# Patient Record
Sex: FEMALE | Race: BLACK OR AFRICAN AMERICAN | NOT HISPANIC OR LATINO
[De-identification: names, ages, dates, MRNs, and addresses within clinical notes are randomized per-mention and may not be internally consistent; named-entity substitution may affect disease eponyms.]

---

## 2019-05-31 ENCOUNTER — RESULT REVIEW (OUTPATIENT)
Age: 54
End: 2019-05-31

## 2019-05-31 ENCOUNTER — APPOINTMENT (OUTPATIENT)
Dept: OBGYN | Facility: CLINIC | Age: 54
End: 2019-05-31
Payer: COMMERCIAL

## 2019-05-31 ENCOUNTER — INPATIENT (INPATIENT)
Facility: HOSPITAL | Age: 54
LOS: 3 days | Discharge: HOME | DRG: 872 | End: 2019-06-04
Attending: INTERNAL MEDICINE | Admitting: STUDENT IN AN ORGANIZED HEALTH CARE EDUCATION/TRAINING PROGRAM
Payer: COMMERCIAL

## 2019-05-31 VITALS
WEIGHT: 156.09 LBS | HEIGHT: 69 IN | OXYGEN SATURATION: 99 % | HEART RATE: 98 BPM | RESPIRATION RATE: 18 BRPM | DIASTOLIC BLOOD PRESSURE: 78 MMHG | SYSTOLIC BLOOD PRESSURE: 133 MMHG | TEMPERATURE: 102 F

## 2019-05-31 LAB
ALBUMIN SERPL ELPH-MCNC: 4.4 G/DL — SIGNIFICANT CHANGE UP (ref 3.3–5)
ALP SERPL-CCNC: 98 U/L — SIGNIFICANT CHANGE UP (ref 40–120)
ALT FLD-CCNC: 54 U/L — HIGH (ref 10–45)
ANION GAP SERPL CALC-SCNC: 14 MMOL/L — SIGNIFICANT CHANGE UP (ref 5–17)
APPEARANCE UR: ABNORMAL
AST SERPL-CCNC: 44 U/L — HIGH (ref 10–40)
BACTERIA # UR AUTO: ABNORMAL
BASOPHILS # BLD AUTO: 0 K/UL — SIGNIFICANT CHANGE UP (ref 0–0.2)
BASOPHILS NFR BLD AUTO: 0.2 % — SIGNIFICANT CHANGE UP (ref 0–2)
BILIRUB SERPL-MCNC: 0.9 MG/DL — SIGNIFICANT CHANGE UP (ref 0.2–1.2)
BILIRUB UR-MCNC: NEGATIVE — SIGNIFICANT CHANGE UP
BUN SERPL-MCNC: 9 MG/DL — SIGNIFICANT CHANGE UP (ref 7–23)
CALCIUM SERPL-MCNC: 9.7 MG/DL — SIGNIFICANT CHANGE UP (ref 8.4–10.5)
CHLORIDE SERPL-SCNC: 94 MMOL/L — LOW (ref 96–108)
CO2 SERPL-SCNC: 23 MMOL/L — SIGNIFICANT CHANGE UP (ref 22–31)
COLOR SPEC: YELLOW — SIGNIFICANT CHANGE UP
CREAT SERPL-MCNC: 0.67 MG/DL — SIGNIFICANT CHANGE UP (ref 0.5–1.3)
DIFF PNL FLD: ABNORMAL
EOSINOPHIL # BLD AUTO: 0 K/UL — SIGNIFICANT CHANGE UP (ref 0–0.5)
EOSINOPHIL NFR BLD AUTO: 0.2 % — SIGNIFICANT CHANGE UP (ref 0–6)
EPI CELLS # UR: 2 /HPF — SIGNIFICANT CHANGE UP
GLUCOSE SERPL-MCNC: 282 MG/DL — HIGH (ref 70–99)
GLUCOSE UR QL: ABNORMAL
HCT VFR BLD CALC: 44.9 % — SIGNIFICANT CHANGE UP (ref 34.5–45)
HGB BLD-MCNC: 15.4 G/DL — SIGNIFICANT CHANGE UP (ref 11.5–15.5)
HYALINE CASTS # UR AUTO: 1 /LPF — SIGNIFICANT CHANGE UP (ref 0–2)
KETONES UR-MCNC: ABNORMAL
LEUKOCYTE ESTERASE UR-ACNC: ABNORMAL
LYMPHOCYTES # BLD AUTO: 1.2 K/UL — SIGNIFICANT CHANGE UP (ref 1–3.3)
LYMPHOCYTES # BLD AUTO: 14.4 % — SIGNIFICANT CHANGE UP (ref 13–44)
MCHC RBC-ENTMCNC: 26.7 PG — LOW (ref 27–34)
MCHC RBC-ENTMCNC: 34.2 GM/DL — SIGNIFICANT CHANGE UP (ref 32–36)
MCV RBC AUTO: 78 FL — LOW (ref 80–100)
MONOCYTES # BLD AUTO: 0.6 K/UL — SIGNIFICANT CHANGE UP (ref 0–0.9)
MONOCYTES NFR BLD AUTO: 7.1 % — SIGNIFICANT CHANGE UP (ref 2–14)
NEUTROPHILS # BLD AUTO: 6.3 K/UL — SIGNIFICANT CHANGE UP (ref 1.8–7.4)
NEUTROPHILS NFR BLD AUTO: 78 % — HIGH (ref 43–77)
NITRITE UR-MCNC: POSITIVE
PH UR: 6 — SIGNIFICANT CHANGE UP (ref 5–8)
PLATELET # BLD AUTO: 112 K/UL — LOW (ref 150–400)
POTASSIUM SERPL-MCNC: 4 MMOL/L — SIGNIFICANT CHANGE UP (ref 3.5–5.3)
POTASSIUM SERPL-SCNC: 4 MMOL/L — SIGNIFICANT CHANGE UP (ref 3.5–5.3)
PROT SERPL-MCNC: 7.8 G/DL — SIGNIFICANT CHANGE UP (ref 6–8.3)
PROT UR-MCNC: ABNORMAL
RBC # BLD: 5.76 M/UL — HIGH (ref 3.8–5.2)
RBC # FLD: 12.1 % — SIGNIFICANT CHANGE UP (ref 10.3–14.5)
RBC CASTS # UR COMP ASSIST: 231 /HPF — HIGH (ref 0–4)
SODIUM SERPL-SCNC: 131 MMOL/L — LOW (ref 135–145)
SP GR SPEC: 1.03 — HIGH (ref 1.01–1.02)
UROBILINOGEN FLD QL: NEGATIVE — SIGNIFICANT CHANGE UP
WBC # BLD: 8 K/UL — SIGNIFICANT CHANGE UP (ref 3.8–10.5)
WBC # FLD AUTO: 8 K/UL — SIGNIFICANT CHANGE UP (ref 3.8–10.5)
WBC UR QL: 374 /HPF — HIGH (ref 0–5)

## 2019-05-31 PROCEDURE — 76856 US EXAM PELVIC COMPLETE: CPT | Mod: 26,59

## 2019-05-31 PROCEDURE — 93975 VASCULAR STUDY: CPT | Mod: 26

## 2019-05-31 PROCEDURE — 99386 PREV VISIT NEW AGE 40-64: CPT

## 2019-05-31 PROCEDURE — 74177 CT ABD & PELVIS W/CONTRAST: CPT | Mod: 26

## 2019-05-31 PROCEDURE — 71046 X-RAY EXAM CHEST 2 VIEWS: CPT | Mod: 26

## 2019-05-31 PROCEDURE — 99285 EMERGENCY DEPT VISIT HI MDM: CPT

## 2019-05-31 RX ORDER — SODIUM CHLORIDE 9 MG/ML
2000 INJECTION INTRAMUSCULAR; INTRAVENOUS; SUBCUTANEOUS ONCE
Refills: 0 | Status: COMPLETED | OUTPATIENT
Start: 2019-05-31 | End: 2019-05-31

## 2019-05-31 RX ORDER — CEFTRIAXONE 500 MG/1
1 INJECTION, POWDER, FOR SOLUTION INTRAMUSCULAR; INTRAVENOUS ONCE
Refills: 0 | Status: COMPLETED | OUTPATIENT
Start: 2019-05-31 | End: 2019-05-31

## 2019-05-31 RX ORDER — ACETAMINOPHEN 500 MG
975 TABLET ORAL ONCE
Refills: 0 | Status: COMPLETED | OUTPATIENT
Start: 2019-05-31 | End: 2019-05-31

## 2019-05-31 RX ADMIN — SODIUM CHLORIDE 2000 MILLILITER(S): 9 INJECTION INTRAMUSCULAR; INTRAVENOUS; SUBCUTANEOUS at 19:00

## 2019-05-31 RX ADMIN — CEFTRIAXONE 100 GRAM(S): 500 INJECTION, POWDER, FOR SOLUTION INTRAMUSCULAR; INTRAVENOUS at 16:38

## 2019-05-31 RX ADMIN — SODIUM CHLORIDE 2000 MILLILITER(S): 9 INJECTION INTRAMUSCULAR; INTRAVENOUS; SUBCUTANEOUS at 16:38

## 2019-05-31 RX ADMIN — Medication 975 MILLIGRAM(S): at 16:37

## 2019-05-31 RX ADMIN — CEFTRIAXONE 1 GRAM(S): 500 INJECTION, POWDER, FOR SOLUTION INTRAMUSCULAR; INTRAVENOUS at 17:15

## 2019-05-31 RX ADMIN — Medication 975 MILLIGRAM(S): at 15:57

## 2019-05-31 NOTE — ED PROVIDER NOTE - CARE PLAN
Principal Discharge DX:	UTI (urinary tract infection)  Secondary Diagnosis:	Endometrial thickening on ultrasound

## 2019-05-31 NOTE — ED PROVIDER NOTE - PROGRESS NOTE DETAILS
Attending MD Gambino: Patient re-evaluated, reports persistent pain, unimproved, reports increasing loose stools

## 2019-05-31 NOTE — ED PROVIDER NOTE - CLINICAL SUMMARY MEDICAL DECISION MAKING FREE TEXT BOX
54 year-old female with history of frequent UTIs presents to the Emergency Department for dysuria with left flank pain; likely pyelonephritis.  Eval. with blood work, antibiotics and TVUS to rule-out CHLOE given discharge/vaginal bleeding.

## 2019-05-31 NOTE — ED PROVIDER NOTE - ATTENDING CONTRIBUTION TO CARE
Attending MD Gambino: I personally have seen and examined this patient.  Resident note reviewed and agree on plan of care and except where noted.  See below for details.     Seen in [place], unaccompanied  Reports PMD retired, did not get new one  Gyn Dr. Chávez      54F with PMH/PSH including C section presents to the ED with fever, L flank pain and dysuria.  Reports 2-3 weeks ago, developed dysuria, hematuria.  Reports started using Monostat at that time thinking it would improve symptoms.  Reports used it only once.  Reports did nothing to symptoms.  Reports L sided back pain started about a week ago.  Reports pain is intermittent, worse with walking, improved with rest.  Reports today went to see Gyn Dr. Chávez when she was sent in for an infection.  Reports temperature at Gyn was 104.  Reports was not given anything for temperature, was sent to the ED.  Reports last UTI was about 10 years ago.  Reports did not feel like this.  Denies abdominal pain, vomiting, reports about 5-6 watery stools today, nonbloody.  Reports had bloody vaginal discharge last night and today.  Reports today Gyn did pelvic exam and did a swab.  LMP 8 yrs ago.  Denies chest pain, shortness of breath, palpitations.  Reports sexually active, with one partner , no history of STIs.  On exam, NAD, head NCAT, PERRL, FROM at neck, no tenderness to midline palpation, no stepoffs along length of spine, lungs CTAB with good inspiratory effort, +S1S2, no m/r/g, abdomen soft with +BS, NT, ND, +L CVAT, moving all extremities with 5/5 strength bilateral upper and lower extremities, good and equal  strength bilaterally, no calf tenderness, swelling, erythema or warmth, skin feels warm to touch; A/P: 54F     TO BE COMPLETED Attending MD Gambino: I personally have seen and examined this patient.  Resident note reviewed and agree on plan of care and except where noted.  See below for details.     Seen in [place], unaccompanied  Reports PMD retired, did not get new one  Gyn Dr. Chávez      54F with PMH/PSH including C section presents to the ED with fever, L flank pain and dysuria.  Reports 2-3 weeks ago, developed dysuria, hematuria.  Reports started using Monostat at that time thinking it would improve symptoms.  Reports used it only once.  Reports did nothing to symptoms.  Reports L sided back pain started about a week ago.  Reports pain is intermittent, worse with walking, improved with rest.  Reports today went to see Gyn Dr. Chávez when she was sent in for an infection.  Reports temperature at Gyn was 104.  Reports was not given anything for temperature, was sent to the ED.  Reports last UTI was about 10 years ago.  Reports did not feel like this.  Denies abdominal pain, vomiting, reports about 5-6 watery stools today, nonbloody.  Reports had bloody vaginal discharge last night and today.  Reports today Gyn did pelvic exam and did a swab.  LMP 8 yrs ago.  Denies chest pain, shortness of breath, palpitations.  Reports sexually active, with one partner , no history of STIs.  On exam, NAD, head NCAT, PERRL, FROM at neck, no tenderness to midline palpation, no stepoffs along length of spine, lungs CTAB with good inspiratory effort, +S1S2, no m/r/g, abdomen soft with +BS, NT, ND, +L CVAT, moving all extremities with 5/5 strength bilateral upper and lower extremities, good and equal  strength bilaterally, no calf tenderness, swelling, erythema or warmth, skin feels warm to touch; A/P: 54F with L flank pain and dysuria, suspect pyelo, will obtain labs, UA, UrCx, also with vaginal discharge/bloody s/p menopause, given this will send for US, post menopausal bleeding always concern for malignancy, +/- CT after labs, will give IVFs, will likely need IV abx, ?CDU

## 2019-05-31 NOTE — ED PROVIDER NOTE - PHYSICAL EXAMINATION
*Gen: NAD, AAO*3  *HEENT: NC/AT, MMM, airway patent, trachea midline  *CV: RRR, S1/S2 present, no murmurs/rubs/gallops  *Resp: no respiratory distress, LCTAB, no wheezing/rales/rhonchi  *Abd: non-distended, soft N/Tx4, no guarding or rigidity, + CVA TTP (left)  *G/U - Pelvic: vagina and cervix without lesions, scant white discharge in vaginal vault, uterus and adnexa/parametria nontender without masses, no cervical motion tenderness; Chaperone: Rozina RN  *Neuro: no focal neuro deficits, moving all limbs appropriately  *Extremities: no gross deformity  *Skin: no rashes, no wounds   ~ Doyle Leos M.D.

## 2019-05-31 NOTE — ED ADULT NURSE NOTE - OBJECTIVE STATEMENT
55 y/o female c/o fever, Left flank pain and dysuria.  Pt reports she developed dysuria, hematuria 2-3 weeks ago and  Left sided back pain about a week ago.  Reports pain is intermittent, worse with walking, improves with rest.   Pt went to see Gyn, had temp of 104, and was sent in for infection.  Reports she was not given anything for temperature by MD.  Reports about 5-6 watery roz bloody stools today.   Denies chest pain, shortness of breath, abd pain, vomiting, palpitations.  Pt moving all extremities.  No acute respiratory distress noted. 53 y/o female c/o fever, Left flank pain and dysuria.  Pt reports she developed dysuria, hematuria 2-3 weeks ago and  Left sided back pain about a week ago.  Reports pain is intermittent, worse with walking, improves with rest.   Pt went to see Gyn, had temp of 104, and was sent in for infection.  Reports she was not given anything for temperature by MD.  Reports about 5-6 watery roz bloody stools today. Reports she also had bloody vaginal discharge last night and today.  Denies chest pain, shortness of breath, abd pain, vomiting, palpitations.  Pt moving all extremities.  No acute respiratory distress noted.

## 2019-05-31 NOTE — ED ADULT NURSE REASSESSMENT NOTE - NS ED NURSE REASSESS COMMENT FT1
Pt denies any pain or discomfort at this time. Awaiting US read and dispo, comfort and safety measures maintained.

## 2019-05-31 NOTE — ED PROVIDER NOTE - OBJECTIVE STATEMENT
54 year-old female with history of 54 year-old female with history of frequent UTIs presents to the Emergency Department for dysuria with left flank pain; fever ongoing for the past 2 weeks.  + vaginal bleeding/discharge has been ongoing during this time.  No fevers, chills, nausea, vomiting, chest pain, shortness of breath.  Seen by ON/GYN today and sent to meseret. sammy in the ER.

## 2019-06-01 DIAGNOSIS — N39.0 URINARY TRACT INFECTION, SITE NOT SPECIFIED: ICD-10-CM

## 2019-06-01 DIAGNOSIS — Z98.891 HISTORY OF UTERINE SCAR FROM PREVIOUS SURGERY: Chronic | ICD-10-CM

## 2019-06-01 DIAGNOSIS — Z29.9 ENCOUNTER FOR PROPHYLACTIC MEASURES, UNSPECIFIED: ICD-10-CM

## 2019-06-01 DIAGNOSIS — K76.0 FATTY (CHANGE OF) LIVER, NOT ELSEWHERE CLASSIFIED: ICD-10-CM

## 2019-06-01 DIAGNOSIS — R73.9 HYPERGLYCEMIA, UNSPECIFIED: ICD-10-CM

## 2019-06-01 DIAGNOSIS — R05 COUGH: ICD-10-CM

## 2019-06-01 DIAGNOSIS — R93.89 ABNORMAL FINDINGS ON DIAGNOSTIC IMAGING OF OTHER SPECIFIED BODY STRUCTURES: ICD-10-CM

## 2019-06-01 LAB
ANION GAP SERPL CALC-SCNC: 11 MMOL/L — SIGNIFICANT CHANGE UP (ref 5–17)
BUN SERPL-MCNC: 9 MG/DL — SIGNIFICANT CHANGE UP (ref 7–23)
CALCIUM SERPL-MCNC: 8.9 MG/DL — SIGNIFICANT CHANGE UP (ref 8.4–10.5)
CHLORIDE SERPL-SCNC: 102 MMOL/L — SIGNIFICANT CHANGE UP (ref 96–108)
CHOLEST SERPL-MCNC: 160 MG/DL — SIGNIFICANT CHANGE UP (ref 10–199)
CO2 SERPL-SCNC: 24 MMOL/L — SIGNIFICANT CHANGE UP (ref 22–31)
CREAT SERPL-MCNC: 0.49 MG/DL — LOW (ref 0.5–1.3)
GLUCOSE BLDC GLUCOMTR-MCNC: 240 MG/DL — HIGH (ref 70–99)
GLUCOSE BLDC GLUCOMTR-MCNC: 299 MG/DL — HIGH (ref 70–99)
GLUCOSE BLDC GLUCOMTR-MCNC: 315 MG/DL — HIGH (ref 70–99)
GLUCOSE SERPL-MCNC: 263 MG/DL — HIGH (ref 70–99)
HCT VFR BLD CALC: 41.2 % — SIGNIFICANT CHANGE UP (ref 34.5–45)
HDLC SERPL-MCNC: 38 MG/DL — LOW
HGB BLD-MCNC: 13.9 G/DL — SIGNIFICANT CHANGE UP (ref 11.5–15.5)
LIPID PNL WITH DIRECT LDL SERPL: 101 MG/DL — HIGH
MCHC RBC-ENTMCNC: 26.5 PG — LOW (ref 27–34)
MCHC RBC-ENTMCNC: 33.8 GM/DL — SIGNIFICANT CHANGE UP (ref 32–36)
MCV RBC AUTO: 78.5 FL — LOW (ref 80–100)
PLATELET # BLD AUTO: 101 K/UL — LOW (ref 150–400)
POTASSIUM SERPL-MCNC: 3.7 MMOL/L — SIGNIFICANT CHANGE UP (ref 3.5–5.3)
POTASSIUM SERPL-SCNC: 3.7 MMOL/L — SIGNIFICANT CHANGE UP (ref 3.5–5.3)
RBC # BLD: 5.24 M/UL — HIGH (ref 3.8–5.2)
RBC # FLD: 12.2 % — SIGNIFICANT CHANGE UP (ref 10.3–14.5)
SODIUM SERPL-SCNC: 137 MMOL/L — SIGNIFICANT CHANGE UP (ref 135–145)
TOTAL CHOLESTEROL/HDL RATIO MEASUREMENT: 4.2 RATIO — SIGNIFICANT CHANGE UP (ref 3.3–7.1)
TRIGL SERPL-MCNC: 106 MG/DL — SIGNIFICANT CHANGE UP (ref 10–149)
WBC # BLD: 5.4 K/UL — SIGNIFICANT CHANGE UP (ref 3.8–10.5)
WBC # FLD AUTO: 5.4 K/UL — SIGNIFICANT CHANGE UP (ref 3.8–10.5)

## 2019-06-01 PROCEDURE — 99223 1ST HOSP IP/OBS HIGH 75: CPT

## 2019-06-01 RX ORDER — CEFTRIAXONE 500 MG/1
1 INJECTION, POWDER, FOR SOLUTION INTRAMUSCULAR; INTRAVENOUS EVERY 24 HOURS
Refills: 0 | Status: DISCONTINUED | OUTPATIENT
Start: 2019-06-01 | End: 2019-06-01

## 2019-06-01 RX ORDER — CEFTRIAXONE 500 MG/1
1 INJECTION, POWDER, FOR SOLUTION INTRAMUSCULAR; INTRAVENOUS EVERY 24 HOURS
Refills: 0 | Status: DISCONTINUED | OUTPATIENT
Start: 2019-06-01 | End: 2019-06-02

## 2019-06-01 RX ORDER — GLUCAGON INJECTION, SOLUTION 0.5 MG/.1ML
1 INJECTION, SOLUTION SUBCUTANEOUS ONCE
Refills: 0 | Status: DISCONTINUED | OUTPATIENT
Start: 2019-06-01 | End: 2019-06-04

## 2019-06-01 RX ORDER — INSULIN LISPRO 100/ML
VIAL (ML) SUBCUTANEOUS AT BEDTIME
Refills: 0 | Status: DISCONTINUED | OUTPATIENT
Start: 2019-06-01 | End: 2019-06-04

## 2019-06-01 RX ORDER — DEXTROSE 50 % IN WATER 50 %
25 SYRINGE (ML) INTRAVENOUS ONCE
Refills: 0 | Status: DISCONTINUED | OUTPATIENT
Start: 2019-06-01 | End: 2019-06-04

## 2019-06-01 RX ORDER — DEXTROSE 50 % IN WATER 50 %
12.5 SYRINGE (ML) INTRAVENOUS ONCE
Refills: 0 | Status: DISCONTINUED | OUTPATIENT
Start: 2019-06-01 | End: 2019-06-04

## 2019-06-01 RX ORDER — SODIUM CHLORIDE 9 MG/ML
1000 INJECTION, SOLUTION INTRAVENOUS
Refills: 0 | Status: DISCONTINUED | OUTPATIENT
Start: 2019-06-01 | End: 2019-06-04

## 2019-06-01 RX ORDER — INSULIN LISPRO 100/ML
VIAL (ML) SUBCUTANEOUS
Refills: 0 | Status: DISCONTINUED | OUTPATIENT
Start: 2019-06-01 | End: 2019-06-04

## 2019-06-01 RX ORDER — DEXTROSE 50 % IN WATER 50 %
15 SYRINGE (ML) INTRAVENOUS ONCE
Refills: 0 | Status: DISCONTINUED | OUTPATIENT
Start: 2019-06-01 | End: 2019-06-04

## 2019-06-01 RX ADMIN — Medication 1: at 21:55

## 2019-06-01 RX ADMIN — Medication 8: at 13:11

## 2019-06-01 RX ADMIN — Medication 4: at 18:24

## 2019-06-01 RX ADMIN — CEFTRIAXONE 100 GRAM(S): 500 INJECTION, POWDER, FOR SOLUTION INTRAMUSCULAR; INTRAVENOUS at 18:41

## 2019-06-01 NOTE — H&P ADULT - ASSESSMENT
This patient is a 54yoF with no significant PMH who presents to the ED with complaint of dysuria and vaginal bleeding, found to have UTI with suspected early pyelonephritis, and endometrial thickening, which will require further evaluation to rule out malignancy. Patient also incidentally noted to have hyperglycemia and hepatic steatosis.

## 2019-06-01 NOTE — H&P ADULT - PROBLEM SELECTOR PLAN 6
VTE ppx: venodyne boots for now, check repeat Hgb in AM given concern for bleeding  Activity: ambulate with assistance  Diet: regular VTE ppx: IMPROVE score of 1 based on decreased mobility. Encourage ambulation  Activity: ambulate with assistance  Diet: regular

## 2019-06-01 NOTE — H&P ADULT - NSHPREVIEWOFSYSTEMS_GEN_ALL_CORE
REVIEW OF SYSTEMS  CONSTITUTIONAL: + fever, + chills, + fatigue, +diaphoresis  EYES: No eye pain, no vision changes  ENMT:  No difficulty hearing, no throat pain  RESPIRATORY: + cough, no wheezing, + sputum production; No shortness of breath  CARDIOVASCULAR: No chest pain, no palpitations, no UNDERWOOD, no leg swelling  GASTROINTESTINAL: No abdominal pain, no nausea, no vomiting, + diarrhea  GENITOURINARY: + dysuria, + hematuria, + suprapubic pain  NEUROLOGICAL: No headaches, no loss of strength, no numbness  SKIN: No itching, no rashes, no lesions   MUSCULOSKELETAL: + L flank pain,  No joint pain, no joint swelling;   HEME/LYMPH: No easy bruising, bleeding

## 2019-06-01 NOTE — H&P ADULT - NSHPPHYSICALEXAM_GEN_ALL_CORE
Vital Signs Last 24 Hrs  T(C): 37.6 (01 Jun 2019 02:36), Max: 39.1 (31 May 2019 12:49)  T(F): 99.6 (01 Jun 2019 02:36), Max: 102.4 (31 May 2019 12:49)  HR: 82 (01 Jun 2019 02:36) (79 - 98)  BP: 129/73 (01 Jun 2019 02:36) (124/68 - 133/78)  BP(mean): --  RR: 16 (01 Jun 2019 02:36) (16 - 18)  SpO2: 97% (01 Jun 2019 02:36) (97% - 99%)    PHYSICAL EXAM:  GENERAL: NAD, well-groomed, well-developed  HEAD:  Atraumatic, Normocephalic  EYES: EOMI, PERRLA, conjunctiva and sclera clear  ENMT: No oropharyngeal exudates, erythema or lesions,  Moist mucous membranes, good dentition  NECK: Supple, no cervical lymphadenopathy  NERVOUS SYSTEM:  Alert & Oriented X3, CN II-XII intact, 5/5 BUE and BLE motor strength, full sensation to light touch   CHEST/LUNG: Clear to auscultation bilaterally; No rales, no  rhonchi, no wheezing  HEART: Regular rate and rhythm; No murmurs, rubs, or gallops  ABDOMEN: Soft, Nontender, Nondistended  : + Left CVAT, +suprapubic tenderness to palpation  EXTREMITIES:  2+ Peripheral Pulses, No clubbing, cyanosis, or edema  LYMPH: No cervical lymphadenopathy noted  SKIN: No rashes or lesions

## 2019-06-01 NOTE — H&P ADULT - PROBLEM SELECTOR PLAN 2
TVUS revealed fibroid uterus, masslike endometrial thickening with internal vascularity. Given patient's age and that she is post-menopause, she will likely require endometrial biopsy to assess for malignancy  - Consult GYN in AM (patient sees Dr. Anyi Chávez) and follow up results of pap smear

## 2019-06-01 NOTE — H&P ADULT - NSHPLABSRESULTS_GEN_ALL_CORE
Labs, imaging and EKG personally reviewed by me.     LABS:                        15.4   8.0   )-----------( 112      ( 31 May 2019 15:50 )             44.9     Hgb Trend: 15.4<--      131<L>  |  94<L>  |  9   ----------------------------<  282<H>  4.0   |  23  |  0.67    Ca    9.7      31 May 2019 15:50    TPro  7.8  /  Alb  4.4  /  TBili  0.9  /  DBili  x   /  AST  44<H>  /  ALT  54<H>  /  AlkPhos  98      Creatinine Trend: 0.67<--    Urinalysis Basic - ( 31 May 2019 15:50 )    Color: Yellow / Appearance: Slightly Turbid / S.035 / pH: x  Gluc: x / Ketone: Moderate  / Bili: Negative / Urobili: Negative   Blood: x / Protein: 30 mg/dL / Nitrite: Positive   Leuk Esterase: Large / RBC: 231 /hpf /  /HPF   Sq Epi: x / Non Sq Epi: 2 /hpf / Bacteria: Moderate    < from: CT Abdomen and Pelvis w/ IV Cont (19 @ 20:15) >    FINDINGS:    LOWER CHEST: Within normal limits.    LIVER: Diffuse hepatic steatosis.  BILE DUCTS: Normal caliber.  GALLBLADDER: Within normal limits.  SPLEEN: Within normal limits.  PANCREAS: Within normal limits.  ADRENALS: Within normal limits.  KIDNEYS/URETERS: Punctate nonobstructing calculus in the left kidney. No hydroureteronephrosis.    BLADDER: Mild wall thickening.  REPRODUCTIVE ORGANS: Enlarged myomatous uterus.    BOWEL: No bowel obstruction. Appendix is normal.  PERITONEUM: No ascites.  VESSELS:  Within normal limits.  RETROPERITONEUM: No lymphadenopathy.    ABDOMINAL WALL: Within normal limits.  BONES: Within normal limits.    IMPRESSION:     Punctate nonobstructing calculus in the left kidney.    Mild thickening of the wall of the urinary bladder. Correlation with urinalysis is suggested to assess for possible infection..    < from: US Doppler Pelvis (19 @ 21:09) >    FINDINGS:    Uterus: 12.8 x 7.4 x 10.2 cm. Heterogeneously enlarged with multiple ill-defined myometrial masses which likely reflect fibroids. Calcified uterine fibroid in the left posterior uterine body.    Endometrium: Masslike endometrial thickening measuring up to 2.5 cm with internal vascularity.    Right ovary: 2.0 x 1.3 x 1.6 cm. Within normal limits. Normal arterial and venous waveforms.    Left ovary: 2.0 x 1.7 x 1.6 cm. Within normal limits. Normal arterial and venous waveforms.    Fluid: None.    Urinary bladder: Mildly distended with wall thickening and internal layering debris.    IMPRESSION:    -Fibroid uterus. Masslike endometrial thickening measuring up to 2.5 cm with internal vascularity. Endometrial carcinoma must be excluded in the setting of postmenopausal vaginal bleeding. Submucosal fibroid may also be considered in the differential diagnosis given the presence of multiple additional uterine fibroids.    Gynecologic follow-up recommended. Consider hysteroscopy and endometrial biopsy as warranted. Contrast-enhanced pelvic MRI would also provide additional characterization.    -Mildly distended urinary bladder with wall thickening and internal layering debris. Correlate with urinalysis.    < end of copied text >

## 2019-06-01 NOTE — H&P ADULT - HISTORY OF PRESENT ILLNESS
This patient is a 54yoF with no significant PMH who presents to the ED with complaint of dysuria and vaginal bleeding. The patient started to develop dysuria with hematuria about 2-3 weeks prior to presentation, with increased urinary frequency (voiding every 30 minutes) and malodorous urine. The patient tried to treat herself with 1 application of OTC Monistat. Her dysuria progressed, and she developed severe pain at the left flank which was worse with movement and improved with rest. Her left flank pain radiates to the groin. She also noted new onset of diarrhea, having 5 loose watery bowel movements daily. She denies nausea, vomiting, but endorsed fever, chills, and diaphoresis.  Pt also complains of intermittent vaginal bleeding and spotting for the last 2-3 weeks, with vaginal discharge. Her last menstrual period was at age 46 or 47. She is sexually active with one partner, her .   Patient had visited her gynecologist on day of ED visit. She was found to be febrile to 104F. Patient had pap smear performed and was directed to ED.     Of note, the patient endorses having persistent cough for the last 3 weeks, productive of clear or yellowish nonbloody phlegm. She denies epistaxis, pharyngitis. Her granddaughter, who was living with her for two weeks, had cold symptoms recently. Denies recent travel.     In the ED:  T102.4F, HR 98, /78, RR 18, SpO2 99%RA

## 2019-06-01 NOTE — H&P ADULT - PROBLEM SELECTOR PLAN 4
Patient was found to have hepatic steatosis.  Check lipid panel and A1C.   Will require follow up with PMD to monitor hepatic steatosis.

## 2019-06-02 LAB
-  AMPICILLIN/SULBACTAM: SIGNIFICANT CHANGE UP
-  CEFAZOLIN: SIGNIFICANT CHANGE UP
-  GENTAMICIN: SIGNIFICANT CHANGE UP
-  OXACILLIN: SIGNIFICANT CHANGE UP
-  PENICILLIN: SIGNIFICANT CHANGE UP
-  RIFAMPIN: SIGNIFICANT CHANGE UP
-  TETRACYCLINE: SIGNIFICANT CHANGE UP
-  TRIMETHOPRIM/SULFAMETHOXAZOLE: SIGNIFICANT CHANGE UP
-  VANCOMYCIN: SIGNIFICANT CHANGE UP
CULTURE RESULTS: SIGNIFICANT CHANGE UP
GLUCOSE BLDC GLUCOMTR-MCNC: 185 MG/DL — HIGH (ref 70–99)
GLUCOSE BLDC GLUCOMTR-MCNC: 228 MG/DL — HIGH (ref 70–99)
GLUCOSE BLDC GLUCOMTR-MCNC: 256 MG/DL — HIGH (ref 70–99)
GLUCOSE BLDC GLUCOMTR-MCNC: 278 MG/DL — HIGH (ref 70–99)
HBA1C BLD-MCNC: 12.1 % — HIGH (ref 4–5.6)
HBA1C BLD-MCNC: 12.3 % — HIGH (ref 4–5.6)
HCT VFR BLD CALC: 39.3 % — SIGNIFICANT CHANGE UP (ref 34.5–45)
HCV AB S/CO SERPL IA: 0.13 S/CO — SIGNIFICANT CHANGE UP (ref 0–0.99)
HCV AB SERPL-IMP: SIGNIFICANT CHANGE UP
HGB BLD-MCNC: 12.8 G/DL — SIGNIFICANT CHANGE UP (ref 11.5–15.5)
MCHC RBC-ENTMCNC: 25.4 PG — LOW (ref 27–34)
MCHC RBC-ENTMCNC: 32.6 GM/DL — SIGNIFICANT CHANGE UP (ref 32–36)
MCV RBC AUTO: 78.1 FL — LOW (ref 80–100)
METHOD TYPE: SIGNIFICANT CHANGE UP
ORGANISM # SPEC MICROSCOPIC CNT: SIGNIFICANT CHANGE UP
ORGANISM # SPEC MICROSCOPIC CNT: SIGNIFICANT CHANGE UP
PLATELET # BLD AUTO: 101 K/UL — LOW (ref 150–400)
RBC # BLD: 5.03 M/UL — SIGNIFICANT CHANGE UP (ref 3.8–5.2)
RBC # FLD: 12.6 % — SIGNIFICANT CHANGE UP (ref 10.3–14.5)
SPECIMEN SOURCE: SIGNIFICANT CHANGE UP
WBC # BLD: 5.06 K/UL — SIGNIFICANT CHANGE UP (ref 3.8–10.5)
WBC # FLD AUTO: 5.06 K/UL — SIGNIFICANT CHANGE UP (ref 3.8–10.5)

## 2019-06-02 RX ORDER — VANCOMYCIN HCL 1 G
1000 VIAL (EA) INTRAVENOUS EVERY 12 HOURS
Refills: 0 | Status: DISCONTINUED | OUTPATIENT
Start: 2019-06-02 | End: 2019-06-03

## 2019-06-02 RX ADMIN — Medication 250 MILLIGRAM(S): at 17:55

## 2019-06-02 RX ADMIN — Medication 1: at 22:20

## 2019-06-02 RX ADMIN — Medication 4: at 08:23

## 2019-06-02 RX ADMIN — Medication 6: at 12:16

## 2019-06-02 RX ADMIN — Medication 2: at 17:27

## 2019-06-03 ENCOUNTER — TRANSCRIPTION ENCOUNTER (OUTPATIENT)
Age: 54
End: 2019-06-03

## 2019-06-03 DIAGNOSIS — E11.9 TYPE 2 DIABETES MELLITUS WITHOUT COMPLICATIONS: ICD-10-CM

## 2019-06-03 LAB
GLUCOSE BLDC GLUCOMTR-MCNC: 171 MG/DL — HIGH (ref 70–99)
GLUCOSE BLDC GLUCOMTR-MCNC: 233 MG/DL — HIGH (ref 70–99)
GLUCOSE BLDC GLUCOMTR-MCNC: 261 MG/DL — HIGH (ref 70–99)
GLUCOSE BLDC GLUCOMTR-MCNC: 262 MG/DL — HIGH (ref 70–99)
HCT VFR BLD CALC: 38.5 % — SIGNIFICANT CHANGE UP (ref 34.5–45)
HGB BLD-MCNC: 12.4 G/DL — SIGNIFICANT CHANGE UP (ref 11.5–15.5)
MCHC RBC-ENTMCNC: 25.1 PG — LOW (ref 27–34)
MCHC RBC-ENTMCNC: 32.2 GM/DL — SIGNIFICANT CHANGE UP (ref 32–36)
MCV RBC AUTO: 77.8 FL — LOW (ref 80–100)
PLATELET # BLD AUTO: 101 K/UL — LOW (ref 150–400)
RBC # BLD: 4.95 M/UL — SIGNIFICANT CHANGE UP (ref 3.8–5.2)
RBC # FLD: 12.4 % — SIGNIFICANT CHANGE UP (ref 10.3–14.5)
WBC # BLD: 4.33 K/UL — SIGNIFICANT CHANGE UP (ref 3.8–10.5)
WBC # FLD AUTO: 4.33 K/UL — SIGNIFICANT CHANGE UP (ref 3.8–10.5)

## 2019-06-03 RX ORDER — INSULIN LISPRO 100/ML
6 VIAL (ML) SUBCUTANEOUS
Refills: 0 | Status: DISCONTINUED | OUTPATIENT
Start: 2019-06-03 | End: 2019-06-04

## 2019-06-03 RX ORDER — INSULIN GLARGINE 100 [IU]/ML
10 INJECTION, SOLUTION SUBCUTANEOUS AT BEDTIME
Refills: 0 | Status: DISCONTINUED | OUTPATIENT
Start: 2019-06-03 | End: 2019-06-04

## 2019-06-03 RX ORDER — CEPHALEXIN 500 MG
500 CAPSULE ORAL
Refills: 0 | Status: DISCONTINUED | OUTPATIENT
Start: 2019-06-03 | End: 2019-06-04

## 2019-06-03 RX ADMIN — Medication 6 UNIT(S): at 17:18

## 2019-06-03 RX ADMIN — Medication 4: at 12:04

## 2019-06-03 RX ADMIN — Medication 500 MILLIGRAM(S): at 17:40

## 2019-06-03 RX ADMIN — Medication 6 UNIT(S): at 12:04

## 2019-06-03 RX ADMIN — Medication 1: at 22:03

## 2019-06-03 RX ADMIN — Medication 2: at 17:17

## 2019-06-03 RX ADMIN — Medication 6: at 08:17

## 2019-06-03 RX ADMIN — Medication 500 MILLIGRAM(S): at 22:03

## 2019-06-03 RX ADMIN — INSULIN GLARGINE 10 UNIT(S): 100 INJECTION, SOLUTION SUBCUTANEOUS at 22:04

## 2019-06-03 RX ADMIN — Medication 500 MILLIGRAM(S): at 12:38

## 2019-06-03 RX ADMIN — Medication 250 MILLIGRAM(S): at 05:36

## 2019-06-03 NOTE — DISCHARGE NOTE NURSING/CASE MANAGEMENT/SOCIAL WORK - NSDCPEWEB_GEN_ALL_CORE
[facility] for Tobacco Control website --- [email or web address] --- [email or web address]

## 2019-06-03 NOTE — CONSULT NOTE ADULT - ASSESSMENT
Assessment  DMT2: 54y Female with newly diagnosed DM T2 with hyperglycemia, A1C 12.1% , blood sugars running high, no hypoglycemic episode,  eating meals,  non compliant with low carb diet.  UTI: on medications, afebrile stable, monitored.    Nahid Shipley MD  Cell: [phone]  Office: [phone]
This patient is a 54yoF with no significant PMH who presents to the ED with complaint of dysuria and vaginal bleeding. The patient started to develop dysuria with hematuria about 2-3 weeks prior to presentation, with increased urinary frequency (voiding every 30 minutes) and malodorous urine. The patient tried to treat herself with 1 application of OTC Monistat. Her dysuria progressed, and she developed severe pain at the left flank which was worse with movement and improved with rest. Her left flank pain radiates to the groin. She also noted new onset of diarrhea, having 5 loose watery bowel movements daily. She denies nausea, vomiting, but endorsed fever, chills, and diaphoresis.  Pt also complains of intermittent vaginal bleeding and spotting for the last 2-3 weeks, with vaginal discharge. Her last menstrual period was at age 46 or 47. She is sexually active with one partner, her .   Patient had visited her gynecologist on day of ED visit. She was found to be febrile to 104F. Patient had pap smear performed and was directed to ED.     Of note, the patient endorses having persistent cough for the last 3 weeks, productive of clear or yellowish nonbloody phlegm. She denies epistaxis, pharyngitis. Her granddaughter, who was living with her for two weeks, had cold symptoms recently. Denies recent travel.     In the ED:  T102.4F, HR 98, /78, RR 18, SpO2 99%GISELA (01 Jun 2019 05:20)        Recommend:    UTI/pyelonephritis:    - Pt with dysuria/freq/urgency and flank pain.  UA (+).  Agree with rocephin.  f/u ucx and bcx.    - monitor for fever, leukocytosis.    - Adjust abx as culture data becomes available.    - GYN following for endometrial biopsy - pt with endometrial thickening and vaginal bleeding.  f/u path.      Will follow,    Lois Reidi  [phone]

## 2019-06-03 NOTE — CHART NOTE - NSCHARTNOTEFT_GEN_A_CORE
Nutrition note     Nutrition verbal consult received per NP for education before discharge as pt diagnosed with DM.     Charts, medications, and labs reviewed - (06/02) HbA1c 12.3%. Pt with good PO intake. Denies acute GI distress, last BM yesterday (06/02). Pt states following an "almost Vegetarian" diet at home.  Provided education on T2DM Vegetarian nutrition therapy. Provided education on foods containing carbohydrates, foods containing proteins, and portion sizes. Stressed the importance of a balanced meal to maintain blood glucose. Encouraged vegetables consumption. Described HbA1c and stressed importance of its normal levels. Encouraged Pt to monitor blood glucose at home at different moments of the day everyday. Discussed how to manage hypoglycemia. Recommended water consumption with avoidance of soda and juice. Recommended limited salt and saturated fat intake. Discussed how to adjust diet recall to recommendations. Pt amenable but in denial of DM, states "I refuse to believe I have DM despite the evidence" with limited interest in education provided and constantly mentioning reasons not to follow recommendations - discussed with NP. Provided handout with education provided and pt made aware RD remains available.     RD remains available.   Lynne Henderson, MS, RDN, #[phone]

## 2019-06-03 NOTE — CONSULT NOTE ADULT - SUBJECTIVE AND OBJECTIVE BOX
HPI:  This patient is a 54yoF with no significant PMH who presents to the ED with complaint of dysuria and vaginal bleeding. The patient started to develop dysuria with hematuria about 2-3 weeks prior to presentation, with increased urinary frequency (voiding every 30 minutes) and malodorous urine. The patient tried to treat herself with 1 application of OTC Monistat. Her dysuria progressed, and she developed severe pain at the left flank which was worse with movement and improved with rest. Her left flank pain radiates to the groin. She also noted new onset of diarrhea, having 5 loose watery bowel movements daily. She denies nausea, vomiting, but endorsed fever, chills, and diaphoresis.  Pt also complains of intermittent vaginal bleeding and spotting for the last 2-3 weeks, with vaginal discharge. Her last menstrual period was at age 46 or 47. She is sexually active with one partner, her .   Patient had visited her gynecologist on day of ED visit. She was found to be febrile to 104F. Patient had pap smear performed and was directed to ED.     Of note, the patient endorses having persistent cough for the last 3 weeks, productive of clear or yellowish nonbloody phlegm. She denies epistaxis, pharyngitis. Her granddaughter, who was living with her for two weeks, had cold symptoms recently. Denies recent travel.     In the ED:  T102.4F, HR 98, /78, RR 18, SpO2 99%RA (2019 05:20)  Patient has no history of diabetes, has family history of diabetes no polyuria polydipsia. Patient follows up with PCP for health management.    PAST MEDICAL & SURGICAL HISTORY:  No pertinent past medical history  History of       FAMILY HISTORY:  FH: type 2 diabetes mellitus: mother      Social History:    Outpatient Medications:    MEDICATIONS  (STANDING):  dextrose 5%. 1000 milliLiter(s) (50 mL/Hr) IV Continuous <Continuous>  dextrose 50% Injectable 12.5 Gram(s) IV Push once  dextrose 50% Injectable 25 Gram(s) IV Push once  dextrose 50% Injectable 25 Gram(s) IV Push once  insulin glargine Injectable (LANTUS) 10 Unit(s) SubCutaneous at bedtime  insulin lispro (HumaLOG) corrective regimen sliding scale   SubCutaneous three times a day before meals  insulin lispro (HumaLOG) corrective regimen sliding scale   SubCutaneous at bedtime  insulin lispro Injectable (HumaLOG) 6 Unit(s) SubCutaneous three times a day before meals  vancomycin  IVPB 1000 milliGRAM(s) IV Intermittent every 12 hours    MEDICATIONS  (PRN):  dextrose 40% Gel 15 Gram(s) Oral once PRN Blood Glucose LESS THAN 70 milliGRAM(s)/deciliter  glucagon  Injectable 1 milliGRAM(s) IntraMuscular once PRN Glucose LESS THAN 70 milligrams/deciliter  guaiFENesin    Syrup 200 milliGRAM(s) Oral every 6 hours PRN Cough      Allergies    Sulf-10 (Rash; Short breath)    Intolerances      Review of Systems:  Constitutional: No fever, no chills  Eyes: No blurry vision  Neuro: No tremors  HEENT: No pain, no neck swelling  Cardiovascular: No chest pain, no palpitations  Respiratory: Has SOB, no cough  GI: No nausea, vomiting, abdominal pain  : No dysuria  Skin: no rash  MSK: Has leg swelling.  Psych: no depression  Endocrine: no polyuria, polydipsia    ALL OTHER SYSTEMS REVIEWED AND NEGATIVE    UNABLE TO OBTAIN    PHYSICAL EXAM:  VITALS: T(C): 36.9 (19 @ 05:12)  T(F): 98.5 (19 @ 05:12), Max: 98.5 (19 @ 05:12)  HR: 67 (19 @ 05:12) (66 - 74)  BP: 122/78 (19 @ 05:12) (121/74 - 135/75)  RR:  (17 - 17)  SpO2:  (97% - 99%)  Wt(kg): --  GENERAL: NAD, well-groomed, well-developed  EYES: No proptosis, no lid lag  HEENT:  Atraumatic, Normocephalic  THYROID: Normal size, no palpable nodules  RESPIRATORY: Clear to auscultation bilaterally; No rales, rhonchi, wheezing  CARDIOVASCULAR: Si S2, No murmurs;  GI: Soft, non distended, normal bowel sounds  SKIN: Dry, intact, No rashes or lesions  MUSCULOSKELETAL: Has BL lower extremity edema.  NEURO:  no tremor, sensation decreased in feet BL,    POCT Blood Glucose.: 278 mg/dL (19 @ 21:55)  POCT Blood Glucose.: 185 mg/dL (19 @ 17:11)  POCT Blood Glucose.: 256 mg/dL (19 @ 12:05)  POCT Blood Glucose.: 228 mg/dL (19 @ 07:49)  POCT Blood Glucose.: 299 mg/dL (19 @ 21:53)  POCT Blood Glucose.: 240 mg/dL (19 @ 17:46)  POCT Blood Glucose.: 315 mg/dL (19 @ 12:51)                            12.8   5.06  )-----------( 101      ( 2019 10:07 )             39.3           137  |  102  |  9   ----------------------------<  263<H>  3.7   |  24  |  0.49<L>    EGFR if : 126  EGFR if non : 109    Ca    8.9          TPro  7.8  /  Alb  4.4  /  TBili  0.9  /  DBili  x   /  AST  44<H>  /  ALT  54<H>  /  AlkPhos  98  05-31      Thyroid Function Tests:      Hemoglobin A1C, Whole Blood: 12.3 % <H> [4.0 - 5.6] (19 @ 10:07)  Hemoglobin A1C, Whole Blood: 12.1 % <H> [4.0 - 5.6] (19 @ 21:52)       Chol 160 <H> HDL 38<L> Trig 106    Radiology:
This patient is a 54yoF with no significant PMH who presents to the ED with complaint of dysuria and vaginal bleeding. The patient started to develop dysuria with hematuria about 2-3 weeks prior to presentation, with increased urinary frequency (voiding every 30 minutes) and malodorous urine. The patient tried to treat herself with 1 application of OTC Monistat. Her dysuria progressed, and she developed severe pain at the left flank which was worse with movement and improved with rest. Her left flank pain radiates to the groin. She also noted new onset of diarrhea, having 5 loose watery bowel movements daily. She denies nausea, vomiting, but endorsed fever, chills, and diaphoresis. Pt also complains of intermittent vaginal bleeding and spotting for the last 2-3 weeks, with vaginal discharge. Her last menstrual period was at age 46 or 47. She is sexually active with one partner, her . Patient had visited Dr. Chávez on day of ED visit. She was found to be febrile to 104F. Patient had pap smear performed and was directed to ED.     Vital Signs Last 24 Hrs  T(C): 37.5 (01 Jun 2019 07:29), Max: 39.1 (31 May 2019 12:49)  T(F): 99.5 (01 Jun 2019 07:29), Max: 102.4 (31 May 2019 12:49)  HR: 78 (01 Jun 2019 07:29) (77 - 98)  BP: 116/65 (01 Jun 2019 07:29) (116/65 - 133/78)  BP(mean): --  RR: 16 (01 Jun 2019 07:29) (16 - 18)  SpO2: 98% (01 Jun 2019 07:29) (97% - 99%)                          13.9   5.4   )-----------( 101      ( 01 Jun 2019 09:25 )             41.2     Gen: NAD  Abd: soft, nt, nd, no rebound or guarding  Perineum: scant dark brown discharge      TVUS   	FINDINGS:    	Uterus: 12.8 x 7.4 x 10.2 cm. Heterogeneously enlarged with multiple ill-defined myometrial masses which likely reflect fibroids. Calcified uterine fibroid in the left posterior uterine body.    	Endometrium: Masslike endometrial thickening measuring up to 2.5 cm with internal vascularity.    	Right ovary: 2.0 x 1.3 x 1.6 cm. Within normal limits. Normal arterial and venous waveforms.    	Left ovary: 2.0 x 1.7 x 1.6 cm. Within normal limits. Normal arterial and venous waveforms.    	Fluid: None.    	Urinary bladder: Mildly distended with wall thickening and internal layering debris.    	IMPRESSION:    	-Fibroid uterus. Masslike endometrial thickening measuring up to 2.5 cm with internal vascularity. Endometrial carcinoma must be excluded in the setting of postmenopausal vaginal bleeding. Submucosal fibroid may also be considered in the differential diagnosis given the presence of multiple additional uterine fibroids.    	Gynecologic follow-up recommended. Consider hysteroscopy and endometrial biopsy as warranted. Contrast-enhanced pelvic MRI would also provide additional characterization.    	-Mildly distended urinary bladder with wall thickening and internal layering debris. Correlate with urinalysis.      Plan:  - Appreciate excellent care from primary team for treatment of suspected pyelonephritis  - Patient actively sees Dr. Chávez regularly in the office, she will be worked up for endometrial thickening and PMB as an outpatient, no need for emergent workup   - Pap smear to be followed up as an outpatient  - Thank you for your consult    GOMEZ Maldonado Fellow
Patient is a 54y old  Female who presents with a chief complaint of dysuria and vaginal bleeding (2019 11:17)      HPI:    This patient is a 54yoF with no significant PMH who presents to the ED with complaint of dysuria and vaginal bleeding. The patient started to develop dysuria with hematuria about 2-3 weeks prior to presentation, with increased urinary frequency (voiding every 30 minutes) and malodorous urine. The patient tried to treat herself with 1 application of OTC Monistat. Her dysuria progressed, and she developed severe pain at the left flank which was worse with movement and improved with rest. Her left flank pain radiates to the groin. She also noted new onset of diarrhea, having 5 loose watery bowel movements daily. She denies nausea, vomiting, but endorsed fever, chills, and diaphoresis.  Pt also complains of intermittent vaginal bleeding and spotting for the last 2-3 weeks, with vaginal discharge. Her last menstrual period was at age 46 or 47. She is sexually active with one partner, her .   Patient had visited her gynecologist on day of ED visit. She was found to be febrile to 104F. Patient had pap smear performed and was directed to ED.     Of note, the patient endorses having persistent cough for the last 3 weeks, productive of clear or yellowish nonbloody phlegm. She denies epistaxis, pharyngitis. Her granddaughter, who was living with her for two weeks, had cold symptoms recently. Denies recent travel.     In the ED:  T102.4F, HR 98, /78, RR 18, SpO2 99%RA (2019 05:20)      REVIEW OF SYSTEMS:    CONSTITUTIONAL: No fever, weight loss, or fatigue  EYES: No eye pain, visual disturbances, or discharge  ENMT:  No sore throat  NECK: No pain or stiffness  RESPIRATORY: No cough, wheezing, chills or hemoptysis; No shortness of breath  CARDIOVASCULAR: No chest pain, palpitations, dizziness, or leg swelling  GASTROINTESTINAL: No abdominal or epigastric pain. No nausea, vomiting, or hematemesis; No diarrhea or constipation. No melena or hematochezia.  GENITOURINARY: No dysuria, frequency, hematuria, or incontinence  NEUROLOGICAL: No headaches, memory loss, loss of strength, numbness, or tremors  SKIN: No itching, burning, rashes, or lesions   LYMPH NODES: No enlarged glands  MUSCULOSKELETAL: No joint pain or swelling; No muscle, back, or extremity pain      PAST MEDICAL & SURGICAL HISTORY:  No pertinent past medical history  History of       Allergies    Sulf-10 (Rash; Short breath)    Intolerances        FAMILY HISTORY:  FH: type 2 diabetes mellitus: mother      SOCIAL HISTORY:        MEDICATIONS  (STANDING):  cefTRIAXone   IVPB 1 Gram(s) IV Intermittent every 24 hours  dextrose 5%. 1000 milliLiter(s) (50 mL/Hr) IV Continuous <Continuous>  dextrose 50% Injectable 12.5 Gram(s) IV Push once  dextrose 50% Injectable 25 Gram(s) IV Push once  dextrose 50% Injectable 25 Gram(s) IV Push once  insulin lispro (HumaLOG) corrective regimen sliding scale   SubCutaneous three times a day before meals  insulin lispro (HumaLOG) corrective regimen sliding scale   SubCutaneous at bedtime    MEDICATIONS  (PRN):  dextrose 40% Gel 15 Gram(s) Oral once PRN Blood Glucose LESS THAN 70 milliGRAM(s)/deciliter  glucagon  Injectable 1 milliGRAM(s) IntraMuscular once PRN Glucose LESS THAN 70 milligrams/deciliter  guaiFENesin    Syrup 200 milliGRAM(s) Oral every 6 hours PRN Cough      Vital Signs Last 24 Hrs  T(C): 37.5 (2019 07:29), Max: 39.1 (31 May 2019 12:49)  T(F): 99.5 (2019 07:29), Max: 102.4 (31 May 2019 12:49)  HR: 78 (2019 07:29) (77 - 98)  BP: 116/65 (2019 07:29) (116/65 - 133/78)  BP(mean): --  RR: 16 (2019 07:29) (16 - 18)  SpO2: 98% (2019 07:29) (97% - 99%)    PHYSICAL EXAM:    GENERAL: NAD, well-groomed  HEAD:  Atraumatic, Normocephalic  EYES: EOMI, PERRLA, conjunctiva and sclera clear  ENMT: No tonsillar erythema, exudates, or enlargement; Moist mucous membranes  NECK: Supple, No JVD  CHEST/LUNG: Clear to percussion bilaterally; No rales, rhonchi, wheezing, or rubs  HEART: Regular rate and rhythm; No murmurs, rubs, or gallops  ABDOMEN: Soft, Nontender, Nondistended; Bowel sounds present  EXTREMITIES:  2+ Peripheral Pulses, No clubbing, cyanosis, or edema  LYMPH: No lymphadenopathy noted  SKIN: No rashes or lesions    LABS:  CBC Full  -  ( 2019 09:25 )  WBC Count : 5.4 K/uL  RBC Count : 5.24 M/uL  Hemoglobin : 13.9 g/dL  Hematocrit : 41.2 %  Platelet Count - Automated : 101 K/uL  Mean Cell Volume : 78.5 fl  Mean Cell Hemoglobin : 26.5 pg  Mean Cell Hemoglobin Concentration : 33.8 gm/dL  Auto Neutrophil # : x  Auto Lymphocyte # : x  Auto Monocyte # : x  Auto Eosinophil # : x  Auto Basophil # : x  Auto Neutrophil % : x  Auto Lymphocyte % : x  Auto Monocyte % : x  Auto Eosinophil % : x  Auto Basophil % : x          137  |  102  |  9   ----------------------------<  263<H>  3.7   |  24  |  0.49<L>    Ca    8.9      2019 09:27    TPro  7.8  /  Alb  4.4  /  TBili  0.9  /  DBili  x   /  AST  44<H>  /  ALT  54<H>  /  AlkPhos  98        LIVER FUNCTIONS - ( 31 May 2019 15:50 )  Alb: 4.4 g/dL / Pro: 7.8 g/dL / ALK PHOS: 98 U/L / ALT: 54 U/L / AST: 44 U/L / GGT: x                               MICROBIOLOGY:        Urinalysis Basic - ( 31 May 2019 15:50 )    Color: Yellow / Appearance: Slightly Turbid / S.035 / pH: x  Gluc: x / Ketone: Moderate  / Bili: Negative / Urobili: Negative   Blood: x / Protein: 30 mg/dL / Nitrite: Positive   Leuk Esterase: Large / RBC: 231 /hpf /  /HPF   Sq Epi: x / Non Sq Epi: 2 /hpf / Bacteria: Moderate                RADIOLOGY:    < from: US Doppler Pelvis (19 @ 21:09) >    FINDINGS:    Uterus: 12.8 x 7.4 x 10.2 cm. Heterogeneously enlarged with multiple   ill-defined myometrial masses which likely reflect fibroids. Calcified   uterine fibroid in the left posterior uterine body.    Endometrium: Masslike endometrial thickening measuring up to 2.5 cm with   internal vascularity.    Right ovary: 2.0 x 1.3 x 1.6 cm. Within normal limits. Normal arterial   and venous waveforms.    Left ovary: 2.0 x 1.7 x 1.6 cm. Within normal limits. Normal arterial and   venous waveforms.    Fluid: None.    Urinary bladder: Mildly distended with wall thickening and internal   layering debris.    IMPRESSION:    -Fibroid uterus. Masslike endometrial thickening measuring up to 2.5 cm   with internal vascularity. Endometrial carcinoma must be excluded in the   setting of postmenopausal vaginal bleeding. Submucosal fibroid may also   be considered in the differential diagnosis given the presence of   multiple additional uterine fibroids.    Gynecologic follow-up recommended. Consider hysteroscopy and endometrial   biopsy as warranted. Contrast-enhanced pelvic MRI would also provide   additional characterization.    -Mildly distended urinary bladder with wall thickening and internal   layering debris. Correlate with urinalysis.    < end of copied text >          < from: CT Abdomen and Pelvis w/ IV Cont (19 @ 20:15) >  EXAM:  CT ABDOMEN AND PELVIS IC                            PROCEDURE DATE:  2019            INTERPRETATION:  CLINICAL INFORMATION: Dysuria and left flank pain 2 to 3   weeks. History of UTI and cough.    COMPARISON: None.    PROCEDURE:   CT of the Abdomen and Pelvis was performed with intravenous contrast.   Intravenous contrast: 90 ml Omnipaque 350. 10 ml discarded.  Oral contrast: None.  Sagittal and coronal reformats were performed.    FINDINGS:    LOWER CHEST: Within normal limits.    LIVER: Diffuse hepatic steatosis.  BILE DUCTS: Normal caliber.  GALLBLADDER: Within normal limits.  SPLEEN: Within normal limits.  PANCREAS: Within normal limits.  ADRENALS: Within normal limits.  KIDNEYS/URETERS: Punctate nonobstructing calculus in the left kidney. No   hydroureteronephrosis.    BLADDER: Mild wall thickening.  REPRODUCTIVE ORGANS: Enlarged myomatous uterus.    BOWEL: No bowel obstruction. Appendix is normal.  PERITONEUM: No ascites.  VESSELS:  Within normal limits.  RETROPERITONEUM: No lymphadenopathy.    ABDOMINAL WALL: Within normal limits.  BONES: Within normal limits.    IMPRESSION:     Punctate nonobstructing calculus in the left kidney.    Mild thickening of the wall of the urinary bladder. Correlation with   urinalysis is suggested to assess for possible infection..    < end of copied text >              < from: Xray Chest 2 Views PA/Lat (19 @ 18:18) >  FINDINGS:    The heart sizeis normal. The cardiomediastinal silhouette is   unremarkable.    The lungs are clear. No pleural effusions or pneumothorax.    IMPRESSION:     Clear lungs.    < end of copied text >

## 2019-06-03 NOTE — DISCHARGE NOTE NURSING/CASE MANAGEMENT/SOCIAL WORK - NSDCDPATPORTLINK_GEN_ALL_CORE
You can access the [facility] Patient Portal, offered by [facility], by registering with the following website: [email or web address]

## 2019-06-04 ENCOUNTER — TRANSCRIPTION ENCOUNTER (OUTPATIENT)
Age: 54
End: 2019-06-04

## 2019-06-04 VITALS
TEMPERATURE: 98 F | SYSTOLIC BLOOD PRESSURE: 126 MMHG | HEART RATE: 78 BPM | DIASTOLIC BLOOD PRESSURE: 78 MMHG | OXYGEN SATURATION: 97 % | RESPIRATION RATE: 17 BRPM

## 2019-06-04 LAB
ALBUMIN SERPL ELPH-MCNC: 3.8 G/DL — SIGNIFICANT CHANGE UP (ref 3.3–5)
ALP SERPL-CCNC: 108 U/L — SIGNIFICANT CHANGE UP (ref 40–120)
ALT FLD-CCNC: 41 U/L — SIGNIFICANT CHANGE UP (ref 10–45)
ANION GAP SERPL CALC-SCNC: 11 MMOL/L — SIGNIFICANT CHANGE UP (ref 5–17)
AST SERPL-CCNC: 32 U/L — SIGNIFICANT CHANGE UP (ref 10–40)
BASOPHILS # BLD AUTO: 0 K/UL — SIGNIFICANT CHANGE UP (ref 0–0.2)
BASOPHILS NFR BLD AUTO: 0.4 % — SIGNIFICANT CHANGE UP (ref 0–2)
BILIRUB SERPL-MCNC: 0.6 MG/DL — SIGNIFICANT CHANGE UP (ref 0.2–1.2)
BUN SERPL-MCNC: 9 MG/DL — SIGNIFICANT CHANGE UP (ref 7–23)
CALCIUM SERPL-MCNC: 9.3 MG/DL — SIGNIFICANT CHANGE UP (ref 8.4–10.5)
CHLORIDE SERPL-SCNC: 99 MMOL/L — SIGNIFICANT CHANGE UP (ref 96–108)
CO2 SERPL-SCNC: 25 MMOL/L — SIGNIFICANT CHANGE UP (ref 22–31)
CREAT SERPL-MCNC: 0.45 MG/DL — LOW (ref 0.5–1.3)
EOSINOPHIL # BLD AUTO: 0.2 K/UL — SIGNIFICANT CHANGE UP (ref 0–0.5)
EOSINOPHIL NFR BLD AUTO: 3.2 % — SIGNIFICANT CHANGE UP (ref 0–6)
GLUCOSE BLDC GLUCOMTR-MCNC: 137 MG/DL — HIGH (ref 70–99)
GLUCOSE BLDC GLUCOMTR-MCNC: 155 MG/DL — HIGH (ref 70–99)
GLUCOSE BLDC GLUCOMTR-MCNC: 231 MG/DL — HIGH (ref 70–99)
GLUCOSE SERPL-MCNC: 263 MG/DL — HIGH (ref 70–99)
HCT VFR BLD CALC: 39.6 % — SIGNIFICANT CHANGE UP (ref 34.5–45)
HGB BLD-MCNC: 12.8 G/DL — SIGNIFICANT CHANGE UP (ref 11.5–15.5)
LYMPHOCYTES # BLD AUTO: 2.3 K/UL — SIGNIFICANT CHANGE UP (ref 1–3.3)
LYMPHOCYTES # BLD AUTO: 47.7 % — HIGH (ref 13–44)
MCHC RBC-ENTMCNC: 25.3 PG — LOW (ref 27–34)
MCHC RBC-ENTMCNC: 32.4 GM/DL — SIGNIFICANT CHANGE UP (ref 32–36)
MCV RBC AUTO: 78.1 FL — LOW (ref 80–100)
MONOCYTES # BLD AUTO: 0.4 K/UL — SIGNIFICANT CHANGE UP (ref 0–0.9)
MONOCYTES NFR BLD AUTO: 7.6 % — SIGNIFICANT CHANGE UP (ref 2–14)
NEUTROPHILS # BLD AUTO: 2 K/UL — SIGNIFICANT CHANGE UP (ref 1.8–7.4)
NEUTROPHILS NFR BLD AUTO: 40.9 % — LOW (ref 43–77)
PLATELET # BLD AUTO: 123 K/UL — LOW (ref 150–400)
POTASSIUM SERPL-MCNC: 3.8 MMOL/L — SIGNIFICANT CHANGE UP (ref 3.5–5.3)
POTASSIUM SERPL-SCNC: 3.8 MMOL/L — SIGNIFICANT CHANGE UP (ref 3.5–5.3)
PROT SERPL-MCNC: 6.7 G/DL — SIGNIFICANT CHANGE UP (ref 6–8.3)
RBC # BLD: 5.07 M/UL — SIGNIFICANT CHANGE UP (ref 3.8–5.2)
RBC # FLD: 12 % — SIGNIFICANT CHANGE UP (ref 10.3–14.5)
SODIUM SERPL-SCNC: 135 MMOL/L — SIGNIFICANT CHANGE UP (ref 135–145)
WBC # BLD: 4.9 K/UL — SIGNIFICANT CHANGE UP (ref 3.8–10.5)
WBC # FLD AUTO: 4.9 K/UL — SIGNIFICANT CHANGE UP (ref 3.8–10.5)

## 2019-06-04 PROCEDURE — 71046 X-RAY EXAM CHEST 2 VIEWS: CPT

## 2019-06-04 PROCEDURE — 82962 GLUCOSE BLOOD TEST: CPT

## 2019-06-04 PROCEDURE — 80053 COMPREHEN METABOLIC PANEL: CPT

## 2019-06-04 PROCEDURE — 87040 BLOOD CULTURE FOR BACTERIA: CPT

## 2019-06-04 PROCEDURE — 99285 EMERGENCY DEPT VISIT HI MDM: CPT | Mod: 25

## 2019-06-04 PROCEDURE — 83036 HEMOGLOBIN GLYCOSYLATED A1C: CPT

## 2019-06-04 PROCEDURE — 86803 HEPATITIS C AB TEST: CPT

## 2019-06-04 PROCEDURE — 74177 CT ABD & PELVIS W/CONTRAST: CPT

## 2019-06-04 PROCEDURE — 76856 US EXAM PELVIC COMPLETE: CPT

## 2019-06-04 PROCEDURE — 85027 COMPLETE CBC AUTOMATED: CPT

## 2019-06-04 PROCEDURE — 80061 LIPID PANEL: CPT

## 2019-06-04 PROCEDURE — 96361 HYDRATE IV INFUSION ADD-ON: CPT

## 2019-06-04 PROCEDURE — 87086 URINE CULTURE/COLONY COUNT: CPT

## 2019-06-04 PROCEDURE — 93975 VASCULAR STUDY: CPT

## 2019-06-04 PROCEDURE — 80048 BASIC METABOLIC PNL TOTAL CA: CPT

## 2019-06-04 PROCEDURE — 87186 SC STD MICRODIL/AGAR DIL: CPT

## 2019-06-04 PROCEDURE — 81001 URINALYSIS AUTO W/SCOPE: CPT

## 2019-06-04 PROCEDURE — 96365 THER/PROPH/DIAG IV INF INIT: CPT | Mod: XU

## 2019-06-04 RX ORDER — CEPHALEXIN 500 MG
1 CAPSULE ORAL
Qty: 48 | Refills: 0
Start: 2019-06-04 | End: 2019-06-15

## 2019-06-04 RX ORDER — SITAGLIPTIN AND METFORMIN HYDROCHLORIDE 500; 50 MG/1; MG/1
1 TABLET, FILM COATED ORAL
Qty: 60 | Refills: 0
Start: 2019-06-04 | End: 2019-07-03

## 2019-06-04 RX ORDER — METFORMIN HYDROCHLORIDE 850 MG/1
1 TABLET ORAL
Qty: 60 | Refills: 0
Start: 2019-06-04 | End: 2019-07-03

## 2019-06-04 RX ORDER — ENOXAPARIN SODIUM 100 MG/ML
18 INJECTION SUBCUTANEOUS
Qty: 1 | Refills: 0
Start: 2019-06-04 | End: 2019-07-03

## 2019-06-04 RX ORDER — ISOPROPYL ALCOHOL, BENZOCAINE .7; .06 ML/ML; ML/ML
1 SWAB TOPICAL
Qty: 100 | Refills: 1
Start: 2019-06-04 | End: 2019-07-23

## 2019-06-04 RX ADMIN — Medication 500 MILLIGRAM(S): at 05:35

## 2019-06-04 RX ADMIN — Medication 500 MILLIGRAM(S): at 12:42

## 2019-06-04 RX ADMIN — Medication 2: at 17:45

## 2019-06-04 RX ADMIN — Medication 6 UNIT(S): at 12:42

## 2019-06-04 RX ADMIN — Medication 6 UNIT(S): at 08:40

## 2019-06-04 RX ADMIN — Medication 6 UNIT(S): at 17:45

## 2019-06-04 RX ADMIN — Medication 500 MILLIGRAM(S): at 17:46

## 2019-06-04 RX ADMIN — Medication 4: at 08:40

## 2019-06-04 NOTE — DISCHARGE NOTE PROVIDER - CARE PROVIDER_API CALL
Anyi Chávez)  Obstetrics and Gynecology  [address] [place] [address], [address]  [place] [address]  Phone: [phone]  Fax: [phone]  Follow Up Time:     Nahid Shipley)  EndocrinologyMetabDiabetes; Internal Medicine  [address] [place], [place] [address]  Phone: [phone]  Fax: [phone]  Follow Up Time: Anyi Chávez)  Obstetrics and Gynecology  [address] [place] [address], [address]  [place] [address]  Phone: [phone]  Fax: [phone]  Follow Up Time: 1 week    Nahid Shipley)  EndocrinologyMetabDiabetes; Internal Medicine  [address] [place], [place] [address]  Phone: [phone]  Fax: [phone]  Follow Up Time: 2 weeks

## 2019-06-04 NOTE — PROGRESS NOTE ADULT - PROVIDER SPECIALTY LIST ADULT
Infectious Disease
Internal Medicine
Endocrinology

## 2019-06-04 NOTE — PROGRESS NOTE ADULT - PROBLEM SELECTOR PLAN 1
Will continue current insulin regimen for now. Will continue monitoring FS, log, will Follow up.  Suggest to DC home on lantus 13u qhs, Metformin 1000mg PO BID, FU 4 weeks, diabetic teaching don, discussed with patient and primary team.  Patient counseled for compliance with consistent low carb diet.

## 2019-06-04 NOTE — PROGRESS NOTE ADULT - SUBJECTIVE AND OBJECTIVE BOX
HPI:  This patient is a 54yoF with no significant PMH who presents to the ED with complaint of dysuria and vaginal bleeding. The patient started to develop dysuria with hematuria about 2-3 weeks prior to presentation, with increased urinary frequency (voiding every 30 minutes) and malodorous urine. The patient tried to treat herself with 1 application of OTC Monistat. Her dysuria progressed, and she developed severe pain at the left flank which was worse with movement and improved with rest. Her left flank pain radiates to the groin. She also noted new onset of diarrhea, having 5 loose watery bowel movements daily. She denies nausea, vomiting, but endorsed fever, chills, and diaphoresis.  Pt also complains of intermittent vaginal bleeding and spotting for the last 2-3 weeks, with vaginal discharge. Her last menstrual period was at age 46 or 47. She is sexually active with one partner, her .   Patient had visited her gynecologist on day of ED visit. She was found to be febrile to 104F. Patient had pap smear performed and was directed to ED.     Of note, the patient endorses having persistent cough for the last 3 weeks, productive of clear or yellowish nonbloody phlegm. She denies epistaxis, pharyngitis. Her granddaughter, who was living with her for two weeks, had cold symptoms recently. Denies recent travel.     In the ED:  T102.4F, HR 98, /78, RR 18, SpO2 99%RA (2019 05:20)  PAST MEDICAL & SURGICAL HISTORY:  No pertinent past medical history  History of       FAMILY HISTORY:  FH: type 2 diabetes mellitus: mother      Social History:    Outpatient Medications:    MEDICATIONS  (STANDING):  cephalexin 500 milliGRAM(s) Oral four times a day  dextrose 5%. 1000 milliLiter(s) (50 mL/Hr) IV Continuous <Continuous>  dextrose 50% Injectable 12.5 Gram(s) IV Push once  dextrose 50% Injectable 25 Gram(s) IV Push once  dextrose 50% Injectable 25 Gram(s) IV Push once  insulin glargine Injectable (LANTUS) 10 Unit(s) SubCutaneous at bedtime  insulin lispro (HumaLOG) corrective regimen sliding scale   SubCutaneous three times a day before meals  insulin lispro (HumaLOG) corrective regimen sliding scale   SubCutaneous at bedtime  insulin lispro Injectable (HumaLOG) 6 Unit(s) SubCutaneous three times a day before meals    MEDICATIONS  (PRN):  dextrose 40% Gel 15 Gram(s) Oral once PRN Blood Glucose LESS THAN 70 milliGRAM(s)/deciliter  glucagon  Injectable 1 milliGRAM(s) IntraMuscular once PRN Glucose LESS THAN 70 milligrams/deciliter  guaiFENesin    Syrup 200 milliGRAM(s) Oral every 6 hours PRN Cough      Allergies    Sulf-10 (Rash; Short breath)    Intolerances      Review of Systems:  Constitutional: No fever, no chills  Eyes: No blurry vision  Neuro: No tremors  HEENT: No pain, no neck swelling  Cardiovascular: No chest pain, no palpitations  Respiratory: Has SOB, no cough  GI: No nausea, vomiting, abdominal pain  : No dysuria  Skin: no rash  MSK: Has leg swelling.  Psych: no depression  Endocrine: no polyuria, polydipsia    ALL OTHER SYSTEMS REVIEWED AND NEGATIVE    UNABLE TO OBTAIN    PHYSICAL EXAM:  VITALS: T(C): 36.7 (19 @ 13:32)  T(F): 98.1 (19 @ 13:32), Max: 98.4 (19 @ 21:15)  HR: 78 (19 @ 13:32) (61 - 78)  BP: 126/78 (19 @ 13:32) (114/72 - 129/73)  RR:  (17 - 18)  SpO2:  (96% - 97%)  Wt(kg): --  GENERAL: NAD, well-groomed, well-developed  EYES: No proptosis, no lid lag  HEENT:  Atraumatic, Normocephalic  THYROID: Normal size, no palpable nodules  RESPIRATORY: Clear to auscultation bilaterally; No rales, rhonchi, wheezing  CARDIOVASCULAR: Si S2, No murmurs;  GI: Soft, non distended, normal bowel sounds  SKIN: Dry, intact, No rashes or lesions  MUSCULOSKELETAL: Has BL lower extremity edema.  NEURO:  no tremor, sensation decreased in feet BL,    POCT Blood Glucose.: 137 mg/dL (19 @ 11:46)  POCT Blood Glucose.: 231 mg/dL (19 @ 08:16)  POCT Blood Glucose.: 262 mg/dL (19 @ 21:39)  POCT Blood Glucose.: 171 mg/dL (19 @ 17:02)  POCT Blood Glucose.: 233 mg/dL (19 @ 11:47)  POCT Blood Glucose.: 261 mg/dL (19 @ 07:59)  POCT Blood Glucose.: 278 mg/dL (19 @ 21:55)  POCT Blood Glucose.: 185 mg/dL (19 @ 17:11)  POCT Blood Glucose.: 256 mg/dL (19 @ 12:05)  POCT Blood Glucose.: 228 mg/dL (19 @ 07:49)  POCT Blood Glucose.: 299 mg/dL (19 @ 21:53)  POCT Blood Glucose.: 240 mg/dL (19 @ 17:46)                            12.8   4.9   )-----------( 123      ( 2019 06:39 )             39.6       06-    135  |  99  |  9   ----------------------------<  263<H>  3.8   |  25  |  0.45<L>    EGFR if : 130  EGFR if non : 112    Ca    9.3      -    TPro  6.7  /  Alb  3.8  /  TBili  0.6  /  DBili  x   /  AST  32  /  ALT  41  /  AlkPhos  108  06-04      Thyroid Function Tests:      Hemoglobin A1C, Whole Blood: 12.3 % <H> [4.0 - 5.6] (19 @ 10:07)  Hemoglobin A1C, Whole Blood: 12.1 % <H> [4.0 - 5.6] (19 @ 21:52)      06- Chol 160 <H> HDL 38<L> Trig 106    Radiology:
INTERVAL HPI/OVERNIGHT EVENTS: I feel fine .   Vital Signs Last 24 Hrs  T(C): 36.6 (03 Jun 2019 12:05), Max: 36.9 (03 Jun 2019 05:12)  T(F): 97.8 (03 Jun 2019 12:05), Max: 98.5 (03 Jun 2019 05:12)  HR: 73 (03 Jun 2019 12:05) (67 - 74)  BP: 133/77 (03 Jun 2019 12:05) (122/78 - 135/75)  BP(mean): --  RR: 17 (03 Jun 2019 12:05) (17 - 17)  SpO2: 97% (03 Jun 2019 12:05) (97% - 99%)  I&O's Summary    02 Jun 2019 07:01  -  03 Jun 2019 07:00  --------------------------------------------------------  IN: 1210 mL / OUT: 0 mL / NET: 1210 mL      MEDICATIONS  (STANDING):  cephalexin 500 milliGRAM(s) Oral four times a day  dextrose 5%. 1000 milliLiter(s) (50 mL/Hr) IV Continuous <Continuous>  dextrose 50% Injectable 12.5 Gram(s) IV Push once  dextrose 50% Injectable 25 Gram(s) IV Push once  dextrose 50% Injectable 25 Gram(s) IV Push once  insulin glargine Injectable (LANTUS) 10 Unit(s) SubCutaneous at bedtime  insulin lispro (HumaLOG) corrective regimen sliding scale   SubCutaneous three times a day before meals  insulin lispro (HumaLOG) corrective regimen sliding scale   SubCutaneous at bedtime  insulin lispro Injectable (HumaLOG) 6 Unit(s) SubCutaneous three times a day before meals    MEDICATIONS  (PRN):  dextrose 40% Gel 15 Gram(s) Oral once PRN Blood Glucose LESS THAN 70 milliGRAM(s)/deciliter  glucagon  Injectable 1 milliGRAM(s) IntraMuscular once PRN Glucose LESS THAN 70 milligrams/deciliter  guaiFENesin    Syrup 200 milliGRAM(s) Oral every 6 hours PRN Cough    LABS:                        12.4   4.33  )-----------( 101      ( 03 Jun 2019 09:05 )             38.5               CAPILLARY BLOOD GLUCOSE      POCT Blood Glucose.: 233 mg/dL (03 Jun 2019 11:47)  POCT Blood Glucose.: 261 mg/dL (03 Jun 2019 07:59)  POCT Blood Glucose.: 278 mg/dL (02 Jun 2019 21:55)  POCT Blood Glucose.: 185 mg/dL (02 Jun 2019 17:11)          REVIEW OF SYSTEMS:  CONSTITUTIONAL: No fever, weight loss, or fatigue  EYES: No eye pain, visual disturbances, or discharge  ENMT:  No difficulty hearing, tinnitus, vertigo; No sinus or throat pain  NECK: No pain or stiffness  RESPIRATORY: No cough, wheezing, chills or hemoptysis; No shortness of breath  CARDIOVASCULAR: No chest pain, palpitations, dizziness, or leg swelling  GASTROINTESTINAL: No abdominal or epigastric pain. No nausea, vomiting, or hematemesis; No diarrhea or constipation. No melena or hematochezia.  GENITOURINARY: No dysuria, frequency, hematuria, or incontinence  NEUROLOGICAL: No headaches, memory loss, loss of strength, numbness, or tremors    Consultant(s) Notes Reviewed:  [x ] YES  [ ] NO    PHYSICAL EXAM:  GENERAL: NAD, well-groomed, well-developed,not in any distress ,  HEAD:  Atraumatic, Normocephalic  EYES: EOMI, PERRLA, conjunctiva and sclera clear  ENMT: No tonsillar erythema, exudates, or enlargement; Moist mucous membranes, Good dentition, No lesions  NECK: Supple, No JVD, Normal thyroid  NERVOUS SYSTEM:  Alert & Oriented X3, No focal deficit   CHEST/LUNG: Good air entry bilateral with no  rales, rhonchi, wheezing, or rubs  HEART: Regular rate and rhythm; No murmurs, rubs, or gallops  ABDOMEN: Soft, Nontender, Nondistended; Bowel sounds present  EXTREMITIES:  2+ Peripheral Pulses, No clubbing, cyanosis, or edema    Care Discussed with Consultants/Other Providers [ x] YES  [ ] NO
INTERVAL HPI/OVERNIGHT EVENTS: I feel fine .  Vital Signs Last 24 Hrs  T(C): 36.7 (04 Jun 2019 13:32), Max: 36.9 (03 Jun 2019 21:15)  T(F): 98.1 (04 Jun 2019 13:32), Max: 98.4 (03 Jun 2019 21:15)  HR: 78 (04 Jun 2019 13:32) (61 - 78)  BP: 126/78 (04 Jun 2019 13:32) (114/72 - 129/73)  BP(mean): --  RR: 17 (04 Jun 2019 13:32) (17 - 18)  SpO2: 97% (04 Jun 2019 13:32) (96% - 97%)  I&O's Summary    04 Jun 2019 07:01  -  04 Jun 2019 14:38  --------------------------------------------------------  IN: 720 mL / OUT: 0 mL / NET: 720 mL      MEDICATIONS  (STANDING):  cephalexin 500 milliGRAM(s) Oral four times a day  dextrose 5%. 1000 milliLiter(s) (50 mL/Hr) IV Continuous <Continuous>  dextrose 50% Injectable 12.5 Gram(s) IV Push once  dextrose 50% Injectable 25 Gram(s) IV Push once  dextrose 50% Injectable 25 Gram(s) IV Push once  insulin glargine Injectable (LANTUS) 10 Unit(s) SubCutaneous at bedtime  insulin lispro (HumaLOG) corrective regimen sliding scale   SubCutaneous three times a day before meals  insulin lispro (HumaLOG) corrective regimen sliding scale   SubCutaneous at bedtime  insulin lispro Injectable (HumaLOG) 6 Unit(s) SubCutaneous three times a day before meals    MEDICATIONS  (PRN):  dextrose 40% Gel 15 Gram(s) Oral once PRN Blood Glucose LESS THAN 70 milliGRAM(s)/deciliter  glucagon  Injectable 1 milliGRAM(s) IntraMuscular once PRN Glucose LESS THAN 70 milligrams/deciliter  guaiFENesin    Syrup 200 milliGRAM(s) Oral every 6 hours PRN Cough    LABS:                        12.8   4.9   )-----------( 123      ( 04 Jun 2019 06:39 )             39.6     06-04    135  |  99  |  9   ----------------------------<  263<H>  3.8   |  25  |  0.45<L>    Ca    9.3      04 Jun 2019 06:37    TPro  6.7  /  Alb  3.8  /  TBili  0.6  /  DBili  x   /  AST  32  /  ALT  41  /  AlkPhos  108  06-04        CAPILLARY BLOOD GLUCOSE      POCT Blood Glucose.: 137 mg/dL (04 Jun 2019 11:46)  POCT Blood Glucose.: 231 mg/dL (04 Jun 2019 08:16)  POCT Blood Glucose.: 262 mg/dL (03 Jun 2019 21:39)  POCT Blood Glucose.: 171 mg/dL (03 Jun 2019 17:02)          REVIEW OF SYSTEMS:  CONSTITUTIONAL: No fever, weight loss, or fatigue  EYES: No eye pain, visual disturbances, or discharge  ENMT:  No difficulty hearing, tinnitus, vertigo; No sinus or throat pain  NECK: No pain or stiffness  RESPIRATORY: No cough, wheezing, chills or hemoptysis; No shortness of breath  CARDIOVASCULAR: No chest pain, palpitations, dizziness, or leg swelling  GASTROINTESTINAL: No abdominal or epigastric pain. No nausea, vomiting, or hematemesis; No diarrhea or constipation. No melena or hematochezia.  GENITOURINARY: No dysuria, frequency, hematuria, or incontinence  NEUROLOGICAL: No headaches, memory loss, loss of strength, numbness, or tremors  SKIN: No itching, burning, rashes, or lesions   LYMPH NODES: No enlarged glands  ENDOCRINE: No heat or cold intolerance; No hair loss  MUSCULOSKELETAL: No joint pain or swelling; No muscle, back, or extremity pain  PSYCHIATRIC: No depression, anxiety, mood swings, or difficulty sleeping  HEME/LYMPH: No easy bruising, or bleeding gums  ALLERY AND IMMUNOLOGIC: No hives or eczema    RADIOLOGY & ADDITIONAL TESTS:    Consultant(s) Notes Reviewed:  [x ] YES  [ ] NO    PHYSICAL EXAM:  GENERAL: NAD, well-groomed, well-developed,not in any distress ,  HEAD:  Atraumatic, Normocephalic  EYES: EOMI, PERRLA, conjunctiva and sclera clear  ENMT: No tonsillar erythema, exudates, or enlargement; Moist mucous membranes, Good dentition, No lesions  NECK: Supple, No JVD, Normal thyroid  NERVOUS SYSTEM:  Alert & Oriented X3, No focal deficit   CHEST/LUNG: Good air entry bilateral with no  rales, rhonchi, wheezing, or rubs  HEART: Regular rate and rhythm; No murmurs, rubs, or gallops  ABDOMEN: Soft, Nontender, Nondistended; Bowel sounds present  EXTREMITIES:  2+ Peripheral Pulses, No clubbing, cyanosis, or edema  SKIN: No rashes or lesions    Care Discussed with Consultants/Other Providers [ x] YES  [ ] NO
INTERVAL HPI/OVERNIGHT EVENTS: I feel fine.   Vital Signs Last 24 Hrs  T(C): 36.8 (02 Jun 2019 20:44), Max: 37.1 (01 Jun 2019 21:45)  T(F): 98.2 (02 Jun 2019 20:44), Max: 98.7 (01 Jun 2019 21:45)  HR: 74 (02 Jun 2019 20:44) (66 - 90)  BP: 135/75 (02 Jun 2019 20:44) (121/74 - 704/65)  BP(mean): --  RR: 17 (02 Jun 2019 20:44) (17 - 18)  SpO2: 97% (02 Jun 2019 20:44) (94% - 97%)  I&O's Summary    01 Jun 2019 07:01  -  02 Jun 2019 07:00  --------------------------------------------------------  IN: 290 mL / OUT: 400 mL / NET: -110 mL    02 Jun 2019 07:01  -  02 Jun 2019 21:41  --------------------------------------------------------  IN: 720 mL / OUT: 0 mL / NET: 720 mL      MEDICATIONS  (STANDING):  dextrose 5%. 1000 milliLiter(s) (50 mL/Hr) IV Continuous <Continuous>  dextrose 50% Injectable 12.5 Gram(s) IV Push once  dextrose 50% Injectable 25 Gram(s) IV Push once  dextrose 50% Injectable 25 Gram(s) IV Push once  insulin lispro (HumaLOG) corrective regimen sliding scale   SubCutaneous three times a day before meals  insulin lispro (HumaLOG) corrective regimen sliding scale   SubCutaneous at bedtime  vancomycin  IVPB 1000 milliGRAM(s) IV Intermittent every 12 hours    MEDICATIONS  (PRN):  dextrose 40% Gel 15 Gram(s) Oral once PRN Blood Glucose LESS THAN 70 milliGRAM(s)/deciliter  glucagon  Injectable 1 milliGRAM(s) IntraMuscular once PRN Glucose LESS THAN 70 milligrams/deciliter  guaiFENesin    Syrup 200 milliGRAM(s) Oral every 6 hours PRN Cough    LABS:                        12.8   5.06  )-----------( 101      ( 02 Jun 2019 10:07 )             39.3     06-01    137  |  102  |  9   ----------------------------<  263<H>  3.7   |  24  |  0.49<L>    Ca    8.9      01 Jun 2019 09:27          CAPILLARY BLOOD GLUCOSE      POCT Blood Glucose.: 185 mg/dL (02 Jun 2019 17:11)  POCT Blood Glucose.: 256 mg/dL (02 Jun 2019 12:05)  POCT Blood Glucose.: 228 mg/dL (02 Jun 2019 07:49)  POCT Blood Glucose.: 299 mg/dL (01 Jun 2019 21:53)          REVIEW OF SYSTEMS:  CONSTITUTIONAL: No fever, weight loss, or fatigue  EYES: No eye pain, visual disturbances, or discharge  ENMT:  No difficulty hearing, tinnitus, vertigo; No sinus or throat pain  NECK: No pain or stiffness  RESPIRATORY: No cough, wheezing, chills or hemoptysis; No shortness of breath  CARDIOVASCULAR: No chest pain, palpitations, dizziness, or leg swelling  GASTROINTESTINAL: No abdominal or epigastric pain. No nausea, vomiting, or hematemesis; No diarrhea or constipation. No melena or hematochezia.  GENITOURINARY: No dysuria, frequency, hematuria, or incontinence  NEUROLOGICAL: No headaches, memory loss, loss of strength, numbness, or tremors:    Consultant(s) Notes Reviewed:  [x ] YES  [ ] NO    PHYSICAL EXAM:  GENERAL: NAD, well-groomed, well-developed, not in any distress ,  HEAD:  Atraumatic, Normocephalic  EYES: EOMI, PERRLA, conjunctiva and sclera clear  ENMT: No tonsillar erythema, exudates, or enlargement; Moist mucous membranes, Good dentition, No lesions  NECK: Supple, No JVD, Normal thyroid  NERVOUS SYSTEM:  Alert & Oriented X3, No focal deficit   CHEST/LUNG: Good air entry bilateral with no  rales, rhonchi, wheezing, or rubs  HEART: Regular rate and rhythm; No murmurs, rubs, or gallops  ABDOMEN: Soft, Nontender, Nondistended; Bowel sounds present  EXTREMITIES:  2+ Peripheral Pulses, No clubbing, cyanosis, or edema    Care Discussed with Consultants/Other Providers [ x] YES  [ ] NO
Infectious Diseases progress note:    Subjective:  NAD, ready to go home.  Afebrile, no acute o/n events.  Feeling much better    ROS:  CONSTITUTIONAL:  No fever, chills, rigors  CARDIOVASCULAR:  No chest pain or palpitations  RESPIRATORY:   No SOB, cough, dyspnea on exertion.  No wheezing  GASTROINTESTINAL:  No abd pain, N/V, diarrhea/constipation  EXTREMITIES:  No swelling or joint pain  GENITOURINARY:  No burning on urination, increased frequency or urgency.  No flank pain  NEUROLOGIC:  No HA, visual disturbances  SKIN: No rashes    Allergies    Sulf-10 (Rash; Short breath)    Intolerances        ANTIBIOTICS/RELEVANT:  antimicrobials  cephalexin 500 milliGRAM(s) Oral four times a day    immunologic:    OTHER:  dextrose 40% Gel 15 Gram(s) Oral once PRN  dextrose 5%. 1000 milliLiter(s) IV Continuous <Continuous>  dextrose 50% Injectable 12.5 Gram(s) IV Push once  dextrose 50% Injectable 25 Gram(s) IV Push once  dextrose 50% Injectable 25 Gram(s) IV Push once  glucagon  Injectable 1 milliGRAM(s) IntraMuscular once PRN  guaiFENesin    Syrup 200 milliGRAM(s) Oral every 6 hours PRN  insulin glargine Injectable (LANTUS) 10 Unit(s) SubCutaneous at bedtime  insulin lispro (HumaLOG) corrective regimen sliding scale   SubCutaneous three times a day before meals  insulin lispro (HumaLOG) corrective regimen sliding scale   SubCutaneous at bedtime  insulin lispro Injectable (HumaLOG) 6 Unit(s) SubCutaneous three times a day before meals      Objective:  Vital Signs Last 24 Hrs  T(C): 36.7 (04 Jun 2019 13:32), Max: 36.7 (04 Jun 2019 04:41)  T(F): 98.1 (04 Jun 2019 13:32), Max: 98.1 (04 Jun 2019 04:41)  HR: 78 (04 Jun 2019 13:32) (61 - 78)  BP: 126/78 (04 Jun 2019 13:32) (126/78 - 129/73)  BP(mean): --  RR: 17 (04 Jun 2019 13:32) (17 - 17)  SpO2: 97% (04 Jun 2019 13:32) (97% - 97%)    PHYSICAL EXAM:  Constitutional:NAD  Eyes:ALMAZ, EOMI  Ear/Nose/Throat: no thrush, mucositis.  Moist mucous membranes	  Neck:no JVD, no lymphadenopathy, supple  Respiratory: CTA iesha  Cardiovascular: S1S2 RRR, no murmurs  Gastrointestinal:soft, nontender,  nondistended (+) BS  Extremities:no e/e/c  Skin:  no rashes, open wounds or ulcerations        LABS:                        12.8   4.9   )-----------( 123      ( 04 Jun 2019 06:39 )             39.6     06-04    135  |  99  |  9   ----------------------------<  263<H>  3.8   |  25  |  0.45<L>    Ca    9.3      04 Jun 2019 06:37    TPro  6.7  /  Alb  3.8  /  TBili  0.6  /  DBili  x   /  AST  32  /  ALT  41  /  AlkPhos  108  06-04            MICROBIOLOGY:    Culture - Urine (05.31.19 @ 22:41)    -  Ampicillin/Sulbactam: S <=8/4    -  Cefazolin: S <=4    -  Gentamicin: S <=4 Should not be used as monotherapy    -  Oxacillin: S <=0.25    -  Penicillin: R 2    -  RIF- Rifampin: S <=1 Should not be used as monotherapy    -  Tetra/Doxy: S <=4    -  Trimethoprim/Sulfamethoxazole: S <=0.5/9.5    -  Vancomycin: S 1    Specimen Source: .Urine    Culture Results:   >100,000 CFU/ml Staphylococcus aureus    Organism Identification: Staphylococcus aureus    Organism: Staphylococcus aureus    Method Type: BJORN    Culture - Blood (05.31.19 @ 22:14)    Specimen Source: .Blood    Culture Results:   No growth to date.    Culture - Blood (05.31.19 @ 22:14)    Specimen Source: .Blood    Culture Results:   No growth to date.          RADIOLOGY & ADDITIONAL STUDIES:
Infectious Diseases progress note:    Subjective: NAD, afebrile.  c/o pain at IV site in RUE.      ROS:  CONSTITUTIONAL:  No fever, chills, rigors  CARDIOVASCULAR:  No chest pain or palpitations  RESPIRATORY:   No SOB, cough, dyspnea on exertion.  No wheezing  GASTROINTESTINAL:  No abd pain, N/V, diarrhea/constipation  EXTREMITIES:  No swelling or joint pain  GENITOURINARY:  No burning on urination, increased frequency or urgency.  No flank pain  NEUROLOGIC:  No HA, visual disturbances  SKIN: No rashes    Allergies    Sulf-10 (Rash; Short breath)    Intolerances        ANTIBIOTICS/RELEVANT:  antimicrobials  cephalexin 500 milliGRAM(s) Oral four times a day    immunologic:    OTHER:  dextrose 40% Gel 15 Gram(s) Oral once PRN  dextrose 5%. 1000 milliLiter(s) IV Continuous <Continuous>  dextrose 50% Injectable 12.5 Gram(s) IV Push once  dextrose 50% Injectable 25 Gram(s) IV Push once  dextrose 50% Injectable 25 Gram(s) IV Push once  glucagon  Injectable 1 milliGRAM(s) IntraMuscular once PRN  guaiFENesin    Syrup 200 milliGRAM(s) Oral every 6 hours PRN  insulin glargine Injectable (LANTUS) 10 Unit(s) SubCutaneous at bedtime  insulin lispro (HumaLOG) corrective regimen sliding scale   SubCutaneous three times a day before meals  insulin lispro (HumaLOG) corrective regimen sliding scale   SubCutaneous at bedtime  insulin lispro Injectable (HumaLOG) 6 Unit(s) SubCutaneous three times a day before meals      Objective:  Vital Signs Last 24 Hrs  T(C): 36.6 (03 Jun 2019 12:05), Max: 36.9 (03 Jun 2019 05:12)  T(F): 97.8 (03 Jun 2019 12:05), Max: 98.5 (03 Jun 2019 05:12)  HR: 73 (03 Jun 2019 12:05) (67 - 74)  BP: 133/77 (03 Jun 2019 12:05) (122/78 - 135/75)  BP(mean): --  RR: 17 (03 Jun 2019 12:05) (17 - 17)  SpO2: 97% (03 Jun 2019 12:05) (97% - 99%)    PHYSICAL EXAM:  Constitutional:NAD  Eyes:ALMAZ, EOMI  Ear/Nose/Throat: no thrush, mucositis.  Moist mucous membranes	  Neck:no JVD, no lymphadenopathy, supple  Respiratory: CTA iesha  Cardiovascular: S1S2 RRR, no murmurs  Gastrointestinal:soft, nontender,  nondistended (+) BS  Extremities:no e/e/c  Skin:  no rashes, open wounds or ulcerations        LABS:                        12.4   4.33  )-----------( 101      ( 03 Jun 2019 09:05 )             38.5                     MICROBIOLOGY:    Culture - Urine (05.31.19 @ 22:41)    -  Ampicillin/Sulbactam: S <=8/4    -  Cefazolin: S <=4    -  Gentamicin: S <=4 Should not be used as monotherapy    -  Oxacillin: S <=0.25    -  Penicillin: R 2    -  RIF- Rifampin: S <=1 Should not be used as monotherapy    -  Tetra/Doxy: S <=4    -  Trimethoprim/Sulfamethoxazole: S <=0.5/9.5    -  Vancomycin: S 1    Specimen Source: .Urine    Culture Results:   >100,000 CFU/ml Staphylococcus aureus    Organism Identification: Staphylococcus aureus    Organism: Staphylococcus aureus    Method Type: BJORN    Culture - Blood (05.31.19 @ 22:14)    Specimen Source: .Blood    Culture Results:   No growth to date.      Culture - Blood (05.31.19 @ 22:14)    Specimen Source: .Blood    Culture Results:   No growth to date.          RADIOLOGY & ADDITIONAL STUDIES:    < from: US Doppler Pelvis (05.31.19 @ 21:09) >    IMPRESSION:    -Fibroid uterus. Masslike endometrial thickening measuring up to 2.5 cm   with internal vascularity. Endometrial carcinoma must be excluded in the   setting of postmenopausal vaginal bleeding. Submucosal fibroid may also   be considered in the differential diagnosis given the presence of   multiple additional uterine fibroids.    Gynecologic follow-up recommended. Consider hysteroscopy and endometrial   biopsy as warranted. Contrast-enhanced pelvic MRI would also provide   additional characterization.    -Mildly distended urinary bladder with wall thickening and internal   layering debris. Correlate with urinalysis.    < end of copied text >
Infectious Diseases progress note:    Subjective: Urinary symptoms improving.  Dysuria resolving.  No new fevers, no leukocytosis.   L flank pain is better.  Ucx (+) GPC    ROS:  CONSTITUTIONAL:  No fever, chills, rigors  CARDIOVASCULAR:  No chest pain or palpitations  RESPIRATORY:   No SOB, cough, dyspnea on exertion.  No wheezing  GASTROINTESTINAL:  No abd pain, N/V, diarrhea/constipation  EXTREMITIES:  No swelling or joint pain  GENITOURINARY:  No burning on urination, increased frequency or urgency.  No flank pain  NEUROLOGIC:  No HA, visual disturbances  SKIN: No rashes    Allergies    Sulf-10 (Rash; Short breath)    Intolerances        ANTIBIOTICS/RELEVANT:  antimicrobials  cefTRIAXone   IVPB 1 Gram(s) IV Intermittent every 24 hours  vancomycin  IVPB 1000 milliGRAM(s) IV Intermittent every 12 hours    immunologic:    OTHER:  dextrose 40% Gel 15 Gram(s) Oral once PRN  dextrose 5%. 1000 milliLiter(s) IV Continuous <Continuous>  dextrose 50% Injectable 12.5 Gram(s) IV Push once  dextrose 50% Injectable 25 Gram(s) IV Push once  dextrose 50% Injectable 25 Gram(s) IV Push once  glucagon  Injectable 1 milliGRAM(s) IntraMuscular once PRN  guaiFENesin    Syrup 200 milliGRAM(s) Oral every 6 hours PRN  insulin lispro (HumaLOG) corrective regimen sliding scale   SubCutaneous three times a day before meals  insulin lispro (HumaLOG) corrective regimen sliding scale   SubCutaneous at bedtime      Objective:  Vital Signs Last 24 Hrs  T(C): 36.8 (02 Jun 2019 12:07), Max: 37.2 (01 Jun 2019 17:21)  T(F): 98.3 (02 Jun 2019 12:07), Max: 98.9 (01 Jun 2019 17:21)  HR: 66 (02 Jun 2019 12:07) (66 - 90)  BP: 121/74 (02 Jun 2019 12:07) (121/74 - 704/65)  BP(mean): --  RR: 17 (02 Jun 2019 12:07) (17 - 18)  SpO2: 97% (02 Jun 2019 12:07) (94% - 99%)    PHYSICAL EXAM:  Constitutional:NAD  Eyes:ALMAZ, EOMI  Ear/Nose/Throat: no thrush, mucositis.  Moist mucous membranes	  Neck:no JVD, no lymphadenopathy, supple  Respiratory: CTA iesha  Cardiovascular: S1S2 RRR, no murmurs  Gastrointestinal:soft, nontender,  nondistended (+) BS  Extremities:no e/e/c  Skin:  no rashes, open wounds or ulcerations        LABS:                        12.8   5.06  )-----------( 101      ( 02 Jun 2019 10:07 )             39.3     06-01    137  |  102  |  9   ----------------------------<  263<H>  3.7   |  24  |  0.49<L>    Ca    8.9      01 Jun 2019 09:27                  MICROBIOLOGY:    Culture - Urine (05.31.19 @ 22:41)    Specimen Source: .Urine    Culture Results:   >100,000 CFU/ml Gram Positive Cocci in Clusters    Culture - Blood (05.31.19 @ 22:14)    Specimen Source: .Blood    Culture Results:   No growth to date.    Culture - Blood (05.31.19 @ 22:14)    Specimen Source: .Blood    Culture Results:   No growth to date.          RADIOLOGY & ADDITIONAL STUDIES:    < from: US Doppler Pelvis (05.31.19 @ 21:09) >  FINDINGS:    Uterus: 12.8 x 7.4 x 10.2 cm. Heterogeneously enlarged with multiple   ill-defined myometrial masses which likely reflect fibroids. Calcified   uterine fibroid in the left posterior uterine body.    Endometrium: Masslike endometrial thickening measuring up to 2.5 cm with   internal vascularity.    Right ovary: 2.0 x 1.3 x 1.6 cm. Within normal limits. Normal arterial   and venous waveforms.    Left ovary: 2.0 x 1.7 x 1.6 cm. Within normal limits. Normal arterial and   venous waveforms.    Fluid: None.    Urinary bladder: Mildly distended with wall thickening and internal   layering debris.    IMPRESSION:    -Fibroid uterus. Masslike endometrial thickening measuring up to 2.5 cm   with internal vascularity. Endometrial carcinoma must be excluded in the   setting of postmenopausal vaginal bleeding. Submucosal fibroid may also   be considered in the differential diagnosis given the presence of   multiple additional uterine fibroids.    Gynecologic follow-up recommended. Consider hysteroscopy and endometrial   biopsy as warranted. Contrast-enhanced pelvic MRI would also provide   additional characterization.    -Mildly distended urinary bladder with wall thickening and internal   layering debris. Correlate with urinalysis.    < end of copied text >
Seen and examined . New dx DM . ID consulted. Gyn helping.

## 2019-06-04 NOTE — DISCHARGE NOTE PROVIDER - NSDCCPCAREPLAN_GEN_ALL_CORE_FT
PRINCIPAL DISCHARGE DIAGNOSIS  Diagnosis: UTI (urinary tract infection)  Assessment and Plan of Treatment: HOME CARE INSTRUCTIONS  f you were prescribed antibiotics, take them exactly as your caregiver instructs you. Finish the medication even if you feel better after you have only taken some of the medication.  Drink enough water and fluids to keep your urine clear or pale yellow.  Avoid caffeine, tea, and carbonated beverages. They tend to irritate your bladder.  Empty your bladder often. Avoid holding urine for long periods of time.  After a bowel movement, women should cleanse from front to back. Use each tissue only once.  SEEK MEDICAL CARE IF:  You have back pain.  You develop a fever.  Your symptoms do not begin to resolve within 3 days.  SEEK IMMEDIATE MEDICAL CARE IF:  You have severe back pain or lower abdominal pain.  You develop chills.  You have nausea or vomiting.  You have continued burning or discomfort with urination.      SECONDARY DISCHARGE DIAGNOSES  Diagnosis: Type 2 diabetes mellitus  Assessment and Plan of Treatment: HgA1C this admission - 12.3  Make sure you get your HgA1c checked every three months.  If you take oral diabetes medications, check your blood glucose two times a day.  If you take insulin, check your blood glucose before meals and at bedtime.  It's important not to skip any meals.  Keep a log of your blood glucose results and always take it with you to your doctor appointments.  Keep a list of your current medications including injectables and over the counter medications and bring this medication list with you to all your doctor appointments.  If you have not seen your ophthalmologist this year call for appointment.  Check your feet daily for redness, sores, or openings. Do not self treat. If no improvement in two days call your primary care physician for an appointment.  Low blood sugar (hypoglycemia) is a blood sugar below 70mg/dl. Check your blood sugar if you feel signs/symptoms of hypoglycemia. If your blood sugar is below 70 take 15 grams of carbohydrates (ex 4 oz of apple juice, 3-4 glucose tablets, or 4-6 oz of regular soda) wait 15 minutes and repeat blood sugar to make sure it comes up above 70.  If your blood sugar is above 70 and you are due for a meal, have a meal.  If you are not due for a meal have a snack.  This snack helps keeps your blood sugar at a safe range.    Diagnosis: Endometrial thickening on ultrasound  Assessment and Plan of Treatment: Outpatient follow up with GYN Dr. Anyi Chávez

## 2019-06-04 NOTE — DISCHARGE NOTE PROVIDER - HOSPITAL COURSE
54yoF with no significant PMH who presents to the ED with complaint of dysuria and vaginal bleeding, found to have UTI with suspected early pyelonephritis, and endometrial thickening, which will require further evaluation to rule out malignancy. Patient also incidentally noted to have hyperglycemia and hepatic steatosis.          Problem/Plan - 1:    ·  Problem: Acute Pyelonephritis with UTI (urinary tract infection).  Plan: Sec to Staph Aureus so Abxs changed to IV Vancomycin. Changed  PO today per ID .     Noted  blood cultures and urine cultures.          Problem/Plan - 2:    ·  Problem: Endometrial thickening on ultrasound.  Plan: GYN consult noted .    - Advised outpt follow up with   Dr. Anyi Chávez .         Problem/Plan - 3:    ·  Problem: Newly DX DM with Hyperglycemia .  Plan: SSI and Endo consulted. outpt f/u 2 weeks. on lantus and janumet          Problem/Plan - 4:    ·  Problem: Hepatic steatosis.  Plan: Patient was found to have hepatic steatosis.    Outp follow up.          Problem/Plan - 5:    ·  Problem: Thrombocytopenia   Plan: stable so needs oupt follow up.

## 2019-06-04 NOTE — DISCHARGE NOTE PROVIDER - PROVIDER TOKENS
PROVIDER:[TOKEN:[[id number]]],PROVIDER:[TOKEN:[[id number]]] PROVIDER:[TOKEN:[[id number]],FOLLOWUP:[1 week]],PROVIDER:[TOKEN:[[id number]],FOLLOWUP:[2 weeks]]

## 2019-06-04 NOTE — PROGRESS NOTE ADULT - ASSESSMENT
Assessment  DMT2: 54y Female with newly diagnosed DM T2 with hyperglycemia, A1C 12.1% , started on basal bolus insulin blood sugars improving, no hypoglycemic episode,  eating meals,  non compliant with low carb diet.  UTI: on medications, afebrile stable, monitored.    Nahid Shipley MD  Cell: [phone]  Office: [phone]
54yoF with no significant PMH who presents to the ED with complaint of dysuria and vaginal bleeding, found to have UTI with suspected early pyelonephritis, and endometrial thickening, which will require further evaluation to rule out malignancy. Patient also incidentally noted to have hyperglycemia and hepatic steatosis.      Problem/Plan - 1:  ·  Problem: Acute Pyelonephritis with UTI (urinary tract infection).  Plan: Sec to Staph Aureus so Abxs changed to IV Vancomycin. Changed  PO today per ID .   Noted  blood cultures and urine cultures.      Problem/Plan - 2:  ·  Problem: Endometrial thickening on ultrasound.  Plan: GYN consult noted .  - Advised outpt follow up with   Dr. Anyi Chávez .     Problem/Plan - 3:  ·  Problem: Newly DX DM with Hyperglycemia .  Plan: SSI and Endo consulted.      Problem/Plan - 4:  ·  Problem: Hepatic steatosis.  Plan: Patient was found to have hepatic steatosis.  Outp follow up.      Problem/Plan - 5:  ·  Problem: Thrombocytopenia   Plan: stable so needs oupt follow up.      Problem/Plan - 6:  Problem: Prophylactic measure. Plan: VTE ppx: IMPROVE score of 1 based on decreased mobility. Encourage ambulation  Activity: ambulate with assistance      Disposition : DC planning home .
54yoF with no significant PMH who presents to the ED with complaint of dysuria and vaginal bleeding, found to have UTI with suspected early pyelonephritis, and endometrial thickening, which will require further evaluation to rule out malignancy. Patient also incidentally noted to have hyperglycemia and hepatic steatosis.      Problem/Plan - 1:  ·  Problem: Acute Pyelonephritis with UTI (urinary tract infection).  Plan: Sec to Staph Aureus so Abxs changed to IV Vancomycin. Changed  PO today per ID .   Noted  blood cultures and urine cultures.      Problem/Plan - 2:  ·  Problem: Endometrial thickening on ultrasound.  Plan: GYN consult noted .  - Advised outpt follow up with   Dr. Anyi Chávez .     Problem/Plan - 3:  ·  Problem: Newly DX DM with Hyperglycemia .  Plan: SSI and Endo helping . Management per him.      Problem/Plan - 4:  ·  Problem: Hepatic steatosis.  Plan: Patient was found to have hepatic steatosis.  Outp follow up.      Problem/Plan - 5:  ·  Problem: Thrombocytopenia   Plan: stable so needs oupt follow up.      Problem/Plan - 6:  Problem: Prophylactic measure. Plan: VTE ppx: IMPROVE score of 1 based on decreased mobility. Encourage ambulation  Activity: ambulate with assistance      Disposition : DC planning home today .
54yoF with no significant PMH who presents to the ED with complaint of dysuria and vaginal bleeding, found to have UTI with suspected early pyelonephritis, and endometrial thickening, which will require further evaluation to rule out malignancy. Patient also incidentally noted to have hyperglycemia and hepatic steatosis.      Problem/Plan - 1:  ·  Problem: UTI (urinary tract infection).  Plan: Sec to Staph Aureus so Abxs changed to IV Vancomycin. Will change to PO per ID . Continue ceftriaxone for UTI.  Noted  blood cultures and urine cultures.      Problem/Plan - 2:  ·  Problem: Endometrial thickening on ultrasound.  Plan: GYN consult noted .  - Advised outpt follow up with   Dr. Anyi Chávez .     Problem/Plan - 3:  ·  Problem: Newly DX DM with Hyperglycemia .  Plan:SSI and Endo consulted.      Problem/Plan - 4:  ·  Problem: Hepatic steatosis.  Plan: Patient was found to have hepatic steatosis.  Outp follow up.      Problem/Plan - 5:  ·  Problem: Thrombocytopenia   Plan: stable so needs oupt follow up.      Problem/Plan - 6:  Problem: Prophylactic measure. Plan: VTE ppx: IMPROVE score of 1 based on decreased mobility. Encourage ambulation  Activity: ambulate with assistance  Diet: regular.  Disposition : DC planning home in 24 to 48hrs.
This patient is a 54yoF with no significant PMH who presents to the ED with complaint of dysuria and vaginal bleeding. The patient started to develop dysuria with hematuria about 2-3 weeks prior to presentation, with increased urinary frequency (voiding every 30 minutes) and malodorous urine. The patient tried to treat herself with 1 application of OTC Monistat. Her dysuria progressed, and she developed severe pain at the left flank which was worse with movement and improved with rest. Her left flank pain radiates to the groin. She also noted new onset of diarrhea, having 5 loose watery bowel movements daily. She denies nausea, vomiting, but endorsed fever, chills, and diaphoresis.  Pt also complains of intermittent vaginal bleeding and spotting for the last 2-3 weeks, with vaginal discharge. Her last menstrual period was at age 46 or 47. She is sexually active with one partner, her .   Patient had visited her gynecologist on day of ED visit. She was found to be febrile to 104F. Patient had pap smear performed and was directed to ED.     Of note, the patient endorses having persistent cough for the last 3 weeks, productive of clear or yellowish nonbloody phlegm. She denies epistaxis, pharyngitis. Her granddaughter, who was living with her for two weeks, had cold symptoms recently. Denies recent travel.     In the ED:  T102.4F, HR 98, /78, RR 18, SpO2 99%GISELA (01 Jun 2019 05:20)        Recommend:    UTI/pyelonephritis:    - Pt with dysuria/freq/urgency and flank pain:  symptoms improving.  UA (+).  Ucx (+) MSSA and bcx NGTD.  Change to PO keflex 500mg q6hrs x total 2 week course for pyelonephritis    - monitor for fever, leukocytosis.    - GYN following for endometrial biopsy - pt with endometrial thickening and vaginal bleeding.  f/u path.    - Pt with pain at RUE pIV site.  d/w RN - will remove line.  Cont to monitor.       Will follow,    Lois Combs  [phone]
This patient is a 54yoF with no significant PMH who presents to the ED with complaint of dysuria and vaginal bleeding. The patient started to develop dysuria with hematuria about 2-3 weeks prior to presentation, with increased urinary frequency (voiding every 30 minutes) and malodorous urine. The patient tried to treat herself with 1 application of OTC Monistat. Her dysuria progressed, and she developed severe pain at the left flank which was worse with movement and improved with rest. Her left flank pain radiates to the groin. She also noted new onset of diarrhea, having 5 loose watery bowel movements daily. She denies nausea, vomiting, but endorsed fever, chills, and diaphoresis.  Pt also complains of intermittent vaginal bleeding and spotting for the last 2-3 weeks, with vaginal discharge. Her last menstrual period was at age 46 or 47. She is sexually active with one partner, her .   Patient had visited her gynecologist on day of ED visit. She was found to be febrile to 104F. Patient had pap smear performed and was directed to ED.     Of note, the patient endorses having persistent cough for the last 3 weeks, productive of clear or yellowish nonbloody phlegm. She denies epistaxis, pharyngitis. Her granddaughter, who was living with her for two weeks, had cold symptoms recently. Denies recent travel.     In the ED:  T102.4F, HR 98, /78, RR 18, SpO2 99%RA (01 Jun 2019 05:20)        Recommend:    UTI/pyelonephritis:    - Pt with dysuria/freq/urgency and flank pain:  symptoms improving.  UA (+).  Ucx (+) GPC clusters and bcx NGTD.  Change from rocephin to vancomycin.  f/u ID and sensis,  change to PO once culture data available.     - monitor for fever, leukocytosis.    - GYN following for endometrial biopsy - pt with endometrial thickening and vaginal bleeding.  f/u path.      Will follow,    Lois Combs  [phone]

## 2019-06-04 NOTE — PROGRESS NOTE ADULT - REASON FOR ADMISSION
dysuria and vaginal bleeding

## 2019-06-05 LAB
CULTURE RESULTS: SIGNIFICANT CHANGE UP
CULTURE RESULTS: SIGNIFICANT CHANGE UP
SPECIMEN SOURCE: SIGNIFICANT CHANGE UP
SPECIMEN SOURCE: SIGNIFICANT CHANGE UP

## 2019-09-04 ENCOUNTER — RESULT REVIEW (OUTPATIENT)
Age: 54
End: 2019-09-04

## 2019-09-04 ENCOUNTER — APPOINTMENT (OUTPATIENT)
Dept: OBGYN | Facility: CLINIC | Age: 54
End: 2019-09-04
Payer: COMMERCIAL

## 2019-09-04 PROCEDURE — 81025 URINE PREGNANCY TEST: CPT

## 2019-09-04 PROCEDURE — 58100 BIOPSY OF UTERUS LINING: CPT

## 2020-07-20 DIAGNOSIS — I10 ESSENTIAL (PRIMARY) HYPERTENSION: ICD-10-CM

## 2021-05-17 ENCOUNTER — APPOINTMENT (OUTPATIENT)
Dept: GYNECOLOGIC ONCOLOGY | Facility: CLINIC | Age: 56
End: 2021-05-17
Payer: COMMERCIAL

## 2021-05-17 VITALS
SYSTOLIC BLOOD PRESSURE: 154 MMHG | DIASTOLIC BLOOD PRESSURE: 75 MMHG | HEIGHT: 69 IN | HEART RATE: 83 BPM | WEIGHT: 151 LBS | BODY MASS INDEX: 22.36 KG/M2

## 2021-05-17 DIAGNOSIS — Z87.898 PERSONAL HISTORY OF OTHER SPECIFIED CONDITIONS: ICD-10-CM

## 2021-05-17 DIAGNOSIS — C54.1 MALIGNANT NEOPLASM OF ENDOMETRIUM: ICD-10-CM

## 2021-05-17 PROCEDURE — 99072 ADDL SUPL MATRL&STAF TM PHE: CPT

## 2021-05-17 PROCEDURE — 99204 OFFICE O/P NEW MOD 45 MIN: CPT

## 2021-05-17 NOTE — REVIEW OF SYSTEMS
[Negative] : Musculoskeletal [Abn Vag Bleeding] : abnormal vaginal bleeding [SOB on Exertion] : shortness of breath during exertion [de-identified] : Prediabetes [FreeTextEntry4] : Postmenopausal bleeding resumed 2/2021, recent UTI s/p antibiotics [de-identified] : Early satiety, occasional bloating [de-identified] : HTN - no meds

## 2021-05-17 NOTE — DISCUSSION/SUMMARY
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 Fung and her daughter.\par \par Given symptoms, there is concern for progression of disease.  I am recommending CT C/A/P to evaluate for interval change.  I discussed that treatment for carcinosarcoma includes both surgical resection and chemotherapy.  Pending results of CT, surgery may not be feasible if there is extensive burden of disease.  She is interested in pursuing treatment now.  I will follow up with patient and her daughter after CT results are available.  Surgical resection if feasible would warrant open approach.  Pathology slides from recent endometrial biopsy will be obtained for review.  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

## 2021-05-17 NOTE — HISTORY OF PRESENT ILLNESS
[FreeTextEntry1] : Referring GYN: Dr. Janae Sparrow\par \par Ms. Fung is a 55 yo  postmenopausal (LMP ) who presents for initial consultation at the request of Dr. Chad Sparrow for the management of endometrial carcinosarcoma. She was hospitalized at [facility] -19 with dysuria and PMB. She was f/w acute pyelonephritis started on ABx and endometrial thickening on US. She was advised to follow up with Dr. Anyi Chávez. Pap smear at the time revealed AGC favor neoplastic.  EMBx performed 19 revealed fragments of serous neoplasm as below and was referred to gyn oncology. She reports a delay in treatment as she felt well, PMB resolved and wanted to seek a second opinion.  Review of records reveals chart note from 3/2020 where patient declined gyn oncology consultation or any treatment.  PMB resumed 2021 and she was seen by Dr. Chad Sparrow for second opinion. EMBx was performed revealing carcinosarcoma and she was referred here for further management.  CT A/P in 2019 revealed no evidence of metastatic disease.\par \par She reports a 10lb weight loss over the past year, early satiety, occasional abdominal bloating, decreased appetite and new UNDERWOOD when climbing stairs. No cough. Reports recent UTI s/p antibiotics. She reports normal bowel and urinary function. She also reports worsening PMB.\par \par PMHx: HTN- no meds, prediabetes\par PSHx: C/S x 2\par Fam Hx: N/A\par \par HCM:\par Mammogram: \par Colonoscopy: never

## 2021-05-17 NOTE — PAST MEDICAL HISTORY
[Postmenopausal] : The patient is postmenopausal [Definite ___ (Date)] : the last menstrual period was [unfilled] [Total Preg ___] : G[unfilled] [Live Births ___] : P[unfilled]

## 2021-05-17 NOTE — PHYSICAL EXAM
[Absent] : CVAT: absent [Normal] : Recto-Vaginal Exam: Normal [Restricted in physically strenuous activity but ambulatory and able to carry out work of a light or sedentary nature] : Status 1- Restricted in physically strenuous activity but ambulatory and able to carry out work of a light or sedentary nature, e.g., light house work, office work [Abnormal] : Uterus: Abnormal [de-identified] : mildly distended [de-identified] : 16 week size uterus

## 2021-05-18 ENCOUNTER — RESULT REVIEW (OUTPATIENT)
Age: 56
End: 2021-05-18

## 2021-05-20 ENCOUNTER — APPOINTMENT (OUTPATIENT)
Dept: CT IMAGING | Facility: IMAGING CENTER | Age: 56
End: 2021-05-20
Payer: COMMERCIAL

## 2021-05-20 ENCOUNTER — OUTPATIENT (OUTPATIENT)
Dept: OUTPATIENT SERVICES | Facility: HOSPITAL | Age: 56
LOS: 1 days | End: 2021-05-20
Payer: COMMERCIAL

## 2021-05-20 DIAGNOSIS — Z98.891 HISTORY OF UTERINE SCAR FROM PREVIOUS SURGERY: Chronic | ICD-10-CM

## 2021-05-20 DIAGNOSIS — C54.1 MALIGNANT NEOPLASM OF ENDOMETRIUM: ICD-10-CM

## 2021-05-20 PROCEDURE — 74177 CT ABD & PELVIS W/CONTRAST: CPT

## 2021-05-20 PROCEDURE — 74177 CT ABD & PELVIS W/CONTRAST: CPT | Mod: 26

## 2021-05-20 PROCEDURE — 71260 CT THORAX DX C+: CPT | Mod: 26

## 2021-05-20 PROCEDURE — 71260 CT THORAX DX C+: CPT

## 2021-05-20 PROCEDURE — 82565 ASSAY OF CREATININE: CPT

## 2021-05-21 ENCOUNTER — OUTPATIENT (OUTPATIENT)
Dept: OUTPATIENT SERVICES | Facility: HOSPITAL | Age: 56
LOS: 1 days | End: 2021-05-21
Payer: COMMERCIAL

## 2021-05-21 DIAGNOSIS — N95.0 POSTMENOPAUSAL BLEEDING: ICD-10-CM

## 2021-05-21 DIAGNOSIS — Z98.891 HISTORY OF UTERINE SCAR FROM PREVIOUS SURGERY: Chronic | ICD-10-CM

## 2021-05-24 ENCOUNTER — RESULT REVIEW (OUTPATIENT)
Age: 56
End: 2021-05-24

## 2021-05-24 ENCOUNTER — OUTPATIENT (OUTPATIENT)
Dept: OUTPATIENT SERVICES | Facility: HOSPITAL | Age: 56
LOS: 1 days | Discharge: HOME | End: 2021-05-24

## 2021-05-24 ENCOUNTER — NON-APPOINTMENT (OUTPATIENT)
Age: 56
End: 2021-05-24

## 2021-05-24 DIAGNOSIS — Z98.891 HISTORY OF UTERINE SCAR FROM PREVIOUS SURGERY: Chronic | ICD-10-CM

## 2021-05-24 DIAGNOSIS — C54.1 MALIGNANT NEOPLASM OF ENDOMETRIUM: ICD-10-CM

## 2021-05-24 PROCEDURE — 88321 CONSLTJ&REPRT SLD PREP ELSWR: CPT

## 2021-05-25 ENCOUNTER — RESULT REVIEW (OUTPATIENT)
Age: 56
End: 2021-05-25

## 2021-05-25 ENCOUNTER — APPOINTMENT (OUTPATIENT)
Dept: HEMATOLOGY ONCOLOGY | Facility: CLINIC | Age: 56
End: 2021-05-25
Payer: COMMERCIAL

## 2021-05-25 ENCOUNTER — LABORATORY RESULT (OUTPATIENT)
Age: 56
End: 2021-05-25

## 2021-05-25 VITALS
WEIGHT: 156.97 LBS | BODY MASS INDEX: 23.25 KG/M2 | HEIGHT: 68.9 IN | DIASTOLIC BLOOD PRESSURE: 75 MMHG | SYSTOLIC BLOOD PRESSURE: 134 MMHG | HEART RATE: 110 BPM | OXYGEN SATURATION: 99 % | TEMPERATURE: 97.9 F | RESPIRATION RATE: 16 BRPM

## 2021-05-25 DIAGNOSIS — C80.1 MALIGNANT (PRIMARY) NEOPLASM, UNSPECIFIED: ICD-10-CM

## 2021-05-25 LAB
BASOPHILS # BLD AUTO: 0.04 K/UL — SIGNIFICANT CHANGE UP (ref 0–0.2)
BASOPHILS NFR BLD AUTO: 0.3 % — SIGNIFICANT CHANGE UP (ref 0–2)
EOSINOPHIL # BLD AUTO: 0.21 K/UL — SIGNIFICANT CHANGE UP (ref 0–0.5)
EOSINOPHIL NFR BLD AUTO: 1.4 % — SIGNIFICANT CHANGE UP (ref 0–6)
HCT VFR BLD CALC: 28 % — LOW (ref 34.5–45)
HGB BLD-MCNC: 8.2 G/DL — LOW (ref 11.5–15.5)
IMM GRANULOCYTES NFR BLD AUTO: 0.9 % — SIGNIFICANT CHANGE UP (ref 0–1.5)
LYMPHOCYTES # BLD AUTO: 1.34 K/UL — SIGNIFICANT CHANGE UP (ref 1–3.3)
LYMPHOCYTES # BLD AUTO: 8.8 % — LOW (ref 13–44)
MCHC RBC-ENTMCNC: 20.6 PG — LOW (ref 27–34)
MCHC RBC-ENTMCNC: 29.3 G/DL — LOW (ref 32–36)
MCV RBC AUTO: 70.4 FL — LOW (ref 80–100)
MONOCYTES # BLD AUTO: 1.45 K/UL — HIGH (ref 0–0.9)
MONOCYTES NFR BLD AUTO: 9.5 % — SIGNIFICANT CHANGE UP (ref 2–14)
NEUTROPHILS # BLD AUTO: 12.02 K/UL — HIGH (ref 1.8–7.4)
NEUTROPHILS NFR BLD AUTO: 79.1 % — HIGH (ref 43–77)
NRBC # BLD: 0 /100 WBCS — SIGNIFICANT CHANGE UP (ref 0–0)
PLATELET # BLD AUTO: 650 K/UL — HIGH (ref 150–400)
RBC # BLD: 3.98 M/UL — SIGNIFICANT CHANGE UP (ref 3.8–5.2)
RBC # FLD: 15.6 % — HIGH (ref 10.3–14.5)
SURGICAL PATHOLOGY STUDY: SIGNIFICANT CHANGE UP
WBC # BLD: 15.2 K/UL — HIGH (ref 3.8–10.5)
WBC # FLD AUTO: 15.2 K/UL — HIGH (ref 3.8–10.5)

## 2021-05-25 PROCEDURE — 99072 ADDL SUPL MATRL&STAF TM PHE: CPT

## 2021-05-25 PROCEDURE — 99205 OFFICE O/P NEW HI 60 MIN: CPT

## 2021-05-25 RX ORDER — TRAMADOL HYDROCHLORIDE 50 MG/1
50 TABLET, COATED ORAL
Qty: 120 | Refills: 0 | Status: ACTIVE | COMMUNITY
Start: 2021-05-25 | End: 1900-01-01

## 2021-05-26 PROBLEM — C80.1 CARCINOSARCOMA: Status: ACTIVE | Noted: 2021-05-17

## 2021-05-26 LAB
ALBUMIN SERPL ELPH-MCNC: 3.1 G/DL
ALP BLD-CCNC: 157 U/L
ALT SERPL-CCNC: 9 U/L
ANION GAP SERPL CALC-SCNC: 13 MMOL/L
APTT BLD: 29.2 SEC
AST SERPL-CCNC: 24 U/L
BILIRUB SERPL-MCNC: 0.4 MG/DL
BUN SERPL-MCNC: 8 MG/DL
CALCIUM SERPL-MCNC: 9.3 MG/DL
CANCER AG125 SERPL-ACNC: 639 U/ML
CEA SERPL-MCNC: 1.3 NG/ML
CHLORIDE SERPL-SCNC: 95 MMOL/L
CO2 SERPL-SCNC: 22 MMOL/L
CREAT SERPL-MCNC: 0.4 MG/DL
GLUCOSE SERPL-MCNC: 290 MG/DL
HAV IGM SER QL: NONREACTIVE
HBV CORE IGM SER QL: NONREACTIVE
HBV SURFACE AG SER QL: NONREACTIVE
HCV AB SER QL: NONREACTIVE
HCV S/CO RATIO: 0.17 S/CO
INR PPP: 1.39 RATIO
MAGNESIUM SERPL-MCNC: 2.2 MG/DL
POTASSIUM SERPL-SCNC: 5.7 MMOL/L
PROT SERPL-MCNC: 6.3 G/DL
PT BLD: 16.2 SEC
SODIUM SERPL-SCNC: 130 MMOL/L

## 2021-05-26 RX ORDER — DEXAMETHASONE 4 MG/1
4 TABLET ORAL
Qty: 10 | Refills: 0 | Status: ACTIVE | COMMUNITY
Start: 2021-05-26 | End: 1900-01-01

## 2021-05-26 RX ORDER — PROCHLORPERAZINE MALEATE 10 MG/1
10 TABLET ORAL EVERY 6 HOURS
Qty: 30 | Refills: 2 | Status: ACTIVE | COMMUNITY
Start: 2021-05-26 | End: 1900-01-01

## 2021-05-26 NOTE — HISTORY OF PRESENT ILLNESS
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 Fung is a 55 yo  postmenopausal (LMP ) who presents for initial consultation at the request of Dr. Chad Sparrow for the management of endometrial carcinosarcoma. She was hospitalized at [facility] -19 with dysuria and PMB. She was f/w acute pyelonephritis started on ABx and endometrial thickening on US. She was advised to follow up with Dr. Anyi Chávez. Pap smear at the time revealed AGC favor neoplastic. EMBx performed 19 revealed fragments of serous neoplasm as below and was referred to gyn oncology. She reports a delay in treatment as she felt well, PMB resolved and wanted to seek a second opinion. Review of records reveals chart note from 3/2020 where patient declined gyn oncology consultation or any treatment. PMB resumed 2021 and she was seen by Dr. Chad Sparrow for second opinion. EMBx was performed revealing carcinosarcoma and she was referred here for further management. CT A/P in 2019 revealed no evidence of metastatic disease.\par \par She reports a 10lb weight loss over the past year, early satiety, occasional abdominal bloating, decreased appetite and new UNDERWOOD when climbing stairs. No cough. Reports recent UTI s/p antibiotics. She reports normal bowel and urinary function. She also reports worsening PMB.\par She had scans as part of the work up by Dr. Veronica.\par 21 CT C/A/P: Extensive omental caking and peritoneal carcinomatosis. Retroperitoneal and pelvic adenopathy. Serosal implants. Bone metastases. Heterogeneously enhancing endometrial mass, as described on pelvic ultrasound of May 31, 2019, compatible with known malignancy.\par Patient not a surgical candidate and referred to med onc for systemic chemotherapy.\par  [de-identified] : Patient has abdominal pain and back pain . She takes Tylenol with some relief.\par She is , has four children. Denies any drinking or smoking. She is a retired teacher.\par No OC or HRT.\par Menarche: 13\par Menopause: 51\par First pregnancy: 19\par Mammogram: 4 yrs ago\par Colonoscopy: never.

## 2021-05-26 NOTE — PHYSICAL EXAM
[Ambulatory and capable of all self care but unable to carry out any work activities] : Status 2- Ambulatory and capable of all self care but unable to carry out any work activities. Up and about more than 50% of waking hours [Normal] : affect appropriate [de-identified] : distended, NT, BS present.

## 2021-05-26 NOTE — CONSULT LETTER
[Dear  ___] : Dear  [unfilled], [Consult Letter:] : I had the pleasure of evaluating your patient, [unfilled]. [Consult Closing:] : Thank you very much for allowing me to participate in the care of this patient.  If you have any questions, please do not hesitate to contact me. [Sincerely,] : Sincerely, [DrAntonia  ___] : Dr. BACK [DrAntonia ___] : Dr. BACK

## 2021-05-26 NOTE — REASON FOR VISIT
[Initial Consultation] : an initial consultation [Other: _____] : [unfilled] [FreeTextEntry2] : metastatic uterine MMT

## 2021-06-01 ENCOUNTER — INPATIENT (INPATIENT)
Facility: HOSPITAL | Age: 56
LOS: 16 days | Discharge: HOME HEALTH | End: 2021-06-18
Attending: INTERNAL MEDICINE | Admitting: INTERNAL MEDICINE
Payer: COMMERCIAL

## 2021-06-01 VITALS
TEMPERATURE: 97 F | SYSTOLIC BLOOD PRESSURE: 139 MMHG | OXYGEN SATURATION: 100 % | HEIGHT: 69 IN | HEART RATE: 111 BPM | RESPIRATION RATE: 18 BRPM | DIASTOLIC BLOOD PRESSURE: 72 MMHG

## 2021-06-01 DIAGNOSIS — Z98.891 HISTORY OF UTERINE SCAR FROM PREVIOUS SURGERY: Chronic | ICD-10-CM

## 2021-06-01 DIAGNOSIS — R18.8 OTHER ASCITES: ICD-10-CM

## 2021-06-01 DIAGNOSIS — E11.9 TYPE 2 DIABETES MELLITUS WITHOUT COMPLICATIONS: ICD-10-CM

## 2021-06-01 DIAGNOSIS — C54.1 MALIGNANT NEOPLASM OF ENDOMETRIUM: ICD-10-CM

## 2021-06-01 DIAGNOSIS — C56.9 MALIGNANT NEOPLASM OF UNSPECIFIED OVARY: ICD-10-CM

## 2021-06-01 DIAGNOSIS — Z29.9 ENCOUNTER FOR PROPHYLACTIC MEASURES, UNSPECIFIED: ICD-10-CM

## 2021-06-01 DIAGNOSIS — D64.9 ANEMIA, UNSPECIFIED: ICD-10-CM

## 2021-06-01 PROBLEM — Z87.42 PERSONAL HISTORY OF OTHER DISEASES OF THE FEMALE GENITAL TRACT: Chronic | Status: ACTIVE | Noted: 2021-05-27

## 2021-06-01 PROBLEM — Z86.69 PERSONAL HISTORY OF OTHER DISEASES OF THE NERVOUS SYSTEM AND SENSE ORGANS: Chronic | Status: ACTIVE | Noted: 2021-05-27

## 2021-06-01 LAB
ALBUMIN SERPL ELPH-MCNC: 2.7 G/DL — LOW (ref 3.3–5)
ALP SERPL-CCNC: 192 U/L — HIGH (ref 40–120)
ALT FLD-CCNC: 33 U/L — SIGNIFICANT CHANGE UP (ref 4–33)
ANION GAP SERPL CALC-SCNC: 15 MMOL/L — HIGH (ref 7–14)
APTT BLD: 25.5 SEC — LOW (ref 27–36.3)
AST SERPL-CCNC: 62 U/L — HIGH (ref 4–32)
BILIRUB SERPL-MCNC: 0.6 MG/DL — SIGNIFICANT CHANGE UP (ref 0.2–1.2)
BLOOD GAS VENOUS COMPREHENSIVE RESULT: SIGNIFICANT CHANGE UP
BUN SERPL-MCNC: 9 MG/DL — SIGNIFICANT CHANGE UP (ref 7–23)
CALCIUM SERPL-MCNC: 9.3 MG/DL — SIGNIFICANT CHANGE UP (ref 8.4–10.5)
CHLORIDE SERPL-SCNC: 94 MMOL/L — LOW (ref 98–107)
CO2 SERPL-SCNC: 20 MMOL/L — LOW (ref 22–31)
CREAT SERPL-MCNC: 0.35 MG/DL — LOW (ref 0.5–1.3)
FERRITIN SERPL-MCNC: 656 NG/ML — HIGH (ref 15–150)
GLUCOSE BLDC GLUCOMTR-MCNC: 229 MG/DL — HIGH (ref 70–99)
GLUCOSE BLDC GLUCOMTR-MCNC: 262 MG/DL — HIGH (ref 70–99)
GLUCOSE SERPL-MCNC: 338 MG/DL — HIGH (ref 70–99)
HCT VFR BLD CALC: 27.6 % — LOW (ref 34.5–45)
HGB BLD-MCNC: 7.7 G/DL — LOW (ref 11.5–15.5)
INR BLD: 1.51 RATIO — HIGH (ref 0.88–1.16)
IRON SATN MFR SERPL: 17 UG/DL — LOW (ref 30–160)
IRON SATN MFR SERPL: 8 % — LOW (ref 14–50)
MCHC RBC-ENTMCNC: 19.6 PG — LOW (ref 27–34)
MCHC RBC-ENTMCNC: 27.9 GM/DL — LOW (ref 32–36)
MCV RBC AUTO: 70.4 FL — LOW (ref 80–100)
NRBC # BLD: 0 /100 WBCS — SIGNIFICANT CHANGE UP
NRBC # FLD: 0 K/UL — SIGNIFICANT CHANGE UP
PLATELET # BLD AUTO: 569 K/UL — HIGH (ref 150–400)
POTASSIUM SERPL-MCNC: 4.6 MMOL/L — SIGNIFICANT CHANGE UP (ref 3.5–5.3)
POTASSIUM SERPL-SCNC: 4.6 MMOL/L — SIGNIFICANT CHANGE UP (ref 3.5–5.3)
PROT SERPL-MCNC: 6.9 G/DL — SIGNIFICANT CHANGE UP (ref 6–8.3)
PROTHROM AB SERPL-ACNC: 16.9 SEC — HIGH (ref 10.6–13.6)
RBC # BLD: 3.92 M/UL — SIGNIFICANT CHANGE UP (ref 3.8–5.2)
RBC # FLD: 16.7 % — HIGH (ref 10.3–14.5)
SARS-COV-2 RNA SPEC QL NAA+PROBE: SIGNIFICANT CHANGE UP
SODIUM SERPL-SCNC: 129 MMOL/L — LOW (ref 135–145)
TIBC SERPL-MCNC: 204 UG/DL — LOW (ref 220–430)
UIBC SERPL-MCNC: 187 UG/DL — SIGNIFICANT CHANGE UP (ref 110–370)
WBC # BLD: 16.42 K/UL — HIGH (ref 3.8–10.5)
WBC # FLD AUTO: 16.42 K/UL — HIGH (ref 3.8–10.5)

## 2021-06-01 PROCEDURE — 93010 ELECTROCARDIOGRAM REPORT: CPT

## 2021-06-01 PROCEDURE — 99223 1ST HOSP IP/OBS HIGH 75: CPT

## 2021-06-01 PROCEDURE — 71045 X-RAY EXAM CHEST 1 VIEW: CPT | Mod: 26

## 2021-06-01 PROCEDURE — 99285 EMERGENCY DEPT VISIT HI MDM: CPT | Mod: 25

## 2021-06-01 PROCEDURE — 74177 CT ABD & PELVIS W/CONTRAST: CPT | Mod: 26

## 2021-06-01 RX ORDER — POLYETHYLENE GLYCOL 3350 17 G/17G
17 POWDER, FOR SOLUTION ORAL DAILY
Refills: 0 | Status: DISCONTINUED | OUTPATIENT
Start: 2021-06-01 | End: 2021-06-08

## 2021-06-01 RX ORDER — SODIUM CHLORIDE 9 MG/ML
1000 INJECTION, SOLUTION INTRAVENOUS
Refills: 0 | Status: DISCONTINUED | OUTPATIENT
Start: 2021-06-01 | End: 2021-06-18

## 2021-06-01 RX ORDER — MORPHINE SULFATE 50 MG/1
4 CAPSULE, EXTENDED RELEASE ORAL ONCE
Refills: 0 | Status: DISCONTINUED | OUTPATIENT
Start: 2021-06-01 | End: 2021-06-01

## 2021-06-01 RX ORDER — MORPHINE SULFATE 50 MG/1
2 CAPSULE, EXTENDED RELEASE ORAL EVERY 4 HOURS
Refills: 0 | Status: DISCONTINUED | OUTPATIENT
Start: 2021-06-01 | End: 2021-06-03

## 2021-06-01 RX ORDER — DEXTROSE 50 % IN WATER 50 %
25 SYRINGE (ML) INTRAVENOUS ONCE
Refills: 0 | Status: DISCONTINUED | OUTPATIENT
Start: 2021-06-01 | End: 2021-06-18

## 2021-06-01 RX ORDER — INSULIN LISPRO 100/ML
VIAL (ML) SUBCUTANEOUS
Refills: 0 | Status: DISCONTINUED | OUTPATIENT
Start: 2021-06-01 | End: 2021-06-02

## 2021-06-01 RX ORDER — DEXTROSE 50 % IN WATER 50 %
15 SYRINGE (ML) INTRAVENOUS ONCE
Refills: 0 | Status: DISCONTINUED | OUTPATIENT
Start: 2021-06-01 | End: 2021-06-18

## 2021-06-01 RX ORDER — SODIUM CHLORIDE 9 MG/ML
1000 INJECTION INTRAMUSCULAR; INTRAVENOUS; SUBCUTANEOUS ONCE
Refills: 0 | Status: COMPLETED | OUTPATIENT
Start: 2021-06-01 | End: 2021-06-01

## 2021-06-01 RX ORDER — ENOXAPARIN SODIUM 100 MG/ML
40 INJECTION SUBCUTANEOUS DAILY
Refills: 0 | Status: DISCONTINUED | OUTPATIENT
Start: 2021-06-01 | End: 2021-06-03

## 2021-06-01 RX ORDER — ACETAMINOPHEN 500 MG
650 TABLET ORAL EVERY 6 HOURS
Refills: 0 | Status: DISCONTINUED | OUTPATIENT
Start: 2021-06-01 | End: 2021-06-18

## 2021-06-01 RX ORDER — INSULIN LISPRO 100/ML
6 VIAL (ML) SUBCUTANEOUS
Refills: 0 | Status: DISCONTINUED | OUTPATIENT
Start: 2021-06-01 | End: 2021-06-02

## 2021-06-01 RX ORDER — INSULIN GLARGINE 100 [IU]/ML
18 INJECTION, SOLUTION SUBCUTANEOUS AT BEDTIME
Refills: 0 | Status: DISCONTINUED | OUTPATIENT
Start: 2021-06-01 | End: 2021-06-02

## 2021-06-01 RX ORDER — DEXTROSE 50 % IN WATER 50 %
12.5 SYRINGE (ML) INTRAVENOUS ONCE
Refills: 0 | Status: DISCONTINUED | OUTPATIENT
Start: 2021-06-01 | End: 2021-06-18

## 2021-06-01 RX ORDER — GLUCAGON INJECTION, SOLUTION 0.5 MG/.1ML
1 INJECTION, SOLUTION SUBCUTANEOUS ONCE
Refills: 0 | Status: DISCONTINUED | OUTPATIENT
Start: 2021-06-01 | End: 2021-06-18

## 2021-06-01 RX ORDER — MORPHINE SULFATE 50 MG/1
2 CAPSULE, EXTENDED RELEASE ORAL ONCE
Refills: 0 | Status: DISCONTINUED | OUTPATIENT
Start: 2021-06-01 | End: 2021-06-01

## 2021-06-01 RX ADMIN — MORPHINE SULFATE 2 MILLIGRAM(S): 50 CAPSULE, EXTENDED RELEASE ORAL at 19:19

## 2021-06-01 RX ADMIN — MORPHINE SULFATE 4 MILLIGRAM(S): 50 CAPSULE, EXTENDED RELEASE ORAL at 13:13

## 2021-06-01 RX ADMIN — MORPHINE SULFATE 2 MILLIGRAM(S): 50 CAPSULE, EXTENDED RELEASE ORAL at 14:45

## 2021-06-01 RX ADMIN — Medication 6: at 17:43

## 2021-06-01 RX ADMIN — MORPHINE SULFATE 2 MILLIGRAM(S): 50 CAPSULE, EXTENDED RELEASE ORAL at 23:07

## 2021-06-01 RX ADMIN — MORPHINE SULFATE 2 MILLIGRAM(S): 50 CAPSULE, EXTENDED RELEASE ORAL at 15:15

## 2021-06-01 RX ADMIN — MORPHINE SULFATE 2 MILLIGRAM(S): 50 CAPSULE, EXTENDED RELEASE ORAL at 18:52

## 2021-06-01 RX ADMIN — SODIUM CHLORIDE 1000 MILLILITER(S): 9 INJECTION INTRAMUSCULAR; INTRAVENOUS; SUBCUTANEOUS at 12:42

## 2021-06-01 RX ADMIN — MORPHINE SULFATE 2 MILLIGRAM(S): 50 CAPSULE, EXTENDED RELEASE ORAL at 22:52

## 2021-06-01 RX ADMIN — Medication 6 UNIT(S): at 17:43

## 2021-06-01 RX ADMIN — INSULIN GLARGINE 18 UNIT(S): 100 INJECTION, SOLUTION SUBCUTANEOUS at 22:58

## 2021-06-01 RX ADMIN — MORPHINE SULFATE 4 MILLIGRAM(S): 50 CAPSULE, EXTENDED RELEASE ORAL at 12:43

## 2021-06-01 NOTE — ED PROVIDER NOTE - ATTENDING CONTRIBUTION TO CARE
I performed a face to face evaluation of this patient and performed a full history and physical examination on the patient.  I agree with the resident's history, physical examination, and plan of the patient. I performed a face to face evaluation of this patient and performed a full history and physical examination on the patient.  I agree with the resident's history, physical examination, and plan of the patient.  Pt with metastatic presumed ovarian cancer with sob, increased abdominal girth, diagnosed recently, in pain, sick in appearance, belly enlarged, firm, lungs equal but decreased at bases. For cxr, labs, CT and admission for further care and treatment- has not started treatment yet as recent diagnosis.

## 2021-06-01 NOTE — H&P ADULT - HISTORY OF PRESENT ILLNESS
55yo AA Female pmhx of DM II, migraines and newly diagnosed stage 4 endometrial cancer presents to the ED with increasing abdominal pain/distention and shortness of breath for 2 weeks. Pt is scheduled to start Chemotherapy on Friday, 6/4. Patient was taking Tramadol for the pain but reported minimal relief so she stopped taking it. Pt denies the use of other medications to alleviate the symptoms. She states that pain is increased with movement and manipulation. Pt denies N/V/D, fevers, chills, malaise, night sweats, chest pain or palpitations. Patient reports increasing shortness of breathe that correlates with her abdominal distention. She denies having significant SOB in the past and denies any history of CV or Pulmonary disease. She states that the SOB was not brought on by physical activity or trauma.   57yo AA Female pmhx of DM II (currently on DM diet, denies taking her insulin), migraines and newly diagnosed stage 4 endometrial cancer presents to the ED with increasing abdominal pain/distention and shortness of breath for 2 weeks. Pt is scheduled to start Chemotherapy on Friday, 6/4. Patient was taking Tramadol for the pain but reported minimal relief so she stopped taking it. Pt denies the use of other medications to alleviate the symptoms. She states that pain is increased with movement and manipulation, received morphine in ED with improvement.  Pt denies N/V/D, fevers, chills, malaise, night sweats, chest pain or palpitations. Patient reports increasing shortness of breathe naa with exertion that she correlates with her abdominal distention. She denies having significant SOB in the past. She states that the SOB was not brought on by physical activity or trauma. Last BM this AM.

## 2021-06-01 NOTE — H&P ADULT - REASON FOR ADMISSION
Endometrial Cancer, Ascites, Abdominal Distention, SOB Recent Endometrial Cancer, Abdominal Distention, SOB

## 2021-06-01 NOTE — ED PROVIDER NOTE - OBJECTIVE STATEMENT
pt with stage 4 ovarian cancer with increased pain/abdominal girth, and difficulty breathing secondary to belly.  No chest pain, fever, cough, is passing gas.  Difficulty eating secondary to fullness of belly.  Pt was recently diagnosed with stage 4 ovarian cancer last week and has not started treatment

## 2021-06-01 NOTE — ED PROVIDER NOTE - CONSTITUTIONAL APPEARANCE HYGIENE, MLM
Discussed results with mom - labs obtained here show a normal creatinine, clear urine and negative immune work-up.   Plan to f/u in 1 year   ILL APPEARING

## 2021-06-01 NOTE — H&P ADULT - RS GEN PE MLT RESP DETAILS PC
Diminished breath sounds at the base of L Lung/normal/airway patent/good air movement/respirations non-labored/no chest wall tenderness/no intercostal retractions/no rales/no rhonchi/no subcutaneous emphysema/no wheezes

## 2021-06-01 NOTE — H&P ADULT - NEGATIVE GASTROINTESTINAL SYMPTOMS
no nausea/no vomiting/no diarrhea/no constipation/no change in bowel habits/no flatulence/no melena/no hematochezia/no steatorrhea/no jaundice/no hiccoughs

## 2021-06-01 NOTE — H&P ADULT - PROBLEM SELECTOR PLAN 1
- Admit to medicine  - f/u with Dr. Hoang  - Consult palliative care  - Consult Interventional Radiology, Abdominal US for possible paracentesis  - Trend sodium  - Pain control w/ Morphine 2mg PRN Q4 - Admit to medicine, Pain control w/ Morphine 2mg IV Q4 prn  - Consult Interventional Radiology for possible paracentesis  - Oncology c/s called by MD

## 2021-06-01 NOTE — H&P ADULT - NEGATIVE GENERAL GENITOURINARY SYMPTOMS
no hematuria/no renal colic/no flank pain L/no flank pain R/no urine discoloration/no gas in urine/no bladder infections/no incontinence/no dysuria/no urinary hesitancy/normal urinary frequency/no nocturia/normal libido

## 2021-06-01 NOTE — H&P ADULT - NSHPOUTPATIENTPROVIDERS_GEN_ALL_CORE
PCP - Dr. Nunez (retired)  OBGYN - Dr. Hoang PCP - Dr. Bejarano (retired 4 years ago)  OBGYN - Evan Cabrales

## 2021-06-01 NOTE — ED ADULT TRIAGE NOTE - CHIEF COMPLAINT QUOTE
pt c/o abd distension and SOB since today.  pt is scheduled to have "drain" put in her abd today.  pt has PMH: uterine ca.

## 2021-06-01 NOTE — H&P ADULT - NSHPSOCIALHISTORY_GEN_ALL_CORE
Pt lives in a private home in [place] with her , son, daughter and 2 grandchildren. She stats she feels safe and comfortable at home. She is in a monogamous relationship with her . She is a retired city . She reports being previously very active and eating a healthy diet. She denies the use of tobacco, drugs or alcohol. Pt lives in a private home in [place] with her , son, daughter and 2 grandchildren. She states she feels safe and comfortable at home. She is in a monogamous relationship with her . She is a retired city . She reports being previously very active and eating a healthy diet. She denies the use of tobacco, drugs or alcohol.

## 2021-06-01 NOTE — H&P ADULT - NSICDXPASTMEDICALHX_GEN_ALL_CORE_FT
PAST MEDICAL HISTORY:  DM type 2 (diabetes mellitus, type 2)     History of irregular menstrual cycles     History of migraine headaches     No pertinent past medical history     Ovarian cancer Stage 4     PAST MEDICAL HISTORY:  DM type 2 (diabetes mellitus, type 2)     History of irregular menstrual cycles     History of migraine headaches     Ovarian cancer Stage 4

## 2021-06-01 NOTE — ED PROVIDER NOTE - CARE PLAN
Principal Discharge DX:	Ovarian cancer   Principal Discharge DX:	Ovarian cancer  Secondary Diagnosis:	Ascites  Secondary Diagnosis:	Abdominal distention

## 2021-06-01 NOTE — H&P ADULT - PROBLEM SELECTOR PLAN 2
- Admit to medicine  - f/u with Dr. Hoang for Ca. treatment plan  - Consult palliative care  - Consult Interventional Radiology, Abdominal US for possible paracentesis  - B/l LE Doppler to r/o DVT  - DVT pphx  - Pain control w/ Morphine 2mg PRN Q4 - f/u with Oncology for CA. treatment plan  - Consult palliative care

## 2021-06-01 NOTE — H&P ADULT - ATTENDING COMMENTS
Patient seen and examined, case d/w ACP.    56F with h/o DM-2, stage IV uterine CA with mets to bone, peritoneal carcinomatosis, malignant ascites, p/w 2 week h/o abdominal pain, worsening abdominal distention, associated with dyspnea, denies fever/chill/chest pain. No prior paracentesis. Pt is f/u Dr. Tania Hoang at [facility], no surgery or chemo, suppose to start palliative RT this Friday. In ED, pt was found to have large ascites.  EKG: sinus tach, no ischemic change  CT abd/pelvis Heterogeneous abnormal thickened endometrium, consistent with known endometrial carcinoma. Bilateral adnexal masses are also noted. Large volume ascites. RP adenopathy.  Lab hgb 7.7, glucose 338, albumin 2.7, lactate 2, CXR:LLL atelectasis, low lung volume  SH: lives at home with /children, denies smoking/etoh  PE: cachectic, lung decreased breath sound lung base, heart regular, tachycardic, abdomen distended, +ascites, ttp, extrem: 1-2+ pitting edema; neuro: a/ox3, nonfocal    Assessment/plan:  # uterine cancer with malignant ascites, peritoneal carcinomatosis, adenopathy, bone mets:   -consult IR for therapeutic paracentesis  -consult house oncology  -consult palliative care, pt is full code  -pain control iv morphine 2mg q4h prn, bowel regimen  -leg doppler to r/o DVT  -DVT ppx    # Anemia: likely d/t malignancy, chronic disease  trend CBC, transfuse prn for hgb<7  check iron panel, b12/folate, ESR  # hypervolemic and hyperglycemia-induced hyponatremia: should improve after large volume paracentesis, trend Na, correct glucose  # Uncontrolled DM-2: check A1c, basal bolus insulin, endocrine consult in AM

## 2021-06-01 NOTE — H&P ADULT - PROBLEM SELECTOR PLAN 4
- Endocrine Consult  - Check A1c  - Correct glucose levels  - Restart home dosing of Lantus  - f/u glucose testing  - US of b/l LE to r/o DVT - Check A1c, cover with ISS and restart home dosing of Lantus  - F/u Endocrine c/s

## 2021-06-01 NOTE — H&P ADULT - NEGATIVE MALE-SPECIFIC SYMPTOMS
GI Discharge Instructions BRONCHOSCOPY        1/24/2020           INSTRUCTIONS AFTER YOUR BRONCHOSCOPY    During your exam, the physician:   Took a biopsy of your lungs, and/or other specimens.  Dr. TAYLOR Han, [phone] will call you regarding results and/or follow up      No restrictions on any aspirin or anti-inflammatory products.    A light diet is recommended for today unless otherwise directed by your physician.    Your physician advises you to refrain from the following for 24 hours.  · Do not drive   · Do not return to work  · Do not consume alcohol   · Do not operate any machinery    AVOID strenuous or heavy activity for the next 24 hours.    You may experience a slight sore throat, coughing , hoarseness, a very small amount of bleeding, occasional chest discomfort or a feeling of sleepiness.    Please notify your physician or come to the emergency room if you experience any of the following:  · Sudden or severe chest pain  · Coughing up a large amount of bright red blood  · Shortness of breath  · Fever and/or chills      > If you have any questions or concerns, contact Dr. TAYLOR Han, [phone]    You may experience some localized pain, tenderness or light bruising at the IV medication site.  However, if the area becomes reddened and swollen, contact your physician within 24 hours.    ADDITIONAL INFORMATION: none   not applicable

## 2021-06-01 NOTE — H&P ADULT - NSICDXFAMILYHX_GEN_ALL_CORE_FT
FAMILY HISTORY:  Mother  Still living? Unknown  FH: type 2 diabetes mellitus, Age at diagnosis: Age Unknown

## 2021-06-01 NOTE — ED ADULT NURSE NOTE - OBJECTIVE STATEMENT
p/t is a 56y old female received awake and responsive, c/o of increased abd size and mild sob for few days, p/t sent by PMD for possible ascites drainage, abd distended with bs present, labs drawn and sent as per MD order

## 2021-06-01 NOTE — ED PROVIDER NOTE - CLINICAL SUMMARY MEDICAL DECISION MAKING FREE TEXT BOX
Pt with metastatic presumed ovarian cancer with sob, increased abdominal girth, diagnosed recently, in pain, sick in appearance, belly enlarged, firm, lungs equal but decreased at bases. For cxr, labs, CT and admission for further care and treatment- has not started treatment yet as recent diagnosis.

## 2021-06-01 NOTE — H&P ADULT - ASSESSMENT
55yo Female w/ newly diagnosed Stage IV Endometrial Cancer and poorly controlled DM II presents to the ED with SOB, Abdominal pain/distention x2 weeks. Patient is to be started on Chemotherapy on 6/04. Palliative care consulted to discuss the possibilities and goals of treatment.     EKG: Sinus Tachy @ 112, No ischemic changes    CXR: Left Lower Lung platelike atelectasis    CT Abdomen: Large volume ascites, increased since 5/20/2021. Peritoneal carcinomatosis is again noted.  Heterogeneous abnormal thickened endometrium, consistent with known endometrial carcinoma. Bilateral adnexal masses are also noted.  Retroperitoneal and pelvic lymphadenopathy.  Sclerotic osseous metastatic disease.    WBC: 16.4  H&H: 7.7/27.6  MCV: 70    Glu: 342 55yo Female w/ newly diagnosed Stage IV Endometrial Cancer and poorly controlled DM II presents to the ED with SOB, Abdominal pain/distention x2 weeks. Patient is to be started on Chemotherapy on 6/04. Palliative care consulted to discuss the possibilities and goals of treatment.     EKG: Sinus Tachy @ 112, No ischemic changes    CXR: Left Lower Lung platelike atelectasis    CT Abdomen: Large volume ascites, increased since 5/20/2021. Peritoneal carcinomatosis is again noted. Heterogeneous abnormal thickened endometrium, consistent with known endometrial carcinoma. Bilateral adnexal masses are also noted. Retroperitoneal and pelvic lymphadenopathy. Sclerotic osseous metastatic disease.

## 2021-06-01 NOTE — ED ADULT NURSE NOTE - ALCOHOL PRE SCREEN (AUDIT - C)
Subjective   Courtney Saleh is a 21 y.o. female.     HPI Comments: Patient is here for follow up on her Bipolar depression. She states she has weaned herself off of her Zoloft, because she felt like she could control her depression without medication. She states she was going through a temporary period of depression, after her miscarriage, and needed medication, but now she is able to deal with it, without medication.     Patient is also here for follow up on her migraines. She states the Topamax works well for her, but she thinks she needs to go back to 50 mg daily, instead of 25 mg. She states 50 mg was too strong at first, but now she is starting to have occasional headaches again, so feels she needs to go back up to 50 mg.     Patient states her GERD is well controlled with the Prevacid.    Patient also wants to discuss her obesity. She states she has been eating better and walking at least 5 days a week, and still can not lose weight. She states she would like to have her cholesterol rechecked as well, and see if it has improved. She states she would also like her thyroid rechecked, since she can not lose weight and her T4 was abnormal last visit.           The following portions of the patient's history were reviewed and updated as appropriate: allergies, current medications, past family history, past medical history, past social history, past surgical history and problem list.    Review of Systems   Constitutional: Negative for activity change, appetite change, chills, diaphoresis, fatigue and fever.        Unable to lose weight   Eyes: Negative.    Respiratory: Negative.    Cardiovascular: Negative.    Gastrointestinal: Negative.    Genitourinary: Negative.    Musculoskeletal: Negative.    Skin: Negative.    Neurological: Positive for headaches. Negative for dizziness, syncope, weakness and numbness.   Hematological: Negative for adenopathy.   Psychiatric/Behavioral: Negative for confusion and suicidal ideas.  Statement Selected The patient is not nervous/anxious.        Objective   Physical Exam   Constitutional: She is oriented to person, place, and time. She appears well-developed and well-nourished. No distress.   HENT:   Head: Normocephalic.   Right Ear: External ear normal.   Left Ear: External ear normal.   Nose: Nose normal.   Eyes: Conjunctivae are normal.   Neck: Normal range of motion. Neck supple.   Cardiovascular: Normal rate, regular rhythm and normal heart sounds.    Pulmonary/Chest: Effort normal and breath sounds normal. No respiratory distress. She has no wheezes. She has no rales.   Abdominal: Soft. She exhibits no distension. There is no hepatosplenomegaly or splenomegaly. There is no tenderness.   Lymphadenopathy:        Right cervical: No superficial cervical, no deep cervical and no posterior cervical adenopathy present.       Left cervical: No superficial cervical, no deep cervical and no posterior cervical adenopathy present.   Neurological: She is alert and oriented to person, place, and time. Coordination and gait normal.   Skin: Skin is warm and dry. No rash noted.   Psychiatric: She has a normal mood and affect. Her behavior is normal. Judgment and thought content normal.   Nursing note and vitals reviewed.      Assessment/Plan   Courtney was seen today for follow-up.    Diagnoses and all orders for this visit:    Bipolar 1 disorder, depressed  -     Comprehensive Metabolic Panel; Future    Hx of migraines  -     topiramate (TOPAMAX) 25 MG tablet; Take 1 tablet by mouth 2 (Two) Times a Day for 30 days.  -     Comprehensive Metabolic Panel; Future    Obesity, Class III, BMI 40-49.9 (morbid obesity)  -     phentermine 37.5 MG capsule; Take 1 capsule by mouth Every Morning for 30 days.  -     TSH; Future  -     T4, Free; Future  -     T3, Free; Future  -     Comprehensive Metabolic Panel; Future    Abnormal serum thyroxine (T4) level  -     TSH; Future  -     T4, Free; Future  -     T3, Free; Future    Mixed  hyperlipidemia  -     Lipid Panel; Future  -     Comprehensive Metabolic Panel; Future    Gastroesophageal reflux disease without esophagitis  -     lansoprazole (PREVACID) 15 MG capsule; Take 1 capsule by mouth 2 (Two) Times a Day.      Patients Bipolar disorder is controlled without medication at this time.     Topamax was increased to 50 mg, for better control of her migraines.     Adipex started for assistance with weight loss. Nutrition and activity goals reviewed including: mainly water to drink, limit white flour/processed sugar, high protein, high fiber carbs, good breakfast, working toward 150 mins cardio per week, weight training 2x/week.    Screening labs ordered and patient to RTC Monday fasting. I will contact patient regarding test results and provide instructions regarding any necessary changes in plan of care.    Prevacid continued, since it is working well.    Patient was encouraged to keep me informed of any acute changes, lack of improvement, or any new concerning symptoms. Patient voiced understanding of all instructions and denied further questions.

## 2021-06-01 NOTE — ED PROVIDER NOTE - PMH
DM type 2 (diabetes mellitus, type 2)    History of irregular menstrual cycles    History of migraine headaches    No pertinent past medical history    Ovarian cancer

## 2021-06-02 ENCOUNTER — RESULT REVIEW (OUTPATIENT)
Age: 56
End: 2021-06-02

## 2021-06-02 DIAGNOSIS — C56.9 MALIGNANT NEOPLASM OF UNSPECIFIED OVARY: ICD-10-CM

## 2021-06-02 DIAGNOSIS — I10 ESSENTIAL (PRIMARY) HYPERTENSION: ICD-10-CM

## 2021-06-02 DIAGNOSIS — E11.65 TYPE 2 DIABETES MELLITUS WITH HYPERGLYCEMIA: ICD-10-CM

## 2021-06-02 DIAGNOSIS — E78.5 HYPERLIPIDEMIA, UNSPECIFIED: ICD-10-CM

## 2021-06-02 DIAGNOSIS — Z51.5 ENCOUNTER FOR PALLIATIVE CARE: ICD-10-CM

## 2021-06-02 DIAGNOSIS — R52 PAIN, UNSPECIFIED: ICD-10-CM

## 2021-06-02 DIAGNOSIS — R18.0 MALIGNANT ASCITES: ICD-10-CM

## 2021-06-02 LAB
A1C WITH ESTIMATED AVERAGE GLUCOSE RESULT: 9.6 % — HIGH (ref 4–5.6)
ALBUMIN FLD-MCNC: 2.6 G/DL — SIGNIFICANT CHANGE UP
ALBUMIN SERPL ELPH-MCNC: 2.8 G/DL — LOW (ref 3.3–5)
ALP SERPL-CCNC: 194 U/L — HIGH (ref 40–120)
ALT FLD-CCNC: 25 U/L — SIGNIFICANT CHANGE UP (ref 4–33)
ANION GAP SERPL CALC-SCNC: 15 MMOL/L — HIGH (ref 7–14)
AST SERPL-CCNC: 35 U/L — HIGH (ref 4–32)
B PERT IGG+IGM PNL SER: ABNORMAL
BASOPHILS # BLD AUTO: 0.05 K/UL — SIGNIFICANT CHANGE UP (ref 0–0.2)
BASOPHILS NFR BLD AUTO: 0.3 % — SIGNIFICANT CHANGE UP (ref 0–2)
BILIRUB SERPL-MCNC: 0.6 MG/DL — SIGNIFICANT CHANGE UP (ref 0.2–1.2)
BLD GP AB SCN SERPL QL: NEGATIVE — SIGNIFICANT CHANGE UP
BUN SERPL-MCNC: 11 MG/DL — SIGNIFICANT CHANGE UP (ref 7–23)
CALCIUM SERPL-MCNC: 9.2 MG/DL — SIGNIFICANT CHANGE UP (ref 8.4–10.5)
CHLORIDE SERPL-SCNC: 95 MMOL/L — LOW (ref 98–107)
CO2 SERPL-SCNC: 20 MMOL/L — LOW (ref 22–31)
COLOR FLD: ABNORMAL
COVID-19 SPIKE DOMAIN AB INTERP: POSITIVE
COVID-19 SPIKE DOMAIN ANTIBODY RESULT: >250 U/ML — HIGH
CREAT SERPL-MCNC: 0.43 MG/DL — LOW (ref 0.5–1.3)
EOSINOPHIL # BLD AUTO: 0.02 K/UL — SIGNIFICANT CHANGE UP (ref 0–0.5)
EOSINOPHIL NFR BLD AUTO: 0.1 % — SIGNIFICANT CHANGE UP (ref 0–6)
ERYTHROCYTE [SEDIMENTATION RATE] IN BLOOD: 109 MM/HR — HIGH (ref 4–25)
ESTIMATED AVERAGE GLUCOSE: 229 MG/DL — HIGH (ref 68–114)
FERRITIN SERPL-MCNC: 646 NG/ML — HIGH (ref 15–150)
FLUID INTAKE SUBSTANCE CLASS: SIGNIFICANT CHANGE UP
FLUID SEGMENTED GRANULOCYTES: 48 % — SIGNIFICANT CHANGE UP
FOLATE SERPL-MCNC: 10.6 NG/ML — SIGNIFICANT CHANGE UP (ref 3.1–17.5)
GLUCOSE BLDC GLUCOMTR-MCNC: 147 MG/DL — HIGH (ref 70–99)
GLUCOSE BLDC GLUCOMTR-MCNC: 215 MG/DL — HIGH (ref 70–99)
GLUCOSE BLDC GLUCOMTR-MCNC: 242 MG/DL — HIGH (ref 70–99)
GLUCOSE BLDC GLUCOMTR-MCNC: 250 MG/DL — HIGH (ref 70–99)
GLUCOSE FLD-MCNC: 109 MG/DL — SIGNIFICANT CHANGE UP
GLUCOSE SERPL-MCNC: 231 MG/DL — HIGH (ref 70–99)
GRAM STN FLD: SIGNIFICANT CHANGE UP
HCT VFR BLD CALC: 26.4 % — LOW (ref 34.5–45)
HCT VFR BLD CALC: 27.2 % — LOW (ref 34.5–45)
HGB BLD-MCNC: 7.3 G/DL — LOW (ref 11.5–15.5)
HGB BLD-MCNC: 7.8 G/DL — LOW (ref 11.5–15.5)
IANC: 13.48 K/UL — HIGH (ref 1.5–8.5)
IMM GRANULOCYTES NFR BLD AUTO: 1.2 % — SIGNIFICANT CHANGE UP (ref 0–1.5)
IRON SATN MFR SERPL: 14 UG/DL — LOW (ref 30–160)
IRON SATN MFR SERPL: 7 % — LOW (ref 14–50)
LDH SERPL L TO P-CCNC: 2694 U/L — SIGNIFICANT CHANGE UP
LYMPHOCYTES # BLD AUTO: 0.95 K/UL — LOW (ref 1–3.3)
LYMPHOCYTES # BLD AUTO: 5.8 % — LOW (ref 13–44)
LYMPHOCYTES # FLD: 2 % — SIGNIFICANT CHANGE UP
MAGNESIUM SERPL-MCNC: 2.2 MG/DL — SIGNIFICANT CHANGE UP (ref 1.6–2.6)
MCHC RBC-ENTMCNC: 19.5 PG — LOW (ref 27–34)
MCHC RBC-ENTMCNC: 19.6 PG — LOW (ref 27–34)
MCHC RBC-ENTMCNC: 27.7 GM/DL — LOW (ref 32–36)
MCHC RBC-ENTMCNC: 28.7 GM/DL — LOW (ref 32–36)
MCV RBC AUTO: 68.5 FL — LOW (ref 80–100)
MCV RBC AUTO: 70.6 FL — LOW (ref 80–100)
MONOCYTES # BLD AUTO: 1.55 K/UL — HIGH (ref 0–0.9)
MONOCYTES NFR BLD AUTO: 9.5 % — SIGNIFICANT CHANGE UP (ref 2–14)
MONOS+MACROS # FLD: 26 % — SIGNIFICANT CHANGE UP
NEUTROPHILS # BLD AUTO: 13.48 K/UL — HIGH (ref 1.8–7.4)
NEUTROPHILS NFR BLD AUTO: 83.1 % — HIGH (ref 43–77)
NRBC # BLD: 0 /100 WBCS — SIGNIFICANT CHANGE UP
NRBC # BLD: 0 /100 WBCS — SIGNIFICANT CHANGE UP
NRBC # FLD: 0 K/UL — SIGNIFICANT CHANGE UP
NRBC # FLD: 0 K/UL — SIGNIFICANT CHANGE UP
OTHER CELLS FLD MANUAL: 24 % — SIGNIFICANT CHANGE UP
PHOSPHATE SERPL-MCNC: 3.3 MG/DL — SIGNIFICANT CHANGE UP (ref 2.5–4.5)
PLATELET # BLD AUTO: 534 K/UL — HIGH (ref 150–400)
PLATELET # BLD AUTO: 537 K/UL — HIGH (ref 150–400)
POTASSIUM SERPL-MCNC: 4.8 MMOL/L — SIGNIFICANT CHANGE UP (ref 3.5–5.3)
POTASSIUM SERPL-SCNC: 4.8 MMOL/L — SIGNIFICANT CHANGE UP (ref 3.5–5.3)
PROT FLD-MCNC: 5.2 G/DL — SIGNIFICANT CHANGE UP
PROT SERPL-MCNC: 6.7 G/DL — SIGNIFICANT CHANGE UP (ref 6–8.3)
RBC # BLD: 3.74 M/UL — LOW (ref 3.8–5.2)
RBC # BLD: 3.97 M/UL — SIGNIFICANT CHANGE UP (ref 3.8–5.2)
RBC # BLD: 3.97 M/UL — SIGNIFICANT CHANGE UP (ref 3.8–5.2)
RBC # FLD: 16.5 % — HIGH (ref 10.3–14.5)
RBC # FLD: 16.6 % — HIGH (ref 10.3–14.5)
RCV VOL RI: HIGH CELLS/UL (ref 0–5)
RETICS #: 100.6 K/UL — SIGNIFICANT CHANGE UP (ref 25–125)
RETICS/RBC NFR: 2.5 % — SIGNIFICANT CHANGE UP (ref 0.5–2.5)
RH IG SCN BLD-IMP: POSITIVE — SIGNIFICANT CHANGE UP
SARS-COV-2 IGG+IGM SERPL QL IA: >250 U/ML — HIGH
SARS-COV-2 IGG+IGM SERPL QL IA: POSITIVE
SODIUM SERPL-SCNC: 130 MMOL/L — LOW (ref 135–145)
SPECIMEN SOURCE: SIGNIFICANT CHANGE UP
TIBC SERPL-MCNC: 201 UG/DL — LOW (ref 220–430)
TOTAL NUCLEATED CELL COUNT, BODY FLUID: 1917 CELLS/UL — HIGH (ref 0–5)
TUBE TYPE: SIGNIFICANT CHANGE UP
UIBC SERPL-MCNC: 187 UG/DL — SIGNIFICANT CHANGE UP (ref 110–370)
VIT B12 SERPL-MCNC: 664 PG/ML — SIGNIFICANT CHANGE UP (ref 200–900)
WBC # BLD: 16.08 K/UL — HIGH (ref 3.8–10.5)
WBC # BLD: 16.24 K/UL — HIGH (ref 3.8–10.5)
WBC # FLD AUTO: 16.08 K/UL — HIGH (ref 3.8–10.5)
WBC # FLD AUTO: 16.24 K/UL — HIGH (ref 3.8–10.5)

## 2021-06-02 PROCEDURE — 88305 TISSUE EXAM BY PATHOLOGIST: CPT | Mod: 26

## 2021-06-02 PROCEDURE — 88112 CYTOPATH CELL ENHANCE TECH: CPT | Mod: 26

## 2021-06-02 PROCEDURE — 99223 1ST HOSP IP/OBS HIGH 75: CPT | Mod: GC

## 2021-06-02 PROCEDURE — 99233 SBSQ HOSP IP/OBS HIGH 50: CPT | Mod: 25

## 2021-06-02 PROCEDURE — 88108 CYTOPATH CONCENTRATE TECH: CPT | Mod: 26

## 2021-06-02 PROCEDURE — 99255 IP/OBS CONSLTJ NEW/EST HI 80: CPT

## 2021-06-02 PROCEDURE — 99223 1ST HOSP IP/OBS HIGH 75: CPT

## 2021-06-02 PROCEDURE — 49083 ABD PARACENTESIS W/IMAGING: CPT | Mod: GC

## 2021-06-02 RX ORDER — IRON SUCROSE 20 MG/ML
200 INJECTION, SOLUTION INTRAVENOUS EVERY 24 HOURS
Refills: 0 | Status: COMPLETED | OUTPATIENT
Start: 2021-06-02 | End: 2021-06-04

## 2021-06-02 RX ORDER — INSULIN LISPRO 100/ML
8 VIAL (ML) SUBCUTANEOUS
Refills: 0 | Status: DISCONTINUED | OUTPATIENT
Start: 2021-06-02 | End: 2021-06-02

## 2021-06-02 RX ORDER — INSULIN LISPRO 100/ML
VIAL (ML) SUBCUTANEOUS
Refills: 0 | Status: DISCONTINUED | OUTPATIENT
Start: 2021-06-02 | End: 2021-06-18

## 2021-06-02 RX ORDER — INSULIN GLARGINE 100 [IU]/ML
24 INJECTION, SOLUTION SUBCUTANEOUS AT BEDTIME
Refills: 0 | Status: DISCONTINUED | OUTPATIENT
Start: 2021-06-02 | End: 2021-06-04

## 2021-06-02 RX ORDER — SENNA PLUS 8.6 MG/1
2 TABLET ORAL AT BEDTIME
Refills: 0 | Status: DISCONTINUED | OUTPATIENT
Start: 2021-06-02 | End: 2021-06-18

## 2021-06-02 RX ORDER — INSULIN LISPRO 100/ML
VIAL (ML) SUBCUTANEOUS AT BEDTIME
Refills: 0 | Status: DISCONTINUED | OUTPATIENT
Start: 2021-06-02 | End: 2021-06-18

## 2021-06-02 RX ORDER — OXYCODONE HYDROCHLORIDE 5 MG/1
5 TABLET ORAL EVERY 4 HOURS
Refills: 0 | Status: DISCONTINUED | OUTPATIENT
Start: 2021-06-02 | End: 2021-06-03

## 2021-06-02 RX ORDER — CEFTRIAXONE 500 MG/1
2000 INJECTION, POWDER, FOR SOLUTION INTRAMUSCULAR; INTRAVENOUS EVERY 24 HOURS
Refills: 0 | Status: COMPLETED | OUTPATIENT
Start: 2021-06-02 | End: 2021-06-08

## 2021-06-02 RX ORDER — LIDOCAINE HCL 20 MG/ML
10 VIAL (ML) INJECTION ONCE
Refills: 0 | Status: COMPLETED | OUTPATIENT
Start: 2021-06-02 | End: 2021-06-02

## 2021-06-02 RX ORDER — INSULIN LISPRO 100/ML
10 VIAL (ML) SUBCUTANEOUS
Refills: 0 | Status: DISCONTINUED | OUTPATIENT
Start: 2021-06-02 | End: 2021-06-04

## 2021-06-02 RX ADMIN — Medication 2: at 12:49

## 2021-06-02 RX ADMIN — MORPHINE SULFATE 2 MILLIGRAM(S): 50 CAPSULE, EXTENDED RELEASE ORAL at 11:41

## 2021-06-02 RX ADMIN — INSULIN GLARGINE 24 UNIT(S): 100 INJECTION, SOLUTION SUBCUTANEOUS at 22:13

## 2021-06-02 RX ADMIN — Medication 2: at 17:32

## 2021-06-02 RX ADMIN — MORPHINE SULFATE 2 MILLIGRAM(S): 50 CAPSULE, EXTENDED RELEASE ORAL at 12:41

## 2021-06-02 RX ADMIN — ENOXAPARIN SODIUM 40 MILLIGRAM(S): 100 INJECTION SUBCUTANEOUS at 13:55

## 2021-06-02 RX ADMIN — MORPHINE SULFATE 2 MILLIGRAM(S): 50 CAPSULE, EXTENDED RELEASE ORAL at 21:46

## 2021-06-02 RX ADMIN — MORPHINE SULFATE 2 MILLIGRAM(S): 50 CAPSULE, EXTENDED RELEASE ORAL at 22:01

## 2021-06-02 RX ADMIN — MORPHINE SULFATE 2 MILLIGRAM(S): 50 CAPSULE, EXTENDED RELEASE ORAL at 07:05

## 2021-06-02 RX ADMIN — MORPHINE SULFATE 2 MILLIGRAM(S): 50 CAPSULE, EXTENDED RELEASE ORAL at 02:53

## 2021-06-02 RX ADMIN — POLYETHYLENE GLYCOL 3350 17 GRAM(S): 17 POWDER, FOR SOLUTION ORAL at 13:55

## 2021-06-02 RX ADMIN — MORPHINE SULFATE 2 MILLIGRAM(S): 50 CAPSULE, EXTENDED RELEASE ORAL at 16:37

## 2021-06-02 RX ADMIN — Medication 8 UNIT(S): at 12:49

## 2021-06-02 RX ADMIN — Medication 650 MILLIGRAM(S): at 19:52

## 2021-06-02 RX ADMIN — IRON SUCROSE 110 MILLIGRAM(S): 20 INJECTION, SOLUTION INTRAVENOUS at 14:30

## 2021-06-02 RX ADMIN — Medication 10 UNIT(S): at 17:33

## 2021-06-02 RX ADMIN — MORPHINE SULFATE 2 MILLIGRAM(S): 50 CAPSULE, EXTENDED RELEASE ORAL at 07:20

## 2021-06-02 RX ADMIN — Medication 4: at 08:47

## 2021-06-02 RX ADMIN — CEFTRIAXONE 100 MILLIGRAM(S): 500 INJECTION, POWDER, FOR SOLUTION INTRAMUSCULAR; INTRAVENOUS at 16:37

## 2021-06-02 RX ADMIN — Medication 650 MILLIGRAM(S): at 20:22

## 2021-06-02 RX ADMIN — Medication 10 MILLILITER(S): at 11:47

## 2021-06-02 RX ADMIN — MORPHINE SULFATE 2 MILLIGRAM(S): 50 CAPSULE, EXTENDED RELEASE ORAL at 16:57

## 2021-06-02 RX ADMIN — Medication 6 UNIT(S): at 08:47

## 2021-06-02 RX ADMIN — MORPHINE SULFATE 2 MILLIGRAM(S): 50 CAPSULE, EXTENDED RELEASE ORAL at 03:12

## 2021-06-02 NOTE — CONSULT NOTE ADULT - ASSESSMENT
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     -Therapeutic paracentesis, 4200ml removed, feels better  -transfuse 1 u prbc  -pt with iron deficiency, venofer 200mg x 3 days  -Pal care consult for symptom management  -If patient stays in house, will consider inpatient chemotherapy   -Supportive care, pain control, Nutrition, PT, DVT ppx  -Outpatient oncology f/u    Will follow. Please do not hesitate to call back with questions.     Aziza Hanna MD  Medical Oncology Attending  C: [phone]

## 2021-06-02 NOTE — CONSULT NOTE ADULT - ASSESSMENT
56 year old woman with PMH uncontrolled DM2, A1c 9.6%, migraines and newly diagnosed stage 4 endometrial cancer presents to the ED with increasing abdominal pain/distention and shortness of breath for 2 weeks. Consult called for management of uncontrolled DM2. BG goal 100-180 mg/dL.

## 2021-06-02 NOTE — CONSULT NOTE ADULT - PROBLEM SELECTOR RECOMMENDATION 3
goal is to start chemo on friday as outpatient  if pt unable to go home, will assess for inpatient chemo   pt made aware
- check lipid panel in AM  - would benefit from statin such as Lipitor 20 mg qhs as long as LDL > 70 and there is no contraindication and as long as consistent with her goals of care

## 2021-06-02 NOTE — PROGRESS NOTE ADULT - PROBLEM SELECTOR PLAN 1
s/p diagnostic and therapeutic (4L fluid removal) 6/2  - SAAG 0.2 suggesting mlaignant ascites although cell count w/ PMNs 844 (adjusted for 19K RBCs)  - given neutrocytic ascites will empriically tx with IV CTX 2g daily x 5 days (6/2 - ), f.u GS, cx and cyropath  Pain control w/ IV Morphine 2mg IV Q4 prn for severe and PO oxy IR 5 mg q4h prn for mod pain, appreciate pall reccs  - Oncology consulted

## 2021-06-02 NOTE — CHART NOTE - NSCHARTNOTEFT_GEN_A_CORE
Per discussion with Dr. Hanna and Dr. Mooney, IV venofer 200mg QD ordered while inpatient. Dr. Hanna also recommended 1U PRBC today, ACP explained benefits and risks at length and obtained consent from patient and T&S x2 ordered with repeat CBC. Will continue to monitor pt closely.

## 2021-06-02 NOTE — PROGRESS NOTE ADULT - ASSESSMENT
55yo Female w/ newly diagnosed Stage IV Endometrial Cancer and poorly controlled DM II presents to the ED with SOB, Abdominal pain/distention x2 weeks. CT A/P showed large volume ascites s/p diagnostic and therapeutic paracentesis (4L removed) c/b neutrocytic ascites on IV CTX. Patient is to be started on Chemotherapy on 6/04, consider inpatient vs outpatient. Palliative care consulted to discuss the possibilities and goals of treatment.

## 2021-06-02 NOTE — CONSULT NOTE ADULT - ASSESSMENT
55yo AA Female pmhx of DM II (currently on DM diet, denies taking her insulin), migraines and newly diagnosed stage 4 endometrial cancer presents to the ED with increasing abdominal pain/distention and shortness of breath for 2 weeks.  +ascites.  Asked to see for pain management

## 2021-06-02 NOTE — CONSULT NOTE ADULT - SUBJECTIVE AND OBJECTIVE BOX
HPI:  Patient is a 56 year old woman with PMH uncontrolled DM2, A1c 9.6%, migraines and newly diagnosed stage 4 endometrial cancer presents to the ED with increasing abdominal pain/distention and shortness of breath for 2 weeks. Consult called for management of uncontrolled DM2. She was diagnosed with DM2 at least a few years ago. Medication list includes Metformin and Lantus, however on further questioning, she states that she does not take any medications for DM at home, was only receiving insulin therapy while in the hospital. States that she has been controlling her DM with diet and exercise. Does not check her BG at home. No known nephropathy. BG on presentation to the ED was in the 300's. Did not eat breakfast today as she is awaiting bedside paracentesis. Not on steroids here.     PAST MEDICAL & SURGICAL HISTORY:  History of migraine headaches    DM type 2 (diabetes mellitus, type 2)    History of irregular menstrual cycles    Ovarian cancer  Stage 4    History of   ,       FAMILY HISTORY:  DM2 in mother     Social History:  no cigarette use  no alcohol use      Outpatient Medications:   not taking medications for DM at home    MEDICATIONS  (STANDING):  dextrose 40% Gel 15 Gram(s) Oral once  dextrose 5%. 1000 milliLiter(s) (50 mL/Hr) IV Continuous <Continuous>  dextrose 5%. 1000 milliLiter(s) (100 mL/Hr) IV Continuous <Continuous>  dextrose 50% Injectable 25 Gram(s) IV Push once  dextrose 50% Injectable 12.5 Gram(s) IV Push once  dextrose 50% Injectable 25 Gram(s) IV Push once  enoxaparin Injectable 40 milliGRAM(s) SubCutaneous daily  glucagon  Injectable 1 milliGRAM(s) IntraMuscular once  insulin glargine Injectable (LANTUS) 18 Unit(s) SubCutaneous at bedtime  insulin lispro (ADMELOG) corrective regimen sliding scale   SubCutaneous three times a day before meals  insulin lispro Injectable (ADMELOG) 6 Unit(s) SubCutaneous three times a day before meals  lidocaine 1% (Preservative-free) Injectable 10 milliLiter(s) Local Injection once  polyethylene glycol 3350 17 Gram(s) Oral daily    MEDICATIONS  (PRN):  acetaminophen   Tablet .. 650 milliGRAM(s) Oral every 6 hours PRN Mild Pain (1 - 3), Moderate Pain (4 - 6)  morphine  - Injectable 2 milliGRAM(s) IV Push every 4 hours PRN Severe Pain (7 - 10)      Allergies  Sulf-10 (Rash; Short breath)    Review of Systems:  Constitutional: No fever  Eyes: No blurry vision  Neuro: No tremors  HEENT: No pain  Cardiovascular: No chest pain, palpitations  Respiratory: No SOB, no cough  GI: No nausea, vomiting, abdominal pain; + abdominal distension  : No dysuria  Skin: no rash  Endocrine: no polyuria, polydipsia    ALL OTHER SYSTEMS REVIEWED AND NEGATIVE    PHYSICAL EXAM:  VITALS: T(C): 36.9 (21 @ 06:28)  T(F): 98.4 (21 @ 06:28), Max: 98.4 (21 @ 06:28)  HR: 107 (21 @ 06:28) (76 - 113)  BP: 124/66 (21 @ 06:28) (122/74 - 140/75)  RR:  (16 - 18)  SpO2:  (99% - 100%)  Wt(kg): --  GENERAL: NAD, well-developed  EYES: No proptosis, anicteric  HEENT:  Atraumatic, Normocephalic  THYROID: Normal size, no palpable nodules  RESPIRATORY: Clear to auscultation bilaterally; No rales, rhonchi, wheezing  CARDIOVASCULAR: Regular rate and rhythm; No murmurs; + lower extremity edema   GI: Soft, nontender, + abdominal distension, normal bowel sounds  SKIN: Dry, intact, No rashes or lesions  PSYCH: Alert and oriented x 3, reactive affect, euthymic mood       POCT Blood Glucose.: 242 mg/dL (21 @ 08:26) A 6, A 4  POCT Blood Glucose.: 229 mg/dL (21 @ 22:41) L 18  POCT Blood Glucose.: 262 mg/dL (21 @ 17:37) A 6, A 6  POCT Blood Glucose.: 342 mg/dL (21 @ 11:13)                          7.8    16.24 )-----------( 534      ( 2021 06:33 )             27.2       06-02    130<L>  |  95<L>  |  11  ----------------------------<  231<H>  4.8   |  20<L>  |  0.43<L>    EGFR if : 130  EGFR if non : 112    Ca    9.2        Mg     2.2       Phos  3.3         TPro  6.7  /  Alb  2.8<L>  /  TBili  0.6  /  DBili  x   /  AST  35<H>  /  ALT  25  /  AlkPhos  194<H>      HbA1c 9.6%             HPI:  Patient is a 56 year old woman with PMH uncontrolled DM2, A1c 9.6%, migraines and newly diagnosed stage 4 endometrial cancer presents to the ED with increasing abdominal pain/distention and shortness of breath for 2 weeks. Consult called for management of uncontrolled DM2. She was diagnosed with DM2 at least a few years ago. Medication list includes Metformin and Lantus, however on further questioning, she states that she does not take any medications for DM at home, was only receiving insulin therapy while in the hospital. States that she has been controlling her DM with diet and exercise. Does not check her BG at home. No known nephropathy. BG on presentation to the ED was in the 300's. Did not eat breakfast today as she is awaiting bedside paracentesis. Not on steroids here.     Does not want to take oral agents at home for DM. Would rather start with just basal insulin at home. States that she knows how to check her BG. Has been drinking a lot of prune juice recently.     PAST MEDICAL & SURGICAL HISTORY:  History of migraine headaches    DM type 2 (diabetes mellitus, type 2)    History of irregular menstrual cycles    Ovarian cancer  Stage 4    History of   ,       FAMILY HISTORY:  DM2 in mother     Social History:  no cigarette use  no alcohol use      Outpatient Medications:   not taking medications for DM at home    MEDICATIONS  (STANDING):  dextrose 40% Gel 15 Gram(s) Oral once  dextrose 5%. 1000 milliLiter(s) (50 mL/Hr) IV Continuous <Continuous>  dextrose 5%. 1000 milliLiter(s) (100 mL/Hr) IV Continuous <Continuous>  dextrose 50% Injectable 25 Gram(s) IV Push once  dextrose 50% Injectable 12.5 Gram(s) IV Push once  dextrose 50% Injectable 25 Gram(s) IV Push once  enoxaparin Injectable 40 milliGRAM(s) SubCutaneous daily  glucagon  Injectable 1 milliGRAM(s) IntraMuscular once  insulin glargine Injectable (LANTUS) 18 Unit(s) SubCutaneous at bedtime  insulin lispro (ADMELOG) corrective regimen sliding scale   SubCutaneous three times a day before meals  insulin lispro Injectable (ADMELOG) 6 Unit(s) SubCutaneous three times a day before meals  lidocaine 1% (Preservative-free) Injectable 10 milliLiter(s) Local Injection once  polyethylene glycol 3350 17 Gram(s) Oral daily    MEDICATIONS  (PRN):  acetaminophen   Tablet .. 650 milliGRAM(s) Oral every 6 hours PRN Mild Pain (1 - 3), Moderate Pain (4 - 6)  morphine  - Injectable 2 milliGRAM(s) IV Push every 4 hours PRN Severe Pain (7 - 10)      Allergies  Sulf-10 (Rash; Short breath)    Review of Systems:  Constitutional: No fever  Eyes: No blurry vision  Neuro: No tremors  HEENT: No pain  Cardiovascular: No chest pain, palpitations  Respiratory: No SOB, no cough  GI: No nausea, vomiting, abdominal pain; + abdominal distension  : No dysuria  Skin: no rash  Endocrine: no polyuria, polydipsia    ALL OTHER SYSTEMS REVIEWED AND NEGATIVE    PHYSICAL EXAM:  VITALS: T(C): 36.9 (21 @ 06:28)  T(F): 98.4 (21 @ 06:28), Max: 98.4 (21 @ 06:28)  HR: 107 (21 @ 06:28) (76 - 113)  BP: 124/66 (21 @ 06:28) (122/74 - 140/75)  RR:  (16 - 18)  SpO2:  (99% - 100%)  Wt(kg): --  GENERAL: NAD, well-developed  EYES: No proptosis, anicteric  HEENT:  Atraumatic, Normocephalic  THYROID: Normal size, no palpable nodules  RESPIRATORY: Clear to auscultation bilaterally; No rales, rhonchi, wheezing  CARDIOVASCULAR: Regular rate and rhythm; No murmurs; + lower extremity edema   GI: Soft, nontender, + abdominal distension, normal bowel sounds  SKIN: Dry, intact, No rashes or lesions  PSYCH: Alert and oriented x 3, reactive affect, euthymic mood       POCT Blood Glucose.: 242 mg/dL (21 @ 08:26) A 6, A 4  POCT Blood Glucose.: 229 mg/dL (21 @ 22:41) L 18  POCT Blood Glucose.: 262 mg/dL (21 @ 17:37) A 6, A 6  POCT Blood Glucose.: 342 mg/dL (21 @ 11:13)                          7.8    16.24 )-----------( 534      ( 2021 06:33 )             27.2       06    130<L>  |  95<L>  |  11  ----------------------------<  231<H>  4.8   |  20<L>  |  0.43<L>    EGFR if : 130  EGFR if non : 112    Ca    9.2        Mg     2.2       Phos  3.3         TPro  6.7  /  Alb  2.8<L>  /  TBili  0.6  /  DBili  x   /  AST  35<H>  /  ALT  25  /  AlkPhos  194<H>      HbA1c 9.6%

## 2021-06-02 NOTE — CONSULT NOTE ADULT - SUBJECTIVE AND OBJECTIVE BOX
HPI:  55yo AA Female pmhx of DM II (currently on DM diet, denies taking her insulin), migraines and newly diagnosed stage 4 endometrial cancer presents to the ED with increasing abdominal pain/distention and shortness of breath for 2 weeks. Pt is scheduled to start Chemotherapy on Friday, . Patient was taking Tramadol for the pain but reported minimal relief so she stopped taking it. Pt denies the use of other medications to alleviate the symptoms. She states that pain is increased with movement and manipulation, received morphine in ED with improvement.  Pt denies N/V/D, fevers, chills, malaise, night sweats, chest pain or palpitations. Patient reports increasing shortness of breathe naa with exertion that she correlates with her abdominal distention. She denies having significant SOB in the past. She states that the SOB was not brought on by physical activity or trauma. Last BM this AM.    (2021 16:27)      Pt had paracentesis- 4L removed.  Pt feels pain improved but still some epigastric.  Morphine relieves pain.  Was not taking any opioids prior to admission.  Pt lives with .  Has 3 children- 2 over 30 ys and 18 you son.      Last BM 2 days ago. Denies nausea, vomitting.      PERTINENT PM/SXH:   No pertinent past medical history    History of migraine headaches    DM type 2 (diabetes mellitus, type 2)    History of irregular menstrual cycles    Ovarian cancer      No significant past surgical history    History of       FAMILY HISTORY:  FH: type 2 diabetes mellitus (Mother)  mother      ITEMS NOT CHECKED ARE NOT PRESENT    SOCIAL HISTORY:   Significant other/partner:  [x ]  Children:  [ x]  Anabaptism/Spirituality:  Substance hx:  [ ]   Tobacco hx:  [ ]   Alcohol hx: [ ]   Home Opioid hx:  [ ] I-Stop Reference No:  Living Situation: [x ]Home  [ ]Long term care  [ ]Rehab [ ]Other    ADVANCE DIRECTIVES:    DNR  MOLST  [ ]  Living Will  [ ]   DECISION MAKER(s):  [ x] Health Care Proxy(s)  [ ] Surrogate(s)  [ ] Guardian           Name(s): Phone Number(s):  Marline- daughter- hcp placed in chart and copy provided to ptbrittney secondary    BASELINE (I)ADL(s) (prior to admission):  [place]: [x ]Total  [ ] Moderate [ ]Dependent    Allergies    Sulf-10 (Rash; Short breath)    Intolerances    MEDICATIONS  (STANDING):  dextrose 40% Gel 15 Gram(s) Oral once  dextrose 5%. 1000 milliLiter(s) (50 mL/Hr) IV Continuous <Continuous>  dextrose 5%. 1000 milliLiter(s) (100 mL/Hr) IV Continuous <Continuous>  dextrose 50% Injectable 25 Gram(s) IV Push once  dextrose 50% Injectable 12.5 Gram(s) IV Push once  dextrose 50% Injectable 25 Gram(s) IV Push once  enoxaparin Injectable 40 milliGRAM(s) SubCutaneous daily  glucagon  Injectable 1 milliGRAM(s) IntraMuscular once  insulin glargine Injectable (LANTUS) 24 Unit(s) SubCutaneous at bedtime  insulin lispro (ADMELOG) corrective regimen sliding scale   SubCutaneous three times a day before meals  insulin lispro (ADMELOG) corrective regimen sliding scale   SubCutaneous at bedtime  insulin lispro Injectable (ADMELOG) 10 Unit(s) SubCutaneous three times a day before meals  iron sucrose IVPB 200 milliGRAM(s) IV Intermittent every 24 hours  polyethylene glycol 3350 17 Gram(s) Oral daily    MEDICATIONS  (PRN):  acetaminophen   Tablet .. 650 milliGRAM(s) Oral every 6 hours PRN Mild Pain (1 - 3), Moderate Pain (4 - 6)  morphine  - Injectable 2 milliGRAM(s) IV Push every 4 hours PRN Severe Pain (7 - 10)  oxyCODONE    IR 5 milliGRAM(s) Oral every 4 hours PRN Moderate Pain (4 - 6)    PRESENT SYMPTOMS: [ ]Unable to obtain due to poor mentation   Source if other than patient:  [ ]Family   [ ]Team     Pain (Impact on QOL):  yes  Location -      mid epigastric    Minimal acceptable level (0-10 scale):  3/10                    Aggrevating factors - increasing fluid in abdomen  Quality - dull  Radiation - to back  Severity (0-10 scale) - 5/10   Timing - comes and goes    PAIN AD Score:     [email or web address] (press ctrl +  left click to view)    Dyspnea:                           [ ]Mild [ ]Moderate [ ]Severe  Anxiety:                             [ ]Mild [ ]Moderate [ ]Severe  Fatigue:                             [ ]Mild x[ ]Moderate [ ]Severe  Nausea:                             [ ]Mild [ ]Moderate [ ]Severe  Loss of appetite:              [ ]Mild [ x]Moderate [ ]Severe  Constipation:                    [ ]Mild [ ]Moderate [ ]Severe    Other Symptoms:  [x ]All other review of systems negative     Karnofsky Performance Score/Palliative Performance Status Version 2: 80        %  PHYSICAL EXAM:  Vital Signs Last 24 Hrs  T(C): 36.9 (2021 13:37), Max: 36.9 (2021 06:28)  T(F): 98.5 (2021 13:37), Max: 98.5 (2021 13:37)  HR: 111 (2021 13:37) (76 - 113)  BP: 117/62 (2021 13:37) (117/62 - 140/75)  BP(mean): --  RR: 18 (2021 13:37) (16 - 18)  SpO2: 97% (2021 13:37) (97% - 100%) I&O's Summary    2021 07:01  -  2021 14:02  --------------------------------------------------------  IN: 240 mL / OUT: 0 mL / NET: 240 mL    GENERAL:  [x ]Alert  [x ]Oriented x3   [ ]Lethargic  [ ]Cachexia  [ ]Unarousable  [ ]Verbal  [ ]Non-Verbal  Behavioral:   [ ] Anxiety  [ ] Delirium [ ] Agitation [ ] Other  HEENT:  [ ]Normal   [ x]Dry mouth   [ ]ET Tube/Trach  [ ]Oral lesions  PULMONARY:   [x ]Clear [ ]Tachypnea  [ ]Audible excessive secretions   [ ]Rhonchi        [ ]Right [ ]Left [ ]Bilateral  [ ]Crackles        [ ]Right [ ]Left [ ]Bilateral  [ ]Wheezing     [ ]Right [ ]Left [ ]Bilateral  CARDIOVASCULAR:    [ ]Regular [ ]Irregular [ x]Tachy  [ ]Glenn [ ]Murmur [ ]Other  GASTROINTESTINAL:  [ x]Soft  [x ]Distended   [x ]+BS  [ ]Non tender [x ]Tender  [ ]PEG [ ]OGT/ NGT  Last BM:   GENITOURINARY:  [ x]Normal [ ] Incontinent   [ ]Oliguria/Anuria   [ ][place]  MUSCULOSKELETAL:   [ ]Normal   [ x]Weakness  [ ]Bed/Wheelchair bound [ ]Edema  NEUROLOGIC:   [ x]No focal deficits  [ ] Cognitive impairment  [ ] Dysphagia [ ]Dysarthria [ ] Paresis [ ]Other   SKIN:   [ x]Normal   [ ]Pressure ulcer(s)  [ ]Rash    CRITICAL CARE:  [ ] Shock Present  [ ]Septic [ ]Cardiogenic [ ]Neurologic [ ]Hypovolemic  [ ]  Vasopressors [ ]  Inotropes   [ ] Respiratory failure present  [ ] Acute  [ ] Chronic [ ] Hypoxic  [ ] Hypercarbic [ ] Other  [ ] Other organ failure     LABS:                        7.3    16.08 )-----------( 537      ( 2021 13:28 )             26.4   06-02    130<L>  |  95<L>  |  11  ----------------------------<  231<H>  4.8   |  20<L>  |  0.43<L>    Ca    9.2      2021 06:33  Phos  3.3     06-  Mg     2.2     06-02    TPro  6.7  /  Alb  2.8<L>  /  TBili  0.6  /  DBili  x   /  AST  35<H>  /  ALT  25  /  AlkPhos  194<H>  06-02  PT/INR - ( 2021 12:08 )   PT: 16.9 sec;   INR: 1.51 ratio         PTT - ( 2021 12:08 )  PTT:25.5 sec      RADIOLOGY & ADDITIONAL STUDIES:    PROTEIN CALORIE MALNUTRITION:   [ ] PPSV2 < or = to 30% [ ] significant weight loss  [ ] poor nutritional intake [ ] catabolic state [ ] anasarca     Albumin, Serum: 2.8 g/dL (21 @ 06:33)  Artificial Nutrition [ ]     REFERRALS:   [ ]Chaplaincy  [ ] Hospice  [ ]Child Life  [ ]Social Work  [ ]Case management [ ]Holistic Therapy   Goals of Care Discussion Document:

## 2021-06-02 NOTE — CONSULT NOTE ADULT - PROBLEM SELECTOR RECOMMENDATION 9
continue iv morphine  try to transition to oral- started oxy ir 5mg po q 4 hours  will assess for need for long acting now that ascites was drained  add bowel regimen- senna, miralax

## 2021-06-02 NOTE — CONSULT NOTE ADULT - PROBLEM SELECTOR RECOMMENDATION 2
s/p 4L drainage  fluid sent to rule out infection  watch for reaccumulation- may required pigtail cath in future
- goal BP is < 130/80, BP intermittently above goal  - continue to monitor

## 2021-06-02 NOTE — CONSULT NOTE ADULT - PROBLEM SELECTOR RECOMMENDATION 9
- HbA1c 9.6%, not on treatment at home  - increase Lantus to 24 units qhs  - increase Admelog to 8 units before meals  - low correction scale qac and qhs   - consistent carb diet  - check FS qac and qhs  - RD consult  - will follow  - for discharge: will likely recommend discharging on basal insulin + oral agent such as Metformin. Final recs TBD. - HbA1c 9.6%, not on treatment at home  - increase Lantus to 24 units qhs  - increase Admelog to 8 units before meals  - low correction scale qac and qhs   - consistent carb diet  - check FS qac and qhs  - RD consult  - will follow  - for discharge: ideally would discharging on basal insulin + oral agent such as Metformin, however patient declines treatment with orals and would likely to start with once daily basal insulin for now. Thus, will plan on discharging on Lantus (or whichever basal insulin is covered), dose TBD. Send script for Lantus 24 units qhs to assess coverage. - HbA1c 9.6%, not on treatment at home  - increase Lantus to 24 units qhs  - increase Admelog to 10 units before meals  - low correction scale qac and qhs   - consistent carb diet  - check FS qac and qhs  - RD consult  - will follow  - for discharge: ideally would discharging on basal insulin + oral agent such as Metformin, however patient declines treatment with orals and would likely to start with once daily basal insulin for now. Thus, will plan on discharging on Lantus (or whichever basal insulin is covered), dose TBD. Send script for Lantus 24 units qhs to assess coverage. - HbA1c 9.6%, not on treatment at home  - increase Lantus to 24 units qhs  - increase Admelog to 10 units before meals  - low correction scale qac and qhs   - consistent carb diet  - check FS qac and qhs  - RD consult  - will follow  - for discharge: ideally would discharge on basal insulin + oral agent such as Metformin, however patient declines treatment with orals and would like to start with once daily basal insulin for now. Thus, will plan on discharging on Lantus (or whichever basal insulin is covered), dose TBD. Send script for Lantus 24 units qhs to assess coverage.

## 2021-06-02 NOTE — CHART NOTE - NSCHARTNOTEFT_GEN_A_CORE
procedure team consulted for paracentesis, will discuss with oncology re: if diagnostic is needed or just therapeutic tap.

## 2021-06-02 NOTE — PROGRESS NOTE ADULT - SUBJECTIVE AND OBJECTIVE BOX
Patient is a 56y old  Female who presents with a chief complaint of Recent Endometrial Cancer, Abdominal Distention, SOB (02 Jun 2021 14:02)      SUBJECTIVE / OVERNIGHT EVENTS: pt admitted overnight. Endorses diffuse abdominal distention w/ dyspnea on exertion. Denies abd pain, although received IV morphine shortly prior to interview. She denies N/V. Has flatus, no BM in 3 three days.    MEDICATIONS  (STANDING):  cefTRIAXone   IVPB 2000 milliGRAM(s) IV Intermittent every 24 hours  dextrose 40% Gel 15 Gram(s) Oral once  dextrose 5%. 1000 milliLiter(s) (50 mL/Hr) IV Continuous <Continuous>  dextrose 5%. 1000 milliLiter(s) (100 mL/Hr) IV Continuous <Continuous>  dextrose 50% Injectable 25 Gram(s) IV Push once  dextrose 50% Injectable 12.5 Gram(s) IV Push once  dextrose 50% Injectable 25 Gram(s) IV Push once  enoxaparin Injectable 40 milliGRAM(s) SubCutaneous daily  glucagon  Injectable 1 milliGRAM(s) IntraMuscular once  insulin glargine Injectable (LANTUS) 24 Unit(s) SubCutaneous at bedtime  insulin lispro (ADMELOG) corrective regimen sliding scale   SubCutaneous three times a day before meals  insulin lispro (ADMELOG) corrective regimen sliding scale   SubCutaneous at bedtime  insulin lispro Injectable (ADMELOG) 10 Unit(s) SubCutaneous three times a day before meals  iron sucrose IVPB 200 milliGRAM(s) IV Intermittent every 24 hours  polyethylene glycol 3350 17 Gram(s) Oral daily  senna 2 Tablet(s) Oral at bedtime    MEDICATIONS  (PRN):  acetaminophen   Tablet .. 650 milliGRAM(s) Oral every 6 hours PRN Mild Pain (1 - 3), Moderate Pain (4 - 6)  morphine  - Injectable 2 milliGRAM(s) IV Push every 4 hours PRN Severe Pain (7 - 10)  oxyCODONE    IR 5 milliGRAM(s) Oral every 4 hours PRN Moderate Pain (4 - 6)        CAPILLARY BLOOD GLUCOSE      POCT Blood Glucose.: 250 mg/dL (02 Jun 2021 12:25)  POCT Blood Glucose.: 242 mg/dL (02 Jun 2021 08:26)  POCT Blood Glucose.: 229 mg/dL (01 Jun 2021 22:41)  POCT Blood Glucose.: 262 mg/dL (01 Jun 2021 17:37)    I&O's Summary    02 Jun 2021 07:01  -  02 Jun 2021 14:52  --------------------------------------------------------  IN: 240 mL / OUT: 0 mL / NET: 240 mL    Vital Signs Last 24 Hrs  T(C): 36.9 (02 Jun 2021 13:37), Max: 36.9 (02 Jun 2021 06:28)  T(F): 98.5 (02 Jun 2021 13:37), Max: 98.5 (02 Jun 2021 13:37)  HR: 111 (02 Jun 2021 13:37) (76 - 113)  BP: 117/62 (02 Jun 2021 13:37) (117/62 - 140/75)  BP(mean): --  RR: 18 (02 Jun 2021 13:37) (16 - 18)  SpO2: 97% (02 Jun 2021 13:37) (97% - 100%)    PHYSICAL EXAM:  GENERAL; cachectic w/ bitemporal wasting  HEAD:  Atraumatic, Normocephalic  CHEST/LUNG: Clear to auscultation bilaterally; No wheeze  HEART: Regular rate and rhythm; No murmurs, rubs, or gallops  ABDOMEN: +severely distended, Nontender, Nondistended; hypoactive BS  EXTREMITIES:  2+ Peripheral Pulses, No clubbing, cyanosis, or edema  PSYCH: AAOx3  NEUROLOGY: non-focal  SKIN: No rashes or lesions    LABS:                        7.3    16.08 )-----------( 537      ( 02 Jun 2021 13:28 )             26.4     06-02    130<L>  |  95<L>  |  11  ----------------------------<  231<H>  4.8   |  20<L>  |  0.43<L>    Ca    9.2      02 Jun 2021 06:33  Phos  3.3     06-02  Mg     2.2     06-02    TPro  6.7  /  Alb  2.8<L>  /  TBili  0.6  /  DBili  x   /  AST  35<H>  /  ALT  25  /  AlkPhos  194<H>  06-02    PT/INR - ( 01 Jun 2021 12:08 )   PT: 16.9 sec;   INR: 1.51 ratio         PTT - ( 01 Jun 2021 12:08 )  PTT:25.5 sec          RADIOLOGY & ADDITIONAL TESTS:    Imaging Personally Reviewed:    Consultant(s) Notes Reviewed:      Care Discussed with Consultants/Other Providers: Dr. Mccord (Palliative Care)

## 2021-06-02 NOTE — PROGRESS NOTE ADULT - PROBLEM SELECTOR PLAN 3
- Trend CBC/H&H  - will transufse 1u PRBC in anticipation for chemotx in future  - iron panel c/w DOUGLAS, administer IV venofer 200 mg [id number]

## 2021-06-02 NOTE — CONSULT NOTE ADULT - SUBJECTIVE AND OBJECTIVE BOX
Patient is a 56y old  Female who presents with a chief complaint of Recent Endometrial Cancer, Abdominal Distention, SOB (2021 16:27)      HPI:  55yo AA Female pmhx of DM II (currently on DM diet, denies taking her insulin), migraines and newly diagnosed stage 4 endometrial cancer presents to the ED with increasing abdominal pain/distention and shortness of breath for 2 weeks. Pt is scheduled to start Chemotherapy on Friday, . Patient was taking Tramadol for the pain but reported minimal relief so she stopped taking it. Pt denies the use of other medications to alleviate the symptoms. She states that pain is increased with movement and manipulation, received morphine in ED with improvement.  Pt denies N/V/D, fevers, chills, malaise, night sweats, chest pain or palpitations. Patient reports increasing shortness of breathe naa with exertion that she correlates with her abdominal distention. She denies having significant SOB in the past. She states that the SOB was not brought on by physical activity or trauma. Last BM this AM.    (2021 16:27)       Oncologic History:      ROS: as above     PAST MEDICAL & SURGICAL HISTORY:  History of migraine headaches    DM type 2 (diabetes mellitus, type 2)    History of irregular menstrual cycles    Ovarian cancer  Stage 4    History of   2003        SOCIAL HISTORY:    FAMILY HISTORY:  FH: type 2 diabetes mellitus (Mother)  mother        MEDICATIONS  (STANDING):  dextrose 40% Gel 15 Gram(s) Oral once  dextrose 5%. 1000 milliLiter(s) (50 mL/Hr) IV Continuous <Continuous>  dextrose 5%. 1000 milliLiter(s) (100 mL/Hr) IV Continuous <Continuous>  dextrose 50% Injectable 25 Gram(s) IV Push once  dextrose 50% Injectable 12.5 Gram(s) IV Push once  dextrose 50% Injectable 25 Gram(s) IV Push once  enoxaparin Injectable 40 milliGRAM(s) SubCutaneous daily  glucagon  Injectable 1 milliGRAM(s) IntraMuscular once  insulin glargine Injectable (LANTUS) 18 Unit(s) SubCutaneous at bedtime  insulin lispro (ADMELOG) corrective regimen sliding scale   SubCutaneous three times a day before meals  insulin lispro Injectable (ADMELOG) 6 Unit(s) SubCutaneous three times a day before meals  lidocaine 1% (Preservative-free) Injectable 10 milliLiter(s) Local Injection once  polyethylene glycol 3350 17 Gram(s) Oral daily    MEDICATIONS  (PRN):  acetaminophen   Tablet .. 650 milliGRAM(s) Oral every 6 hours PRN Mild Pain (1 - 3), Moderate Pain (4 - 6)  morphine  - Injectable 2 milliGRAM(s) IV Push every 4 hours PRN Severe Pain (7 - 10)      Allergies    Sulf-10 (Rash; Short breath)    Intolerances        Vital Signs Last 24 Hrs  T(C): 36.9 (2021 06:28), Max: 36.9 (2021 06:28)  T(F): 98.4 (2021 06:28), Max: 98.4 (2021 06:28)  HR: 107 (2021 06:28) (76 - 113)  BP: 124/66 (2021 06:28) (122/74 - 140/75)  BP(mean): --  RR: 17 (2021 06:28) (16 - 18)  SpO2: 99% (2021 06:28) (99% - 100%)    PHYSICAL EXAM  General: adult in NAD  HEENT: clear oropharynx, anicteric sclera, pink conjunctiva  Neck: supple  CV: normal S1/S2 with no murmur rubs or gallops  Lungs: positive air movement b/l ant lungs, clear to auscultation, no wheezes, no rales  Abdomen: soft non-tender non-distended, no hepatosplenomegaly  Ext: no clubbing cyanosis or edema  Skin: no rashes and no petechiae  Neuro: alert and oriented X 3, none focal    LABS:                          7.8    16.24 )-----------( 534      ( 2021 06:33 )             27.2         Mean Cell Volume : 68.5 fL  Mean Cell Hemoglobin : 19.6 pg  Mean Cell Hemoglobin Concentration : 28.7 gm/dL  Auto Neutrophil # : 13.48 K/uL  Auto Lymphocyte # : 0.95 K/uL  Auto Monocyte # : 1.55 K/uL  Auto Eosinophil # : 0.02 K/uL  Auto Basophil # : 0.05 K/uL  Auto Neutrophil % : 83.1 %  Auto Lymphocyte % : 5.8 %  Auto Monocyte % : 9.5 %  Auto Eosinophil % : 0.1 %  Auto Basophil % : 0.3 %      Serial CBC's   @ 06:33  Hct-27.2 / Hgb-7.8 / Plat-534 / RBC-3.97 / WBC-16.24  Serial CBC's   @ 12:08  Hct-27.6 / Hgb-7.7 / Plat-569 / RBC-3.92 / WBC-16.42      06-02    130<L>  |  95<L>  |  11  ----------------------------<  231<H>  4.8   |  20<L>  |  0.43<L>    Ca    9.2      2021 06:33  Phos  3.3       Mg     2.2         TPro  6.7  /  Alb  2.8<L>  /  TBili  0.6  /  DBili  x   /  AST  35<H>  /  ALT  25  /  AlkPhos  194<H>        PT/INR - ( 2021 12:08 )   PT: 16.9 sec;   INR: 1.51 ratio         PTT - ( 2021 12:08 )  PTT:25.5 sec    Vitamin B12, Serum: 664 pg/mL ( @ 06:33)  Folate, Serum: 10.6 ng/mL ( @ 06:33)  Iron - Total Binding Capacity.: 201 ug/dL ( @ 06:33)  Ferritin, Serum: 646 ng/mL ( @ 06:33)  Reticulocyte Percent: 2.5 % ( @ 06:33)  Iron - Total Binding Capacity.: 204 ug/dL ( @ 12:16)  Ferritin, Serum: 656 ng/mL ( @ 12:16)              RADIOLOGY & ADDITIONAL STUDIES:     Patient is a 56y old  Female who presents with a chief complaint of Recent Endometrial Cancer, Abdominal Distention, SOB (2021 16:27)      HPI:  55yo AA Female pmhx of DM II (currently on DM diet, denies taking her insulin), migraines and newly diagnosed stage 4 endometrial cancer presents to the ED with increasing abdominal pain/distention and shortness of breath for 2 weeks. Pt is scheduled to start Chemotherapy on Friday, . Patient was taking Tramadol for the pain but reported minimal relief so she stopped taking it. Pt denies the use of other medications to alleviate the symptoms. She states that pain is increased with movement and manipulation, received morphine in ED with improvement.  Pt denies N/V/D, fevers, chills, malaise, night sweats, chest pain or palpitations. Patient reports increasing shortness of breathe naa with exertion that she correlates with her abdominal distention. She denies having significant SOB in the past. She states that the SOB was not brought on by physical activity or trauma. Last BM this AM.    (2021 16:27)         ROS: as above     PAST MEDICAL & SURGICAL HISTORY:  History of migraine headaches    DM type 2 (diabetes mellitus, type 2)    History of irregular menstrual cycles    Ovarian cancer  Stage 4    History of   2003        SOCIAL HISTORY:    FAMILY HISTORY:  FH: type 2 diabetes mellitus (Mother)  mother        MEDICATIONS  (STANDING):  dextrose 40% Gel 15 Gram(s) Oral once  dextrose 5%. 1000 milliLiter(s) (50 mL/Hr) IV Continuous <Continuous>  dextrose 5%. 1000 milliLiter(s) (100 mL/Hr) IV Continuous <Continuous>  dextrose 50% Injectable 25 Gram(s) IV Push once  dextrose 50% Injectable 12.5 Gram(s) IV Push once  dextrose 50% Injectable 25 Gram(s) IV Push once  enoxaparin Injectable 40 milliGRAM(s) SubCutaneous daily  glucagon  Injectable 1 milliGRAM(s) IntraMuscular once  insulin glargine Injectable (LANTUS) 18 Unit(s) SubCutaneous at bedtime  insulin lispro (ADMELOG) corrective regimen sliding scale   SubCutaneous three times a day before meals  insulin lispro Injectable (ADMELOG) 6 Unit(s) SubCutaneous three times a day before meals  lidocaine 1% (Preservative-free) Injectable 10 milliLiter(s) Local Injection once  polyethylene glycol 3350 17 Gram(s) Oral daily    MEDICATIONS  (PRN):  acetaminophen   Tablet .. 650 milliGRAM(s) Oral every 6 hours PRN Mild Pain (1 - 3), Moderate Pain (4 - 6)  morphine  - Injectable 2 milliGRAM(s) IV Push every 4 hours PRN Severe Pain (7 - 10)      Allergies    Sulf-10 (Rash; Short breath)    Intolerances        Vital Signs Last 24 Hrs  T(C): 36.9 (2021 06:28), Max: 36.9 (2021 06:28)  T(F): 98.4 (2021 06:28), Max: 98.4 (2021 06:28)  HR: 107 (:) (76 - 113)  BP: 124/66 (:28) (122/74 - 140/75)  BP(mean): --  RR: 17 (2021 06:28) (16 - 18)  SpO2: 99% (2021 06:28) (99% - 100%)      LABS:                          7.8    16.24 )-----------( 534      ( 2021 06:33 )             27.2         Mean Cell Volume : 68.5 fL  Mean Cell Hemoglobin : 19.6 pg  Mean Cell Hemoglobin Concentration : 28.7 gm/dL  Auto Neutrophil # : 13.48 K/uL  Auto Lymphocyte # : 0.95 K/uL  Auto Monocyte # : 1.55 K/uL  Auto Eosinophil # : 0.02 K/uL  Auto Basophil # : 0.05 K/uL  Auto Neutrophil % : 83.1 %  Auto Lymphocyte % : 5.8 %  Auto Monocyte % : 9.5 %  Auto Eosinophil % : 0.1 %  Auto Basophil % : 0.3 %      Serial CBC's  06-02 @ 06:33  Hct-27.2 / Hgb-7.8 / Plat-534 / RBC-3.97 / WBC-16.24  Serial CBC's  - @ 12:08  Hct-27.6 / Hgb-7.7 / Plat-569 / RBC-3.92 / WBC-16.42      06-02    130<L>  |  95<L>  |  11  ----------------------------<  231<H>  4.8   |  20<L>  |  0.43<L>    Ca    9.2      2021 06:33  Phos  3.3       Mg     2.2         TPro  6.7  /  Alb  2.8<L>  /  TBili  0.6  /  DBili  x   /  AST  35<H>  /  ALT  25  /  AlkPhos  194<H>        PT/INR - ( 2021 12:08 )   PT: 16.9 sec;   INR: 1.51 ratio         PTT - ( 2021 12:08 )  PTT:25.5 sec    Vitamin B12, Serum: 664 pg/mL ( @ 06:33)  Folate, Serum: 10.6 ng/mL ( @ 06:33)  Iron - Total Binding Capacity.: 201 ug/dL ( @ 06:33)  Ferritin, Serum: 646 ng/mL ( @ 06:33)  Reticulocyte Percent: 2.5 % ( @ 06:33)  Iron - Total Binding Capacity.: 204 ug/dL ( @ 12:16)  Ferritin, Serum: 656 ng/mL ( @ 12:16)          RADIOLOGY & ADDITIONAL STUDIES:    reviewed

## 2021-06-03 DIAGNOSIS — I82.412 ACUTE EMBOLISM AND THROMBOSIS OF LEFT FEMORAL VEIN: ICD-10-CM

## 2021-06-03 LAB
ALBUMIN SERPL ELPH-MCNC: 2.1 G/DL — LOW (ref 3.3–5)
ALP SERPL-CCNC: 184 U/L — HIGH (ref 40–120)
ALT FLD-CCNC: 32 U/L — SIGNIFICANT CHANGE UP (ref 4–33)
ANION GAP SERPL CALC-SCNC: 12 MMOL/L — SIGNIFICANT CHANGE UP (ref 7–14)
AST SERPL-CCNC: 81 U/L — HIGH (ref 4–32)
BASOPHILS # BLD AUTO: 0.04 K/UL — SIGNIFICANT CHANGE UP (ref 0–0.2)
BASOPHILS NFR BLD AUTO: 0.2 % — SIGNIFICANT CHANGE UP (ref 0–2)
BILIRUB SERPL-MCNC: 0.7 MG/DL — SIGNIFICANT CHANGE UP (ref 0.2–1.2)
BLD GP AB SCN SERPL QL: NEGATIVE — SIGNIFICANT CHANGE UP
BUN SERPL-MCNC: 7 MG/DL — SIGNIFICANT CHANGE UP (ref 7–23)
CALCIUM SERPL-MCNC: 8.3 MG/DL — LOW (ref 8.4–10.5)
CHLORIDE SERPL-SCNC: 97 MMOL/L — LOW (ref 98–107)
CHOLEST SERPL-MCNC: 115 MG/DL — SIGNIFICANT CHANGE UP
CO2 SERPL-SCNC: 20 MMOL/L — LOW (ref 22–31)
CREAT SERPL-MCNC: 0.37 MG/DL — LOW (ref 0.5–1.3)
EOSINOPHIL # BLD AUTO: 0.02 K/UL — SIGNIFICANT CHANGE UP (ref 0–0.5)
EOSINOPHIL NFR BLD AUTO: 0.1 % — SIGNIFICANT CHANGE UP (ref 0–6)
GLUCOSE BLDC GLUCOMTR-MCNC: 110 MG/DL — HIGH (ref 70–99)
GLUCOSE BLDC GLUCOMTR-MCNC: 144 MG/DL — HIGH (ref 70–99)
GLUCOSE BLDC GLUCOMTR-MCNC: 84 MG/DL — SIGNIFICANT CHANGE UP (ref 70–99)
GLUCOSE BLDC GLUCOMTR-MCNC: 89 MG/DL — SIGNIFICANT CHANGE UP (ref 70–99)
GLUCOSE SERPL-MCNC: 111 MG/DL — HIGH (ref 70–99)
HCT VFR BLD CALC: 31 % — LOW (ref 34.5–45)
HDLC SERPL-MCNC: 19 MG/DL — LOW
HGB BLD-MCNC: 9.1 G/DL — LOW (ref 11.5–15.5)
IANC: 14.11 K/UL — HIGH (ref 1.5–8.5)
IMM GRANULOCYTES NFR BLD AUTO: 1.1 % — SIGNIFICANT CHANGE UP (ref 0–1.5)
LIPID PNL WITH DIRECT LDL SERPL: 59 MG/DL — SIGNIFICANT CHANGE UP
LYMPHOCYTES # BLD AUTO: 0.97 K/UL — LOW (ref 1–3.3)
LYMPHOCYTES # BLD AUTO: 5.7 % — LOW (ref 13–44)
MAGNESIUM SERPL-MCNC: 2 MG/DL — SIGNIFICANT CHANGE UP (ref 1.6–2.6)
MCHC RBC-ENTMCNC: 20.8 PG — LOW (ref 27–34)
MCHC RBC-ENTMCNC: 29.4 GM/DL — LOW (ref 32–36)
MCV RBC AUTO: 70.8 FL — LOW (ref 80–100)
MONOCYTES # BLD AUTO: 1.62 K/UL — HIGH (ref 0–0.9)
MONOCYTES NFR BLD AUTO: 9.6 % — SIGNIFICANT CHANGE UP (ref 2–14)
NEUTROPHILS # BLD AUTO: 14.11 K/UL — HIGH (ref 1.8–7.4)
NEUTROPHILS NFR BLD AUTO: 83.3 % — HIGH (ref 43–77)
NON HDL CHOLESTEROL: 96 MG/DL — SIGNIFICANT CHANGE UP
NRBC # BLD: 0 /100 WBCS — SIGNIFICANT CHANGE UP
NRBC # FLD: 0.02 K/UL — HIGH
PHOSPHATE SERPL-MCNC: 2.7 MG/DL — SIGNIFICANT CHANGE UP (ref 2.5–4.5)
PLATELET # BLD AUTO: 459 K/UL — HIGH (ref 150–400)
POTASSIUM SERPL-MCNC: 4.9 MMOL/L — SIGNIFICANT CHANGE UP (ref 3.5–5.3)
POTASSIUM SERPL-SCNC: 4.9 MMOL/L — SIGNIFICANT CHANGE UP (ref 3.5–5.3)
PROT SERPL-MCNC: 5.9 G/DL — LOW (ref 6–8.3)
RBC # BLD: 4.38 M/UL — SIGNIFICANT CHANGE UP (ref 3.8–5.2)
RBC # FLD: 17.5 % — HIGH (ref 10.3–14.5)
RH IG SCN BLD-IMP: POSITIVE — SIGNIFICANT CHANGE UP
SODIUM SERPL-SCNC: 129 MMOL/L — LOW (ref 135–145)
TRIGL SERPL-MCNC: 184 MG/DL — HIGH
WBC # BLD: 16.94 K/UL — HIGH (ref 3.8–10.5)
WBC # FLD AUTO: 16.94 K/UL — HIGH (ref 3.8–10.5)

## 2021-06-03 PROCEDURE — 93970 EXTREMITY STUDY: CPT | Mod: 26

## 2021-06-03 PROCEDURE — 99233 SBSQ HOSP IP/OBS HIGH 50: CPT

## 2021-06-03 PROCEDURE — 99232 SBSQ HOSP IP/OBS MODERATE 35: CPT

## 2021-06-03 PROCEDURE — 99233 SBSQ HOSP IP/OBS HIGH 50: CPT | Mod: GC

## 2021-06-03 RX ORDER — MORPHINE SULFATE 50 MG/1
1 CAPSULE, EXTENDED RELEASE ORAL
Qty: 60 | Refills: 0
Start: 2021-06-03 | End: 2021-07-02

## 2021-06-03 RX ORDER — MORPHINE SULFATE 50 MG/1
15 CAPSULE, EXTENDED RELEASE ORAL
Refills: 0 | Status: DISCONTINUED | OUTPATIENT
Start: 2021-06-03 | End: 2021-06-10

## 2021-06-03 RX ORDER — ENOXAPARIN SODIUM 100 MG/ML
70 INJECTION SUBCUTANEOUS
Qty: 30 | Refills: 0
Start: 2021-06-03 | End: 2021-07-02

## 2021-06-03 RX ORDER — ENOXAPARIN SODIUM 100 MG/ML
70 INJECTION SUBCUTANEOUS
Refills: 0 | Status: DISCONTINUED | OUTPATIENT
Start: 2021-06-04 | End: 2021-06-10

## 2021-06-03 RX ORDER — OXYCODONE HYDROCHLORIDE 5 MG/1
5 TABLET ORAL EVERY 4 HOURS
Refills: 0 | Status: DISCONTINUED | OUTPATIENT
Start: 2021-06-03 | End: 2021-06-10

## 2021-06-03 RX ORDER — ENOXAPARIN SODIUM 100 MG/ML
24 INJECTION SUBCUTANEOUS
Qty: 5 | Refills: 0
Start: 2021-06-03 | End: 2021-07-02

## 2021-06-03 RX ORDER — MORPHINE SULFATE 50 MG/1
30 CAPSULE, EXTENDED RELEASE ORAL
Refills: 0 | Status: DISCONTINUED | OUTPATIENT
Start: 2021-06-03 | End: 2021-06-03

## 2021-06-03 RX ORDER — ENOXAPARIN SODIUM 100 MG/ML
30 INJECTION SUBCUTANEOUS ONCE
Refills: 0 | Status: COMPLETED | OUTPATIENT
Start: 2021-06-03 | End: 2021-06-03

## 2021-06-03 RX ORDER — MORPHINE SULFATE 50 MG/1
2 CAPSULE, EXTENDED RELEASE ORAL EVERY 4 HOURS
Refills: 0 | Status: DISCONTINUED | OUTPATIENT
Start: 2021-06-03 | End: 2021-06-08

## 2021-06-03 RX ADMIN — IRON SUCROSE 110 MILLIGRAM(S): 20 INJECTION, SOLUTION INTRAVENOUS at 14:09

## 2021-06-03 RX ADMIN — MORPHINE SULFATE 15 MILLIGRAM(S): 50 CAPSULE, EXTENDED RELEASE ORAL at 17:40

## 2021-06-03 RX ADMIN — MORPHINE SULFATE 2 MILLIGRAM(S): 50 CAPSULE, EXTENDED RELEASE ORAL at 03:21

## 2021-06-03 RX ADMIN — Medication 10 UNIT(S): at 12:25

## 2021-06-03 RX ADMIN — MORPHINE SULFATE 2 MILLIGRAM(S): 50 CAPSULE, EXTENDED RELEASE ORAL at 12:25

## 2021-06-03 RX ADMIN — SENNA PLUS 2 TABLET(S): 8.6 TABLET ORAL at 21:51

## 2021-06-03 RX ADMIN — ENOXAPARIN SODIUM 30 MILLIGRAM(S): 100 INJECTION SUBCUTANEOUS at 13:41

## 2021-06-03 RX ADMIN — Medication 10 UNIT(S): at 08:53

## 2021-06-03 RX ADMIN — MORPHINE SULFATE 2 MILLIGRAM(S): 50 CAPSULE, EXTENDED RELEASE ORAL at 13:00

## 2021-06-03 RX ADMIN — CEFTRIAXONE 100 MILLIGRAM(S): 500 INJECTION, POWDER, FOR SOLUTION INTRAMUSCULAR; INTRAVENOUS at 13:41

## 2021-06-03 RX ADMIN — MORPHINE SULFATE 2 MILLIGRAM(S): 50 CAPSULE, EXTENDED RELEASE ORAL at 03:06

## 2021-06-03 RX ADMIN — MORPHINE SULFATE 2 MILLIGRAM(S): 50 CAPSULE, EXTENDED RELEASE ORAL at 07:54

## 2021-06-03 RX ADMIN — ENOXAPARIN SODIUM 40 MILLIGRAM(S): 100 INJECTION SUBCUTANEOUS at 12:25

## 2021-06-03 RX ADMIN — MORPHINE SULFATE 15 MILLIGRAM(S): 50 CAPSULE, EXTENDED RELEASE ORAL at 18:43

## 2021-06-03 RX ADMIN — POLYETHYLENE GLYCOL 3350 17 GRAM(S): 17 POWDER, FOR SOLUTION ORAL at 12:26

## 2021-06-03 RX ADMIN — MORPHINE SULFATE 2 MILLIGRAM(S): 50 CAPSULE, EXTENDED RELEASE ORAL at 07:39

## 2021-06-03 NOTE — PROGRESS NOTE ADULT - PROBLEM SELECTOR PLAN 3
- was initiating chemotx on 6/4, givne ongoing infection will defer tx until completion of abx  - onc to determine inpatient vs outpt chemo early next week  - palliative following

## 2021-06-03 NOTE — PROGRESS NOTE ADULT - ASSESSMENT
55yo Female w/ newly diagnosed Stage IV Endometrial Cancer and poorly controlled DM II presents to the ED with SOB, Abdominal pain/distention x2 weeks. CT A/P showed large volume ascites s/p diagnostic and therapeutic paracentesis (4L removed) c/b neutrocytic ascites on IV CTX. Found to have age indeterminate non occlusive L. mid femoral and peroneal veins DVT on therapeutic lovenox. Patient is to be started on Chemotherapy on 6/04, consider inpatient vs outpatient after infection resolves. Palliative care consulted for pain management.

## 2021-06-03 NOTE — PROGRESS NOTE ADULT - SUBJECTIVE AND OBJECTIVE BOX
SUBJECTIVE AND OBJECTIVE:  pt with pain in abdomen.  took 6 doses of morphine 2mg in last 24 hours.    INTERVAL HPI/OVERNIGHT EVENTS:    DNR on chart:   Allergies    Sulf-10 (Rash; Short breath)    Intolerances    MEDICATIONS  (STANDING):  cefTRIAXone   IVPB 2000 milliGRAM(s) IV Intermittent every 24 hours  dextrose 40% Gel 15 Gram(s) Oral once  dextrose 5%. 1000 milliLiter(s) (50 mL/Hr) IV Continuous <Continuous>  dextrose 5%. 1000 milliLiter(s) (100 mL/Hr) IV Continuous <Continuous>  dextrose 50% Injectable 25 Gram(s) IV Push once  dextrose 50% Injectable 12.5 Gram(s) IV Push once  dextrose 50% Injectable 25 Gram(s) IV Push once  glucagon  Injectable 1 milliGRAM(s) IntraMuscular once  insulin glargine Injectable (LANTUS) 24 Unit(s) SubCutaneous at bedtime  insulin lispro (ADMELOG) corrective regimen sliding scale   SubCutaneous three times a day before meals  insulin lispro (ADMELOG) corrective regimen sliding scale   SubCutaneous at bedtime  insulin lispro Injectable (ADMELOG) 10 Unit(s) SubCutaneous three times a day before meals  iron sucrose IVPB 200 milliGRAM(s) IV Intermittent every 24 hours  morphine ER Tablet 15 milliGRAM(s) Oral two times a day  polyethylene glycol 3350 17 Gram(s) Oral daily  senna 2 Tablet(s) Oral at bedtime    MEDICATIONS  (PRN):  acetaminophen   Tablet .. 650 milliGRAM(s) Oral every 6 hours PRN Mild Pain (1 - 3), Moderate Pain (4 - 6)  morphine  - Injectable 2 milliGRAM(s) IV Push every 4 hours PRN Severe Pain (7 - 10)  oxyCODONE    IR 5 milliGRAM(s) Oral every 4 hours PRN Moderate Pain (4 - 6)      ITEMS UNCHECKED ARE NOT PRESENT    PRESENT SYMPTOMS: [ ]Unable to obtain due to poor mentation   Source if other than patient:  [ ]Family   [ ]Team     Pain (Impact on QOL):  yes  Location: mid epigastric  Minimal acceptable level (0-10 scale):  3/10         Aggravating factors:  none  Quality: dull  Radiation: none  Severity (0-10 scale):  7/10  Timing: comes and goes    Dyspnea:                           [ ]Mild [ ]Moderate [ ]Severe  Anxiety:                             [x ]Mild [ ]Moderate [ ]Severe  Fatigue:                             [ ]Mild [ x]Moderate [ ]Severe  Nausea:                             [ ]Mild [ ]Moderate [ ]Severe  Loss of appetite:              [ ]Mild [x ]Moderate [ ]Severe  Constipation:                    [ ]Mild [x ]Moderate [ ]Severe    PAIN AD Score:	  [email or web address].[email or web address]/[email or web address] (Ctrl + left click to view)    Other Symptoms:  [x ]All other review of systems negative     Karnofsky Performance Score/Palliative Performance Status Version 2:     70    %    http://palliative.info/resource_material/[email or web address].pdf  PHYSICAL EXAM:  Vital Signs Last 24 Hrs  T(C): 36.8 (03 Jun 2021 12:33), Max: 37.4 (02 Jun 2021 19:08)  T(F): 98.2 (03 Jun 2021 12:33), Max: 99.3 (02 Jun 2021 19:08)  HR: 108 (03 Jun 2021 12:33) (99 - 116)  BP: 128/72 (03 Jun 2021 12:33) (112/61 - 137/73)  BP(mean): --  RR: 18 (03 Jun 2021 12:33) (18 - 18)  SpO2: 99% (03 Jun 2021 12:33) (98% - 100%) I&O's Summary    02 Jun 2021 07:01  -  03 Jun 2021 07:00  --------------------------------------------------------  IN: 1009 mL / OUT: 200 mL / NET: 809 mL    03 Jun 2021 07:01  -  03 Jun 2021 16:29  --------------------------------------------------------  IN: 200 mL / OUT: 0 mL / NET: 200 mL     GENERAL:  [x ]Alert  [x ]Oriented x3   [ ]Lethargic  [ ]Cachexia  [ ]Unarousable  [ ]Verbal  [ ]Non-Verbal    Behavioral:   [ ] Anxiety  [ ] Delirium [ ] Agitation [ ] Other    HEENT:  [ x]Normal   [ ]Dry mouth   [ ]ET Tube/Trach  [ ]Oral lesions    PULMONARY:   [x ]Clear [ ]Tachypnea  [ ]Audible excessive secretions   [ ]Rhonchi        [ ]Right [ ]Left [ ]Bilateral  [ ]Crackles        [ ]Right [ ]Left [ ]Bilateral  [ ]Wheezing     [ ]Right [ ]Left [ ]Bilateral    CARDIOVASCULAR:    [ ]Regular [ ]Irregular [x ]Tachy  [ ]Glenn [ ]Murmur [ ]Other    GASTROINTESTINAL:  [x ]Soft  [ x]Distended   [ ]+BS  [ ]Non tender [x ]Tender  [ ]PEG [ ]OGT/ NGT   Last BM:    GENITOURINARY:  [ x]Normal [ ] Incontinent   [ ]Oliguria/Anuria   [ ]Angeles    MUSCULOSKELETAL:   [ x]Normal   [ ]Weakness  [ ]Bed/Wheelchair bound [ ]Edema    NEUROLOGIC:   [x ]No focal deficits  [ ] Cognitive impairment  [ ] Dysphagia [ ]Dysarthria [ ] Paresis [ ]Other     SKIN:   [x ]Normal   [ ]Pressure ulcer(s)  [ ]Rash    CRITICAL CARE:  [ ] Shock Present  [ ]Septic [ ]Cardiogenic [ ]Neurologic [ ]Hypovolemic  [ ]  Vasopressors [ ]  Inotropes   [ ] Respiratory failure present  [ ] Acute  [ ] Chronic [ ] Hypoxic  [ ] Hypercarbic [ ] Other  [ ] Other organ failure     LABS:                        9.1    16.94 )-----------( 459      ( 03 Jun 2021 07:58 )             31.0   06-03    129<L>  |  97<L>  |  7   ----------------------------<  111<H>  4.9   |  20<L>  |  0.37<L>    Ca    8.3<L>      03 Jun 2021 07:58  Phos  2.7     06-03  Mg     2.0     06-03    TPro  5.9<L>  /  Alb  2.1<L>  /  TBili  0.7  /  DBili  x   /  AST  81<H>  /  ALT  32  /  AlkPhos  184<H>  06-03        RADIOLOGY & ADDITIONAL STUDIES:    Protein Calorie Malnutrition Present: [ ] yes [ ] no  [ ] PPSV2 < or = 30%  [ ] significant weight loss [ ] poor nutritional intake [ ] anasarca [ ] catabolic state Albumin, Serum: 2.1 g/dL (06-03-21 @ 07:58)  Artificial Nutrition [ ]     REFERRALS:   [ ]Chaplaincy  [ ] Hospice  [ ]Child Life  [ ]Social Work  [ ]Case management [ ]Holistic Therapy   Goals of Care Document:

## 2021-06-03 NOTE — PROGRESS NOTE ADULT - SUBJECTIVE AND OBJECTIVE BOX
INTERVAL HPI/OVERNIGHT EVENTS:  Patient seen at bedside.  Patient complaining of soreness in the abdomen  Requesting pain meds  Denies fever or chills      VITAL SIGNS:  T(F): 98.2 (06-03-21 @ 12:33)  HR: 108 (06-03-21 @ 12:33)  BP: 128/72 (06-03-21 @ 12:33)  RR: 18 (06-03-21 @ 12:33)  SpO2: 99% (06-03-21 @ 12:33)  Wt(kg): --    PHYSICAL EXAM:    In accordance with current standard limiting patient contact, deferred physical exam  2/2 COVID pandemic  Please refer to physical exam of primary team.    MEDICATIONS  (STANDING):  cefTRIAXone   IVPB 2000 milliGRAM(s) IV Intermittent every 24 hours  dextrose 40% Gel 15 Gram(s) Oral once  dextrose 5%. 1000 milliLiter(s) (50 mL/Hr) IV Continuous <Continuous>  dextrose 5%. 1000 milliLiter(s) (100 mL/Hr) IV Continuous <Continuous>  dextrose 50% Injectable 25 Gram(s) IV Push once  dextrose 50% Injectable 12.5 Gram(s) IV Push once  dextrose 50% Injectable 25 Gram(s) IV Push once  glucagon  Injectable 1 milliGRAM(s) IntraMuscular once  insulin glargine Injectable (LANTUS) 24 Unit(s) SubCutaneous at bedtime  insulin lispro (ADMELOG) corrective regimen sliding scale   SubCutaneous three times a day before meals  insulin lispro (ADMELOG) corrective regimen sliding scale   SubCutaneous at bedtime  insulin lispro Injectable (ADMELOG) 10 Unit(s) SubCutaneous three times a day before meals  iron sucrose IVPB 200 milliGRAM(s) IV Intermittent every 24 hours  polyethylene glycol 3350 17 Gram(s) Oral daily  senna 2 Tablet(s) Oral at bedtime    MEDICATIONS  (PRN):  acetaminophen   Tablet .. 650 milliGRAM(s) Oral every 6 hours PRN Mild Pain (1 - 3), Moderate Pain (4 - 6)  morphine  - Injectable 2 milliGRAM(s) IV Push every 4 hours PRN Severe Pain (7 - 10)  oxyCODONE    IR 5 milliGRAM(s) Oral every 4 hours PRN Moderate Pain (4 - 6)      Allergies    Sulf-10 (Rash; Short breath)    Intolerances        LABS:                        9.1    16.94 )-----------( 459      ( 03 Jun 2021 07:58 )             31.0     06-03    129<L>  |  97<L>  |  7   ----------------------------<  111<H>  4.9   |  20<L>  |  0.37<L>    Ca    8.3<L>      03 Jun 2021 07:58  Phos  2.7     06-03  Mg     2.0     06-03    TPro  5.9<L>  /  Alb  2.1<L>  /  TBili  0.7  /  DBili  x   /  AST  81<H>  /  ALT  32  /  AlkPhos  184<H>  06-03          RADIOLOGY & ADDITIONAL TESTS:  Studies reviewed.

## 2021-06-03 NOTE — PROGRESS NOTE ADULT - ASSESSMENT
57yo AA Female pmhx of DM II (currently on DM diet, denies taking her insulin), migraines and newly diagnosed stage 4 endometrial cancer presents to the ED with increasing abdominal pain/distention and shortness of breath for 2 weeks.  +ascites.  Asked to see for pain management

## 2021-06-03 NOTE — PROGRESS NOTE ADULT - PROBLEM SELECTOR PLAN 3
- LDL 59  - okay to defer statin     Darryl Valladares MD   Pager # [phone]  On evenings and weekends, please call the office at [phone] or page endocrine fellow on call. Please note that this patient may be followed by different provider tomorrow. If no answer, contact the office.

## 2021-06-03 NOTE — CHART NOTE - NSCHARTNOTEFT_GEN_A_CORE
Notified by vascular lab [phone] pt has RLE nonocclusive DVT located at mid-femur. Per discussion with Dr. Mooney, will start Lovenox 1mg/kg BID and follow up onc recs re: AC. Will continue to monitor pt closely.

## 2021-06-03 NOTE — PROGRESS NOTE ADULT - PROBLEM SELECTOR PLAN 1
- HbA1c 9.6%, not on treatment at home  - BG now at goal  - c/w Lantus 24 units qhs and Admelog to 10 units before meals  - low correction scale qac and qhs   - consistent carb diet  - check FS qac and qhs  - RD consult  - will follow  - for discharge: ideally would discharge on basal insulin + oral agent such as Metformin, however patient declines treatment with orals and would like to start with once daily basal insulin for now. Thus, will plan on discharging on Lantus (or whichever basal insulin is covered), dose TBD. Send script for Lantus 24 units qhs to assess coverage.

## 2021-06-03 NOTE — PROGRESS NOTE ADULT - PROBLEM SELECTOR PLAN 2
Va duplex 6/3 showed age indeterminate non occlusive L. mid femoral and peroneal veins DVT  - given active malignancy start on therapeutic Lovenox BID  - monitor for bleeding

## 2021-06-03 NOTE — PROGRESS NOTE ADULT - SUBJECTIVE AND OBJECTIVE BOX
Patient is a 56y old  Female who presents with a chief complaint of Recent Endometrial Cancer, Abdominal Distention, SOB (03 Jun 2021 13:53)      SUBJECTIVE / OVERNIGHT EVENTS: No notable events overnight. pt reports abdominal soreness s/p paracentesis although denies N/V/diarrhea. Reports sob has improved.    MEDICATIONS  (STANDING):  cefTRIAXone   IVPB 2000 milliGRAM(s) IV Intermittent every 24 hours  dextrose 40% Gel 15 Gram(s) Oral once  dextrose 5%. 1000 milliLiter(s) (50 mL/Hr) IV Continuous <Continuous>  dextrose 5%. 1000 milliLiter(s) (100 mL/Hr) IV Continuous <Continuous>  dextrose 50% Injectable 25 Gram(s) IV Push once  dextrose 50% Injectable 12.5 Gram(s) IV Push once  dextrose 50% Injectable 25 Gram(s) IV Push once  glucagon  Injectable 1 milliGRAM(s) IntraMuscular once  insulin glargine Injectable (LANTUS) 24 Unit(s) SubCutaneous at bedtime  insulin lispro (ADMELOG) corrective regimen sliding scale   SubCutaneous three times a day before meals  insulin lispro (ADMELOG) corrective regimen sliding scale   SubCutaneous at bedtime  insulin lispro Injectable (ADMELOG) 10 Unit(s) SubCutaneous three times a day before meals  iron sucrose IVPB 200 milliGRAM(s) IV Intermittent every 24 hours  polyethylene glycol 3350 17 Gram(s) Oral daily  senna 2 Tablet(s) Oral at bedtime    MEDICATIONS  (PRN):  acetaminophen   Tablet .. 650 milliGRAM(s) Oral every 6 hours PRN Mild Pain (1 - 3), Moderate Pain (4 - 6)  morphine  - Injectable 2 milliGRAM(s) IV Push every 4 hours PRN Severe Pain (7 - 10)  oxyCODONE    IR 5 milliGRAM(s) Oral every 4 hours PRN Moderate Pain (4 - 6)        CAPILLARY BLOOD GLUCOSE      POCT Blood Glucose.: 144 mg/dL (03 Jun 2021 12:20)  POCT Blood Glucose.: 110 mg/dL (03 Jun 2021 08:23)  POCT Blood Glucose.: 147 mg/dL (02 Jun 2021 21:56)  POCT Blood Glucose.: 215 mg/dL (02 Jun 2021 17:10)    I&O's Summary    02 Jun 2021 07:01  -  03 Jun 2021 07:00  --------------------------------------------------------  IN: 1009 mL / OUT: 200 mL / NET: 809 mL    03 Jun 2021 07:01  -  03 Jun 2021 14:59  --------------------------------------------------------  IN: 200 mL / OUT: 0 mL / NET: 200 mL    Vital Signs Last 24 Hrs  T(C): 36.8 (03 Jun 2021 12:33), Max: 37.4 (02 Jun 2021 19:08)  T(F): 98.2 (03 Jun 2021 12:33), Max: 99.3 (02 Jun 2021 19:08)  HR: 108 (03 Jun 2021 12:33) (99 - 116)  BP: 128/72 (03 Jun 2021 12:33) (112/61 - 137/73)  BP(mean): --  RR: 18 (03 Jun 2021 12:33) (18 - 18)  SpO2: 99% (03 Jun 2021 12:33) (98% - 100%)      PHYSICAL EXAM:  GENERAL; cachectic w/ bitemporal wasting  HEAD:  Atraumatic, Normocephalic  CHEST/LUNG: Clear to auscultation bilaterally; No wheeze  HEART: Regular rate and rhythm; No murmurs, rubs, or gallops  ABDOMEN: +mildly distended, Nontender, soft, hypoactive BS  EXTREMITIES:  2+ Peripheral Pulses, No clubbing, cyanosis, or edema  PSYCH: AAOx3  NEUROLOGY: non-focal  SKIN: No rashes or lesions    LABS:                        9.1    16.94 )-----------( 459      ( 03 Jun 2021 07:58 )             31.0     06-03    129<L>  |  97<L>  |  7   ----------------------------<  111<H>  4.9   |  20<L>  |  0.37<L>    Ca    8.3<L>      03 Jun 2021 07:58  Phos  2.7     06-03  Mg     2.0     06-03    TPro  5.9<L>  /  Alb  2.1<L>  /  TBili  0.7  /  DBili  x   /  AST  81<H>  /  ALT  32  /  AlkPhos  184<H>  06-03              RADIOLOGY & ADDITIONAL TESTS:    Imaging Personally Reviewed:    Consultant(s) Notes Reviewed:      Care Discussed with Consultants/Other Providers: heme/onc Dr. Hanna

## 2021-06-03 NOTE — PROGRESS NOTE ADULT - PROBLEM SELECTOR PLAN 1
s/p diagnostic and therapeutic (4L fluid removal) 6/2  - SAAG 0.2 suggesting mlaignant ascites although cell count w/ PMNs 844 (adjusted for 19K RBCs)  - given neutrocytic ascites will empriically tx with IV CTX 2g daily x 5 days (6/2 - ), f.u GS, cx and cytopath  Pain control w/ IV Morphine 2mg IV Q4 prn for severe and PO oxy IR 5 mg q4h prn for mod pain, appreciate pall reccs

## 2021-06-03 NOTE — PROGRESS NOTE ADULT - SUBJECTIVE AND OBJECTIVE BOX
Chief Complaint: f/u DM2    History:  Patient seen at bedside this afternoon. Tolerating po, had breakfast this morning. States that she had a big lunch yesterday so she didn't eat dinner yesterday. No nausea, vomiting, does have some abdominal discomfort.     MEDICATIONS  (STANDING):  cefTRIAXone   IVPB 2000 milliGRAM(s) IV Intermittent every 24 hours  dextrose 40% Gel 15 Gram(s) Oral once  dextrose 5%. 1000 milliLiter(s) (50 mL/Hr) IV Continuous <Continuous>  dextrose 5%. 1000 milliLiter(s) (100 mL/Hr) IV Continuous <Continuous>  dextrose 50% Injectable 25 Gram(s) IV Push once  dextrose 50% Injectable 12.5 Gram(s) IV Push once  dextrose 50% Injectable 25 Gram(s) IV Push once  enoxaparin Injectable 30 milliGRAM(s) SubCutaneous once  glucagon  Injectable 1 milliGRAM(s) IntraMuscular once  insulin glargine Injectable (LANTUS) 24 Unit(s) SubCutaneous at bedtime  insulin lispro (ADMELOG) corrective regimen sliding scale   SubCutaneous three times a day before meals  insulin lispro (ADMELOG) corrective regimen sliding scale   SubCutaneous at bedtime  insulin lispro Injectable (ADMELOG) 10 Unit(s) SubCutaneous three times a day before meals  iron sucrose IVPB 200 milliGRAM(s) IV Intermittent every 24 hours  polyethylene glycol 3350 17 Gram(s) Oral daily  senna 2 Tablet(s) Oral at bedtime    MEDICATIONS  (PRN):  acetaminophen   Tablet .. 650 milliGRAM(s) Oral every 6 hours PRN Mild Pain (1 - 3), Moderate Pain (4 - 6)  morphine  - Injectable 2 milliGRAM(s) IV Push every 4 hours PRN Severe Pain (7 - 10)  oxyCODONE    IR 5 milliGRAM(s) Oral every 4 hours PRN Moderate Pain (4 - 6)      Allergies  Sulf-10 (Rash; Short breath)    Review of Systems:  ALL OTHER SYSTEMS REVIEWED AND NEGATIVE    PHYSICAL EXAM:  VITALS: T(C): 36.8 (06-03-21 @ 12:33)  T(F): 98.2 (06-03-21 @ 12:33), Max: 99.3 (06-02-21 @ 19:08)  HR: 108 (06-03-21 @ 12:33) (99 - 116)  BP: 128/72 (06-03-21 @ 12:33) (112/61 - 137/73)  RR:  (18 - 18)  SpO2:  (97% - 100%)  Wt(kg): --  GENERAL: NAD, well-developed  EYES: No proptosis, anicteric  HEENT:  Atraumatic, Normocephalic  RESPIRATORY: + air movement bilaterally, no respiratory distress   PSYCH: Alert and oriented x 3, reactive affect     POCT Blood Glucose.: 144 mg/dL (06-03-21 @ 12:20)  POCT Blood Glucose.: 110 mg/dL (06-03-21 @ 08:23)  POCT Blood Glucose.: 147 mg/dL (06-02-21 @ 21:56)  POCT Blood Glucose.: 215 mg/dL (06-02-21 @ 17:10)  POCT Blood Glucose.: 250 mg/dL (06-02-21 @ 12:25)  POCT Blood Glucose.: 242 mg/dL (06-02-21 @ 08:26)  POCT Blood Glucose.: 229 mg/dL (06-01-21 @ 22:41)  POCT Blood Glucose.: 262 mg/dL (06-01-21 @ 17:37)  POCT Blood Glucose.: 342 mg/dL (06-01-21 @ 11:13)      06-03    129<L>  |  97<L>  |  7   ----------------------------<  111<H>  4.9   |  20<L>  |  0.37<L>    EGFR if : 137  EGFR if non : 118    Ca    8.3<L>      06-03  Mg     2.0     06-03  Phos  2.7     06-03    TPro  5.9<L>  /  Alb  2.1<L>  /  TBili  0.7  /  DBili  x   /  AST  81<H>  /  ALT  32  /  AlkPhos  184<H>  06-03

## 2021-06-03 NOTE — PROGRESS NOTE ADULT - ASSESSMENT
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     -Therapeutic paracentesis, 4200ml removed, given neutrocytic ascites now on empiric tx for SBP with IV CTX 2g daily x 5 days (6/2 - ), Will f.u GS, cx and cyropath  -S/p 1 unit of PRBC, Hgb improved  -pt with iron deficiency, venofer 200mg x 3 days  -Pal care consult for symptom and pain management  -Will hold inpatient chemotherapy for now since patient is being treated for infection, will re eevaluate next week s/p completion of antibiotcs  -Patient to followup with Dr. Hoang ([facility]) upon discharge  -C/w Supportive care, pain control, Nutrition, PT, DVT ppx  -Oncology will continue to follow with you      Case d/w Dr. Jacques HORN  Oncology Physician Assistant  [facility] SHARMA/[facility]  Pager [phone]    If after 5pm or weekends please page On-call Oncology Fellow   56f with recently diagnosed metastatic uterine carcinosarcoma, c/b ascites, with plan to start chemotherapy 6/4, presented to the ED with abdominal discomfort.     -Therapeutic paracentesis, 4200ml removed, high PMN in ascites now on empiric tx for SBP with IV CTX 2g daily x 5 days (6/2 - ), Will f.u GS, cx and cytopath  -S/p 1 unit of PRBC, Hgb improved  -pt with iron deficiency, venofer 200mg x 3 days  -Pal care consult for symptom and pain management  -Will hold inpatient chemotherapy for now since patient is being treated for infection, will re eevaluate next week s/p completion of antibiotcs  -Patient to followup with Dr. Hoang ([facility]) upon discharge  -C/w Supportive care, pain control, Nutrition, PT, DVT ppx  -Oncology will continue to follow with you    Case d/w Dr. Jacques HORN  Oncology Physician Assistant  [facility] SHARMA/[facility]  Pager [phone]    If after 5pm or weekends please page On-call Oncology Fellow

## 2021-06-04 ENCOUNTER — APPOINTMENT (OUTPATIENT)
Dept: INFUSION THERAPY | Facility: HOSPITAL | Age: 56
End: 2021-06-04

## 2021-06-04 LAB
ALBUMIN SERPL ELPH-MCNC: 2.5 G/DL — LOW (ref 3.3–5)
ALP SERPL-CCNC: 198 U/L — HIGH (ref 40–120)
ALT FLD-CCNC: 22 U/L — SIGNIFICANT CHANGE UP (ref 4–33)
ANION GAP SERPL CALC-SCNC: 14 MMOL/L — SIGNIFICANT CHANGE UP (ref 7–14)
AST SERPL-CCNC: 43 U/L — HIGH (ref 4–32)
BASOPHILS # BLD AUTO: 0.03 K/UL — SIGNIFICANT CHANGE UP (ref 0–0.2)
BASOPHILS NFR BLD AUTO: 0.2 % — SIGNIFICANT CHANGE UP (ref 0–2)
BILIRUB SERPL-MCNC: 0.6 MG/DL — SIGNIFICANT CHANGE UP (ref 0.2–1.2)
BUN SERPL-MCNC: 7 MG/DL — SIGNIFICANT CHANGE UP (ref 7–23)
CALCIUM SERPL-MCNC: 8.7 MG/DL — SIGNIFICANT CHANGE UP (ref 8.4–10.5)
CHLORIDE SERPL-SCNC: 94 MMOL/L — LOW (ref 98–107)
CO2 SERPL-SCNC: 19 MMOL/L — LOW (ref 22–31)
COMMENT - FLUIDS: SIGNIFICANT CHANGE UP
COMMENT - FLUIDS: SIGNIFICANT CHANGE UP
CREAT SERPL-MCNC: 0.39 MG/DL — LOW (ref 0.5–1.3)
EOSINOPHIL # BLD AUTO: 0 K/UL — SIGNIFICANT CHANGE UP (ref 0–0.5)
EOSINOPHIL NFR BLD AUTO: 0 % — SIGNIFICANT CHANGE UP (ref 0–6)
GLUCOSE BLDC GLUCOMTR-MCNC: 127 MG/DL — HIGH (ref 70–99)
GLUCOSE BLDC GLUCOMTR-MCNC: 202 MG/DL — HIGH (ref 70–99)
GLUCOSE BLDC GLUCOMTR-MCNC: 80 MG/DL — SIGNIFICANT CHANGE UP (ref 70–99)
GLUCOSE BLDC GLUCOMTR-MCNC: 84 MG/DL — SIGNIFICANT CHANGE UP (ref 70–99)
GLUCOSE BLDC GLUCOMTR-MCNC: 85 MG/DL — SIGNIFICANT CHANGE UP (ref 70–99)
GLUCOSE BLDC GLUCOMTR-MCNC: 96 MG/DL — SIGNIFICANT CHANGE UP (ref 70–99)
GLUCOSE SERPL-MCNC: 165 MG/DL — HIGH (ref 70–99)
HCT VFR BLD CALC: 33.7 % — LOW (ref 34.5–45)
HGB BLD-MCNC: 9.5 G/DL — LOW (ref 11.5–15.5)
IANC: 15.29 K/UL — HIGH (ref 1.5–8.5)
IMM GRANULOCYTES NFR BLD AUTO: 1.6 % — HIGH (ref 0–1.5)
LYMPHOCYTES # BLD AUTO: 1.04 K/UL — SIGNIFICANT CHANGE UP (ref 1–3.3)
LYMPHOCYTES # BLD AUTO: 5.6 % — LOW (ref 13–44)
MAGNESIUM SERPL-MCNC: 2.3 MG/DL — SIGNIFICANT CHANGE UP (ref 1.6–2.6)
MCHC RBC-ENTMCNC: 20.5 PG — LOW (ref 27–34)
MCHC RBC-ENTMCNC: 28.2 GM/DL — LOW (ref 32–36)
MCV RBC AUTO: 72.6 FL — LOW (ref 80–100)
MONOCYTES # BLD AUTO: 2.05 K/UL — HIGH (ref 0–0.9)
MONOCYTES NFR BLD AUTO: 11 % — SIGNIFICANT CHANGE UP (ref 2–14)
NEUTROPHILS # BLD AUTO: 15.29 K/UL — HIGH (ref 1.8–7.4)
NEUTROPHILS NFR BLD AUTO: 81.6 % — HIGH (ref 43–77)
NRBC # BLD: 0 /100 WBCS — SIGNIFICANT CHANGE UP
NRBC # FLD: 0.02 K/UL — HIGH
OSMOLALITY UR: 663 MOSM/KG — SIGNIFICANT CHANGE UP (ref 50–1200)
PHOSPHATE SERPL-MCNC: 3.5 MG/DL — SIGNIFICANT CHANGE UP (ref 2.5–4.5)
PLATELET # BLD AUTO: 502 K/UL — HIGH (ref 150–400)
POTASSIUM SERPL-MCNC: 4.9 MMOL/L — SIGNIFICANT CHANGE UP (ref 3.5–5.3)
POTASSIUM SERPL-SCNC: 4.9 MMOL/L — SIGNIFICANT CHANGE UP (ref 3.5–5.3)
PROT SERPL-MCNC: 6.6 G/DL — SIGNIFICANT CHANGE UP (ref 6–8.3)
RBC # BLD: 4.64 M/UL — SIGNIFICANT CHANGE UP (ref 3.8–5.2)
RBC # FLD: 18.6 % — HIGH (ref 10.3–14.5)
SODIUM SERPL-SCNC: 127 MMOL/L — LOW (ref 135–145)
SODIUM UR-SCNC: <20 MMOL/L — SIGNIFICANT CHANGE UP
WBC # BLD: 18.7 K/UL — HIGH (ref 3.8–10.5)
WBC # FLD AUTO: 18.7 K/UL — HIGH (ref 3.8–10.5)

## 2021-06-04 PROCEDURE — 99232 SBSQ HOSP IP/OBS MODERATE 35: CPT

## 2021-06-04 PROCEDURE — 99233 SBSQ HOSP IP/OBS HIGH 50: CPT | Mod: GC

## 2021-06-04 RX ORDER — INSULIN GLARGINE 100 [IU]/ML
18 INJECTION, SOLUTION SUBCUTANEOUS ONCE
Refills: 0 | Status: COMPLETED | OUTPATIENT
Start: 2021-06-04 | End: 2021-06-04

## 2021-06-04 RX ORDER — INSULIN GLARGINE 100 [IU]/ML
18 INJECTION, SOLUTION SUBCUTANEOUS AT BEDTIME
Refills: 0 | Status: DISCONTINUED | OUTPATIENT
Start: 2021-06-04 | End: 2021-06-06

## 2021-06-04 RX ORDER — INSULIN LISPRO 100/ML
8 VIAL (ML) SUBCUTANEOUS
Refills: 0 | Status: DISCONTINUED | OUTPATIENT
Start: 2021-06-04 | End: 2021-06-06

## 2021-06-04 RX ADMIN — MORPHINE SULFATE 15 MILLIGRAM(S): 50 CAPSULE, EXTENDED RELEASE ORAL at 18:41

## 2021-06-04 RX ADMIN — MORPHINE SULFATE 2 MILLIGRAM(S): 50 CAPSULE, EXTENDED RELEASE ORAL at 17:28

## 2021-06-04 RX ADMIN — INSULIN GLARGINE 18 UNIT(S): 100 INJECTION, SOLUTION SUBCUTANEOUS at 00:12

## 2021-06-04 RX ADMIN — MORPHINE SULFATE 15 MILLIGRAM(S): 50 CAPSULE, EXTENDED RELEASE ORAL at 06:35

## 2021-06-04 RX ADMIN — Medication 8 UNIT(S): at 12:59

## 2021-06-04 RX ADMIN — Medication 2: at 08:53

## 2021-06-04 RX ADMIN — CEFTRIAXONE 100 MILLIGRAM(S): 500 INJECTION, POWDER, FOR SOLUTION INTRAMUSCULAR; INTRAVENOUS at 13:13

## 2021-06-04 RX ADMIN — MORPHINE SULFATE 15 MILLIGRAM(S): 50 CAPSULE, EXTENDED RELEASE ORAL at 06:33

## 2021-06-04 RX ADMIN — MORPHINE SULFATE 2 MILLIGRAM(S): 50 CAPSULE, EXTENDED RELEASE ORAL at 22:54

## 2021-06-04 RX ADMIN — MORPHINE SULFATE 2 MILLIGRAM(S): 50 CAPSULE, EXTENDED RELEASE ORAL at 23:09

## 2021-06-04 RX ADMIN — ENOXAPARIN SODIUM 70 MILLIGRAM(S): 100 INJECTION SUBCUTANEOUS at 18:00

## 2021-06-04 RX ADMIN — ENOXAPARIN SODIUM 70 MILLIGRAM(S): 100 INJECTION SUBCUTANEOUS at 06:54

## 2021-06-04 RX ADMIN — OXYCODONE HYDROCHLORIDE 5 MILLIGRAM(S): 5 TABLET ORAL at 20:49

## 2021-06-04 RX ADMIN — MORPHINE SULFATE 2 MILLIGRAM(S): 50 CAPSULE, EXTENDED RELEASE ORAL at 13:28

## 2021-06-04 RX ADMIN — IRON SUCROSE 110 MILLIGRAM(S): 20 INJECTION, SOLUTION INTRAVENOUS at 13:50

## 2021-06-04 RX ADMIN — Medication 10 UNIT(S): at 08:53

## 2021-06-04 RX ADMIN — MORPHINE SULFATE 2 MILLIGRAM(S): 50 CAPSULE, EXTENDED RELEASE ORAL at 05:20

## 2021-06-04 RX ADMIN — SENNA PLUS 2 TABLET(S): 8.6 TABLET ORAL at 21:39

## 2021-06-04 RX ADMIN — Medication 8 UNIT(S): at 18:02

## 2021-06-04 RX ADMIN — MORPHINE SULFATE 2 MILLIGRAM(S): 50 CAPSULE, EXTENDED RELEASE ORAL at 17:13

## 2021-06-04 RX ADMIN — OXYCODONE HYDROCHLORIDE 5 MILLIGRAM(S): 5 TABLET ORAL at 00:08

## 2021-06-04 RX ADMIN — MORPHINE SULFATE 2 MILLIGRAM(S): 50 CAPSULE, EXTENDED RELEASE ORAL at 09:28

## 2021-06-04 RX ADMIN — POLYETHYLENE GLYCOL 3350 17 GRAM(S): 17 POWDER, FOR SOLUTION ORAL at 11:44

## 2021-06-04 RX ADMIN — MORPHINE SULFATE 2 MILLIGRAM(S): 50 CAPSULE, EXTENDED RELEASE ORAL at 09:13

## 2021-06-04 RX ADMIN — OXYCODONE HYDROCHLORIDE 5 MILLIGRAM(S): 5 TABLET ORAL at 21:49

## 2021-06-04 RX ADMIN — OXYCODONE HYDROCHLORIDE 5 MILLIGRAM(S): 5 TABLET ORAL at 01:00

## 2021-06-04 RX ADMIN — MORPHINE SULFATE 2 MILLIGRAM(S): 50 CAPSULE, EXTENDED RELEASE ORAL at 13:13

## 2021-06-04 RX ADMIN — MORPHINE SULFATE 15 MILLIGRAM(S): 50 CAPSULE, EXTENDED RELEASE ORAL at 17:59

## 2021-06-04 RX ADMIN — MORPHINE SULFATE 2 MILLIGRAM(S): 50 CAPSULE, EXTENDED RELEASE ORAL at 05:08

## 2021-06-04 NOTE — PROGRESS NOTE ADULT - SUBJECTIVE AND OBJECTIVE BOX
Chief Complaint: f/u DM2    History:  Patient seen at bedside this morning. She states that she had a big breakfast yesterday morning, didn't really eat much of lunch, and then BG dropped to the 80's before dinner. Premeal Admelog for dinner was held, but she did have some potatoes for dinner. Received reduced dose of Lantus 18 units last night.     MEDICATIONS  (STANDING):  cefTRIAXone   IVPB 2000 milliGRAM(s) IV Intermittent every 24 hours  dextrose 40% Gel 15 Gram(s) Oral once  dextrose 5%. 1000 milliLiter(s) (50 mL/Hr) IV Continuous <Continuous>  dextrose 5%. 1000 milliLiter(s) (100 mL/Hr) IV Continuous <Continuous>  dextrose 50% Injectable 25 Gram(s) IV Push once  dextrose 50% Injectable 12.5 Gram(s) IV Push once  dextrose 50% Injectable 25 Gram(s) IV Push once  enoxaparin Injectable 70 milliGRAM(s) SubCutaneous two times a day  glucagon  Injectable 1 milliGRAM(s) IntraMuscular once  insulin glargine Injectable (LANTUS) 18 Unit(s) SubCutaneous at bedtime  insulin lispro (ADMELOG) corrective regimen sliding scale   SubCutaneous three times a day before meals  insulin lispro (ADMELOG) corrective regimen sliding scale   SubCutaneous at bedtime  insulin lispro Injectable (ADMELOG) 8 Unit(s) SubCutaneous three times a day before meals  iron sucrose IVPB 200 milliGRAM(s) IV Intermittent every 24 hours  morphine ER Tablet 15 milliGRAM(s) Oral two times a day  polyethylene glycol 3350 17 Gram(s) Oral daily  senna 2 Tablet(s) Oral at bedtime    MEDICATIONS  (PRN):  acetaminophen   Tablet .. 650 milliGRAM(s) Oral every 6 hours PRN Mild Pain (1 - 3), Moderate Pain (4 - 6)  bisacodyl Suppository 10 milliGRAM(s) Rectal daily PRN Constipation  morphine  - Injectable 2 milliGRAM(s) IV Push every 4 hours PRN Severe Pain (7 - 10)  oxyCODONE    IR 5 milliGRAM(s) Oral every 4 hours PRN Moderate Pain (4 - 6)      Allergies  Sulf-10 (Rash; Short breath)    Review of Systems:  ALL OTHER SYSTEMS REVIEWED AND NEGATIVE    PHYSICAL EXAM:  VITALS: T(C): 36.8 (06-04-21 @ 05:07)  T(F): 98.2 (06-04-21 @ 05:07), Max: 98.6 (06-03-21 @ 21:48)  HR: 107 (06-04-21 @ 05:07) (102 - 108)  BP: 116/62 (06-04-21 @ 05:07) (116/62 - 128/72)  RR:  (16 - 18)  SpO2:  (98% - 99%)  Wt(kg): --  GENERAL: NAD, well-developed  EYES: No proptosis, anicteric  HEENT:  Atraumatic, Normocephalic  RESPIRATORY: + air movement bilaterally, no respiratory distress  PSYCH: Alert and oriented x 3, reactive affect    POCT Blood Glucose.: 202 mg/dL (06-04-21 @ 08:49)  POCT Blood Glucose.: 127 mg/dL (06-04-21 @ 00:03)  POCT Blood Glucose.: 84 mg/dL (06-03-21 @ 22:57)  POCT Blood Glucose.: 89 mg/dL (06-03-21 @ 17:46)  POCT Blood Glucose.: 144 mg/dL (06-03-21 @ 12:20)  POCT Blood Glucose.: 110 mg/dL (06-03-21 @ 08:23)  POCT Blood Glucose.: 147 mg/dL (06-02-21 @ 21:56)  POCT Blood Glucose.: 215 mg/dL (06-02-21 @ 17:10)  POCT Blood Glucose.: 250 mg/dL (06-02-21 @ 12:25)  POCT Blood Glucose.: 242 mg/dL (06-02-21 @ 08:26)  POCT Blood Glucose.: 229 mg/dL (06-01-21 @ 22:41)  POCT Blood Glucose.: 262 mg/dL (06-01-21 @ 17:37)      06-04    127<L>  |  94<L>  |  7   ----------------------------<  165<H>  4.9   |  19<L>  |  0.39<L>    EGFR if : 134  EGFR if non : 116    Ca    8.7      06-04  Mg     2.3     06-04  Phos  3.5     06-04    TPro  6.6  /  Alb  2.5<L>  /  TBili  0.6  /  DBili  x   /  AST  43<H>  /  ALT  22  /  AlkPhos  198<H>  06-04

## 2021-06-04 NOTE — PROGRESS NOTE ADULT - PROBLEM SELECTOR PLAN 1
- HbA1c 9.6%, not on treatment at home  - given erratic po intake and occasional post-prandial BG < 100 mg/dL, will decrease Admelog to 8 units before meals  - reduce Lantus to 18 units (received this reduced dose yesterday night and serum BG this AM at goal)  - low correction scale qac and qhs   - consistent carb diet  - check FS qac and qhs  - RD consult  - will follow  - for discharge: ideally would discharge on basal insulin + oral agent such as Metformin, however patient declines treatment with orals and would like to start with once daily basal insulin for now. Thus, will plan on discharging on Lantus (or whichever basal insulin is covered), dose TBD. Send script for Lantus 18 units qhs to assess coverage.

## 2021-06-04 NOTE — DIETITIAN INITIAL EVALUATION ADULT. - ETIOLOGY
patient meets criteria for Moderate malnutrition in the context of acute on chronic illness (newly diagnosed stage 4 endometrial cancer

## 2021-06-04 NOTE — DIETITIAN INITIAL EVALUATION ADULT. - PERTINENT LABORATORY DATA
06-04 Na127 mmol/L<L> Glu 165 mg/dL<H> K+ 4.9 mmol/L Cr  0.39 mg/dL<L> BUN 7 mg/dL 06-04 Phos 3.5 mg/dL 06-04 Alb 2.5 g/dL<L> 06-03 Chol 115 mg/dL LDL --    HDL 19 mg/dL<L> Trig 184 mg/dL<H>    A1C with Estimated Average Glucose Result: 9.6: High Risk (prediabetic)    5.7 - 6.4 %  Diabetic, diagnostic           > 6.5 %  ADA diabetic treatment goal    < 7.0 %  HbA1C values may not accurately reflect mean blood glucose in patients  with Hb variants.  Suggest clinical correlation. % (06.02.21 @ 06:33)

## 2021-06-04 NOTE — PROGRESS NOTE ADULT - SUBJECTIVE AND OBJECTIVE BOX
INTERVAL HPI/OVERNIGHT EVENTS:  Patient seen at bedside.  Patient with much improved pain symptoms since her regimen  was changed last night  Slight symptoms of SOB when ambulating  No chest pain, fever or chills  No overnight events reported    VITAL SIGNS:  T(F): 98.2 (06-04-21 @ 05:07)  HR: 107 (06-04-21 @ 05:07)  BP: 116/62 (06-04-21 @ 05:07)  RR: 16 (06-04-21 @ 05:07)  SpO2: 98% (06-04-21 @ 05:07)  Wt(kg): --    PHYSICAL EXAM:    In accordance with current standard limiting patient contact, deferred physical exam  2/2 COVID pandemic  Please refer to physical exam of primary team.    MEDICATIONS  (STANDING):  cefTRIAXone   IVPB 2000 milliGRAM(s) IV Intermittent every 24 hours  dextrose 40% Gel 15 Gram(s) Oral once  dextrose 5%. 1000 milliLiter(s) (50 mL/Hr) IV Continuous <Continuous>  dextrose 5%. 1000 milliLiter(s) (100 mL/Hr) IV Continuous <Continuous>  dextrose 50% Injectable 25 Gram(s) IV Push once  dextrose 50% Injectable 12.5 Gram(s) IV Push once  dextrose 50% Injectable 25 Gram(s) IV Push once  enoxaparin Injectable 70 milliGRAM(s) SubCutaneous two times a day  glucagon  Injectable 1 milliGRAM(s) IntraMuscular once  insulin glargine Injectable (LANTUS) 18 Unit(s) SubCutaneous at bedtime  insulin lispro (ADMELOG) corrective regimen sliding scale   SubCutaneous three times a day before meals  insulin lispro (ADMELOG) corrective regimen sliding scale   SubCutaneous at bedtime  insulin lispro Injectable (ADMELOG) 8 Unit(s) SubCutaneous three times a day before meals  iron sucrose IVPB 200 milliGRAM(s) IV Intermittent every 24 hours  morphine ER Tablet 15 milliGRAM(s) Oral two times a day  polyethylene glycol 3350 17 Gram(s) Oral daily  senna 2 Tablet(s) Oral at bedtime    MEDICATIONS  (PRN):  acetaminophen   Tablet .. 650 milliGRAM(s) Oral every 6 hours PRN Mild Pain (1 - 3), Moderate Pain (4 - 6)  bisacodyl Suppository 10 milliGRAM(s) Rectal daily PRN Constipation  morphine  - Injectable 2 milliGRAM(s) IV Push every 4 hours PRN Severe Pain (7 - 10)  oxyCODONE    IR 5 milliGRAM(s) Oral every 4 hours PRN Moderate Pain (4 - 6)      Allergies    Sulf-10 (Rash; Short breath)    Intolerances        LABS:                        9.5    18.70 )-----------( 502      ( 04 Jun 2021 07:05 )             33.7     06-04    127<L>  |  94<L>  |  7   ----------------------------<  165<H>  4.9   |  19<L>  |  0.39<L>    Ca    8.7      04 Jun 2021 07:05  Phos  3.5     06-04  Mg     2.3     06-04    TPro  6.6  /  Alb  2.5<L>  /  TBili  0.6  /  DBili  x   /  AST  43<H>  /  ALT  22  /  AlkPhos  198<H>  06-04          RADIOLOGY & ADDITIONAL TESTS:  Studies reviewed.

## 2021-06-04 NOTE — PROGRESS NOTE ADULT - PROBLEM SELECTOR PLAN 3
- LDL 59  - okay to defer statin     Darryl Valladares MD   Pager # [phone]  On evenings and weekends, please call the office at [phone] or page endocrine fellow on call. Please note that this patient may be followed by different provider tomorrow. If no answer, contact the office.

## 2021-06-04 NOTE — DIETITIAN INITIAL EVALUATION ADULT. - PERTINENT MEDS FT
dextrose 40% Gel  dextrose 5%.  dextrose 5%.  dextrose 50% Injectable  dextrose 50% Injectable  dextrose 50% Injectable  glucagon  Injectable  insulin glargine Injectable (LANTUS)  insulin lispro (ADMELOG) corrective regimen sliding scale  insulin lispro (ADMELOG) corrective regimen sliding scale  insulin lispro Injectable (ADMELOG)  morphine ER Tablet  polyethylene glycol 3350  senna

## 2021-06-04 NOTE — DIETITIAN NUTRITION RISK NOTIFICATION - TREATMENT: THE FOLLOWING DIET HAS BEEN RECOMMENDED
Diet, Regular:   Consistent Carbohydrate {Evening Snack} (CSTCHOSN)  [employer] Vegetarian (Accepts Fish) (06-02-21 @ 12:50) [Active]

## 2021-06-04 NOTE — PROGRESS NOTE ADULT - ASSESSMENT
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     -Therapeutic paracentesis, 4200ml removed, high PMN in ascites now on empiric tx for SBP with IV CTX 2g daily x 5 days (6/2 - ), Will f.u GS, cx and cytopath  -S/p 1 unit of PRBC, Hgb improved  -pt with iron deficiency, venofer 200mg x 3 days  -Pal care consult for symptom and pain management, Pain improved  -Will hold inpatient chemotherapy for now since patient is being treated for infection, will re eealuate next week s/p completion of antibiotics  -Noted to have RLE nonocclusive DVT located at mid-femur on recent dopplers ( 6/4) . Patient started on therapeutic Lovenox which she is amendable to self-admininstering  -Patient to followup with Dr. Hoang ([facility]) upon discharge  -C/w Supportive care, pain control, Nutrition, PT, DVT ppx  -Oncology will continue to follow with you    Case d/w Dr. Marga HORN  Oncology Physician Assistant  Car[facility] SHARMA/[facility]  Pager [phone]    If after 5pm or weekends please page On-call Oncology Fellow

## 2021-06-04 NOTE — PROGRESS NOTE ADULT - ASSESSMENT
55yo Female w/ newly diagnosed Stage IV Endometrial Cancer and poorly controlled DM II presents to the ED with SOB, Abdominal pain/distention x2 weeks. CT A/P showed large volume ascites s/p diagnostic and therapeutic paracentesis (4L removed) c/b neutrocytic ascites on IV CTX. Found to have age indeterminate non occlusive L. mid femoral and peroneal veins DVT on therapeutic lovenox. Pt was scheduled for outpt chemotx 6/4, although tentative plan for inpt chemo 6/7. Palliative care following for pain management.

## 2021-06-04 NOTE — PROGRESS NOTE ADULT - SUBJECTIVE AND OBJECTIVE BOX
Patient is a 56y old  Female who presents with a chief complaint of Recent Endometrial Cancer, Abdominal Distention, SOB (04 Jun 2021 13:12)      SUBJECTIVE / OVERNIGHT EVENTS: No events overnight     MEDICATIONS  (STANDING):  cefTRIAXone   IVPB 2000 milliGRAM(s) IV Intermittent every 24 hours  dextrose 40% Gel 15 Gram(s) Oral once  dextrose 5%. 1000 milliLiter(s) (50 mL/Hr) IV Continuous <Continuous>  dextrose 5%. 1000 milliLiter(s) (100 mL/Hr) IV Continuous <Continuous>  dextrose 50% Injectable 25 Gram(s) IV Push once  dextrose 50% Injectable 12.5 Gram(s) IV Push once  dextrose 50% Injectable 25 Gram(s) IV Push once  enoxaparin Injectable 70 milliGRAM(s) SubCutaneous two times a day  glucagon  Injectable 1 milliGRAM(s) IntraMuscular once  insulin glargine Injectable (LANTUS) 18 Unit(s) SubCutaneous at bedtime  insulin lispro (ADMELOG) corrective regimen sliding scale   SubCutaneous three times a day before meals  insulin lispro (ADMELOG) corrective regimen sliding scale   SubCutaneous at bedtime  insulin lispro Injectable (ADMELOG) 8 Unit(s) SubCutaneous three times a day before meals  iron sucrose IVPB 200 milliGRAM(s) IV Intermittent every 24 hours  morphine ER Tablet 15 milliGRAM(s) Oral two times a day  polyethylene glycol 3350 17 Gram(s) Oral daily  senna 2 Tablet(s) Oral at bedtime    MEDICATIONS  (PRN):  acetaminophen   Tablet .. 650 milliGRAM(s) Oral every 6 hours PRN Mild Pain (1 - 3), Moderate Pain (4 - 6)  bisacodyl Suppository 10 milliGRAM(s) Rectal daily PRN Constipation  morphine  - Injectable 2 milliGRAM(s) IV Push every 4 hours PRN Severe Pain (7 - 10)  oxyCODONE    IR 5 milliGRAM(s) Oral every 4 hours PRN Moderate Pain (4 - 6)        CAPILLARY BLOOD GLUCOSE      POCT Blood Glucose.: 84 mg/dL (04 Jun 2021 12:38)  POCT Blood Glucose.: 202 mg/dL (04 Jun 2021 08:49)  POCT Blood Glucose.: 127 mg/dL (04 Jun 2021 00:03)  POCT Blood Glucose.: 84 mg/dL (03 Jun 2021 22:57)  POCT Blood Glucose.: 89 mg/dL (03 Jun 2021 17:46)    I&O's Summary    03 Jun 2021 07:01  -  04 Jun 2021 07:00  --------------------------------------------------------  IN: 200 mL / OUT: 100 mL / NET: 100 mL    04 Jun 2021 07:01  -  04 Jun 2021 13:30  --------------------------------------------------------  IN: 400 mL / OUT: 300 mL / NET: 100 mL        PHYSICAL EXAM:  GENERAL: NAD, well-developed  HEAD:  Atraumatic, Normocephalic  EYES: EOMI, PERRLA, conjunctiva and sclera clear  NECK: Supple, No JVD  CHEST/LUNG: Clear to auscultation bilaterally; No wheeze  HEART: Regular rate and rhythm; No murmurs, rubs, or gallops  ABDOMEN: Soft, Nontender, Nondistended; Bowel sounds present  EXTREMITIES:  2+ Peripheral Pulses, No clubbing, cyanosis, or edema  PSYCH: AAOx3  NEUROLOGY: non-focal  SKIN: No rashes or lesions    LABS:                        9.5    18.70 )-----------( 502      ( 04 Jun 2021 07:05 )             33.7     06-04    127<L>  |  94<L>  |  7   ----------------------------<  165<H>  4.9   |  19<L>  |  0.39<L>    Ca    8.7      04 Jun 2021 07:05  Phos  3.5     06-04  Mg     2.3     06-04    TPro  6.6  /  Alb  2.5<L>  /  TBili  0.6  /  DBili  x   /  AST  43<H>  /  ALT  22  /  AlkPhos  198<H>  06-04              RADIOLOGY & ADDITIONAL TESTS:    Imaging Personally Reviewed:    Consultant(s) Notes Reviewed:      Care Discussed with Consultants/Other Providers:     Patient is a 56y old  Female who presents with a chief complaint of Recent Endometrial Cancer, Abdominal Distention, SOB (04 Jun 2021 13:12)      SUBJECTIVE / OVERNIGHT EVENTS: No events overnight. pt reports abd pain and UNDERWOOD have improved. Currently ambulating without issues. Has no other complaint.    MEDICATIONS  (STANDING):  cefTRIAXone   IVPB 2000 milliGRAM(s) IV Intermittent every 24 hours  dextrose 40% Gel 15 Gram(s) Oral once  dextrose 5%. 1000 milliLiter(s) (50 mL/Hr) IV Continuous <Continuous>  dextrose 5%. 1000 milliLiter(s) (100 mL/Hr) IV Continuous <Continuous>  dextrose 50% Injectable 25 Gram(s) IV Push once  dextrose 50% Injectable 12.5 Gram(s) IV Push once  dextrose 50% Injectable 25 Gram(s) IV Push once  enoxaparin Injectable 70 milliGRAM(s) SubCutaneous two times a day  glucagon  Injectable 1 milliGRAM(s) IntraMuscular once  insulin glargine Injectable (LANTUS) 18 Unit(s) SubCutaneous at bedtime  insulin lispro (ADMELOG) corrective regimen sliding scale   SubCutaneous three times a day before meals  insulin lispro (ADMELOG) corrective regimen sliding scale   SubCutaneous at bedtime  insulin lispro Injectable (ADMELOG) 8 Unit(s) SubCutaneous three times a day before meals  iron sucrose IVPB 200 milliGRAM(s) IV Intermittent every 24 hours  morphine ER Tablet 15 milliGRAM(s) Oral two times a day  polyethylene glycol 3350 17 Gram(s) Oral daily  senna 2 Tablet(s) Oral at bedtime    MEDICATIONS  (PRN):  acetaminophen   Tablet .. 650 milliGRAM(s) Oral every 6 hours PRN Mild Pain (1 - 3), Moderate Pain (4 - 6)  bisacodyl Suppository 10 milliGRAM(s) Rectal daily PRN Constipation  morphine  - Injectable 2 milliGRAM(s) IV Push every 4 hours PRN Severe Pain (7 - 10)  oxyCODONE    IR 5 milliGRAM(s) Oral every 4 hours PRN Moderate Pain (4 - 6)        CAPILLARY BLOOD GLUCOSE      POCT Blood Glucose.: 84 mg/dL (04 Jun 2021 12:38)  POCT Blood Glucose.: 202 mg/dL (04 Jun 2021 08:49)  POCT Blood Glucose.: 127 mg/dL (04 Jun 2021 00:03)  POCT Blood Glucose.: 84 mg/dL (03 Jun 2021 22:57)  POCT Blood Glucose.: 89 mg/dL (03 Jun 2021 17:46)    I&O's Summary    03 Jun 2021 07:01  -  04 Jun 2021 07:00  --------------------------------------------------------  IN: 200 mL / OUT: 100 mL / NET: 100 mL    04 Jun 2021 07:01  -  04 Jun 2021 13:30  --------------------------------------------------------  IN: 400 mL / OUT: 300 mL / NET: 100 mL    Vital Signs Last 24 Hrs  T(C): 36.8 (04 Jun 2021 05:07), Max: 37 (03 Jun 2021 21:48)  T(F): 98.2 (04 Jun 2021 05:07), Max: 98.6 (03 Jun 2021 21:48)  HR: 107 (04 Jun 2021 05:07) (102 - 107)  BP: 116/62 (04 Jun 2021 05:07) (116/62 - 121/78)  BP(mean): --  RR: 16 (04 Jun 2021 05:07) (16 - 18)  SpO2: 98% (04 Jun 2021 05:07) (98% - 99%)    PHYSICAL EXAM:  GENERAL; cachectic w/ bitemporal wasting  HEAD:  Atraumatic, Normocephalic  CHEST/LUNG: Clear to auscultation bilaterally; No wheeze  HEART: Regular rate and rhythm; No murmurs, rubs, or gallops  ABDOMEN: +mildly distended, Nontender, soft, hypoactive BS  EXTREMITIES:  2+ Peripheral Pulses, No clubbing, cyanosis, or edema  PSYCH: AAOx3  NEUROLOGY: non-focal  SKIN: No rashes or lesions  LABS:                        9.5    18.70 )-----------( 502      ( 04 Jun 2021 07:05 )             33.7     06-04    127<L>  |  94<L>  |  7   ----------------------------<  165<H>  4.9   |  19<L>  |  0.39<L>    Ca    8.7      04 Jun 2021 07:05  Phos  3.5     06-04  Mg     2.3     06-04    TPro  6.6  /  Alb  2.5<L>  /  TBili  0.6  /  DBili  x   /  AST  43<H>  /  ALT  22  /  AlkPhos  198<H>  06-04              RADIOLOGY & ADDITIONAL TESTS:    Imaging Personally Reviewed:    Consultant(s) Notes Reviewed:      Care Discussed with Consultants/Other Providers:

## 2021-06-04 NOTE — PROGRESS NOTE ADULT - PROBLEM SELECTOR PLAN 1
continue ms contin 15mg bid with morphine iv 2mg prn  bowel regimen senna- miralax  add dulcolax supp if no bm

## 2021-06-04 NOTE — DIETITIAN INITIAL EVALUATION ADULT. - OTHER INFO
Patient appears to be very lethargic at time of visit. Limited information provided. Reports diminished appetite and PO intake about 50% of usual intake over 2 weeks. Noted with ascites s/p paracentesis 6/2 and 4L removed. Consuming >50%-80% in house and confirmed by nursing flowsheet. Patient denies any nausea/vomiting/diarrhea/constipation or difficulty chewing and swallowing. NKFA. Unable to report weight changes. Noted to have elevated HbA1c 9.6% 6/2/21 and consult for uncontrolled DM. However, nutrition education not appropriate at this time. Patient with poor participation in interview.

## 2021-06-04 NOTE — DIETITIAN INITIAL EVALUATION ADULT. - PROBLEM SELECTOR PLAN 1
- Admit to medicine, Pain control w/ Morphine 2mg IV Q4 prn  - Consult Interventional Radiology for possible paracentesis  - Oncology c/s called by MD

## 2021-06-04 NOTE — PROGRESS NOTE ADULT - SUBJECTIVE AND OBJECTIVE BOX
SUBJECTIVE AND OBJECTIVE:  pt doing much better on ms contin.    INTERVAL HPI/OVERNIGHT EVENTS:    DNR on chart:   Allergies    Sulf-10 (Rash; Short breath)    Intolerances    MEDICATIONS  (STANDING):  cefTRIAXone   IVPB 2000 milliGRAM(s) IV Intermittent every 24 hours  dextrose 40% Gel 15 Gram(s) Oral once  dextrose 5%. 1000 milliLiter(s) (50 mL/Hr) IV Continuous <Continuous>  dextrose 5%. 1000 milliLiter(s) (100 mL/Hr) IV Continuous <Continuous>  dextrose 50% Injectable 25 Gram(s) IV Push once  dextrose 50% Injectable 12.5 Gram(s) IV Push once  dextrose 50% Injectable 25 Gram(s) IV Push once  enoxaparin Injectable 70 milliGRAM(s) SubCutaneous two times a day  glucagon  Injectable 1 milliGRAM(s) IntraMuscular once  insulin glargine Injectable (LANTUS) 18 Unit(s) SubCutaneous at bedtime  insulin lispro (ADMELOG) corrective regimen sliding scale   SubCutaneous three times a day before meals  insulin lispro (ADMELOG) corrective regimen sliding scale   SubCutaneous at bedtime  insulin lispro Injectable (ADMELOG) 8 Unit(s) SubCutaneous three times a day before meals  iron sucrose IVPB 200 milliGRAM(s) IV Intermittent every 24 hours  morphine ER Tablet 15 milliGRAM(s) Oral two times a day  polyethylene glycol 3350 17 Gram(s) Oral daily  senna 2 Tablet(s) Oral at bedtime    MEDICATIONS  (PRN):  acetaminophen   Tablet .. 650 milliGRAM(s) Oral every 6 hours PRN Mild Pain (1 - 3), Moderate Pain (4 - 6)  bisacodyl Suppository 10 milliGRAM(s) Rectal daily PRN Constipation  morphine  - Injectable 2 milliGRAM(s) IV Push every 4 hours PRN Severe Pain (7 - 10)  oxyCODONE    IR 5 milliGRAM(s) Oral every 4 hours PRN Moderate Pain (4 - 6)      ITEMS UNCHECKED ARE NOT PRESENT    PRESENT SYMPTOMS: [ ]Unable to obtain due to poor mentation   Source if other than patient:  [ ]Family   [ ]Team     Pain (Impact on QOL):  yes  Location: abdomen  Minimal acceptable level (0-10 scale):          3/10  Aggravating factors:  none  Quality:  dull  Radiation:  none  Severity (0-10 scale):  4/10  Timing:  comes and goes    Dyspnea:                           [ ]Mild [ ]Moderate [ ]Severe  Anxiety:                             [ ]Mild [ ]Moderate [ ]Severe  Fatigue:                             [ ]Mild [x ]Moderate [ ]Severe  Nausea:                             [ ]Mild [ ]Moderate [ ]Severe  Loss of appetite:              [ ]Mild [ x]Moderate [ ]Severe  Constipation:                    [ ]Mild [ x]Moderate [ ]Severe    PAIN AD Score:	  [email or web address] (Ctrl + left click to view)    Other Symptoms:  [ x]All other review of systems negative     Karnofsky Performance Score/Palliative Performance Status Version 2:   80      %    http://palliative.info/resource_material/[email or web address].pdf  PHYSICAL EXAM:  Vital Signs Last 24 Hrs  T(C): 36.8 (04 Jun 2021 05:07), Max: 37 (03 Jun 2021 21:48)  T(F): 98.2 (04 Jun 2021 05:07), Max: 98.6 (03 Jun 2021 21:48)  HR: 107 (04 Jun 2021 05:07) (102 - 107)  BP: 116/62 (04 Jun 2021 05:07) (116/62 - 121/78)  BP(mean): --  RR: 16 (04 Jun 2021 05:07) (16 - 18)  SpO2: 98% (04 Jun 2021 05:07) (98% - 99%) I&O's Summary    03 Jun 2021 07:01  -  04 Jun 2021 07:00  --------------------------------------------------------  IN: 200 mL / OUT: 100 mL / NET: 100 mL    04 Jun 2021 07:01  -  04 Jun 2021 13:12  --------------------------------------------------------  IN: 400 mL / OUT: 300 mL / NET: 100 mL     GENERAL:  [x ]Alert  [ x]Oriented x3   [ ]Lethargic  [ ]Cachexia  [ ]Unarousable  [ ]Verbal  [ ]Non-Verbal    Behavioral:   [ ] Anxiety  [ ] Delirium [ ] Agitation [ ] Other    HEENT:  [ ]Normal   [ x]Dry mouth   [ ]ET Tube/Trach  [ ]Oral lesions    PULMONARY:   [x ]Clear [ ]Tachypnea  [ ]Audible excessive secretions   [ ]Rhonchi        [ ]Right [ ]Left [ ]Bilateral  [ ]Crackles        [ ]Right [ ]Left [ ]Bilateral  [ ]Wheezing     [ ]Right [ ]Left [ ]Bilateral    CARDIOVASCULAR:    [ ]Regular [ ]Irregular [x ]Tachy  [ ]Glenn [ ]Murmur [ ]Other    GASTROINTESTINAL:  [x ]Soft  [x ]Distended   [ ]+BS  [ ]Non tender [x ]Tender  [ ]PEG [ ]OGT/ NGT   Last BM:    GENITOURINARY:  [x ]Normal [ ] Incontinent   [ ]Oliguria/Anuria   [ ]Angeles    MUSCULOSKELETAL:   [ ]Normal   [ x]Weakness  [ ]Bed/Wheelchair bound [ ]Edema    NEUROLOGIC:   [ x]No focal deficits  [ ] Cognitive impairment  [ ] Dysphagia [ ]Dysarthria [ ] Paresis [ ]Other     SKIN:   [x ]Normal   [ ]Pressure ulcer(s)  [ ]Rash    CRITICAL CARE:  [ ] Shock Present  [ ]Septic [ ]Cardiogenic [ ]Neurologic [ ]Hypovolemic  [ ]  Vasopressors [ ]  Inotropes   [ ] Respiratory failure present  [ ] Acute  [ ] Chronic [ ] Hypoxic  [ ] Hypercarbic [ ] Other  [ ] Other organ failure     LABS:                        9.5    18.70 )-----------( 502      ( 04 Jun 2021 07:05 )             33.7   06-04    127<L>  |  94<L>  |  7   ----------------------------<  165<H>  4.9   |  19<L>  |  0.39<L>    Ca    8.7      04 Jun 2021 07:05  Phos  3.5     06-04  Mg     2.3     06-04    TPro  6.6  /  Alb  2.5<L>  /  TBili  0.6  /  DBili  x   /  AST  43<H>  /  ALT  22  /  AlkPhos  198<H>  06-04        RADIOLOGY & ADDITIONAL STUDIES:    Protein Calorie Malnutrition Present: [ ] yes [ ] no  [ ] PPSV2 < or = 30%  [ ] significant weight loss [ ] poor nutritional intake [ ] anasarca [ ] catabolic state Albumin, Serum: 2.5 g/dL (06-04-21 @ 07:05)  Artificial Nutrition [ ]     REFERRALS:   [ ]Chaplaincy  [ ] Hospice  [ ]Child Life  [ ]Social Work  [ ]Case management [ ]Holistic Therapy   Goals of Care Document:

## 2021-06-04 NOTE — PROGRESS NOTE ADULT - PROBLEM SELECTOR PLAN 3
- was initiating chemotx on 6/4, givne ongoing infection will defer tx until completion of abx  - tentative plan for inpt chemo 6/7  - palliative following

## 2021-06-05 LAB
ALBUMIN SERPL ELPH-MCNC: 2.5 G/DL — LOW (ref 3.3–5)
ALP SERPL-CCNC: 174 U/L — HIGH (ref 40–120)
ALT FLD-CCNC: 16 U/L — SIGNIFICANT CHANGE UP (ref 4–33)
ANION GAP SERPL CALC-SCNC: 15 MMOL/L — HIGH (ref 7–14)
AST SERPL-CCNC: 29 U/L — SIGNIFICANT CHANGE UP (ref 4–32)
BASOPHILS # BLD AUTO: 0.03 K/UL — SIGNIFICANT CHANGE UP (ref 0–0.2)
BASOPHILS NFR BLD AUTO: 0.2 % — SIGNIFICANT CHANGE UP (ref 0–2)
BILIRUB SERPL-MCNC: 0.5 MG/DL — SIGNIFICANT CHANGE UP (ref 0.2–1.2)
BUN SERPL-MCNC: 13 MG/DL — SIGNIFICANT CHANGE UP (ref 7–23)
CALCIUM SERPL-MCNC: 8.6 MG/DL — SIGNIFICANT CHANGE UP (ref 8.4–10.5)
CHLORIDE SERPL-SCNC: 93 MMOL/L — LOW (ref 98–107)
CO2 SERPL-SCNC: 18 MMOL/L — LOW (ref 22–31)
CREAT SERPL-MCNC: 0.45 MG/DL — LOW (ref 0.5–1.3)
EOSINOPHIL # BLD AUTO: 0 K/UL — SIGNIFICANT CHANGE UP (ref 0–0.5)
EOSINOPHIL NFR BLD AUTO: 0 % — SIGNIFICANT CHANGE UP (ref 0–6)
GLUCOSE BLDC GLUCOMTR-MCNC: 106 MG/DL — HIGH (ref 70–99)
GLUCOSE BLDC GLUCOMTR-MCNC: 115 MG/DL — HIGH (ref 70–99)
GLUCOSE BLDC GLUCOMTR-MCNC: 133 MG/DL — HIGH (ref 70–99)
GLUCOSE BLDC GLUCOMTR-MCNC: 159 MG/DL — HIGH (ref 70–99)
GLUCOSE SERPL-MCNC: 213 MG/DL — HIGH (ref 70–99)
HCT VFR BLD CALC: 32.2 % — LOW (ref 34.5–45)
HGB BLD-MCNC: 9.3 G/DL — LOW (ref 11.5–15.5)
IANC: 15.19 K/UL — HIGH (ref 1.5–8.5)
IMM GRANULOCYTES NFR BLD AUTO: 1.5 % — SIGNIFICANT CHANGE UP (ref 0–1.5)
LYMPHOCYTES # BLD AUTO: 1.06 K/UL — SIGNIFICANT CHANGE UP (ref 1–3.3)
LYMPHOCYTES # BLD AUTO: 5.8 % — LOW (ref 13–44)
MAGNESIUM SERPL-MCNC: 2.1 MG/DL — SIGNIFICANT CHANGE UP (ref 1.6–2.6)
MCHC RBC-ENTMCNC: 21 PG — LOW (ref 27–34)
MCHC RBC-ENTMCNC: 28.9 GM/DL — LOW (ref 32–36)
MCV RBC AUTO: 72.9 FL — LOW (ref 80–100)
MONOCYTES # BLD AUTO: 1.77 K/UL — HIGH (ref 0–0.9)
MONOCYTES NFR BLD AUTO: 9.7 % — SIGNIFICANT CHANGE UP (ref 2–14)
NEUTROPHILS # BLD AUTO: 15.19 K/UL — HIGH (ref 1.8–7.4)
NEUTROPHILS NFR BLD AUTO: 82.8 % — HIGH (ref 43–77)
NRBC # BLD: 0 /100 WBCS — SIGNIFICANT CHANGE UP
NRBC # FLD: 0 K/UL — SIGNIFICANT CHANGE UP
OSMOLALITY SERPL: 274 MOSM/KG — LOW (ref 275–295)
PHOSPHATE SERPL-MCNC: 3.3 MG/DL — SIGNIFICANT CHANGE UP (ref 2.5–4.5)
PLATELET # BLD AUTO: 470 K/UL — HIGH (ref 150–400)
POTASSIUM SERPL-MCNC: 4.9 MMOL/L — SIGNIFICANT CHANGE UP (ref 3.5–5.3)
POTASSIUM SERPL-SCNC: 4.9 MMOL/L — SIGNIFICANT CHANGE UP (ref 3.5–5.3)
PROT SERPL-MCNC: 6.7 G/DL — SIGNIFICANT CHANGE UP (ref 6–8.3)
RBC # BLD: 4.42 M/UL — SIGNIFICANT CHANGE UP (ref 3.8–5.2)
RBC # FLD: 18.7 % — HIGH (ref 10.3–14.5)
SODIUM SERPL-SCNC: 126 MMOL/L — LOW (ref 135–145)
WBC # BLD: 18.33 K/UL — HIGH (ref 3.8–10.5)
WBC # FLD AUTO: 18.33 K/UL — HIGH (ref 3.8–10.5)

## 2021-06-05 PROCEDURE — 99232 SBSQ HOSP IP/OBS MODERATE 35: CPT

## 2021-06-05 RX ORDER — INSULIN GLARGINE 100 [IU]/ML
9 INJECTION, SOLUTION SUBCUTANEOUS ONCE
Refills: 0 | Status: COMPLETED | OUTPATIENT
Start: 2021-06-05 | End: 2021-06-05

## 2021-06-05 RX ADMIN — OXYCODONE HYDROCHLORIDE 5 MILLIGRAM(S): 5 TABLET ORAL at 02:25

## 2021-06-05 RX ADMIN — ENOXAPARIN SODIUM 70 MILLIGRAM(S): 100 INJECTION SUBCUTANEOUS at 18:17

## 2021-06-05 RX ADMIN — MORPHINE SULFATE 2 MILLIGRAM(S): 50 CAPSULE, EXTENDED RELEASE ORAL at 21:59

## 2021-06-05 RX ADMIN — MORPHINE SULFATE 2 MILLIGRAM(S): 50 CAPSULE, EXTENDED RELEASE ORAL at 05:34

## 2021-06-05 RX ADMIN — Medication 8 UNIT(S): at 08:41

## 2021-06-05 RX ADMIN — MORPHINE SULFATE 2 MILLIGRAM(S): 50 CAPSULE, EXTENDED RELEASE ORAL at 12:20

## 2021-06-05 RX ADMIN — SENNA PLUS 2 TABLET(S): 8.6 TABLET ORAL at 21:59

## 2021-06-05 RX ADMIN — INSULIN GLARGINE 18 UNIT(S): 100 INJECTION, SOLUTION SUBCUTANEOUS at 22:47

## 2021-06-05 RX ADMIN — MORPHINE SULFATE 15 MILLIGRAM(S): 50 CAPSULE, EXTENDED RELEASE ORAL at 06:44

## 2021-06-05 RX ADMIN — MORPHINE SULFATE 2 MILLIGRAM(S): 50 CAPSULE, EXTENDED RELEASE ORAL at 22:14

## 2021-06-05 RX ADMIN — OXYCODONE HYDROCHLORIDE 5 MILLIGRAM(S): 5 TABLET ORAL at 07:50

## 2021-06-05 RX ADMIN — Medication 8 UNIT(S): at 18:16

## 2021-06-05 RX ADMIN — CEFTRIAXONE 100 MILLIGRAM(S): 500 INJECTION, POWDER, FOR SOLUTION INTRAMUSCULAR; INTRAVENOUS at 13:51

## 2021-06-05 RX ADMIN — MORPHINE SULFATE 15 MILLIGRAM(S): 50 CAPSULE, EXTENDED RELEASE ORAL at 18:22

## 2021-06-05 RX ADMIN — MORPHINE SULFATE 15 MILLIGRAM(S): 50 CAPSULE, EXTENDED RELEASE ORAL at 06:45

## 2021-06-05 RX ADMIN — ENOXAPARIN SODIUM 70 MILLIGRAM(S): 100 INJECTION SUBCUTANEOUS at 05:34

## 2021-06-05 RX ADMIN — OXYCODONE HYDROCHLORIDE 5 MILLIGRAM(S): 5 TABLET ORAL at 07:35

## 2021-06-05 RX ADMIN — MORPHINE SULFATE 15 MILLIGRAM(S): 50 CAPSULE, EXTENDED RELEASE ORAL at 18:28

## 2021-06-05 RX ADMIN — Medication 8 UNIT(S): at 12:52

## 2021-06-05 RX ADMIN — Medication 1: at 12:52

## 2021-06-05 RX ADMIN — MORPHINE SULFATE 2 MILLIGRAM(S): 50 CAPSULE, EXTENDED RELEASE ORAL at 12:12

## 2021-06-05 RX ADMIN — OXYCODONE HYDROCHLORIDE 5 MILLIGRAM(S): 5 TABLET ORAL at 03:25

## 2021-06-05 RX ADMIN — MORPHINE SULFATE 2 MILLIGRAM(S): 50 CAPSULE, EXTENDED RELEASE ORAL at 05:49

## 2021-06-05 RX ADMIN — INSULIN GLARGINE 9 UNIT(S): 100 INJECTION, SOLUTION SUBCUTANEOUS at 01:20

## 2021-06-05 RX ADMIN — Medication 3: at 08:41

## 2021-06-05 NOTE — PROGRESS NOTE ADULT - ASSESSMENT
57yo Female w/ newly diagnosed Stage IV Endometrial Cancer and poorly controlled DM II presents to the ED with SOB, Abdominal pain/distention x2 weeks. CT A/P showed large volume ascites s/p diagnostic and therapeutic paracentesis (4L removed) c/b neutrocytic ascites on IV CTX. Found to have age indeterminate non occlusive L. mid femoral and peroneal veins DVT on therapeutic lovenox. Pt was scheduled for outpt chemotx 6/4, although tentative plan for inpt chemo 6/7. Palliative care following for pain management.

## 2021-06-05 NOTE — PROGRESS NOTE ADULT - SUBJECTIVE AND OBJECTIVE BOX
Patient is a 56y old  Female who presents with a chief complaint of Recent Endometrial Cancer, Abdominal Distention, SOB (04 Jun 2021 15:48)    [facility]   Raymond Escalona DO   Pager: [phone]  Cell: [phone]    SUBJECTIVE / OVERNIGHT EVENTS: Sitting up in chair, eating lunch. Complains of mild abdominal pain, improving with medications. SOB improving.     MEDICATIONS  (STANDING):  cefTRIAXone   IVPB 2000 milliGRAM(s) IV Intermittent every 24 hours  dextrose 40% Gel 15 Gram(s) Oral once  dextrose 5%. 1000 milliLiter(s) (50 mL/Hr) IV Continuous <Continuous>  dextrose 5%. 1000 milliLiter(s) (100 mL/Hr) IV Continuous <Continuous>  dextrose 50% Injectable 25 Gram(s) IV Push once  dextrose 50% Injectable 12.5 Gram(s) IV Push once  dextrose 50% Injectable 25 Gram(s) IV Push once  enoxaparin Injectable 70 milliGRAM(s) SubCutaneous two times a day  glucagon  Injectable 1 milliGRAM(s) IntraMuscular once  insulin glargine Injectable (LANTUS) 18 Unit(s) SubCutaneous at bedtime  insulin lispro (ADMELOG) corrective regimen sliding scale   SubCutaneous three times a day before meals  insulin lispro (ADMELOG) corrective regimen sliding scale   SubCutaneous at bedtime  insulin lispro Injectable (ADMELOG) 8 Unit(s) SubCutaneous three times a day before meals  morphine ER Tablet 15 milliGRAM(s) Oral two times a day  polyethylene glycol 3350 17 Gram(s) Oral daily  senna 2 Tablet(s) Oral at bedtime    MEDICATIONS  (PRN):  acetaminophen   Tablet .. 650 milliGRAM(s) Oral every 6 hours PRN Mild Pain (1 - 3), Moderate Pain (4 - 6)  bisacodyl Suppository 10 milliGRAM(s) Rectal daily PRN Constipation  morphine  - Injectable 2 milliGRAM(s) IV Push every 4 hours PRN Severe Pain (7 - 10)  oxyCODONE    IR 5 milliGRAM(s) Oral every 4 hours PRN Moderate Pain (4 - 6)      Vital Signs Last 24 Hrs  T(C): 36.9 (05 Jun 2021 14:27), Max: 36.9 (05 Jun 2021 14:27)  T(F): 98.4 (05 Jun 2021 14:27), Max: 98.4 (05 Jun 2021 14:27)  HR: 103 (05 Jun 2021 12:21) (103 - 106)  BP: 126/82 (05 Jun 2021 12:21) (121/64 - 127/67)  BP(mean): --  RR: 16 (05 Jun 2021 12:21) (16 - 16)  SpO2: 100% (05 Jun 2021 12:21) (98% - 100%)  CAPILLARY BLOOD GLUCOSE      POCT Blood Glucose.: 133 mg/dL (05 Jun 2021 18:15)  POCT Blood Glucose.: 159 mg/dL (05 Jun 2021 12:16)  POCT Blood Glucose.: 115 mg/dL (05 Jun 2021 01:17)  POCT Blood Glucose.: 96 mg/dL (04 Jun 2021 23:25)  POCT Blood Glucose.: 85 mg/dL (04 Jun 2021 21:43)      PHYSICAL EXAM:  GENERAL: cachectic  HEAD:  Atraumatic, Normocephalic  CHEST/LUNG: Clear to auscultation bilaterally; No wheeze  HEART: Regular rate and rhythm; No murmurs, rubs, or gallops  ABDOMEN:  distended, Nontender, soft, hypoactive BS  EXTREMITIES:  2+ Peripheral Pulses, No clubbing, cyanosis, or edema  PSYCH: AAOx3  NEUROLOGY: non-focal  SKIN: No rashes or lesions    LABS:                        9.3    18.33 )-----------( 470      ( 05 Jun 2021 08:11 )             32.2     06-05    126<L>  |  93<L>  |  13  ----------------------------<  213<H>  4.9   |  18<L>  |  0.45<L>    Ca    8.6      05 Jun 2021 08:11  Phos  3.3     06-05  Mg     2.1     06-05    TPro  6.7  /  Alb  2.5<L>  /  TBili  0.5  /  DBili  x   /  AST  29  /  ALT  16  /  AlkPhos  174<H>  06-05              RADIOLOGY & ADDITIONAL TESTS:

## 2021-06-05 NOTE — PROGRESS NOTE ADULT - PROBLEM SELECTOR PLAN 3
- was initiating chemotx on 6/4, given ongoing infection will defer tx until completion of abx  - tentative plan for inpt chemo 6/7  - palliative following

## 2021-06-06 DIAGNOSIS — E87.1 HYPO-OSMOLALITY AND HYPONATREMIA: ICD-10-CM

## 2021-06-06 LAB
ALBUMIN SERPL ELPH-MCNC: 2.5 G/DL — LOW (ref 3.3–5)
ALP SERPL-CCNC: 173 U/L — HIGH (ref 40–120)
ALT FLD-CCNC: 13 U/L — SIGNIFICANT CHANGE UP (ref 4–33)
ANION GAP SERPL CALC-SCNC: 14 MMOL/L — SIGNIFICANT CHANGE UP (ref 7–14)
ANION GAP SERPL CALC-SCNC: 15 MMOL/L — HIGH (ref 7–14)
AST SERPL-CCNC: 30 U/L — SIGNIFICANT CHANGE UP (ref 4–32)
BASOPHILS # BLD AUTO: 0.04 K/UL — SIGNIFICANT CHANGE UP (ref 0–0.2)
BASOPHILS NFR BLD AUTO: 0.2 % — SIGNIFICANT CHANGE UP (ref 0–2)
BILIRUB SERPL-MCNC: 0.4 MG/DL — SIGNIFICANT CHANGE UP (ref 0.2–1.2)
BUN SERPL-MCNC: 12 MG/DL — SIGNIFICANT CHANGE UP (ref 7–23)
BUN SERPL-MCNC: 16 MG/DL — SIGNIFICANT CHANGE UP (ref 7–23)
CALCIUM SERPL-MCNC: 8.6 MG/DL — SIGNIFICANT CHANGE UP (ref 8.4–10.5)
CALCIUM SERPL-MCNC: 8.9 MG/DL — SIGNIFICANT CHANGE UP (ref 8.4–10.5)
CHLORIDE SERPL-SCNC: 91 MMOL/L — LOW (ref 98–107)
CHLORIDE SERPL-SCNC: 94 MMOL/L — LOW (ref 98–107)
CO2 SERPL-SCNC: 18 MMOL/L — LOW (ref 22–31)
CO2 SERPL-SCNC: 21 MMOL/L — LOW (ref 22–31)
CREAT SERPL-MCNC: 0.36 MG/DL — LOW (ref 0.5–1.3)
CREAT SERPL-MCNC: 0.45 MG/DL — LOW (ref 0.5–1.3)
EOSINOPHIL # BLD AUTO: 0 K/UL — SIGNIFICANT CHANGE UP (ref 0–0.5)
EOSINOPHIL NFR BLD AUTO: 0 % — SIGNIFICANT CHANGE UP (ref 0–6)
GLUCOSE BLDC GLUCOMTR-MCNC: 101 MG/DL — HIGH (ref 70–99)
GLUCOSE BLDC GLUCOMTR-MCNC: 133 MG/DL — HIGH (ref 70–99)
GLUCOSE BLDC GLUCOMTR-MCNC: 171 MG/DL — HIGH (ref 70–99)
GLUCOSE BLDC GLUCOMTR-MCNC: 211 MG/DL — HIGH (ref 70–99)
GLUCOSE SERPL-MCNC: 109 MG/DL — HIGH (ref 70–99)
GLUCOSE SERPL-MCNC: 225 MG/DL — HIGH (ref 70–99)
HCT VFR BLD CALC: 32.5 % — LOW (ref 34.5–45)
HGB BLD-MCNC: 9.3 G/DL — LOW (ref 11.5–15.5)
IANC: 15.18 K/UL — HIGH (ref 1.5–8.5)
IMM GRANULOCYTES NFR BLD AUTO: 1.4 % — SIGNIFICANT CHANGE UP (ref 0–1.5)
LYMPHOCYTES # BLD AUTO: 0.99 K/UL — LOW (ref 1–3.3)
LYMPHOCYTES # BLD AUTO: 5.5 % — LOW (ref 13–44)
MAGNESIUM SERPL-MCNC: 2.2 MG/DL — SIGNIFICANT CHANGE UP (ref 1.6–2.6)
MCHC RBC-ENTMCNC: 20.9 PG — LOW (ref 27–34)
MCHC RBC-ENTMCNC: 28.6 GM/DL — LOW (ref 32–36)
MCV RBC AUTO: 73.2 FL — LOW (ref 80–100)
MONOCYTES # BLD AUTO: 1.4 K/UL — HIGH (ref 0–0.9)
MONOCYTES NFR BLD AUTO: 7.8 % — SIGNIFICANT CHANGE UP (ref 2–14)
NEUTROPHILS # BLD AUTO: 15.18 K/UL — HIGH (ref 1.8–7.4)
NEUTROPHILS NFR BLD AUTO: 85.1 % — HIGH (ref 43–77)
NRBC # BLD: 0 /100 WBCS — SIGNIFICANT CHANGE UP
NRBC # FLD: 0 K/UL — SIGNIFICANT CHANGE UP
OSMOLALITY SERPL: 270 MOSM/KG — LOW (ref 275–295)
OSMOLALITY UR: 659 MOSM/KG — SIGNIFICANT CHANGE UP (ref 50–1200)
PHOSPHATE SERPL-MCNC: 2.7 MG/DL — SIGNIFICANT CHANGE UP (ref 2.5–4.5)
PLATELET # BLD AUTO: 436 K/UL — HIGH (ref 150–400)
POTASSIUM SERPL-MCNC: 4.7 MMOL/L — SIGNIFICANT CHANGE UP (ref 3.5–5.3)
POTASSIUM SERPL-MCNC: 5.1 MMOL/L — SIGNIFICANT CHANGE UP (ref 3.5–5.3)
POTASSIUM SERPL-SCNC: 4.7 MMOL/L — SIGNIFICANT CHANGE UP (ref 3.5–5.3)
POTASSIUM SERPL-SCNC: 5.1 MMOL/L — SIGNIFICANT CHANGE UP (ref 3.5–5.3)
PROT SERPL-MCNC: 6.6 G/DL — SIGNIFICANT CHANGE UP (ref 6–8.3)
RBC # BLD: 4.44 M/UL — SIGNIFICANT CHANGE UP (ref 3.8–5.2)
RBC # FLD: 19.1 % — HIGH (ref 10.3–14.5)
SODIUM SERPL-SCNC: 124 MMOL/L — LOW (ref 135–145)
SODIUM SERPL-SCNC: 129 MMOL/L — LOW (ref 135–145)
SODIUM UR-SCNC: <20 MMOL/L — SIGNIFICANT CHANGE UP
URATE SERPL-MCNC: 4.4 MG/DL — SIGNIFICANT CHANGE UP (ref 2.5–7)
WBC # BLD: 17.86 K/UL — HIGH (ref 3.8–10.5)
WBC # FLD AUTO: 17.86 K/UL — HIGH (ref 3.8–10.5)

## 2021-06-06 PROCEDURE — 99254 IP/OBS CNSLTJ NEW/EST MOD 60: CPT | Mod: GC

## 2021-06-06 PROCEDURE — 99232 SBSQ HOSP IP/OBS MODERATE 35: CPT

## 2021-06-06 RX ORDER — ETHACRYNIC ACID 25 MG/1
25 TABLET ORAL EVERY 12 HOURS
Refills: 0 | Status: DISCONTINUED | OUTPATIENT
Start: 2021-06-06 | End: 2021-06-08

## 2021-06-06 RX ORDER — INSULIN GLARGINE 100 [IU]/ML
22 INJECTION, SOLUTION SUBCUTANEOUS AT BEDTIME
Refills: 0 | Status: DISCONTINUED | OUTPATIENT
Start: 2021-06-06 | End: 2021-06-07

## 2021-06-06 RX ORDER — INSULIN LISPRO 100/ML
7 VIAL (ML) SUBCUTANEOUS
Refills: 0 | Status: DISCONTINUED | OUTPATIENT
Start: 2021-06-06 | End: 2021-06-07

## 2021-06-06 RX ORDER — ALBUMIN HUMAN 25 %
50 VIAL (ML) INTRAVENOUS EVERY 6 HOURS
Refills: 0 | Status: COMPLETED | OUTPATIENT
Start: 2021-06-06 | End: 2021-06-07

## 2021-06-06 RX ADMIN — MORPHINE SULFATE 15 MILLIGRAM(S): 50 CAPSULE, EXTENDED RELEASE ORAL at 19:26

## 2021-06-06 RX ADMIN — Medication 10 MILLIGRAM(S): at 22:24

## 2021-06-06 RX ADMIN — OXYCODONE HYDROCHLORIDE 5 MILLIGRAM(S): 5 TABLET ORAL at 12:50

## 2021-06-06 RX ADMIN — SENNA PLUS 2 TABLET(S): 8.6 TABLET ORAL at 22:24

## 2021-06-06 RX ADMIN — MORPHINE SULFATE 15 MILLIGRAM(S): 50 CAPSULE, EXTENDED RELEASE ORAL at 06:18

## 2021-06-06 RX ADMIN — MORPHINE SULFATE 15 MILLIGRAM(S): 50 CAPSULE, EXTENDED RELEASE ORAL at 18:50

## 2021-06-06 RX ADMIN — Medication 50 MILLILITER(S): at 16:05

## 2021-06-06 RX ADMIN — INSULIN GLARGINE 22 UNIT(S): 100 INJECTION, SOLUTION SUBCUTANEOUS at 22:24

## 2021-06-06 RX ADMIN — MORPHINE SULFATE 2 MILLIGRAM(S): 50 CAPSULE, EXTENDED RELEASE ORAL at 22:38

## 2021-06-06 RX ADMIN — Medication 1: at 12:45

## 2021-06-06 RX ADMIN — CEFTRIAXONE 100 MILLIGRAM(S): 500 INJECTION, POWDER, FOR SOLUTION INTRAMUSCULAR; INTRAVENOUS at 13:20

## 2021-06-06 RX ADMIN — Medication 7 UNIT(S): at 17:58

## 2021-06-06 RX ADMIN — MORPHINE SULFATE 2 MILLIGRAM(S): 50 CAPSULE, EXTENDED RELEASE ORAL at 04:45

## 2021-06-06 RX ADMIN — Medication 2: at 08:38

## 2021-06-06 RX ADMIN — MORPHINE SULFATE 2 MILLIGRAM(S): 50 CAPSULE, EXTENDED RELEASE ORAL at 22:24

## 2021-06-06 RX ADMIN — OXYCODONE HYDROCHLORIDE 5 MILLIGRAM(S): 5 TABLET ORAL at 13:50

## 2021-06-06 RX ADMIN — ENOXAPARIN SODIUM 70 MILLIGRAM(S): 100 INJECTION SUBCUTANEOUS at 06:19

## 2021-06-06 RX ADMIN — ENOXAPARIN SODIUM 70 MILLIGRAM(S): 100 INJECTION SUBCUTANEOUS at 17:58

## 2021-06-06 RX ADMIN — MORPHINE SULFATE 2 MILLIGRAM(S): 50 CAPSULE, EXTENDED RELEASE ORAL at 04:30

## 2021-06-06 RX ADMIN — Medication 7 UNIT(S): at 12:45

## 2021-06-06 RX ADMIN — Medication 8 UNIT(S): at 08:38

## 2021-06-06 NOTE — PROGRESS NOTE ADULT - SUBJECTIVE AND OBJECTIVE BOX
Patient is a 56y old  Female who presents with a chief complaint of Recent Endometrial Cancer, Abdominal Distention, SOB (06 Jun 2021 11:37)    [facility] Hospitalist - Department of Medicine   Raymond Escalona DO   Pager: [phone]  Cell: [phone]    SUBJECTIVE / OVERNIGHT EVENTS: When seen in the afternoon denies any abdominal pain. Eating lunch. Pain controlled. Denies any nausea, vomiting, confusion. Denies CP, palpitations and SOB.     MEDICATIONS  (STANDING):  albumin human 25% IVPB 50 milliLiter(s) IV Intermittent every 6 hours  bisacodyl Suppository 10 milliGRAM(s) Rectal daily  cefTRIAXone   IVPB 2000 milliGRAM(s) IV Intermittent every 24 hours  dextrose 40% Gel 15 Gram(s) Oral once  dextrose 5%. 1000 milliLiter(s) (50 mL/Hr) IV Continuous <Continuous>  dextrose 5%. 1000 milliLiter(s) (100 mL/Hr) IV Continuous <Continuous>  dextrose 50% Injectable 25 Gram(s) IV Push once  dextrose 50% Injectable 12.5 Gram(s) IV Push once  dextrose 50% Injectable 25 Gram(s) IV Push once  enoxaparin Injectable 70 milliGRAM(s) SubCutaneous two times a day  ethacrynic acid 25 milliGRAM(s) Oral every 12 hours  glucagon  Injectable 1 milliGRAM(s) IntraMuscular once  insulin glargine Injectable (LANTUS) 22 Unit(s) SubCutaneous at bedtime  insulin lispro (ADMELOG) corrective regimen sliding scale   SubCutaneous three times a day before meals  insulin lispro (ADMELOG) corrective regimen sliding scale   SubCutaneous at bedtime  insulin lispro Injectable (ADMELOG) 7 Unit(s) SubCutaneous three times a day before meals  morphine ER Tablet 15 milliGRAM(s) Oral two times a day  polyethylene glycol 3350 17 Gram(s) Oral daily  senna 2 Tablet(s) Oral at bedtime    MEDICATIONS  (PRN):  acetaminophen   Tablet .. 650 milliGRAM(s) Oral every 6 hours PRN Mild Pain (1 - 3), Moderate Pain (4 - 6)  morphine  - Injectable 2 milliGRAM(s) IV Push every 4 hours PRN Severe Pain (7 - 10)  oxyCODONE    IR 5 milliGRAM(s) Oral every 4 hours PRN Moderate Pain (4 - 6)      Vital Signs Last 24 Hrs  T(C): 36.7 (06 Jun 2021 22:01), Max: 36.7 (06 Jun 2021 04:26)  T(F): 98.1 (06 Jun 2021 22:01), Max: 98.1 (06 Jun 2021 22:01)  HR: 105 (06 Jun 2021 22:01) (101 - 108)  BP: 136/68 (06 Jun 2021 22:01) (126/70 - 136/68)  BP(mean): --  RR: 17 (06 Jun 2021 22:01) (17 - 18)  SpO2: 99% (06 Jun 2021 22:01) (99% - 99%)  CAPILLARY BLOOD GLUCOSE      POCT Blood Glucose.: 101 mg/dL (06 Jun 2021 22:22)  POCT Blood Glucose.: 133 mg/dL (06 Jun 2021 17:49)  POCT Blood Glucose.: 171 mg/dL (06 Jun 2021 11:55)  POCT Blood Glucose.: 211 mg/dL (06 Jun 2021 08:27)      PHYSICAL EXAM:  GENERAL: cachectic  HEAD:  Atraumatic, Normocephalic  CHEST/LUNG: Clear to auscultation bilaterally; No wheeze  HEART: Regular rate and rhythm; No murmurs, rubs, or gallops  ABDOMEN:  distended, Nontender, soft, hypoactive BS  EXTREMITIES:  2+ Peripheral Pulses, No clubbing, cyanosis, or edema  PSYCH: AAOx3  NEUROLOGY: non-focal  SKIN: No rashes or lesions    LABS:                        9.3    17.86 )-----------( 436      ( 06 Jun 2021 07:54 )             32.5     06-06    129<L>  |  94<L>  |  12  ----------------------------<  109<H>  4.7   |  21<L>  |  0.36<L>    Ca    8.6      06 Jun 2021 20:59  Phos  2.7     06-06  Mg     2.2     06-06    TPro  6.6  /  Alb  2.5<L>  /  TBili  0.4  /  DBili  x   /  AST  30  /  ALT  13  /  AlkPhos  173<H>  06-06              RADIOLOGY & ADDITIONAL TESTS:

## 2021-06-06 NOTE — CONSULT NOTE ADULT - SUBJECTIVE AND OBJECTIVE BOX
[facility] DIVISION OF KIDNEY DISEASES AND HYPERTENSION -- [phone]  -- INITIAL CONSULT NOTE  --------------------------------------------------------------------------------  HPI: 55yo Female w/ newly diagnosed Stage IV Endometrial Cancer and poorly controlled DM II presents to the ED with SOB, Abdominal pain/distention x2 weeks. CT A/P showed large volume ascites s/p diagnostic and therapeutic paracentesis (4L removed) c/b neutrocytic ascites on IV CTX. Found to have age indeterminate non occlusive L. mid femoral and peroneal veins DVT on therapeutic lovenox. Pt was scheduled for outpt chemotx , although tentative plan for inpt chemo . Palliative care following for pain management. Nephrology called for hyponatremia.             PAST HISTORY  --------------------------------------------------------------------------------  PAST MEDICAL & SURGICAL HISTORY:  History of migraine headaches    DM type 2 (diabetes mellitus, type 2)    History of irregular menstrual cycles    Ovarian cancer  Stage 4    History of   2003      FAMILY HISTORY:  FH: type 2 diabetes mellitus (Mother)  mother    Patient seen & examined. Denies chest pain, fever, chills, increased frequency, dysuria, hematuria, pus in urine, frothy urine, SOB, leg edema, loss of appetite, pruritis, N/V.  Gen: NAD  	HEENT: MMM  	Pulm: CTA B/L  	CV: S1S2  	Abd: Soft, +BS   	Ext: No LE edema B/L, no asterixis  	Neuro: Awake, alert  	Skin: Warm and dry  	Vascular access:      PAST SOCIAL HISTORY:    ALLERGIES & MEDICATIONS  --------------------------------------------------------------------------------  Allergies    Sulf-10 (Rash; Short breath)    Intolerances      Standing Inpatient Medications  cefTRIAXone   IVPB 2000 milliGRAM(s) IV Intermittent every 24 hours  dextrose 40% Gel 15 Gram(s) Oral once  dextrose 5%. 1000 milliLiter(s) IV Continuous <Continuous>  dextrose 5%. 1000 milliLiter(s) IV Continuous <Continuous>  dextrose 50% Injectable 25 Gram(s) IV Push once  dextrose 50% Injectable 12.5 Gram(s) IV Push once  dextrose 50% Injectable 25 Gram(s) IV Push once  enoxaparin Injectable 70 milliGRAM(s) SubCutaneous two times a day  glucagon  Injectable 1 milliGRAM(s) IntraMuscular once  insulin glargine Injectable (LANTUS) 18 Unit(s) SubCutaneous at bedtime  insulin lispro (ADMELOG) corrective regimen sliding scale   SubCutaneous three times a day before meals  insulin lispro (ADMELOG) corrective regimen sliding scale   SubCutaneous at bedtime  insulin lispro Injectable (ADMELOG) 8 Unit(s) SubCutaneous three times a day before meals  morphine ER Tablet 15 milliGRAM(s) Oral two times a day  polyethylene glycol 3350 17 Gram(s) Oral daily  senna 2 Tablet(s) Oral at bedtime    PRN Inpatient Medications  acetaminophen   Tablet .. 650 milliGRAM(s) Oral every 6 hours PRN  bisacodyl Suppository 10 milliGRAM(s) Rectal daily PRN  morphine  - Injectable 2 milliGRAM(s) IV Push every 4 hours PRN  oxyCODONE    IR 5 milliGRAM(s) Oral every 4 hours PRN      REVIEW OF SYSTEMS  --------------------------------------------------------------------------------  Gen: No fevers/chills  Skin: No rashes  Head/Eyes/Ears: Normal hearing,   Respiratory: No dyspnea, cough  CV: No chest pain  GI: No abdominal pain, diarrhea  : No dysuria, hematuria  MSK: No  edema  Heme: No easy bruising or bleeding  Psych: No significant depression    All other systems were reviewed and are negative, except as noted.    VITALS/PHYSICAL EXAM  --------------------------------------------------------------------------------  T(C): 36.7 (21 @ 04:26), Max: 36.9 (21 @ 14:27)  HR: 108 (21 @ 04:26) (103 - 108)  BP: 128/72 (21 @ 04:26) (118/75 - 128/72)  RR: 18 (21 @ 04:26) (16 - 18)  SpO2: 99% (21 @ 04:26) (98% - 100%)  Wt(kg): --        21 @ 07:01  -  21 @ 07:00  --------------------------------------------------------  IN: 537 mL / OUT: 500 mL / NET: 37 mL      Physical Exam:  	    LABS/STUDIES  --------------------------------------------------------------------------------              9.3    17.86 >-----------<  436      [21 @ 07:54]              32.5     124  |  91  |  16  ----------------------------<  225      [21 @ 07:54]  5.1   |  18  |  0.45        Ca     8.9     [21 @ 07:54]      Mg     2.2     [21 07:54]      Phos  2.7     [21 07:54]    TPro  6.6  /  Alb  2.5  /  TBili  0.4  /  DBili  x   /  AST  30  /  ALT  13  /  AlkPhos  173  [21 @ 07:54]        Serum Osmolality 274      [21 @ 08:11]    Creatinine Trend:  SCr 0.45 [:54]  SCr 0.45 [ 08:11]  SCr 0.39 [ 07:05]  SCr 0.37 [ 07:58]  SCr 0.43 [ 06:33]    Urinalysis - [19 @ 15:50]      Color Yellow / Appearance Slightly Turbid / SG 1.035 / pH 6.0      Gluc 1000 mg/dL / Ketone Moderate  / Bili Negative / Urobili Negative       Blood Large / Protein 30 mg/dL / Leuk Est Large / Nitrite Positive       /  / Hyaline 1 / Gran  / Sq Epi  / Non Sq Epi 2 / Bacteria Moderate    Urine Sodium <20      [21 @ 18:52]  Urine Osmolality 663      [21 @ 18:52]    Iron 14, TIBC 201, %sat 7      [21 @ 06:33]  Ferritin 646      [21 @ 06:33]  HbA1c 12.3      [19 @ 10:07]  Lipid: chol 115, , HDL 19, LDL --      [21 @ 07:58]    HCV 0.13, Nonreact      [19 @ 10:15]     [facility] DIVISION OF KIDNEY DISEASES AND HYPERTENSION -- [phone]  -- INITIAL CONSULT NOTE  --------------------------------------------------------------------------------  HPI: 57yo Female w/ newly diagnosed Stage IV Endometrial Cancer and poorly controlled DM II presents to the ED with SOB, Abdominal pain/distention x2 weeks. CT A/P showed large volume ascites s/p diagnostic and therapeutic paracentesis (4L removed) c/b neutrocytic ascites on IV CTX. Found to have age indeterminate non occlusive L. mid femoral and peroneal veins DVT on therapeutic lovenox. Pt was scheduled for outpt chemotx , although tentative plan for inpt chemo . Palliative care following for pain management. Nephrology called for hyponatremia.     Pt seen & examined. Does not have any abd pain currently. Eating lunch. States she has a good appetite. Does not know if she has a hx of hyponatremia. No SOB, N/V, diarrhea.         PAST HISTORY  --------------------------------------------------------------------------------  PAST MEDICAL & SURGICAL HISTORY:  History of migraine headaches    DM type 2 (diabetes mellitus, type 2)    History of irregular menstrual cycles    Ovarian cancer  Stage 4    History of   2003      FAMILY HISTORY:  FH: type 2 diabetes mellitus (Mother)  mother            PAST SOCIAL HISTORY:    ALLERGIES & MEDICATIONS  --------------------------------------------------------------------------------  Allergies    Sulf-10 (Rash; Short breath)    Intolerances      Standing Inpatient Medications  cefTRIAXone   IVPB 2000 milliGRAM(s) IV Intermittent every 24 hours  dextrose 40% Gel 15 Gram(s) Oral once  dextrose 5%. 1000 milliLiter(s) IV Continuous <Continuous>  dextrose 5%. 1000 milliLiter(s) IV Continuous <Continuous>  dextrose 50% Injectable 25 Gram(s) IV Push once  dextrose 50% Injectable 12.5 Gram(s) IV Push once  dextrose 50% Injectable 25 Gram(s) IV Push once  enoxaparin Injectable 70 milliGRAM(s) SubCutaneous two times a day  glucagon  Injectable 1 milliGRAM(s) IntraMuscular once  insulin glargine Injectable (LANTUS) 18 Unit(s) SubCutaneous at bedtime  insulin lispro (ADMELOG) corrective regimen sliding scale   SubCutaneous three times a day before meals  insulin lispro (ADMELOG) corrective regimen sliding scale   SubCutaneous at bedtime  insulin lispro Injectable (ADMELOG) 8 Unit(s) SubCutaneous three times a day before meals  morphine ER Tablet 15 milliGRAM(s) Oral two times a day  polyethylene glycol 3350 17 Gram(s) Oral daily  senna 2 Tablet(s) Oral at bedtime    PRN Inpatient Medications  acetaminophen   Tablet .. 650 milliGRAM(s) Oral every 6 hours PRN  bisacodyl Suppository 10 milliGRAM(s) Rectal daily PRN  morphine  - Injectable 2 milliGRAM(s) IV Push every 4 hours PRN  oxyCODONE    IR 5 milliGRAM(s) Oral every 4 hours PRN      REVIEW OF SYSTEMS  --------------------------------------------------------------------------------  Gen: No fevers/chills  Skin: No rashes  Head/Eyes/Ears: Normal hearing,   Respiratory: No dyspnea, cough  CV: No chest pain  GI: No abdominal pain, diarrhea  : No dysuria, hematuria  MSK: No  edema  Heme: No easy bruising or bleeding  Psych: No significant depression    All other systems were reviewed and are negative, except as noted.    VITALS/PHYSICAL EXAM  Gen: NAD, frail, cachectic  	HEENT: MMM  	Pulm: CTA B/L  	CV: S1S2  	Abd: Soft, +BS, +ascites   	Ext: ++ LE edema B/L, no asterixis  	Neuro: Awake, alert  	Skin: Warm and dry  	Vascular access:  --------------------------------------------------------------------------------  T(C): 36.7 (21 @ 04:26), Max: 36.9 (21 @ 14:27)  HR: 108 (21 @ 04:26) (103 - 108)  BP: 128/72 (21 @ 04:26) (118/75 - 128/72)  RR: 18 (21 @ 04:26) (16 - 18)  SpO2: 99% (21 @ 04:26) (98% - 100%)  Wt(kg): --        21 @ 07:01  -  21 @ 07:00  --------------------------------------------------------  IN: 537 mL / OUT: 500 mL / NET: 37 mL      Physical Exam:  	    LABS/STUDIES  --------------------------------------------------------------------------------              9.3    17.86 >-----------<  436      [21 @ 07:54]              32.5     124  |  91  |  16  ----------------------------<  225      [21 @ 07:54]  5.1   |  18  |  0.45        Ca     8.9     [21 @ 07:54]      Mg     2.2     [21 07:54]      Phos  2.7     [21 07:54]    TPro  6.6  /  Alb  2.5  /  TBili  0.4  /  DBili  x   /  AST  30  /  ALT  13  /  AlkPhos  173  [21 @ 07:54]        Serum Osmolality 274      [21 @ 08:11]    Creatinine Trend:  SCr 0.45 [ 07:54]  SCr 0.45 [ 08:11]  SCr 0.39 [ 07:05]  SCr 0.37 [ 07:58]  SCr 0.43 [ 06:33]    Urinalysis - [19 @ 15:50]      Color Yellow / Appearance Slightly Turbid / SG 1.035 / pH 6.0      Gluc 1000 mg/dL / Ketone Moderate  / Bili Negative / Urobili Negative       Blood Large / Protein 30 mg/dL / Leuk Est Large / Nitrite Positive       /  / Hyaline 1 / Gran  / Sq Epi  / Non Sq Epi 2 / Bacteria Moderate    Urine Sodium <20      [21 @ 18:52]  Urine Osmolality 663      [21 @ 18:52]    Iron 14, TIBC 201, %sat 7      [21 @ 06:33]  Ferritin 646      [21 @ 06:33]  HbA1c 12.3      [19 @ 10:07]  Lipid: chol 115, , HDL 19, LDL --      [21 @ 07:58]    HCV 0.13, Nonreact      [19 @ 10:15]

## 2021-06-06 NOTE — PROGRESS NOTE ADULT - PROBLEM SELECTOR PLAN 1
s/p diagnostic and therapeutic (4L fluid removal) 6/2  - SAAG 0.2 suggesting malignant ascites although cell count w/ PMNs 844 (adjusted for 19K RBCs)  - given neutrocytic ascites will empirically tx with IV CTX 2g daily x 5 days (6/2 - ), f.u GS, cx and cytopath  Pain control w/ IV Morphine 2mg IV Q4 prn for severe and PO oxy IR 5 mg q4h prn for mod pain, appreciate pall reccs

## 2021-06-06 NOTE — CHART NOTE - NSCHARTNOTEFT_GEN_A_CORE
Patient off the floor. Chart reviewed.  HbA1c 9.6% DM2, stage 4 endometrial Ca  Trend of hyperglycemia in AM and improves later in the day.  Increase Lantus to 22 units qhs  reduce Admelog to 7/7/7 units premeal  continue low correction scale premeal and low bedtime scale.  Discharge plan basal insulin only  Will follow    Hemal Giang MD  Division of Endocrinology  Pager: [phone]    If after 6PM or before 9AM, or on weekends/holidays, please call endocrine answering service for assistance ([phone]).  For nonurgent matters email [email or web address] for assistance.

## 2021-06-06 NOTE — PROGRESS NOTE ADULT - PROBLEM SELECTOR PLAN 2
Pt with worsening hyponatremia, appears to be hypervolemic given ascites but with intravascular depletion.   - f/u urine lytes, nephrology consulted   - per nephro will give ethacrynic acid + albumin

## 2021-06-06 NOTE — CONSULT NOTE ADULT - ASSESSMENT
57yo Female w/ newly diagnosed Stage IV Endometrial Cancer and poorly controlled DM II presents to the ED with SOB, Abdominal pain/distention x2 weeks. CT A/P showed large volume ascites s/p diagnostic and therapeutic paracentesis (4L removed) c/b neutrocytic ascites on IV CTX. Found to have age indeterminate non occlusive L. mid femoral and peroneal veins DVT on therapeutic lovenox. Pt was scheduled for outpt chemotx 6/4, although tentative plan for inpt chemo 6/7. Palliative care following for pain management. Nephrology called for hyponatremia.           Hypo-osmolar Hyponatremia-  Upon review of [facility] pt's serum Na was 130 on 6/2/21. Currently corrected Na is 126 (corrected for hyperglycemia)  Posm 274; Uosm 663  Please send Mark & serum uric acid  Pt received NS & receiving rocephin in dextrose since admission  Increase Solute intake  Goal correction is not more than 6meq in 24 hrs.     Note incomplete**        If you have any questions, please feel free to contact me  Lynn Shah  Nephrology Fellow  [phone]  (After 5pm or on weekends please page the on-call fellow)     55yo Female w/ newly diagnosed Stage IV Endometrial Cancer and poorly controlled DM II presents to the ED with SOB, Abdominal pain/distention x2 weeks. CT A/P showed large volume ascites s/p diagnostic and therapeutic paracentesis (4L removed) c/b neutrocytic ascites on IV CTX. Found to have age indeterminate non occlusive L. mid femoral and peroneal veins DVT on therapeutic lovenox. Pt was scheduled for outpt chemotx 6/4, although tentative plan for inpt chemo 6/7. Palliative care following for pain management. Nephrology called for hyponatremia.           Hypo-osmolar Hyponatremia-  Upon review of [facility] pt's serum Na was 130 on 6/2/21. Currently corrected Na is 126 (corrected for hyperglycemia)  Posm 274; Uosm 663; Mark < 20  Please send Mark & serum uric acid  Pt received NS & receiving rocephin in dextrose since admission  Increase Solute intake  Goal correction is not more than 6meq in 24 hrs.     Note incomplete**        If you have any questions, please feel free to contact agustin Shah  Nephrology Fellow  [phone]  (After 5pm or on weekends please page the on-call fellow)     55yo Female w/ newly diagnosed Stage IV Endometrial Cancer and poorly controlled DM II presents to the ED with SOB, Abdominal pain/distention x2 weeks. CT A/P showed large volume ascites s/p diagnostic and therapeutic paracentesis (4L removed) c/b neutrocytic ascites on IV CTX. Found to have age indeterminate non occlusive L. mid femoral and peroneal veins DVT on therapeutic lovenox. Pt was scheduled for outpt chemotx 6/4, although tentative plan for inpt chemo 6/7. Palliative care following for pain management. Nephrology called for hyponatremia.           Hypo-osmolar Hypervolemic Hyponatremia-  Upon review of [facility] pt's serum Na was 130 on 6/2/21. Currently corrected Na is 126 (corrected for hyperglycemia)  Posm 274; Uosm 663; Mark < 20 which goes with volume overload; pt is also third spacing  Hyponatremia is ADH mediated from intravascular volume depletion  Will do a trial of ethacrynic acid + albumin (pt is allergic to sulfa)  Monitor I & Os  F/U BMP  Please send serum uric acid  Pt received NS & receiving rocephin in dextrose since admission. Minimise IVF if possible  Increase Solute intake which will cause free water diuresis  Goal correction is not more than 6meq in 24 hrs.             If you have any questions, please feel free to contact me  Lynn Shah  Nephrology Fellow  [phone]  (After 5pm or on weekends please page the on-call fellow)     57yo Female w/ newly diagnosed Stage IV Endometrial Cancer and poorly controlled DM II presents to the ED with SOB, Abdominal pain/distention x2 weeks. CT A/P showed large volume ascites s/p diagnostic and therapeutic paracentesis (4L removed) c/b neutrocytic ascites on IV CTX. Found to have age indeterminate non occlusive L. mid femoral and peroneal veins DVT on therapeutic lovenox. Pt was scheduled for outpt chemotx 6/4, although tentative plan for inpt chemo 6/7. Palliative care following for pain management. Nephrology called for hyponatremia.           Hypo-osmolar Hypervolemic Hyponatremia-  Upon review of [facility] pt's serum Na was 130 on 6/2/21. Currently corrected Na is 126 (corrected for hyperglycemia)  Posm 274; Uosm 663; Mark < 20 which goes with volume depletein; pt is also third spacing  Hyponatremia is ADH mediated   Will do a trial of ethacrynic acid + albumin (pt is allergic to sulfa)  Monitor I & Os  F/U BMP  Please send serum uric acid  Pt received NS & receiving rocephin in dextrose since admission. Minimise IVF if possible  Increase Solute intake which will cause free water diuresis  Goal correction is not more than 6meq in 24 hrs.             If you have any questions, please feel free to contact me  Lynn Shah  Nephrology Fellow  [phone]  (After 5pm or on weekends please page the on-call fellow)

## 2021-06-07 DIAGNOSIS — K59.00 CONSTIPATION, UNSPECIFIED: ICD-10-CM

## 2021-06-07 LAB
ALBUMIN SERPL ELPH-MCNC: 2.6 G/DL — LOW (ref 3.3–5)
ALBUMIN SERPL ELPH-MCNC: 3.1 G/DL — LOW (ref 3.3–5)
ALP SERPL-CCNC: 148 U/L — HIGH (ref 40–120)
ALP SERPL-CCNC: 150 U/L — HIGH (ref 40–120)
ALT FLD-CCNC: 12 U/L — SIGNIFICANT CHANGE UP (ref 4–33)
ALT FLD-CCNC: 9 U/L — SIGNIFICANT CHANGE UP (ref 4–33)
ANION GAP SERPL CALC-SCNC: 14 MMOL/L — SIGNIFICANT CHANGE UP (ref 7–14)
ANION GAP SERPL CALC-SCNC: 14 MMOL/L — SIGNIFICANT CHANGE UP (ref 7–14)
APTT BLD: 27.8 SEC — SIGNIFICANT CHANGE UP (ref 27–36.3)
AST SERPL-CCNC: 29 U/L — SIGNIFICANT CHANGE UP (ref 4–32)
AST SERPL-CCNC: 29 U/L — SIGNIFICANT CHANGE UP (ref 4–32)
BASOPHILS # BLD AUTO: 0.03 K/UL — SIGNIFICANT CHANGE UP (ref 0–0.2)
BASOPHILS NFR BLD AUTO: 0.2 % — SIGNIFICANT CHANGE UP (ref 0–2)
BILIRUB SERPL-MCNC: 0.5 MG/DL — SIGNIFICANT CHANGE UP (ref 0.2–1.2)
BILIRUB SERPL-MCNC: 0.6 MG/DL — SIGNIFICANT CHANGE UP (ref 0.2–1.2)
BLD GP AB SCN SERPL QL: NEGATIVE — SIGNIFICANT CHANGE UP
BUN SERPL-MCNC: 10 MG/DL — SIGNIFICANT CHANGE UP (ref 7–23)
BUN SERPL-MCNC: 12 MG/DL — SIGNIFICANT CHANGE UP (ref 7–23)
CALCIUM SERPL-MCNC: 8.7 MG/DL — SIGNIFICANT CHANGE UP (ref 8.4–10.5)
CALCIUM SERPL-MCNC: 8.8 MG/DL — SIGNIFICANT CHANGE UP (ref 8.4–10.5)
CHLORIDE SERPL-SCNC: 92 MMOL/L — LOW (ref 98–107)
CHLORIDE SERPL-SCNC: 94 MMOL/L — LOW (ref 98–107)
CO2 SERPL-SCNC: 19 MMOL/L — LOW (ref 22–31)
CO2 SERPL-SCNC: 22 MMOL/L — SIGNIFICANT CHANGE UP (ref 22–31)
CREAT SERPL-MCNC: 0.39 MG/DL — LOW (ref 0.5–1.3)
CREAT SERPL-MCNC: 0.42 MG/DL — LOW (ref 0.5–1.3)
CULTURE RESULTS: SIGNIFICANT CHANGE UP
EOSINOPHIL # BLD AUTO: 0.01 K/UL — SIGNIFICANT CHANGE UP (ref 0–0.5)
EOSINOPHIL NFR BLD AUTO: 0.1 % — SIGNIFICANT CHANGE UP (ref 0–6)
GLUCOSE BLDC GLUCOMTR-MCNC: 121 MG/DL — HIGH (ref 70–99)
GLUCOSE BLDC GLUCOMTR-MCNC: 122 MG/DL — HIGH (ref 70–99)
GLUCOSE BLDC GLUCOMTR-MCNC: 146 MG/DL — HIGH (ref 70–99)
GLUCOSE BLDC GLUCOMTR-MCNC: 213 MG/DL — HIGH (ref 70–99)
GLUCOSE SERPL-MCNC: 119 MG/DL — HIGH (ref 70–99)
GLUCOSE SERPL-MCNC: 201 MG/DL — HIGH (ref 70–99)
HCT VFR BLD CALC: 29 % — LOW (ref 34.5–45)
HCT VFR BLD CALC: 29.6 % — LOW (ref 34.5–45)
HGB BLD-MCNC: 8.4 G/DL — LOW (ref 11.5–15.5)
HGB BLD-MCNC: 8.6 G/DL — LOW (ref 11.5–15.5)
IANC: 14.59 K/UL — HIGH (ref 1.5–8.5)
IMM GRANULOCYTES NFR BLD AUTO: 1 % — SIGNIFICANT CHANGE UP (ref 0–1.5)
INR BLD: 1.31 RATIO — HIGH (ref 0.88–1.16)
LYMPHOCYTES # BLD AUTO: 0.84 K/UL — LOW (ref 1–3.3)
LYMPHOCYTES # BLD AUTO: 4.9 % — LOW (ref 13–44)
MAGNESIUM SERPL-MCNC: 2 MG/DL — SIGNIFICANT CHANGE UP (ref 1.6–2.6)
MAGNESIUM SERPL-MCNC: 2.2 MG/DL — SIGNIFICANT CHANGE UP (ref 1.6–2.6)
MCHC RBC-ENTMCNC: 20.6 PG — LOW (ref 27–34)
MCHC RBC-ENTMCNC: 21.1 PG — LOW (ref 27–34)
MCHC RBC-ENTMCNC: 29 GM/DL — LOW (ref 32–36)
MCHC RBC-ENTMCNC: 29.1 GM/DL — LOW (ref 32–36)
MCV RBC AUTO: 71.3 FL — LOW (ref 80–100)
MCV RBC AUTO: 72.5 FL — LOW (ref 80–100)
MONOCYTES # BLD AUTO: 1.55 K/UL — HIGH (ref 0–0.9)
MONOCYTES NFR BLD AUTO: 9 % — SIGNIFICANT CHANGE UP (ref 2–14)
NEUTROPHILS # BLD AUTO: 14.59 K/UL — HIGH (ref 1.8–7.4)
NEUTROPHILS NFR BLD AUTO: 84.8 % — HIGH (ref 43–77)
NRBC # BLD: 0 /100 WBCS — SIGNIFICANT CHANGE UP
NRBC # BLD: 0 /100 WBCS — SIGNIFICANT CHANGE UP
NRBC # FLD: 0 K/UL — SIGNIFICANT CHANGE UP
NRBC # FLD: 0.02 K/UL — HIGH
PHOSPHATE SERPL-MCNC: 2.6 MG/DL — SIGNIFICANT CHANGE UP (ref 2.5–4.5)
PHOSPHATE SERPL-MCNC: 2.6 MG/DL — SIGNIFICANT CHANGE UP (ref 2.5–4.5)
PLATELET # BLD AUTO: 411 K/UL — HIGH (ref 150–400)
PLATELET # BLD AUTO: 414 K/UL — HIGH (ref 150–400)
POTASSIUM SERPL-MCNC: 4.7 MMOL/L — SIGNIFICANT CHANGE UP (ref 3.5–5.3)
POTASSIUM SERPL-MCNC: 4.9 MMOL/L — SIGNIFICANT CHANGE UP (ref 3.5–5.3)
POTASSIUM SERPL-SCNC: 4.7 MMOL/L — SIGNIFICANT CHANGE UP (ref 3.5–5.3)
POTASSIUM SERPL-SCNC: 4.9 MMOL/L — SIGNIFICANT CHANGE UP (ref 3.5–5.3)
PROT SERPL-MCNC: 6.4 G/DL — SIGNIFICANT CHANGE UP (ref 6–8.3)
PROT SERPL-MCNC: 6.7 G/DL — SIGNIFICANT CHANGE UP (ref 6–8.3)
PROTHROM AB SERPL-ACNC: 14.9 SEC — HIGH (ref 10.6–13.6)
RBC # BLD: 4.07 M/UL — SIGNIFICANT CHANGE UP (ref 3.8–5.2)
RBC # BLD: 4.08 M/UL — SIGNIFICANT CHANGE UP (ref 3.8–5.2)
RBC # FLD: 19.3 % — HIGH (ref 10.3–14.5)
RBC # FLD: 19.4 % — HIGH (ref 10.3–14.5)
RH IG SCN BLD-IMP: POSITIVE — SIGNIFICANT CHANGE UP
SODIUM SERPL-SCNC: 127 MMOL/L — LOW (ref 135–145)
SODIUM SERPL-SCNC: 128 MMOL/L — LOW (ref 135–145)
SPECIMEN SOURCE: SIGNIFICANT CHANGE UP
WBC # BLD: 17.2 K/UL — HIGH (ref 3.8–10.5)
WBC # BLD: 19.73 K/UL — HIGH (ref 3.8–10.5)
WBC # FLD AUTO: 17.2 K/UL — HIGH (ref 3.8–10.5)
WBC # FLD AUTO: 19.73 K/UL — HIGH (ref 3.8–10.5)

## 2021-06-07 PROCEDURE — 74018 RADEX ABDOMEN 1 VIEW: CPT | Mod: 26

## 2021-06-07 PROCEDURE — 99232 SBSQ HOSP IP/OBS MODERATE 35: CPT | Mod: GC

## 2021-06-07 PROCEDURE — 74177 CT ABD & PELVIS W/CONTRAST: CPT | Mod: 26

## 2021-06-07 PROCEDURE — 99232 SBSQ HOSP IP/OBS MODERATE 35: CPT

## 2021-06-07 PROCEDURE — 99233 SBSQ HOSP IP/OBS HIGH 50: CPT

## 2021-06-07 PROCEDURE — 99233 SBSQ HOSP IP/OBS HIGH 50: CPT | Mod: GC

## 2021-06-07 RX ORDER — MAGNESIUM HYDROXIDE 400 MG/1
30 TABLET, CHEWABLE ORAL DAILY
Refills: 0 | Status: DISCONTINUED | OUTPATIENT
Start: 2021-06-07 | End: 2021-06-08

## 2021-06-07 RX ORDER — AER TRAVELER 0.5 G/1
1 SOLUTION RECTAL; TOPICAL THREE TIMES A DAY
Refills: 0 | Status: DISCONTINUED | OUTPATIENT
Start: 2021-06-07 | End: 2021-06-18

## 2021-06-07 RX ORDER — UREA 15 G
15 POWDER IN PACKET (EA) ORAL DAILY
Refills: 0 | Status: DISCONTINUED | OUTPATIENT
Start: 2021-06-07 | End: 2021-06-10

## 2021-06-07 RX ORDER — INSULIN LISPRO 100/ML
7 VIAL (ML) SUBCUTANEOUS
Refills: 0 | Status: DISCONTINUED | OUTPATIENT
Start: 2021-06-07 | End: 2021-06-11

## 2021-06-07 RX ORDER — INSULIN LISPRO 100/ML
8 VIAL (ML) SUBCUTANEOUS
Refills: 0 | Status: DISCONTINUED | OUTPATIENT
Start: 2021-06-07 | End: 2021-06-07

## 2021-06-07 RX ORDER — INSULIN GLARGINE 100 [IU]/ML
18 INJECTION, SOLUTION SUBCUTANEOUS AT BEDTIME
Refills: 0 | Status: DISCONTINUED | OUTPATIENT
Start: 2021-06-07 | End: 2021-06-07

## 2021-06-07 RX ORDER — INSULIN GLARGINE 100 [IU]/ML
24 INJECTION, SOLUTION SUBCUTANEOUS AT BEDTIME
Refills: 0 | Status: DISCONTINUED | OUTPATIENT
Start: 2021-06-07 | End: 2021-06-09

## 2021-06-07 RX ORDER — ALBUMIN HUMAN 25 %
50 VIAL (ML) INTRAVENOUS EVERY 6 HOURS
Refills: 0 | Status: COMPLETED | OUTPATIENT
Start: 2021-06-07 | End: 2021-06-08

## 2021-06-07 RX ADMIN — Medication 50 MILLILITER(S): at 11:42

## 2021-06-07 RX ADMIN — Medication 15 GRAM(S): at 18:19

## 2021-06-07 RX ADMIN — OXYCODONE HYDROCHLORIDE 5 MILLIGRAM(S): 5 TABLET ORAL at 14:25

## 2021-06-07 RX ADMIN — Medication 50 MILLILITER(S): at 23:22

## 2021-06-07 RX ADMIN — ENOXAPARIN SODIUM 70 MILLIGRAM(S): 100 INJECTION SUBCUTANEOUS at 18:20

## 2021-06-07 RX ADMIN — Medication 8 UNIT(S): at 12:52

## 2021-06-07 RX ADMIN — MORPHINE SULFATE 15 MILLIGRAM(S): 50 CAPSULE, EXTENDED RELEASE ORAL at 06:30

## 2021-06-07 RX ADMIN — INSULIN GLARGINE 24 UNIT(S): 100 INJECTION, SOLUTION SUBCUTANEOUS at 23:22

## 2021-06-07 RX ADMIN — MORPHINE SULFATE 2 MILLIGRAM(S): 50 CAPSULE, EXTENDED RELEASE ORAL at 02:41

## 2021-06-07 RX ADMIN — MORPHINE SULFATE 2 MILLIGRAM(S): 50 CAPSULE, EXTENDED RELEASE ORAL at 02:56

## 2021-06-07 RX ADMIN — ETHACRYNIC ACID 25 MILLIGRAM(S): 25 TABLET ORAL at 06:30

## 2021-06-07 RX ADMIN — AER TRAVELER 1 APPLICATION(S): 0.5 SOLUTION RECTAL; TOPICAL at 06:21

## 2021-06-07 RX ADMIN — OXYCODONE HYDROCHLORIDE 5 MILLIGRAM(S): 5 TABLET ORAL at 15:20

## 2021-06-07 RX ADMIN — Medication 7 UNIT(S): at 08:47

## 2021-06-07 RX ADMIN — MORPHINE SULFATE 15 MILLIGRAM(S): 50 CAPSULE, EXTENDED RELEASE ORAL at 18:00

## 2021-06-07 RX ADMIN — Medication 7 UNIT(S): at 18:20

## 2021-06-07 RX ADMIN — OXYCODONE HYDROCHLORIDE 5 MILLIGRAM(S): 5 TABLET ORAL at 22:25

## 2021-06-07 RX ADMIN — ENOXAPARIN SODIUM 70 MILLIGRAM(S): 100 INJECTION SUBCUTANEOUS at 06:30

## 2021-06-07 RX ADMIN — MORPHINE SULFATE 15 MILLIGRAM(S): 50 CAPSULE, EXTENDED RELEASE ORAL at 18:20

## 2021-06-07 RX ADMIN — CEFTRIAXONE 100 MILLIGRAM(S): 500 INJECTION, POWDER, FOR SOLUTION INTRAMUSCULAR; INTRAVENOUS at 14:17

## 2021-06-07 RX ADMIN — Medication 50 MILLILITER(S): at 01:03

## 2021-06-07 RX ADMIN — Medication 50 MILLILITER(S): at 06:30

## 2021-06-07 RX ADMIN — Medication 50 MILLILITER(S): at 18:19

## 2021-06-07 RX ADMIN — OXYCODONE HYDROCHLORIDE 5 MILLIGRAM(S): 5 TABLET ORAL at 21:55

## 2021-06-07 RX ADMIN — Medication 2: at 08:47

## 2021-06-07 RX ADMIN — ETHACRYNIC ACID 25 MILLIGRAM(S): 25 TABLET ORAL at 18:20

## 2021-06-07 NOTE — PROGRESS NOTE ADULT - SUBJECTIVE AND OBJECTIVE BOX
Patient is a 56y old  Female who presents with a chief complaint of Recent Endometrial Cancer, Abdominal Distention, SOB (07 Jun 2021 14:30)      SUBJECTIVE / OVERNIGHT EVENTS: Pt seen and examined at 12:15pm, no overnight events, pt's sisters at bedside, pt reports no bm since 2nd, no nausea, vomiting or abdominal pain, says she is belching a lot, abdomen is distended, just had abdominal xray,  wants to have MOM. No other new issues reported.    MEDICATIONS  (STANDING):  albumin human 25% IVPB 50 milliLiter(s) IV Intermittent every 6 hours  bisacodyl Suppository 10 milliGRAM(s) Rectal daily  cefTRIAXone   IVPB 2000 milliGRAM(s) IV Intermittent every 24 hours  dextrose 40% Gel 15 Gram(s) Oral once  dextrose 5%. 1000 milliLiter(s) (50 mL/Hr) IV Continuous <Continuous>  dextrose 5%. 1000 milliLiter(s) (100 mL/Hr) IV Continuous <Continuous>  dextrose 50% Injectable 25 Gram(s) IV Push once  dextrose 50% Injectable 12.5 Gram(s) IV Push once  dextrose 50% Injectable 25 Gram(s) IV Push once  enoxaparin Injectable 70 milliGRAM(s) SubCutaneous two times a day  ethacrynic acid 25 milliGRAM(s) Oral every 12 hours  glucagon  Injectable 1 milliGRAM(s) IntraMuscular once  insulin glargine Injectable (LANTUS) 18 Unit(s) SubCutaneous at bedtime  insulin lispro (ADMELOG) corrective regimen sliding scale   SubCutaneous three times a day before meals  insulin lispro (ADMELOG) corrective regimen sliding scale   SubCutaneous at bedtime  insulin lispro Injectable (ADMELOG) 8 Unit(s) SubCutaneous three times a day before meals  morphine ER Tablet 15 milliGRAM(s) Oral two times a day  polyethylene glycol 3350 17 Gram(s) Oral daily  senna 2 Tablet(s) Oral at bedtime    MEDICATIONS  (PRN):  acetaminophen   Tablet .. 650 milliGRAM(s) Oral every 6 hours PRN Mild Pain (1 - 3), Moderate Pain (4 - 6)  magnesium hydroxide Suspension 30 milliLiter(s) Oral daily PRN Constipation  morphine  - Injectable 2 milliGRAM(s) IV Push every 4 hours PRN Severe Pain (7 - 10)  oxyCODONE    IR 5 milliGRAM(s) Oral every 4 hours PRN Moderate Pain (4 - 6)  witch hazel Pads 1 Application(s) Topical three times a day PRN discomfort      Vital Signs Last 24 Hrs  T(C): 36.4 (07 Jun 2021 06:27), Max: 36.7 (06 Jun 2021 22:01)  T(F): 97.6 (07 Jun 2021 06:27), Max: 98.1 (06 Jun 2021 22:01)  HR: 107 (07 Jun 2021 06:27) (101 - 107)  BP: 133/72 (07 Jun 2021 06:27) (126/70 - 136/68)  BP(mean): --  RR: 18 (07 Jun 2021 06:27) (17 - 18)  SpO2: 98% (07 Jun 2021 06:27) (98% - 99%)  CAPILLARY BLOOD GLUCOSE      POCT Blood Glucose.: 146 mg/dL (07 Jun 2021 12:39)  POCT Blood Glucose.: 213 mg/dL (07 Jun 2021 08:43)  POCT Blood Glucose.: 101 mg/dL (06 Jun 2021 22:22)  POCT Blood Glucose.: 133 mg/dL (06 Jun 2021 17:49)    I&O's Summary    06 Jun 2021 07:01  -  07 Jun 2021 07:00  --------------------------------------------------------  IN: 440 mL / OUT: 250 mL / NET: 190 mL    07 Jun 2021 07:01  -  07 Jun 2021 14:35  --------------------------------------------------------  IN: 454 mL / OUT: 550 mL / NET: -96 mL        PHYSICAL EXAM:  GENERAL: cachectic  CHEST/LUNG: Clear to auscultation bilaterally; No wheeze  HEART: mild tachycardia to 104/mt  ABDOMEN: soft, distended, Nontender, hypoactive BS  EXTREMITIES:  +LE edema L>R  PSYCH: Calm  NEUROLOGY: AAOx3  SKIN: No rashes or lesions    LABS:                        8.4    17.20 )-----------( 414      ( 07 Jun 2021 06:59 )             29.0     06-07    127<L>  |  94<L>  |  12  ----------------------------<  201<H>  4.9   |  19<L>  |  0.39<L>    Ca    8.8      07 Jun 2021 06:59  Phos  2.6     06-07  Mg     2.2     06-07    TPro  6.4  /  Alb  2.6<L>  /  TBili  0.6  /  DBili  x   /  AST  29  /  ALT  9   /  AlkPhos  150<H>  06-07              RADIOLOGY & ADDITIONAL TESTS:  < from: Xray Abdomen 1 View PORTABLE -Urgent (Xray Abdomen 1 View PORTABLE -Urgent .) (06.07.21 @ 12:29) >  XR ABDOMEN PORTABLE URGENT 1V        < end of copied text >  < from: Xray Abdomen 1 View PORTABLE -Urgent (Xray Abdomen 1 View PORTABLE -Urgent .) (06.07.21 @ 12:29) >  1. Gaseous distention of the transverse colon with questionable small bowel distention in the left upper quadrant versus more distal transverse colon  2. Air is seen in the rectum but the possibility of a distal colonic obstruction is not excluded considering the absence of any air in the descending colon.    Comment: If obstruction suspected, CT abdomen would be more sensitive.        < end of copied text >    Imaging Personally Reviewed:    Consultant(s) Notes Reviewed:      Care Discussed with Consultants/Other Providers:

## 2021-06-07 NOTE — PROGRESS NOTE ADULT - PROBLEM SELECTOR PLAN 3
Abdominal xray showed gaseous distention of the transverse colon with questionable small bowel distention in the left upper quadrant versus more distal transverse colon. Air is seen in the rectum but the possibility of a distal colonic obstruction is not excluded considering the absence of any air in the descending colon.  -will get urgent ct abd/pelvis  - plan discussed with ACP  - f/u ct results

## 2021-06-07 NOTE — PROGRESS NOTE ADULT - ASSESSMENT
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     -Therapeutic paracentesis, 4200ml removed, high PMN in ascites now on empiric tx for SBP with IV CTX 2g daily x 5 days (6/2 - ), Will f.u GS, cx and cytopath  -S/p 1 unit of PRBC, Hgb improved  -pt with iron deficiency, venofer 200mg x 3 days  -Pal care consult for symptom and pain management, Pain improved  -Will hold inpatient chemotherapy for now since patient is being treated for infection, will re eealuate later this week s/p completion of antibiotics Patient currently on Ceftriaxone   -Patient with constipation, AXR done 6/7 today : 1. Gaseous distention of the transverse colon with questionable small bowel distention in the left upper quadrant versus more distal transverse colon  2. Air is seen in the rectum but the possibility of a distal colonic obstruction is not excluded considering the absence of any air in the descending colon.  -Recommended for CT AP to r/o obstruction  -Noted to have RLE nonocclusive DVT located at mid-femur on recent dopplers ( 6/4) . Patient started on therapeutic Lovenox which she is amendable to self-admininstering  -Patient to followup with Dr. Hoang ([facility]) upon discharge  -C/w Supportive care, pain control, Nutrition, PT, DVT ppx  -Oncology will continue to follow with you    Case d/w Dr. Jacques HORN  Oncology Physician Assistant  Carina SHARMA/[facility]  Pager [phone]    If after 5pm or weekends please page On-call Oncology Fellow   56f with recently diagnosed metastatic uterine carcinosarcoma, c/b ascites, with plan to start chemotherapy 6/4, presented to the ED with abdominal discomfort.     -Therapeutic paracentesis, 4200ml removed, high PMN in ascites now on empiric tx for SBP with IV CTX 2g daily x 5 days (6/2 - ), Will f.u GS, cx and cytopath  -S/p 1 unit of PRBC, Hgb improved  -pt with iron deficiency, venofer 200mg x 3 days  -Pal care consult for symptom and pain management, Pain improved  -Will hold inpatient chemotherapy for now since patient is being treated for infection, will re eealuate later this week after completion of antibiotics. Patient currently on Ceftriaxone   -Patient with constipation, AXR done 6/7 today : 1. Gaseous distention of the transverse colon with questionable small bowel distention in the left upper quadrant versus more distal transverse colon  2. Air is seen in the rectum but the possibility of a distal colonic obstruction is not excluded considering the absence of any air in the descending colon.  -Recommended for CT AP to r/o obstruction  -Noted to have RLE nonocclusive DVT located at mid-femur on recent dopplers ( 6/4) . Patient started on therapeutic Lovenox which she is amendable to self-admininstering  -Patient to followup with Dr. Hoang ([facility]) upon discharge  -C/w Supportive care, pain control, Nutrition, PT, DVT ppx  -Oncology will continue to follow with you    Case d/w Dr. Jacques HORN  Oncology Physician Assistant  Carina SHARMA/[facility]  Pager [phone]    If after 5pm or weekends please page On-call Oncology Fellow

## 2021-06-07 NOTE — PROGRESS NOTE ADULT - PROBLEM SELECTOR PLAN 2
Please advise refill request. It was prescribed to patient when she was discharged from the hospital.    Pt with worsening hyponatremia, appears to be hypervolemic given ascites but with intravascular depletion.   -  nephrology consulted and follg  - per nephro will give ethacrynic acid + albumin  - cont to monitor Na, f/u renal recs

## 2021-06-07 NOTE — PROVIDER CONTACT NOTE (OTHER) - ASSESSMENT
Pt. c/o constipation since 6/2   On bowel regimen   Ducolax suppository given, pt. complains of discomfort at rectal area and small amount of blood noted on jacey pad

## 2021-06-07 NOTE — CHART NOTE - NSCHARTNOTEFT_GEN_A_CORE
Patient with distended abdomen on exam, +hypoactive and distant BS on auscultation. Patient reports last BM was 6/1 (day of admission). Suppository administered last night without resulting BM.   Abdominal xray ordered, cannot r/o distal obstruction. Urgent CT Abd/Pel with contrast ordered. Will follow for results.   Dr. Milner aware and agrees with above plan of care.

## 2021-06-07 NOTE — PROGRESS NOTE ADULT - PROBLEM SELECTOR PLAN 1
continue ms contin 15mg bid with morphine iv 2mg prn- would encourage oxy ir 5mg po q 4 hours prn  bowel regimen senna- miralax

## 2021-06-07 NOTE — CHART NOTE - NSCHARTNOTEFT_GEN_A_CORE
Called by radiologist, patients CT abdomen prelim results:   High-grade small bowel obstruction with transition point in the right lower quadrant. Bibasilar atelectasis.  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 Large ascites.  Dilated and fecalized ileum with transition point in the right lower quadrant (2:112), new since prior study. This finding is compatible with high-grade small bowel obstruction.  Heterogeneous uterus with central hypodense enlarged endometrium and bilateral complex adnexal masses are unchanged.  Retroperitoneal lymphadenopathy is grossly unchanged.  Diffuse metastatic osseous lesions.    Called surgery consult at [id number]. Will follow up recommendations. Called by radiologist, patients CT abdomen prelim results:   High-grade small bowel obstruction with transition point in the right lower quadrant. Bibasilar atelectasis.  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 Large ascites.  Dilated and fecalized ileum with transition point in the right lower quadrant (2:112), new since prior study. This finding is compatible with high-grade small bowel obstruction.  Heterogeneous uterus with central hypodense enlarged endometrium and bilateral complex adnexal masses are unchanged.  Retroperitoneal lymphadenopathy is grossly unchanged.  Diffuse metastatic osseous lesions.    Called surgery consult at [id number]. Will follow up recommendations.    D/w surgery who after further d/w radiology does not think it is a high grade SBO but patient has large stool burden. New read shows Dilated and fecalized ileum with transition point in the right lower quadrant (2:112), new since prior study, likely represent ileus or incompetent cecal valve. Per sx recommendations can give enemas and lactulose as needed for constipation.

## 2021-06-07 NOTE — PROGRESS NOTE ADULT - SUBJECTIVE AND OBJECTIVE BOX
Chief Complaint:     History:    MEDICATIONS  (STANDING):  albumin human 25% IVPB 50 milliLiter(s) IV Intermittent every 6 hours  bisacodyl Suppository 10 milliGRAM(s) Rectal daily  cefTRIAXone   IVPB 2000 milliGRAM(s) IV Intermittent every 24 hours  dextrose 40% Gel 15 Gram(s) Oral once  dextrose 5%. 1000 milliLiter(s) (50 mL/Hr) IV Continuous <Continuous>  dextrose 5%. 1000 milliLiter(s) (100 mL/Hr) IV Continuous <Continuous>  dextrose 50% Injectable 25 Gram(s) IV Push once  dextrose 50% Injectable 12.5 Gram(s) IV Push once  dextrose 50% Injectable 25 Gram(s) IV Push once  enoxaparin Injectable 70 milliGRAM(s) SubCutaneous two times a day  ethacrynic acid 25 milliGRAM(s) Oral every 12 hours  glucagon  Injectable 1 milliGRAM(s) IntraMuscular once  insulin glargine Injectable (LANTUS) 22 Unit(s) SubCutaneous at bedtime  insulin lispro (ADMELOG) corrective regimen sliding scale   SubCutaneous three times a day before meals  insulin lispro (ADMELOG) corrective regimen sliding scale   SubCutaneous at bedtime  insulin lispro Injectable (ADMELOG) 7 Unit(s) SubCutaneous three times a day before meals  morphine ER Tablet 15 milliGRAM(s) Oral two times a day  polyethylene glycol 3350 17 Gram(s) Oral daily  senna 2 Tablet(s) Oral at bedtime    MEDICATIONS  (PRN):  acetaminophen   Tablet .. 650 milliGRAM(s) Oral every 6 hours PRN Mild Pain (1 - 3), Moderate Pain (4 - 6)  morphine  - Injectable 2 milliGRAM(s) IV Push every 4 hours PRN Severe Pain (7 - 10)  oxyCODONE    IR 5 milliGRAM(s) Oral every 4 hours PRN Moderate Pain (4 - 6)  witch hazel Pads 1 Application(s) Topical three times a day PRN discomfort      Allergies    Sulf-10 (Rash; Short breath)    Intolerances      Review of Systems:  Constitutional: No fever  Eyes: No blurry vision  Neuro: No tremors  HEENT: No pain  Cardiovascular: No chest pain, palpitations  Respiratory: No SOB, no cough  GI: No nausea, vomiting, abdominal pain  : No dysuria  Skin: no rash  Psych: no depression  Endocrine: no polyuria, polydipsia  Hem/lymph: no swelling  Osteoporosis: no fractures    ALL OTHER SYSTEMS REVIEWED AND NEGATIVE    UNABLE TO OBTAIN    PHYSICAL EXAM:  VITALS: T(C): 36.4 (06-07-21 @ 06:27)  T(F): 97.6 (06-07-21 @ 06:27), Max: 98.1 (06-06-21 @ 22:01)  HR: 107 (06-07-21 @ 06:27) (101 - 107)  BP: 133/72 (06-07-21 @ 06:27) (126/70 - 136/68)  RR:  (17 - 18)  SpO2:  (98% - 99%)  Wt(kg): --  GENERAL: NAD, well-groomed, well-developed  EYES: No proptosis, no lid lag, anicteric  HEENT:  Atraumatic, Normocephalic, moist mucous membranes  THYROID: Normal size, no palpable nodules  RESPIRATORY: Clear to auscultation bilaterally; No rales, rhonchi, wheezing, or rubs  CARDIOVASCULAR: Regular rate and rhythm; No murmurs; no peripheral edema  GI: Soft, nontender, non distended, normal bowel sounds  SKIN: Dry, intact, No rashes or lesions  MUSCULOSKELETAL: Full range of motion, normal strength  NEURO: sensation intact, extraocular movements intact, no tremor, normal reflexes  PSYCH: Alert and oriented x 3, normal affect, normal mood  CUSHING'S SIGNS: no striae    POCT Blood Glucose.: 213 mg/dL (06-07-21 @ 08:43)  POCT Blood Glucose.: 101 mg/dL (06-06-21 @ 22:22)  POCT Blood Glucose.: 133 mg/dL (06-06-21 @ 17:49)  POCT Blood Glucose.: 171 mg/dL (06-06-21 @ 11:55)  POCT Blood Glucose.: 211 mg/dL (06-06-21 @ 08:27)  POCT Blood Glucose.: 106 mg/dL (06-05-21 @ 22:41)  POCT Blood Glucose.: 133 mg/dL (06-05-21 @ 18:15)  POCT Blood Glucose.: 159 mg/dL (06-05-21 @ 12:16)  POCT Blood Glucose.: 115 mg/dL (06-05-21 @ 01:17)  POCT Blood Glucose.: 96 mg/dL (06-04-21 @ 23:25)  POCT Blood Glucose.: 85 mg/dL (06-04-21 @ 21:43)  POCT Blood Glucose.: 80 mg/dL (06-04-21 @ 17:50)  POCT Blood Glucose.: 84 mg/dL (06-04-21 @ 12:38)      06-07    127<L>  |  94<L>  |  12  ----------------------------<  201<H>  4.9   |  19<L>  |  0.39<L>    EGFR if : 134  EGFR if non : 116    Ca    8.8      06-07  Mg     2.2     06-07  Phos  2.6     06-07    TPro  6.4  /  Alb  2.6<L>  /  TBili  0.6  /  DBili  x   /  AST  29  /  ALT  9   /  AlkPhos  150<H>  06-07          Thyroid Function Tests:                           Chief Complaint: T2DM    History: No acute events. No hypoglycemia. Patient is eating well.     MEDICATIONS  (STANDING):  albumin human 25% IVPB 50 milliLiter(s) IV Intermittent every 6 hours  bisacodyl Suppository 10 milliGRAM(s) Rectal daily  cefTRIAXone   IVPB 2000 milliGRAM(s) IV Intermittent every 24 hours  dextrose 40% Gel 15 Gram(s) Oral once  dextrose 5%. 1000 milliLiter(s) (50 mL/Hr) IV Continuous <Continuous>  dextrose 5%. 1000 milliLiter(s) (100 mL/Hr) IV Continuous <Continuous>  dextrose 50% Injectable 25 Gram(s) IV Push once  dextrose 50% Injectable 12.5 Gram(s) IV Push once  dextrose 50% Injectable 25 Gram(s) IV Push once  enoxaparin Injectable 70 milliGRAM(s) SubCutaneous two times a day  ethacrynic acid 25 milliGRAM(s) Oral every 12 hours  glucagon  Injectable 1 milliGRAM(s) IntraMuscular once  insulin glargine Injectable (LANTUS) 22 Unit(s) SubCutaneous at bedtime  insulin lispro (ADMELOG) corrective regimen sliding scale   SubCutaneous three times a day before meals  insulin lispro (ADMELOG) corrective regimen sliding scale   SubCutaneous at bedtime  insulin lispro Injectable (ADMELOG) 7 Unit(s) SubCutaneous three times a day before meals  morphine ER Tablet 15 milliGRAM(s) Oral two times a day  polyethylene glycol 3350 17 Gram(s) Oral daily  senna 2 Tablet(s) Oral at bedtime    MEDICATIONS  (PRN):  acetaminophen   Tablet .. 650 milliGRAM(s) Oral every 6 hours PRN Mild Pain (1 - 3), Moderate Pain (4 - 6)  morphine  - Injectable 2 milliGRAM(s) IV Push every 4 hours PRN Severe Pain (7 - 10)  oxyCODONE    IR 5 milliGRAM(s) Oral every 4 hours PRN Moderate Pain (4 - 6)  witch hazel Pads 1 Application(s) Topical three times a day PRN discomfort      Allergies    Sulf-10 (Rash; Short breath)    Intolerances      Review of Systems:  Constitutional: No fever  Eyes: No blurry vision  Neuro: No tremors  HEENT: No pain  Cardiovascular: No chest pain, palpitations  Respiratory: No SOB, no cough  GI: No nausea, vomiting, abdominal pain  : No dysuria  Skin: no rash  Psych: no depression  Endocrine: no polyuria, polydipsia      ALL OTHER SYSTEMS REVIEWED AND NEGATIVE        PHYSICAL EXAM:  VITALS: T(C): 36.4 (06-07-21 @ 06:27)  T(F): 97.6 (06-07-21 @ 06:27), Max: 98.1 (06-06-21 @ 22:01)  HR: 107 (06-07-21 @ 06:27) (101 - 107)  BP: 133/72 (06-07-21 @ 06:27) (126/70 - 136/68)  RR:  (17 - 18)  SpO2:  (98% - 99%)  Wt(kg): --  GENERAL: NAD, well-groomed, well-developed  EYES: No proptosis, no lid lag, anicteric  HEENT:  Atraumatic, Normocephalic, moist mucous membranes  RESPIRATORY: Clear to auscultation bilaterally  CARDIOVASCULAR: Regular rate and rhythm  PSYCH: Alert and oriented x 3, normal affect, normal mood      POCT Blood Glucose.: 213 mg/dL (06-07-21 @ 08:43)  POCT Blood Glucose.: 101 mg/dL (06-06-21 @ 22:22)  POCT Blood Glucose.: 133 mg/dL (06-06-21 @ 17:49)  POCT Blood Glucose.: 171 mg/dL (06-06-21 @ 11:55)  POCT Blood Glucose.: 211 mg/dL (06-06-21 @ 08:27)  POCT Blood Glucose.: 106 mg/dL (06-05-21 @ 22:41)  POCT Blood Glucose.: 133 mg/dL (06-05-21 @ 18:15)  POCT Blood Glucose.: 159 mg/dL (06-05-21 @ 12:16)  POCT Blood Glucose.: 115 mg/dL (06-05-21 @ 01:17)  POCT Blood Glucose.: 96 mg/dL (06-04-21 @ 23:25)  POCT Blood Glucose.: 85 mg/dL (06-04-21 @ 21:43)  POCT Blood Glucose.: 80 mg/dL (06-04-21 @ 17:50)  POCT Blood Glucose.: 84 mg/dL (06-04-21 @ 12:38)      06-07    127<L>  |  94<L>  |  12  ----------------------------<  201<H>  4.9   |  19<L>  |  0.39<L>    EGFR if : 134  EGFR if non : 116    Ca    8.8      06-07  Mg     2.2     06-07  Phos  2.6     06-07    TPro  6.4  /  Alb  2.6<L>  /  TBili  0.6  /  DBili  x   /  AST  29  /  ALT  9   /  AlkPhos  150<H>  06-07

## 2021-06-07 NOTE — PROGRESS NOTE ADULT - PROBLEM SELECTOR PLAN 1
- HbA1c 9.6%, not on treatment at home  - given erratic po intake and occasional post-prandial BG < 100 mg/dL, will decrease Admelog to 8 units before meals  - reduce Lantus to 18 units (received this reduced dose yesterday night and serum BG this AM at goal)  - low correction scale qac and qhs   - consistent carb diet  - check FS qac and qhs  - RD consult  - will follow  - for discharge: ideally would discharge on basal insulin + oral agent such as Metformin, however patient declines treatment with orals and would like to start with once daily basal insulin for now. Thus, will plan on discharging on Lantus (or whichever basal insulin is covered), dose TBD. Send script for Lantus 18 units qhs to assess coverage. - HbA1c 9.6%, not on treatment at home  - Increase Lantus to 24 units qhs  - Cont Admelog 7 units TIDAC  - low correction scale qac and qhs   - consistent carb diet  - check FS qac and qhs  - RD consult  - will follow  - for discharge: ideally would discharge on basal insulin + oral agent such as Metformin, however patient declines treatment with orals and would like to start with once daily basal insulin for now. Thus, will plan on discharging on Lantus (or whichever basal insulin is covered), dose TBD. Send script for Lantus 24 units qhs to assess coverage.

## 2021-06-07 NOTE — CHART NOTE - NSCHARTNOTEFT_GEN_A_CORE
Called for CT scan Prelim report performed for abdominal distention of findings of possible small bowel obstruction with dilated terminal ileum.   Discussion with radiology resident Dr. 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 No other small bowel dilatation present, no proximal small bowel dilation.     Patient states she has not had a bowel movement since 6/1. On significant amount of opioid medications for pain. Has been tolerating diet without issue, no nausea/emesis.   On exam, abdominal soft, minimally tender, not tympanic to percussion.     Clinical setting more consistent with constipation and large volume stool burden as seen on CT. Recommend aggressive bowel regimen to include enemas, lactulose.     Please do not hesitate to contact for any Surgical questions or concerns.

## 2021-06-07 NOTE — PROGRESS NOTE ADULT - SUBJECTIVE AND OBJECTIVE BOX
INTERVAL HPI/OVERNIGHT EVENTS:  Patient seen at bedside.  Patient with symptoms of anxiety s/p enema yesterday  Has some abdominal cramping but much improved from  yesterday  No other acute complaints       VITAL SIGNS:  T(F): 97.6 (06-07-21 @ 06:27)  HR: 107 (06-07-21 @ 06:27)  BP: 133/72 (06-07-21 @ 06:27)  RR: 18 (06-07-21 @ 06:27)  SpO2: 98% (06-07-21 @ 06:27)  Wt(kg): --    PHYSICAL EXAM:    In accordance with current standard limiting patient contact, deferred physical exam  2/2 COVID pandemic  Please refer to physical exam of primary team.    MEDICATIONS  (STANDING):  albumin human 25% IVPB 50 milliLiter(s) IV Intermittent every 6 hours  bisacodyl Suppository 10 milliGRAM(s) Rectal daily  cefTRIAXone   IVPB 2000 milliGRAM(s) IV Intermittent every 24 hours  dextrose 40% Gel 15 Gram(s) Oral once  dextrose 5%. 1000 milliLiter(s) (50 mL/Hr) IV Continuous <Continuous>  dextrose 5%. 1000 milliLiter(s) (100 mL/Hr) IV Continuous <Continuous>  dextrose 50% Injectable 25 Gram(s) IV Push once  dextrose 50% Injectable 12.5 Gram(s) IV Push once  dextrose 50% Injectable 25 Gram(s) IV Push once  enoxaparin Injectable 70 milliGRAM(s) SubCutaneous two times a day  ethacrynic acid 25 milliGRAM(s) Oral every 12 hours  glucagon  Injectable 1 milliGRAM(s) IntraMuscular once  insulin glargine Injectable (LANTUS) 18 Unit(s) SubCutaneous at bedtime  insulin lispro (ADMELOG) corrective regimen sliding scale   SubCutaneous three times a day before meals  insulin lispro (ADMELOG) corrective regimen sliding scale   SubCutaneous at bedtime  insulin lispro Injectable (ADMELOG) 8 Unit(s) SubCutaneous three times a day before meals  morphine ER Tablet 15 milliGRAM(s) Oral two times a day  polyethylene glycol 3350 17 Gram(s) Oral daily  senna 2 Tablet(s) Oral at bedtime    MEDICATIONS  (PRN):  acetaminophen   Tablet .. 650 milliGRAM(s) Oral every 6 hours PRN Mild Pain (1 - 3), Moderate Pain (4 - 6)  magnesium hydroxide Suspension 30 milliLiter(s) Oral daily PRN Constipation  morphine  - Injectable 2 milliGRAM(s) IV Push every 4 hours PRN Severe Pain (7 - 10)  oxyCODONE    IR 5 milliGRAM(s) Oral every 4 hours PRN Moderate Pain (4 - 6)  witch hazel Pads 1 Application(s) Topical three times a day PRN discomfort      Allergies    Sulf-10 (Rash; Short breath)    Intolerances        LABS:                        8.4    17.20 )-----------( 414      ( 07 Jun 2021 06:59 )             29.0     06-07    127<L>  |  94<L>  |  12  ----------------------------<  201<H>  4.9   |  19<L>  |  0.39<L>    Ca    8.8      07 Jun 2021 06:59  Phos  2.6     06-07  Mg     2.2     06-07    TPro  6.4  /  Alb  2.6<L>  /  TBili  0.6  /  DBili  x   /  AST  29  /  ALT  9   /  AlkPhos  150<H>  06-07          RADIOLOGY & ADDITIONAL TESTS:  Studies reviewed.

## 2021-06-07 NOTE — PROGRESS NOTE ADULT - SUBJECTIVE AND OBJECTIVE BOX
[facility] Division of Kidney Diseases & Hypertension  FOLLOW UP NOTE  [phone]--------------------------------------------------------------------------------  Chief Complaint:Malignant neoplasm of ovary    HPI: 57yo Female w/ newly diagnosed Stage IV Endometrial Cancer and poorly controlled DM II presents to the ED with SOB, Abdominal pain/distention x2 weeks. CT A/P showed large volume ascites s/p diagnostic and therapeutic paracentesis (4L removed) c/b neutrocytic ascites on IV CTX. Found to have age indeterminate non occlusive L. mid femoral and peroneal veins DVT on therapeutic lovenox. Pt was scheduled for outpt chemotx 6/4, although tentative plan for inpt chemo 6/7. Palliative care following for pain management. Nephrology called for hyponatremia.     Pt seen & examined. Does not have any abd pain currently. States she has a good appetite. Does not know if she has a hx of hyponatremia. No chest pain, palpitations, sob, light headedness/dizziness, difficulty breathing/cough, fevers/chills, abdominal pain, n/v, diarrhea        PAST HISTORY  --------------------------------------------------------------------------------  No significant changes to PMH, PSH, FHx, SHx, unless otherwise noted    ALLERGIES & MEDICATIONS  --------------------------------------------------------------------------------  Allergies    Sulf-10 (Rash; Short breath)    Intolerances      Standing Inpatient Medications  bisacodyl Suppository 10 milliGRAM(s) Rectal daily  cefTRIAXone   IVPB 2000 milliGRAM(s) IV Intermittent every 24 hours  dextrose 40% Gel 15 Gram(s) Oral once  dextrose 5%. 1000 milliLiter(s) IV Continuous <Continuous>  dextrose 5%. 1000 milliLiter(s) IV Continuous <Continuous>  dextrose 50% Injectable 25 Gram(s) IV Push once  dextrose 50% Injectable 12.5 Gram(s) IV Push once  dextrose 50% Injectable 25 Gram(s) IV Push once  enoxaparin Injectable 70 milliGRAM(s) SubCutaneous two times a day  ethacrynic acid 25 milliGRAM(s) Oral every 12 hours  glucagon  Injectable 1 milliGRAM(s) IntraMuscular once  insulin glargine Injectable (LANTUS) 18 Unit(s) SubCutaneous at bedtime  insulin lispro (ADMELOG) corrective regimen sliding scale   SubCutaneous three times a day before meals  insulin lispro (ADMELOG) corrective regimen sliding scale   SubCutaneous at bedtime  insulin lispro Injectable (ADMELOG) 8 Unit(s) SubCutaneous three times a day before meals  morphine ER Tablet 15 milliGRAM(s) Oral two times a day  polyethylene glycol 3350 17 Gram(s) Oral daily  senna 2 Tablet(s) Oral at bedtime    PRN Inpatient Medications  acetaminophen   Tablet .. 650 milliGRAM(s) Oral every 6 hours PRN  magnesium hydroxide Suspension 30 milliLiter(s) Oral daily PRN  morphine  - Injectable 2 milliGRAM(s) IV Push every 4 hours PRN  oxyCODONE    IR 5 milliGRAM(s) Oral every 4 hours PRN  witch hazel Pads 1 Application(s) Topical three times a day PRN      REVIEW OF SYSTEMS  --------------------------------------------------------------------------------  Gen: No  fevers/chills  Skin: No rashes  Head/Eyes/Ears/Mouth: No headache; Normal hearing; Normal vision w/o blurriness  Respiratory: No dyspnea, cough, wheezing, hemoptysis  CV: No chest pain, PND, orthopnea  GI: No abdominal pain, diarrhea, constipation, nausea, vomiting  : No increased frequency, dysuria, hematuria, nocturia  MSK: No joint pain/swelling; no back pain; no edema  Neuro: No dizziness/lightheadedness, weakness, seizures, numbness, tingling      All other systems were reviewed and are negative, except as noted.    VITALS/PHYSICAL EXAM  --------------------------------------------------------------------------------  T(C): 36.4 (06-07-21 @ 06:27), Max: 36.7 (06-06-21 @ 22:01)  HR: 107 (06-07-21 @ 06:27) (101 - 107)  BP: 133/72 (06-07-21 @ 06:27) (126/70 - 136/68)  RR: 18 (06-07-21 @ 06:27) (17 - 18)  SpO2: 98% (06-07-21 @ 06:27) (98% - 99%)  Wt(kg): --        06-06-21 @ 07:01  -  06-07-21 @ 07:00  --------------------------------------------------------  IN: 440 mL / OUT: 250 mL / NET: 190 mL    06-07-21 @ 07:01  -  06-07-21 @ 13:07  --------------------------------------------------------  IN: 336 mL / OUT: 550 mL / NET: -214 mL      Physical Exam:  	Gen: NAD, frail, cachectic  	HEENT: MMM  	Pulm: CTA B/L  	CV: S1S2  	Abd: Soft, +BS, +ascites   	Ext: ++ LE edema B/L, no asterixis  	Neuro: Awake, alert  	Skin: Warm and dry  	Vascular access:    LABS/STUDIES  --------------------------------------------------------------------------------              8.4    17.20 >-----------<  414      [06-07-21 @ 06:59]              29.0     127  |  94  |  12  ----------------------------<  201      [06-07-21 @ 06:59]  4.9   |  19  |  0.39        Ca     8.8     [06-07-21 @ 06:59]      Mg     2.2     [06-07-21 @ 06:59]      Phos  2.6     [06-07-21 @ 06:59]    TPro  6.4  /  Alb  2.6  /  TBili  0.6  /  DBili  x   /  AST  29  /  ALT  9   /  AlkPhos  150  [06-07-21 @ 06:59]        Uric acid 4.4      [06-06-21 @ 20:59]  Serum Osmolality 270      [06-06-21 @ 20:59]    Creatinine Trend:  SCr 0.39 [06-07 @ 06:59]  SCr 0.36 [06-06 @ 20:59]  SCr 0.45 [06-06 @ 07:54]  SCr 0.45 [06-05 @ 08:11]  SCr 0.39 [06-04 @ 07:05]      Urine Sodium <20      [06-06-21 @ 11:26]  Urine Osmolality 659      [06-06-21 @ 11:26]    Iron 14, TIBC 201, %sat 7      [06-02-21 @ 06:33]  Ferritin 646      [06-02-21 @ 06:33]  Lipid: chol 115, , HDL 19, LDL --      [06-03-21 @ 07:58]

## 2021-06-07 NOTE — PROGRESS NOTE ADULT - PROBLEM SELECTOR PLAN 1
s/p diagnostic and therapeutic (4L fluid removal) 6/2  - SAAG 0.2 suggesting malignant ascites although cell count w/ PMNs 844 (adjusted for 19K RBCs)  - given neutrocytic ascites will empirically tx with IV CTX 2g daily x 5 days (6/2 - ), f.u GS, cx and cytopath  Pain control with Morphine ER, iv morphine and oxy IR, appreciate pall recs

## 2021-06-07 NOTE — PROGRESS NOTE ADULT - ASSESSMENT
56 year old woman with PMH uncontrolled DM2, A1c 9.6%, migraines and newly diagnosed stage 4 endometrial cancer presents to the ED with increasing abdominal pain/distention and shortness of breath for 2 weeks. Consult called for management of uncontrolled DM2. BG goal 100-180 mg/dL. 56 year old woman with PMH uncontrolled DM2, A1c 9.6%, migraines and newly diagnosed stage 4 endometrial cancer presents to the ED with increasing abdominal pain/distention and shortness of breath for 2 weeks. Endocrine following for management of uncontrolled DM2. BG goal 100-180 mg/dL.

## 2021-06-07 NOTE — PROGRESS NOTE ADULT - PROBLEM SELECTOR PLAN 3
- LDL 59  - okay to defer statin     Darryl Valladares MD   Pager # [phone]  On evenings and weekends, please call the office at [phone] or page endocrine fellow on call. Please note that this patient may be followed by different provider tomorrow. If no answer, contact the office. - LDL 59  - okay to defer statin     Alicia Simmons (pager [phone])  On evenings and weekends, please call [phone] or page endocrine fellow on call.   Please note that this patient may be followed by different provider tomorrow. If no answer, contact endocrine fellow on call.

## 2021-06-07 NOTE — PROGRESS NOTE ADULT - SUBJECTIVE AND OBJECTIVE BOX
SUBJECTIVE AND OBJECTIVE:  pt sitting in chair.  States pain is very well controlled.  had an issue with a suppository but doesn't want to discuss with me  INTERVAL HPI/OVERNIGHT EVENTS:    DNR on chart:   Allergies    Sulf-10 (Rash; Short breath)    Intolerances    MEDICATIONS  (STANDING):  albumin human 25% IVPB 50 milliLiter(s) IV Intermittent every 6 hours  bisacodyl Suppository 10 milliGRAM(s) Rectal daily  cefTRIAXone   IVPB 2000 milliGRAM(s) IV Intermittent every 24 hours  dextrose 40% Gel 15 Gram(s) Oral once  dextrose 5%. 1000 milliLiter(s) (50 mL/Hr) IV Continuous <Continuous>  dextrose 5%. 1000 milliLiter(s) (100 mL/Hr) IV Continuous <Continuous>  dextrose 50% Injectable 25 Gram(s) IV Push once  dextrose 50% Injectable 12.5 Gram(s) IV Push once  dextrose 50% Injectable 25 Gram(s) IV Push once  enoxaparin Injectable 70 milliGRAM(s) SubCutaneous two times a day  ethacrynic acid 25 milliGRAM(s) Oral every 12 hours  glucagon  Injectable 1 milliGRAM(s) IntraMuscular once  insulin glargine Injectable (LANTUS) 18 Unit(s) SubCutaneous at bedtime  insulin lispro (ADMELOG) corrective regimen sliding scale   SubCutaneous three times a day before meals  insulin lispro (ADMELOG) corrective regimen sliding scale   SubCutaneous at bedtime  insulin lispro Injectable (ADMELOG) 8 Unit(s) SubCutaneous three times a day before meals  morphine ER Tablet 15 milliGRAM(s) Oral two times a day  polyethylene glycol 3350 17 Gram(s) Oral daily  senna 2 Tablet(s) Oral at bedtime    MEDICATIONS  (PRN):  acetaminophen   Tablet .. 650 milliGRAM(s) Oral every 6 hours PRN Mild Pain (1 - 3), Moderate Pain (4 - 6)  magnesium hydroxide Suspension 30 milliLiter(s) Oral daily PRN Constipation  morphine  - Injectable 2 milliGRAM(s) IV Push every 4 hours PRN Severe Pain (7 - 10)  oxyCODONE    IR 5 milliGRAM(s) Oral every 4 hours PRN Moderate Pain (4 - 6)  witch hazel Pads 1 Application(s) Topical three times a day PRN discomfort      ITEMS UNCHECKED ARE NOT PRESENT    PRESENT SYMPTOMS: [ ]Unable to obtain due to poor mentation   Source if other than patient:  [ ]Family   [ ]Team     Pain (Impact on QOL):  yes  Location: abdomen  Minimal acceptable level (0-10 scale):    3/10        Aggravating factors:  none  Quality:  dull  Radiation:  none  Severity (0-10 scale):  4/10  Timing:  comes and goes    Dyspnea:                           [ ]Mild [ ]Moderate [ ]Severe  Anxiety:                             [ ]Mild [ ]Moderate [ ]Severe  Fatigue:                             [x ]Mild [ ]Moderate [ ]Severe  Nausea:                             [ ]Mild [ ]Moderate [ ]Severe  Loss of appetite:              [ ]Mild [ ]Moderate [ ]Severe  Constipation:                    [ ]Mild [x ]Moderate [ ]Severe    PAIN AD Score:	  [email or web address] (Ctrl + left click to view)    Other Symptoms:  [x ]All other review of systems negative     Karnofsky Performance Score/Palliative Performance Status Version 2:  60       %    http://palliative.info/resource_material/[email or web address]  PHYSICAL EXAM:  Vital Signs Last 24 Hrs  T(C): 36.4 (07 Jun 2021 06:27), Max: 36.7 (06 Jun 2021 22:01)  T(F): 97.6 (07 Jun 2021 06:27), Max: 98.1 (06 Jun 2021 22:01)  HR: 107 (07 Jun 2021 06:27) (101 - 107)  BP: 133/72 (07 Jun 2021 06:27) (126/70 - 136/68)  BP(mean): --  RR: 18 (07 Jun 2021 06:27) (17 - 18)  SpO2: 98% (07 Jun 2021 06:27) (98% - 99%) I&O's Summary    06 Jun 2021 07:01  -  07 Jun 2021 07:00  --------------------------------------------------------  IN: 440 mL / OUT: 250 mL / NET: 190 mL    07 Jun 2021 07:01  -  07 Jun 2021 14:30  --------------------------------------------------------  IN: 454 mL / OUT: 550 mL / NET: -96 mL     GENERAL:  [x ]Alert  [x ]Oriented x 3  [ ]Lethargic  [ ]Cachexia  [ ]Unarousable  [ ]Verbal  [ ]Non-Verbal    Behavioral:   [ ] Anxiety  [ ] Delirium [ ] Agitation [ ] Other    HEENT:  [x ]Normal   [ ]Dry mouth   [ ]ET Tube/Trach  [ ]Oral lesions    PULMONARY:   [x ]Clear [ ]Tachypnea  [ ]Audible excessive secretions   [ ]Rhonchi        [ ]Right [ ]Left [ ]Bilateral  [ ]Crackles        [ ]Right [ ]Left [ ]Bilateral  [ ]Wheezing     [ ]Right [ ]Left [ ]Bilateral    CARDIOVASCULAR:    [ ]Regular [ ]Irregular [x ]Tachy  [ ]Glenn [ ]Murmur [ ]Other    GASTROINTESTINAL:  [ x]Soft  [x ]Distended   [x ]+BS  [x ]Non tender [ ]Tender  [ ]PEG [ ]OGT/ NGT   Last BM:    GENITOURINARY:  [ x]Normal [ ] Incontinent   [ ]Oliguria/Anuria   [ ][place]    MUSCULOSKELETAL:   [ ]Normal   [x ]Weakness  [ ]Bed/Wheelchair bound [ ]Edema    NEUROLOGIC:   [x ]No focal deficits  [ ] Cognitive impairment  [ ] Dysphagia [ ]Dysarthria [ ] Paresis [ ]Other     SKIN:   [ x]Normal   [ ]Pressure ulcer(s)  [ ]Rash    CRITICAL CARE:  [ ] Shock Present  [ ]Septic [ ]Cardiogenic [ ]Neurologic [ ]Hypovolemic  [ ]  Vasopressors [ ]  Inotropes   [ ] Respiratory failure present  [ ] Acute  [ ] Chronic [ ] Hypoxic  [ ] Hypercarbic [ ] Other  [ ] Other organ failure     LABS:                        8.4    17.20 )-----------( 414      ( 07 Jun 2021 06:59 )             29.0   06-07    127<L>  |  94<L>  |  12  ----------------------------<  201<H>  4.9   |  19<L>  |  0.39<L>    Ca    8.8      07 Jun 2021 06:59  Phos  2.6     06-07  Mg     2.2     06-07    TPro  6.4  /  Alb  2.6<L>  /  TBili  0.6  /  DBili  x   /  AST  29  /  ALT  9   /  AlkPhos  150<H>  06-07        RADIOLOGY & ADDITIONAL STUDIES:    Protein Calorie Malnutrition Present: [ ] yes [ ] no  [ ] PPSV2 < or = 30%  [ ] significant weight loss [ ] poor nutritional intake [ ] anasarca [ ] catabolic state Albumin, Serum: 2.6 g/dL (06-07-21 @ 06:59)  Artificial Nutrition [ ]     REFERRALS:   [ ]Chaplaincy  [ ] Hospice  [ ]Child Life  [ ]Social Work  [ ]Case management [ ]Holistic Therapy   Goals of Care Document:

## 2021-06-08 LAB
ANION GAP SERPL CALC-SCNC: 16 MMOL/L — HIGH (ref 7–14)
ANION GAP SERPL CALC-SCNC: 17 MMOL/L — HIGH (ref 7–14)
BUN SERPL-MCNC: 18 MG/DL — SIGNIFICANT CHANGE UP (ref 7–23)
BUN SERPL-MCNC: 20 MG/DL — SIGNIFICANT CHANGE UP (ref 7–23)
CALCIUM SERPL-MCNC: 8.8 MG/DL — SIGNIFICANT CHANGE UP (ref 8.4–10.5)
CALCIUM SERPL-MCNC: 9 MG/DL — SIGNIFICANT CHANGE UP (ref 8.4–10.5)
CHLORIDE SERPL-SCNC: 93 MMOL/L — LOW (ref 98–107)
CHLORIDE SERPL-SCNC: 93 MMOL/L — LOW (ref 98–107)
CO2 SERPL-SCNC: 20 MMOL/L — LOW (ref 22–31)
CO2 SERPL-SCNC: 23 MMOL/L — SIGNIFICANT CHANGE UP (ref 22–31)
CREAT SERPL-MCNC: 0.39 MG/DL — LOW (ref 0.5–1.3)
CREAT SERPL-MCNC: 0.4 MG/DL — LOW (ref 0.5–1.3)
GLUCOSE BLDC GLUCOMTR-MCNC: 108 MG/DL — HIGH (ref 70–99)
GLUCOSE BLDC GLUCOMTR-MCNC: 136 MG/DL — HIGH (ref 70–99)
GLUCOSE BLDC GLUCOMTR-MCNC: 154 MG/DL — HIGH (ref 70–99)
GLUCOSE BLDC GLUCOMTR-MCNC: 179 MG/DL — HIGH (ref 70–99)
GLUCOSE SERPL-MCNC: 130 MG/DL — HIGH (ref 70–99)
GLUCOSE SERPL-MCNC: 146 MG/DL — HIGH (ref 70–99)
HCT VFR BLD CALC: 28 % — LOW (ref 34.5–45)
HGB BLD-MCNC: 8.2 G/DL — LOW (ref 11.5–15.5)
MAGNESIUM SERPL-MCNC: 2 MG/DL — SIGNIFICANT CHANGE UP (ref 1.6–2.6)
MCHC RBC-ENTMCNC: 21.1 PG — LOW (ref 27–34)
MCHC RBC-ENTMCNC: 29.3 GM/DL — LOW (ref 32–36)
MCV RBC AUTO: 72 FL — LOW (ref 80–100)
NON-GYNECOLOGICAL CYTOLOGY STUDY: SIGNIFICANT CHANGE UP
NRBC # BLD: 0 /100 WBCS — SIGNIFICANT CHANGE UP
NRBC # FLD: 0 K/UL — SIGNIFICANT CHANGE UP
PHOSPHATE SERPL-MCNC: 2.7 MG/DL — SIGNIFICANT CHANGE UP (ref 2.5–4.5)
PLATELET # BLD AUTO: 397 K/UL — SIGNIFICANT CHANGE UP (ref 150–400)
POTASSIUM SERPL-MCNC: 3.7 MMOL/L — SIGNIFICANT CHANGE UP (ref 3.5–5.3)
POTASSIUM SERPL-MCNC: 4.2 MMOL/L — SIGNIFICANT CHANGE UP (ref 3.5–5.3)
POTASSIUM SERPL-SCNC: 3.7 MMOL/L — SIGNIFICANT CHANGE UP (ref 3.5–5.3)
POTASSIUM SERPL-SCNC: 4.2 MMOL/L — SIGNIFICANT CHANGE UP (ref 3.5–5.3)
RBC # BLD: 3.89 M/UL — SIGNIFICANT CHANGE UP (ref 3.8–5.2)
RBC # FLD: 19.5 % — HIGH (ref 10.3–14.5)
SODIUM SERPL-SCNC: 130 MMOL/L — LOW (ref 135–145)
SODIUM SERPL-SCNC: 132 MMOL/L — LOW (ref 135–145)
WBC # BLD: 16.78 K/UL — HIGH (ref 3.8–10.5)
WBC # FLD AUTO: 16.78 K/UL — HIGH (ref 3.8–10.5)

## 2021-06-08 PROCEDURE — 99232 SBSQ HOSP IP/OBS MODERATE 35: CPT

## 2021-06-08 PROCEDURE — 99253 IP/OBS CNSLTJ NEW/EST LOW 45: CPT | Mod: GC

## 2021-06-08 PROCEDURE — 99233 SBSQ HOSP IP/OBS HIGH 50: CPT | Mod: GC

## 2021-06-08 PROCEDURE — 99233 SBSQ HOSP IP/OBS HIGH 50: CPT

## 2021-06-08 PROCEDURE — 99232 SBSQ HOSP IP/OBS MODERATE 35: CPT | Mod: GC

## 2021-06-08 RX ORDER — MULTIVIT WITH MIN/MFOLATE/K2 340-15/3 G
1 POWDER (GRAM) ORAL ONCE
Refills: 0 | Status: COMPLETED | OUTPATIENT
Start: 2021-06-08 | End: 2021-06-08

## 2021-06-08 RX ORDER — MAGNESIUM HYDROXIDE 400 MG/1
30 TABLET, CHEWABLE ORAL DAILY
Refills: 0 | Status: DISCONTINUED | OUTPATIENT
Start: 2021-06-08 | End: 2021-06-18

## 2021-06-08 RX ORDER — POLYETHYLENE GLYCOL 3350 17 G/17G
17 POWDER, FOR SOLUTION ORAL DAILY
Refills: 0 | Status: DISCONTINUED | OUTPATIENT
Start: 2021-06-08 | End: 2021-06-18

## 2021-06-08 RX ORDER — ETHACRYNIC ACID 25 MG/1
50 TABLET ORAL EVERY 12 HOURS
Refills: 0 | Status: DISCONTINUED | OUTPATIENT
Start: 2021-06-08 | End: 2021-06-09

## 2021-06-08 RX ADMIN — ENOXAPARIN SODIUM 70 MILLIGRAM(S): 100 INJECTION SUBCUTANEOUS at 06:10

## 2021-06-08 RX ADMIN — Medication 50 MILLILITER(S): at 12:08

## 2021-06-08 RX ADMIN — ETHACRYNIC ACID 25 MILLIGRAM(S): 25 TABLET ORAL at 17:24

## 2021-06-08 RX ADMIN — INSULIN GLARGINE 24 UNIT(S): 100 INJECTION, SOLUTION SUBCUTANEOUS at 22:26

## 2021-06-08 RX ADMIN — Medication 50 MILLILITER(S): at 06:10

## 2021-06-08 RX ADMIN — ETHACRYNIC ACID 25 MILLIGRAM(S): 25 TABLET ORAL at 06:10

## 2021-06-08 RX ADMIN — Medication 7 UNIT(S): at 08:52

## 2021-06-08 RX ADMIN — Medication 1: at 17:59

## 2021-06-08 RX ADMIN — CEFTRIAXONE 100 MILLIGRAM(S): 500 INJECTION, POWDER, FOR SOLUTION INTRAMUSCULAR; INTRAVENOUS at 13:11

## 2021-06-08 RX ADMIN — OXYCODONE HYDROCHLORIDE 5 MILLIGRAM(S): 5 TABLET ORAL at 23:25

## 2021-06-08 RX ADMIN — OXYCODONE HYDROCHLORIDE 5 MILLIGRAM(S): 5 TABLET ORAL at 22:25

## 2021-06-08 RX ADMIN — Medication 7 UNIT(S): at 17:59

## 2021-06-08 RX ADMIN — POLYETHYLENE GLYCOL 3350 17 GRAM(S): 17 POWDER, FOR SOLUTION ORAL at 16:35

## 2021-06-08 RX ADMIN — MORPHINE SULFATE 15 MILLIGRAM(S): 50 CAPSULE, EXTENDED RELEASE ORAL at 17:24

## 2021-06-08 RX ADMIN — SENNA PLUS 2 TABLET(S): 8.6 TABLET ORAL at 22:25

## 2021-06-08 RX ADMIN — MAGNESIUM HYDROXIDE 30 MILLILITER(S): 400 TABLET, CHEWABLE ORAL at 09:53

## 2021-06-08 RX ADMIN — MORPHINE SULFATE 15 MILLIGRAM(S): 50 CAPSULE, EXTENDED RELEASE ORAL at 17:59

## 2021-06-08 RX ADMIN — MAGNESIUM HYDROXIDE 30 MILLILITER(S): 400 TABLET, CHEWABLE ORAL at 16:35

## 2021-06-08 RX ADMIN — MORPHINE SULFATE 15 MILLIGRAM(S): 50 CAPSULE, EXTENDED RELEASE ORAL at 06:10

## 2021-06-08 RX ADMIN — ENOXAPARIN SODIUM 70 MILLIGRAM(S): 100 INJECTION SUBCUTANEOUS at 17:24

## 2021-06-08 RX ADMIN — Medication 1 BOTTLE: at 16:35

## 2021-06-08 NOTE — CONSULT NOTE ADULT - REASON FOR ADMISSION
Recent Endometrial Cancer, Abdominal Distention, SOB

## 2021-06-08 NOTE — PROGRESS NOTE ADULT - PROBLEM SELECTOR PLAN 1
- HbA1c 9.6%, not on treatment at home  - Increase Lantus to 24 units qhs  - Cont Admelog 7 units TIDAC  - low correction scale qac and qhs   - consistent carb diet  - check FS qac and qhs  - RD consult  - will follow  - for discharge: ideally would discharge on basal insulin + oral agent such as Metformin, however patient declines treatment with orals and would like to start with once daily basal insulin for now. Thus, will plan on discharging on Lantus (or whichever basal insulin is covered), dose TBD. Send script for Lantus 24 units qhs to assess coverage. - HbA1c 9.6%, not on treatment at home  - c/w Lantus 24 units qhs  - Cont Admelog 7 units TIDAC  - low correction scale qac and qhs   - consistent carb diet  - check FS qac and qhs  - RD consult  - will follow  - for discharge: ideally would discharge on basal insulin + oral agent such as Metformin, however patient declines treatment with orals and would like to start with once daily basal insulin for now. Thus, will plan on discharging on Lantus (or whichever basal insulin is covered), dose TBD. Send script for Lantus 24 units qhs to assess coverage.

## 2021-06-08 NOTE — PROGRESS NOTE ADULT - ASSESSMENT
56 year old woman with PMH uncontrolled DM2, A1c 9.6%, migraines and newly diagnosed stage 4 endometrial cancer presents to the ED with increasing abdominal pain/distention and shortness of breath for 2 weeks. Endocrine following for management of uncontrolled DM2. BG goal 100-180 mg/dL. 56 year old woman with PMH uncontrolled DM2, A1c 9.6%, migraines and newly diagnosed stage 4 endometrial cancer presented to the ED with increasing abdominal pain/distention and shortness of breath for 2 weeks. Endocrine following for management of uncontrolled DM2. BG goal 100-180 mg/dL.

## 2021-06-08 NOTE — PROGRESS NOTE ADULT - PROBLEM SELECTOR PLAN 3
- LDL 59  - okay to defer statin     Alicia Simmons (pager [phone])  On evenings and weekends, please call [phone] or page endocrine fellow on call.   Please note that this patient may be followed by different provider tomorrow. If no answer, contact endocrine fellow on call.

## 2021-06-08 NOTE — CONSULT NOTE ADULT - CONSULT REASON
Possible partial SBO on CT scan
uncontrolled DM2
hyponatremia
Metastatic carcinosarcoma of uterus
symptom management

## 2021-06-08 NOTE — PROGRESS NOTE ADULT - SUBJECTIVE AND OBJECTIVE BOX
Chief Complaint:     History:    MEDICATIONS  (STANDING):  albumin human 25% IVPB 50 milliLiter(s) IV Intermittent every 6 hours  bisacodyl Suppository 10 milliGRAM(s) Rectal daily  cefTRIAXone   IVPB 2000 milliGRAM(s) IV Intermittent every 24 hours  dextrose 40% Gel 15 Gram(s) Oral once  dextrose 5%. 1000 milliLiter(s) (50 mL/Hr) IV Continuous <Continuous>  dextrose 5%. 1000 milliLiter(s) (100 mL/Hr) IV Continuous <Continuous>  dextrose 50% Injectable 25 Gram(s) IV Push once  dextrose 50% Injectable 12.5 Gram(s) IV Push once  dextrose 50% Injectable 25 Gram(s) IV Push once  enoxaparin Injectable 70 milliGRAM(s) SubCutaneous two times a day  ethacrynic acid 25 milliGRAM(s) Oral every 12 hours  glucagon  Injectable 1 milliGRAM(s) IntraMuscular once  insulin glargine Injectable (LANTUS) 24 Unit(s) SubCutaneous at bedtime  insulin lispro (ADMELOG) corrective regimen sliding scale   SubCutaneous three times a day before meals  insulin lispro (ADMELOG) corrective regimen sliding scale   SubCutaneous at bedtime  insulin lispro Injectable (ADMELOG) 7 Unit(s) SubCutaneous three times a day before meals  morphine ER Tablet 15 milliGRAM(s) Oral two times a day  polyethylene glycol 3350 17 Gram(s) Oral daily  senna 2 Tablet(s) Oral at bedtime  urea Oral Powder 15 Gram(s) Oral daily    MEDICATIONS  (PRN):  acetaminophen   Tablet .. 650 milliGRAM(s) Oral every 6 hours PRN Mild Pain (1 - 3), Moderate Pain (4 - 6)  magnesium hydroxide Suspension 30 milliLiter(s) Oral daily PRN Constipation  morphine  - Injectable 2 milliGRAM(s) IV Push every 4 hours PRN Severe Pain (7 - 10)  oxyCODONE    IR 5 milliGRAM(s) Oral every 4 hours PRN Moderate Pain (4 - 6)  witch hazel Pads 1 Application(s) Topical three times a day PRN discomfort      Allergies    Sulf-10 (Rash; Short breath)    Intolerances      Review of Systems:  Constitutional: No fever  Eyes: No blurry vision  Neuro: No tremors  HEENT: No pain  Cardiovascular: No chest pain, palpitations  Respiratory: No SOB, no cough  GI: No nausea, vomiting, abdominal pain  : No dysuria  Skin: no rash  Psych: no depression  Endocrine: no polyuria, polydipsia  Hem/lymph: no swelling  Osteoporosis: no fractures    ALL OTHER SYSTEMS REVIEWED AND NEGATIVE    UNABLE TO OBTAIN    PHYSICAL EXAM:  VITALS: T(C): 36.7 (06-08-21 @ 06:10)  T(F): 98 (06-08-21 @ 06:10), Max: 98.2 (06-07-21 @ 22:03)  HR: 102 (06-08-21 @ 06:10) (101 - 104)  BP: 129/63 (06-08-21 @ 06:10) (127/56 - 135/71)  RR:  (18 - 18)  SpO2:  (96% - 99%)  Wt(kg): --  GENERAL: NAD, well-groomed, well-developed  EYES: No proptosis, no lid lag, anicteric  HEENT:  Atraumatic, Normocephalic, moist mucous membranes  THYROID: Normal size, no palpable nodules  RESPIRATORY: Clear to auscultation bilaterally; No rales, rhonchi, wheezing, or rubs  CARDIOVASCULAR: Regular rate and rhythm; No murmurs; no peripheral edema  GI: Soft, nontender, non distended, normal bowel sounds  SKIN: Dry, intact, No rashes or lesions  MUSCULOSKELETAL: Full range of motion, normal strength  NEURO: sensation intact, extraocular movements intact, no tremor, normal reflexes  PSYCH: Alert and oriented x 3, normal affect, normal mood  CUSHING'S SIGNS: no striae    POCT Blood Glucose.: 136 mg/dL (06-08-21 @ 08:27)  POCT Blood Glucose.: 122 mg/dL (06-07-21 @ 22:49)  POCT Blood Glucose.: 121 mg/dL (06-07-21 @ 17:53)  POCT Blood Glucose.: 146 mg/dL (06-07-21 @ 12:39)  POCT Blood Glucose.: 213 mg/dL (06-07-21 @ 08:43)  POCT Blood Glucose.: 101 mg/dL (06-06-21 @ 22:22)  POCT Blood Glucose.: 133 mg/dL (06-06-21 @ 17:49)  POCT Blood Glucose.: 171 mg/dL (06-06-21 @ 11:55)  POCT Blood Glucose.: 211 mg/dL (06-06-21 @ 08:27)  POCT Blood Glucose.: 106 mg/dL (06-05-21 @ 22:41)  POCT Blood Glucose.: 133 mg/dL (06-05-21 @ 18:15)  POCT Blood Glucose.: 159 mg/dL (06-05-21 @ 12:16)      06-08    130<L>  |  93<L>  |  20  ----------------------------<  130<H>  4.2   |  20<L>  |  0.39<L>    EGFR if : 134  EGFR if non : 116    Ca    8.8      06-08  Mg     2.0     06-08  Phos  2.7     06-08    TPro  6.7  /  Alb  3.1<L>  /  TBili  0.5  /  DBili  x   /  AST  29  /  ALT  12  /  AlkPhos  148<H>  06-07          Thyroid Function Tests:                           Chief Complaint: T2DM    History: No acute events. No hypoglycemia. Patient is eating well.     MEDICATIONS  (STANDING):  albumin human 25% IVPB 50 milliLiter(s) IV Intermittent every 6 hours  bisacodyl Suppository 10 milliGRAM(s) Rectal daily  cefTRIAXone   IVPB 2000 milliGRAM(s) IV Intermittent every 24 hours  dextrose 40% Gel 15 Gram(s) Oral once  dextrose 5%. 1000 milliLiter(s) (50 mL/Hr) IV Continuous <Continuous>  dextrose 5%. 1000 milliLiter(s) (100 mL/Hr) IV Continuous <Continuous>  dextrose 50% Injectable 25 Gram(s) IV Push once  dextrose 50% Injectable 12.5 Gram(s) IV Push once  dextrose 50% Injectable 25 Gram(s) IV Push once  enoxaparin Injectable 70 milliGRAM(s) SubCutaneous two times a day  ethacrynic acid 25 milliGRAM(s) Oral every 12 hours  glucagon  Injectable 1 milliGRAM(s) IntraMuscular once  insulin glargine Injectable (LANTUS) 24 Unit(s) SubCutaneous at bedtime  insulin lispro (ADMELOG) corrective regimen sliding scale   SubCutaneous three times a day before meals  insulin lispro (ADMELOG) corrective regimen sliding scale   SubCutaneous at bedtime  insulin lispro Injectable (ADMELOG) 7 Unit(s) SubCutaneous three times a day before meals  morphine ER Tablet 15 milliGRAM(s) Oral two times a day  polyethylene glycol 3350 17 Gram(s) Oral daily  senna 2 Tablet(s) Oral at bedtime  urea Oral Powder 15 Gram(s) Oral daily    MEDICATIONS  (PRN):  acetaminophen   Tablet .. 650 milliGRAM(s) Oral every 6 hours PRN Mild Pain (1 - 3), Moderate Pain (4 - 6)  magnesium hydroxide Suspension 30 milliLiter(s) Oral daily PRN Constipation  morphine  - Injectable 2 milliGRAM(s) IV Push every 4 hours PRN Severe Pain (7 - 10)  oxyCODONE    IR 5 milliGRAM(s) Oral every 4 hours PRN Moderate Pain (4 - 6)  witch hazel Pads 1 Application(s) Topical three times a day PRN discomfort      Allergies    Sulf-10 (Rash; Short breath)    Intolerances      Review of Systems:  Constitutional: No fever  Eyes: No blurry vision  Neuro: No tremors  HEENT: No pain  Cardiovascular: No chest pain, palpitations  Respiratory: No SOB, no cough  GI: No nausea, vomiting, abdominal pain  : No dysuria  Skin: no rash  Psych: no depression  Endocrine: no polyuria, polydipsia        ALL OTHER SYSTEMS REVIEWED AND NEGATIVE        PHYSICAL EXAM:  VITALS: T(C): 36.7 (06-08-21 @ 06:10)  T(F): 98 (06-08-21 @ 06:10), Max: 98.2 (06-07-21 @ 22:03)  HR: 102 (06-08-21 @ 06:10) (101 - 104)  BP: 129/63 (06-08-21 @ 06:10) (127/56 - 135/71)  RR:  (18 - 18)  SpO2:  (96% - 99%)  Wt(kg): --  GENERAL: NAD, well-groomed, well-developed  EYES: No proptosis, no lid lag, anicteric  HEENT:  Atraumatic, Normocephalic, moist mucous membranes  RESPIRATORY: Clear to auscultation bilaterally  CARDIOVASCULAR: Regular rate and rhythm  GI: Soft, nontender, non distended, normal bowel sounds  SKIN: Dry, intact, No rashes or lesions  PSYCH: Alert and oriented x 3, normal affect, normal mood      POCT Blood Glucose.: 136 mg/dL (06-08-21 @ 08:27)  POCT Blood Glucose.: 122 mg/dL (06-07-21 @ 22:49)  POCT Blood Glucose.: 121 mg/dL (06-07-21 @ 17:53)  POCT Blood Glucose.: 146 mg/dL (06-07-21 @ 12:39)  POCT Blood Glucose.: 213 mg/dL (06-07-21 @ 08:43)  POCT Blood Glucose.: 101 mg/dL (06-06-21 @ 22:22)  POCT Blood Glucose.: 133 mg/dL (06-06-21 @ 17:49)  POCT Blood Glucose.: 171 mg/dL (06-06-21 @ 11:55)  POCT Blood Glucose.: 211 mg/dL (06-06-21 @ 08:27)  POCT Blood Glucose.: 106 mg/dL (06-05-21 @ 22:41)  POCT Blood Glucose.: 133 mg/dL (06-05-21 @ 18:15)  POCT Blood Glucose.: 159 mg/dL (06-05-21 @ 12:16)      06-08    130<L>  |  93<L>  |  20  ----------------------------<  130<H>  4.2   |  20<L>  |  0.39<L>    EGFR if : 134  EGFR if non : 116    Ca    8.8      06-08  Mg     2.0     06-08  Phos  2.7     06-08    TPro  6.7  /  Alb  3.1<L>  /  TBili  0.5  /  DBili  x   /  AST  29  /  ALT  12  /  AlkPhos  148<H>  06-07

## 2021-06-08 NOTE — CHART NOTE - NSCHARTNOTEFT_GEN_A_CORE
Patient s/p CT abd/pel 6/7/21. Final report concerning for partial SBO; patient made NPO, surgery consulted. Dr. Milner aware and agrees with plan of care.  Will follow up for surgery recommendations.

## 2021-06-08 NOTE — PROGRESS NOTE ADULT - PROBLEM SELECTOR PLAN 3
Abdominal xray showed gaseous distention of the transverse colon with questionable small bowel distention in the left upper quadrant versus more distal transverse colon. Air is seen in the rectum but the possibility of a distal colonic obstruction is not excluded considering the absence of any air in the descending colon.  -s/p ct abd/pelvis showed Findings in keeping with known endometrial cancer with extensive peritoneal disease, as describe. Dilated and fecalized ileum with transition point in the right lower quadrant new from prior study, concerning for partial small bowel obstruction. Findings concerning for bladder wall invasion and metastasis.  Redemonstration of diffuse metastatic osseous lesions. Grossly unchanged retroperitoneal lymphadenopathy.  - Sx consult appreciated  - cont bowel regimen, monitor for bms  - plan discussed with ACP Abdominal xray showed gaseous distention of the transverse colon with questionable small bowel distention in the left upper quadrant versus more distal transverse colon. Air is seen in the rectum but the possibility of a distal colonic obstruction is not excluded considering the absence of any air in the descending colon.  -s/p ct abd/pelvis showed Findings in keeping with known endometrial cancer with extensive peritoneal disease, as describe. Dilated and fecalized ileum with transition point in the right lower quadrant new from prior study, concerning for partial small bowel obstruction. Findings concerning for bladder wall invasion and metastasis.  Redemonstration of diffuse metastatic osseous lesions. Grossly unchanged retroperitoneal lymphadenopathy.  - Sx consult appreciated  per sx concern for SBO is low. Patient symptoms more consistent with constipation, can give stool softeners and suppositories for comfort.  - cont bowel regimen, monitor for bms  - plan discussed with ACP

## 2021-06-08 NOTE — PROGRESS NOTE ADULT - PROBLEM SELECTOR PLAN 1
s/p diagnostic and therapeutic (4L fluid removal) 6/2  - SAAG 0.2 suggesting malignant ascites although cell count w/ PMNs 844 (adjusted for 19K RBCs)  - given neutrocytic ascites will empirically tx with IV CTX 2g daily x 5 days (6/2 - ), f.u GS, cx and cytopath  Pain control with Morphine ER, iv morphine and oxy IR, appreciate pall recs  -will need therapeutic tap prior to dc s/p diagnostic and therapeutic (4L fluid removal) 6/2  - SAAG 0.2 suggesting malignant ascites although cell count w/ PMNs 844 (adjusted for 19K RBCs)  - given neutrocytic ascites will empirically tx with IV CTX 2g daily x 5 days (6/2 - 6/8),  -completed ceftriaxone  - f.u GS, cx and cytopath  Pain control with Morphine ER, iv morphine and oxy IR, appreciate pall recs  -will need therapeutic tap prior to dc s/p diagnostic and therapeutic (4L fluid removal) 6/2  - SAAG 0.2 suggesting malignant ascites although cell count w/ PMNs 844 (adjusted for 19K RBCs)  - given neutrocytic ascites will empirically tx with IV CTX 2g daily x 5 days (6/2 - 6/8),  -completed ceftriaxone  -  PERITONEAL FLUID, POSITIVE FOR MALIGNANT CELLS. Cytomorphologically consistent with mucinou adenocarcinoma  Pain control with Morphine ER, iv morphine and oxy IR, appreciate pall recs  -will need therapeutic tap prior to dc  - Sister Alexa updated on 6/8 at [phone]

## 2021-06-08 NOTE — PROGRESS NOTE ADULT - PROBLEM SELECTOR PLAN 2
s/p 4L drainage  now with sbp- iv abx for 7 days  would consider retapping abdomen to help with pain and constipation  consider placing pigtail catheter for future needs

## 2021-06-08 NOTE — PROGRESS NOTE ADULT - SUBJECTIVE AND OBJECTIVE BOX
[facility] Division of Kidney Diseases & Hypertension  FOLLOW UP NOTE  [phone]--------------------------------------------------------------------------------  Chief Complaint:Malignant neoplasm of ovary      HPI: 57yo Female w/ newly diagnosed Stage IV Endometrial Cancer and poorly controlled DM II presents to the ED with SOB, Abdominal pain/distention x2 weeks. CT A/P showed large volume ascites s/p diagnostic and therapeutic paracentesis (4L removed) c/b neutrocytic ascites on IV CTX. Found to have age indeterminate non occlusive L. mid femoral and peroneal veins DVT on therapeutic lovenox. Pt was scheduled for outpt chemotx 6/4, although tentative plan for inpt chemo 6/7. Palliative care following for pain management. Nephrology called for hyponatremia.     Pt seen & examined. Does not have any abd pain currently. States she has a good appetite & feels fine. No chest pain, palpitations, sob, light headedness/dizziness, difficulty breathing/cough, fevers/chills, abdominal pain, n/v, diarrhea              PAST HISTORY  --------------------------------------------------------------------------------  No significant changes to PMH, PSH, FHx, SHx, unless otherwise noted    ALLERGIES & MEDICATIONS  --------------------------------------------------------------------------------  Allergies    Sulf-10 (Rash; Short breath)    Intolerances      Standing Inpatient Medications  bisacodyl Suppository 10 milliGRAM(s) Rectal daily  dextrose 40% Gel 15 Gram(s) Oral once  dextrose 5%. 1000 milliLiter(s) IV Continuous <Continuous>  dextrose 5%. 1000 milliLiter(s) IV Continuous <Continuous>  dextrose 50% Injectable 25 Gram(s) IV Push once  dextrose 50% Injectable 12.5 Gram(s) IV Push once  dextrose 50% Injectable 25 Gram(s) IV Push once  enoxaparin Injectable 70 milliGRAM(s) SubCutaneous two times a day  ethacrynic acid 25 milliGRAM(s) Oral every 12 hours  glucagon  Injectable 1 milliGRAM(s) IntraMuscular once  insulin glargine Injectable (LANTUS) 24 Unit(s) SubCutaneous at bedtime  insulin lispro (ADMELOG) corrective regimen sliding scale   SubCutaneous three times a day before meals  insulin lispro (ADMELOG) corrective regimen sliding scale   SubCutaneous at bedtime  insulin lispro Injectable (ADMELOG) 7 Unit(s) SubCutaneous three times a day before meals  magnesium citrate Oral Solution 1 Bottle Oral once  magnesium hydroxide Suspension 30 milliLiter(s) Oral daily  morphine ER Tablet 15 milliGRAM(s) Oral two times a day  polyethylene glycol 3350 17 Gram(s) Oral daily  senna 2 Tablet(s) Oral at bedtime  urea Oral Powder 15 Gram(s) Oral daily    PRN Inpatient Medications  acetaminophen   Tablet .. 650 milliGRAM(s) Oral every 6 hours PRN  oxyCODONE    IR 5 milliGRAM(s) Oral every 4 hours PRN  witch hazel Pads 1 Application(s) Topical three times a day PRN      REVIEW OF SYSTEMS  --------------------------------------------------------------------------------  Gen: No  fevers/chills  Skin: No rashes  Head/Eyes/Ears/Mouth: No headache; Normal hearing; Normal vision w/o blurriness  Respiratory: No dyspnea, cough, wheezing, hemoptysis  CV: No chest pain, PND, orthopnea  GI: No abdominal pain, diarrhea, constipation, nausea, vomiting  : No increased frequency, dysuria, hematuria, nocturia  MSK: No joint pain/swelling; no back pain; no edema  Neuro: No dizziness/lightheadedness, weakness, seizures, numbness, tingling      All other systems were reviewed and are negative, except as noted.    VITALS/PHYSICAL EXAM  --------------------------------------------------------------------------------  T(C): 36.2 (06-08-21 @ 13:42), Max: 36.8 (06-07-21 @ 22:03)  HR: 100 (06-08-21 @ 13:42) (100 - 102)  BP: 124/60 (06-08-21 @ 13:42) (124/60 - 129/63)  RR: 18 (06-08-21 @ 13:42) (18 - 18)  SpO2: 98% (06-08-21 @ 13:42) (96% - 99%)  Wt(kg): --        06-07-21 @ 07:01  -  06-08-21 @ 07:00  --------------------------------------------------------  IN: 1160 mL / OUT: 1800 mL / NET: -640 mL    06-08-21 @ 07:01  -  06-08-21 @ 16:04  --------------------------------------------------------  IN: 150 mL / OUT: 330 mL / NET: -180 mL      Physical Exam:  	Gen: NAD, frail, cachectic  	HEENT: MMM  	Pulm: CTA B/L  	CV: S1S2  	Abd: Soft, +BS, +ascites   	Ext: ++ LE edema B/L, no asterixis  	Neuro: Awake, alert  	Skin: Warm and dry  	Vascular access:    LABS/STUDIES  --------------------------------------------------------------------------------              8.2    16.78 >-----------<  397      [06-08-21 @ 07:16]              28.0     130  |  93  |  20  ----------------------------<  130      [06-08-21 @ 07:16]  4.2   |  20  |  0.39        Ca     8.8     [06-08-21 @ 07:16]      Mg     2.0     [06-08-21 @ 07:16]      Phos  2.7     [06-08-21 @ 07:16]    TPro  6.7  /  Alb  3.1  /  TBili  0.5  /  DBili  x   /  AST  29  /  ALT  12  /  AlkPhos  148  [06-07-21 @ 18:30]    PT/INR: PT 14.9 , INR 1.31       [06-07-21 @ 18:30]  PTT: 27.8       [06-07-21 @ 18:30]    Uric acid 4.4      [06-06-21 @ 20:59]  Serum Osmolality 270      [06-06-21 @ 20:59]    Creatinine Trend:  SCr 0.39 [06-08 @ 07:16]  SCr 0.42 [06-07 @ 18:30]  SCr 0.39 [06-07 @ 06:59]  SCr 0.36 [06-06 @ 20:59]  SCr 0.45 [06-06 @ 07:54]      Urine Sodium <20      [06-06-21 @ 11:26]  Urine Osmolality 659      [06-06-21 @ 11:26]    Iron 14, TIBC 201, %sat 7      [06-02-21 @ 06:33]  Ferritin 646      [06-02-21 @ 06:33]  Lipid: chol 115, , HDL 19, LDL --      [06-03-21 @ 07:58]

## 2021-06-08 NOTE — CONSULT NOTE ADULT - ASSESSMENT
57yo Female w/ newly diagnosed Stage IV Endometrial Cancer and poorly controlled DM II presents to the ED with SOB, Abdominal pain/distention x2 weeks. CT A/P showed large volume ascites s/p diagnostic and therapeutic paracentesis (4L removed) c/b neutrocytic ascites on IV CTX. Found to have age indeterminate non occlusive L. mid femoral and peroneal veins DVT on therapeutic lovenox. Pt was scheduled for outpt chemotx 6/4, although tentative plan for inpt chemo 6/7. Palliative care following for pain management.    - No acute general surgery intervention at this time  - Patient claims she is passing gas, tolerating regular diet, and denies nausea, concern for SBO low  - Patient symptoms more consistent with constipation, can give stool softeners and suppositories for comfort   - If patient unable to tolerate PO intake for extended period of time, can consider palliative bypass  - Discussed with attending     [id number]

## 2021-06-08 NOTE — PROGRESS NOTE ADULT - PROBLEM SELECTOR PLAN 1
continue ms contin 15mg bid with  oxy ir 5mg po q 4 hours prn  bowel regimen senna- miralax  would trial enema

## 2021-06-08 NOTE — PROGRESS NOTE ADULT - SUBJECTIVE AND OBJECTIVE BOX
SUBJECTIVE AND OBJECTIVE:  pt with no bm x8 days.  ct noted.  large volume ascites, met disease, ?sbo- pt denies nausea or vomitng  INTERVAL HPI/OVERNIGHT EVENTS:    DNR on chart:   Allergies    Sulf-10 (Rash; Short breath)    Intolerances    MEDICATIONS  (STANDING):  bisacodyl Suppository 10 milliGRAM(s) Rectal daily  cefTRIAXone   IVPB 2000 milliGRAM(s) IV Intermittent every 24 hours  dextrose 40% Gel 15 Gram(s) Oral once  dextrose 5%. 1000 milliLiter(s) (50 mL/Hr) IV Continuous <Continuous>  dextrose 5%. 1000 milliLiter(s) (100 mL/Hr) IV Continuous <Continuous>  dextrose 50% Injectable 25 Gram(s) IV Push once  dextrose 50% Injectable 12.5 Gram(s) IV Push once  dextrose 50% Injectable 25 Gram(s) IV Push once  enoxaparin Injectable 70 milliGRAM(s) SubCutaneous two times a day  ethacrynic acid 25 milliGRAM(s) Oral every 12 hours  glucagon  Injectable 1 milliGRAM(s) IntraMuscular once  insulin glargine Injectable (LANTUS) 24 Unit(s) SubCutaneous at bedtime  insulin lispro (ADMELOG) corrective regimen sliding scale   SubCutaneous three times a day before meals  insulin lispro (ADMELOG) corrective regimen sliding scale   SubCutaneous at bedtime  insulin lispro Injectable (ADMELOG) 7 Unit(s) SubCutaneous three times a day before meals  morphine ER Tablet 15 milliGRAM(s) Oral two times a day  polyethylene glycol 3350 17 Gram(s) Oral daily  senna 2 Tablet(s) Oral at bedtime  urea Oral Powder 15 Gram(s) Oral daily    MEDICATIONS  (PRN):  acetaminophen   Tablet .. 650 milliGRAM(s) Oral every 6 hours PRN Mild Pain (1 - 3), Moderate Pain (4 - 6)  magnesium hydroxide Suspension 30 milliLiter(s) Oral daily PRN Constipation  oxyCODONE    IR 5 milliGRAM(s) Oral every 4 hours PRN Moderate Pain (4 - 6)  witch hazel Pads 1 Application(s) Topical three times a day PRN discomfort      ITEMS UNCHECKED ARE NOT PRESENT    PRESENT SYMPTOMS: [ ]Unable to obtain due to poor mentation   Source if other than patient:  [ ]Family   [ ]Team     Pain (Impact on QOL): yes   Location: generalized abdomen  Minimal acceptable level (0-10 scale):   4/10         Aggravating factors:  nonw  Quality:  dull  Radiation:  none  Severity (0-10 scale):  6/10  Timing: comes and goes    Dyspnea:                           [ ]Mild [ ]Moderate [ ]Severe  Anxiety:                             [ ]Mild [ ]Moderate [ ]Severe  Fatigue:                             [ ]Mild [x ]Moderate [ ]Severe  Nausea:                             [ ]Mild [ ]Moderate [ ]Severe  Loss of appetite:              [ ]Mild [ x]Moderate [ ]Severe  Constipation:                    [ ]Mild [ ]Moderate [ x]Severe    PAIN AD Score:	  [email or web address] (Ctrl + left click to view)    Other Symptoms:  [x ]All other review of systems negative     Karnofsky Performance Score/Palliative Performance Status Version 2:     60    %    http://palliative.info/resource_material/[email or web address].pdf  PHYSICAL EXAM:  Vital Signs Last 24 Hrs  T(C): 36.7 (08 Jun 2021 06:10), Max: 36.8 (07 Jun 2021 22:03)  T(F): 98 (08 Jun 2021 06:10), Max: 98.2 (07 Jun 2021 22:03)  HR: 102 (08 Jun 2021 06:10) (101 - 104)  BP: 129/63 (08 Jun 2021 06:10) (127/56 - 135/71)  BP(mean): --  RR: 18 (08 Jun 2021 06:10) (18 - 18)  SpO2: 99% (08 Jun 2021 06:10) (96% - 99%) I&O's Summary    07 Jun 2021 07:01  -  08 Jun 2021 07:00  --------------------------------------------------------  IN: 1160 mL / OUT: 1800 mL / NET: -640 mL    08 Jun 2021 07:01  -  08 Jun 2021 12:45  --------------------------------------------------------  IN: 150 mL / OUT: 200 mL / NET: -50 mL     GENERAL:  [x ]Alert  [x ]Oriented x3   [ ]Lethargic  [ ]Cachexia  [ ]Unarousable  [ ]Verbal  [ ]Non-Verbal    Behavioral:   [ ] Anxiety  [ ] Delirium [ ] Agitation [ ] Other    HEENT:  [x ]Normal   [ ]Dry mouth   [ ]ET Tube/Trach  [ ]Oral lesions    PULMONARY:   [ x]Clear [ ]Tachypnea  [ ]Audible excessive secretions   [ ]Rhonchi        [ ]Right [ ]Left [ ]Bilateral  [ ]Crackles        [ ]Right [ ]Left [ ]Bilateral  [ ]Wheezing     [ ]Right [ ]Left [ ]Bilateral    CARDIOVASCULAR:    [ ]Regular [ ]Irregular [ x]Tachy  [ ]Glenn [ ]Murmur [ ]Other    GASTROINTESTINAL:  [ ]Soft  [x ]Distended   [ ]+BS  [ ]Non tender [x ]Tender  [ ]PEG [ ]OGT/ NGT   Last BM:    GENITOURINARY:  [x ]Normal [ ] Incontinent   [ ]Oliguria/Anuria   [ ][place]    MUSCULOSKELETAL:   [ ]Normal   [x ]Weakness  [ ]Bed/Wheelchair bound [ ]Edema    NEUROLOGIC:   [x ]No focal deficits  [ ] Cognitive impairment  [ ] Dysphagia [ ]Dysarthria [ ] Paresis [ ]Other     SKIN:   [ x]Normal   [ ]Pressure ulcer(s)  [ ]Rash    CRITICAL CARE:  [ ] Shock Present  [ ]Septic [ ]Cardiogenic [ ]Neurologic [ ]Hypovolemic  [ ]  Vasopressors [ ]  Inotropes   [ ] Respiratory failure present  [ ] Acute  [ ] Chronic [ ] Hypoxic  [ ] Hypercarbic [ ] Other  [ ] Other organ failure     LABS:                        8.2    16.78 )-----------( 397      ( 08 Jun 2021 07:16 )             28.0   06-08    130<L>  |  93<L>  |  20  ----------------------------<  130<H>  4.2   |  20<L>  |  0.39<L>    Ca    8.8      08 Jun 2021 07:16  Phos  2.7     06-08  Mg     2.0     06-08    TPro  6.7  /  Alb  3.1<L>  /  TBili  0.5  /  DBili  x   /  AST  29  /  ALT  12  /  AlkPhos  148<H>  06-07  PT/INR - ( 07 Jun 2021 18:30 )   PT: 14.9 sec;   INR: 1.31 ratio         PTT - ( 07 Jun 2021 18:30 )  PTT:27.8 sec      RADIOLOGY & ADDITIONAL STUDIES:    Protein Calorie Malnutrition Present: [ ] yes [ ] no  [ ] PPSV2 < or = 30%  [ ] significant weight loss [ ] poor nutritional intake [ ] anasarca [ ] catabolic state Albumin, Serum: 3.1 g/dL (06-07-21 @ 18:30)  Artificial Nutrition [ ]     REFERRALS:   [ ]Chaplaincy  [ ] Hospice  [ ]Child Life  [ ]Social Work  [ ]Case management [ ]Holistic Therapy   Goals of Care Document:

## 2021-06-08 NOTE — PROGRESS NOTE ADULT - ASSESSMENT
57yo Female w/ newly diagnosed Stage IV Endometrial Cancer and poorly controlled DM II presents to the ED with SOB, Abdominal pain/distention x2 weeks. CT A/P showed large volume ascites s/p diagnostic and therapeutic paracentesis (4L removed) c/b neutrocytic ascites on IV CTX. Found to have age indeterminate non occlusive L. mid femoral and peroneal veins DVT on therapeutic lovenox. Pt was scheduled for outpt chemotx 6/4, although tentative plan for inpt chemo 6/7. Palliative care following for pain management. Nephrology called for hyponatremia.         Hypo-osmolar Hypervolemic Hyponatremia-  Upon review of [facility] pt's serum Na was 130 on 6/2/21. Currently Na 130  Posm 274; Uosm 663; Mark < 20; serum uric acid low/ normal which goes with ADH mediated hyponatremia  Continue trial of ethacrynic acid + albumin (pt is allergic to sulfa)  Restrict free water to < 1L/day  Monitor I & Os  Continue urena   F/U BMP  Minimise IVF if possible  Increase Solute intake which will cause free water diuresis. Higher protein supplementation like ensure  Goal correction is not more than 6meq in 24 hrs.             If you have any questions, please feel free to contact me  Lynn Shah  Nephrology Fellow  [phone]  (After 5pm or on weekends please page the on-call fellow)

## 2021-06-08 NOTE — PROGRESS NOTE ADULT - PROBLEM SELECTOR PLAN 2
Pt with worsening hyponatremia, appears to be hypervolemic given ascites but with intravascular depletion.   -  nephrology consulted and follg  - per nephro will give ethacrynic acid   - cont to monitor Na, f/u renal recs Pt with worsening hyponatremia, appears to be hypervolemic given ascites but with intravascular depletion.   -  nephrology consulted and follg  - per nephro cont ethacrynic acid  - completed albumin  - cont to monitor Na, f/u renal recs

## 2021-06-08 NOTE — CONSULT NOTE ADULT - SUBJECTIVE AND OBJECTIVE BOX
55yo AA Female pmhx of DM II (currently on DM diet, denies taking her insulin), migraines and newly diagnosed stage 4 endometrial cancer presents to the ED with increasing abdominal pain/distention and shortness of breath for 2 weeks. Pt is scheduled to start Chemotherapy on Friday, . Patient was taking Tramadol for the pain but reported minimal relief so she stopped taking it. Pt denies the use of other medications to alleviate the symptoms. She states that pain is increased with movement and manipulation, received morphine in ED with improvement.  Pt denies N/V/D, fevers, chills, malaise, night sweats, chest pain or palpitations. Patient reports increasing shortness of breathe naa with exertion that she correlates with her abdominal distention. She denies having significant SOB in the past. Surgery consulted due to CT scan with concern for partial SBO. Upon entering room, patient eating. Patient states that she has been eating food and no nausea or vomiting. Patient states she has not had a bowel movement since being admitted to the hospital but has been passing flatus. On exam patient with firm abdomen (most likely from omental caking and ascites), however non tender and minimally distended.       PAST MEDICAL & SURGICAL HISTORY:  History of migraine headaches    DM type 2 (diabetes mellitus, type 2)    History of irregular menstrual cycles    Ovarian cancer  Stage 4    History of   ,         MEDICATIONS  (STANDING):  bisacodyl Suppository 10 milliGRAM(s) Rectal daily  dextrose 40% Gel 15 Gram(s) Oral once  dextrose 5%. 1000 milliLiter(s) (50 mL/Hr) IV Continuous <Continuous>  dextrose 5%. 1000 milliLiter(s) (100 mL/Hr) IV Continuous <Continuous>  dextrose 50% Injectable 25 Gram(s) IV Push once  dextrose 50% Injectable 12.5 Gram(s) IV Push once  dextrose 50% Injectable 25 Gram(s) IV Push once  enoxaparin Injectable 70 milliGRAM(s) SubCutaneous two times a day  ethacrynic acid 25 milliGRAM(s) Oral every 12 hours  glucagon  Injectable 1 milliGRAM(s) IntraMuscular once  insulin glargine Injectable (LANTUS) 24 Unit(s) SubCutaneous at bedtime  insulin lispro (ADMELOG) corrective regimen sliding scale   SubCutaneous three times a day before meals  insulin lispro (ADMELOG) corrective regimen sliding scale   SubCutaneous at bedtime  insulin lispro Injectable (ADMELOG) 7 Unit(s) SubCutaneous three times a day before meals  morphine ER Tablet 15 milliGRAM(s) Oral two times a day  polyethylene glycol 3350 17 Gram(s) Oral daily  senna 2 Tablet(s) Oral at bedtime  urea Oral Powder 15 Gram(s) Oral daily    MEDICATIONS  (PRN):  acetaminophen   Tablet .. 650 milliGRAM(s) Oral every 6 hours PRN Mild Pain (1 - 3), Moderate Pain (4 - 6)  magnesium hydroxide Suspension 30 milliLiter(s) Oral daily PRN Constipation  oxyCODONE    IR 5 milliGRAM(s) Oral every 4 hours PRN Moderate Pain (4 - 6)  witch hazel Pads 1 Application(s) Topical three times a day PRN discomfort      Allergies    Sulf-10 (Rash; Short breath)    Intolerances        SOCIAL HISTORY:    FAMILY HISTORY:  FH: type 2 diabetes mellitus (Mother)  mother            Physical Exam:  PHYSICAL EXAM:  Constitutional: NAD  Eyes: anicteric  ENMT: normal facies, symmetric  Gastrointestinal: abdomen mildly firm, nontender, nondistended.   Extremities: FROM, warm  Neurological: intact, non-focal  Psychiatric: oriented x 3; appropriate      Vital Signs Last 24 Hrs  T(C): 36.7 (2021 06:10), Max: 36.8 (2021 22:03)  T(F): 98 (2021 06:10), Max: 98.2 (2021 22:03)  HR: 102 (2021 06:10) (101 - 104)  BP: 129/63 (2021 06:10) (127/56 - 135/71)  BP(mean): --  RR: 18 (2021 06:10) (18 - 18)  SpO2: 99% (2021 06:10) (96% - 99%)    I&O's Summary    2021 07:01  -  2021 07:00  --------------------------------------------------------  IN: 1160 mL / OUT: 1800 mL / NET: -640 mL    2021 07:01  -  2021 13:27  --------------------------------------------------------  IN: 150 mL / OUT: 200 mL / NET: -50 mL            LABS:                        8.2    16.78 )-----------( 397      ( 2021 07:16 )             28.0     06-08    130<L>  |  93<L>  |  20  ----------------------------<  130<H>  4.2   |  20<L>  |  0.39<L>    Ca    8.8      2021 07:16  Phos  2.7     06-08  Mg     2.0     06-08    TPro  6.7  /  Alb  3.1<L>  /  TBili  0.5  /  DBili  x   /  AST  29  /  ALT  12  /  AlkPhos  148<H>  06-07    PT/INR - ( 2021 18:30 )   PT: 14.9 sec;   INR: 1.31 ratio         PTT - ( 2021 18:30 )  PTT:27.8 sec    CAPILLARY BLOOD GLUCOSE      POCT Blood Glucose.: 108 mg/dL (2021 12:15)  POCT Blood Glucose.: 136 mg/dL (2021 08:27)  POCT Blood Glucose.: 122 mg/dL (2021 22:49)  POCT Blood Glucose.: 121 mg/dL (2021 17:53)    LIVER FUNCTIONS - ( 2021 18:30 )  Alb: 3.1 g/dL / Pro: 6.7 g/dL / ALK PHOS: 148 U/L / ALT: 12 U/L / AST: 29 U/L / GGT: x             Cultures:      RADIOLOGY & ADDITIONAL STUDIES:        EXAM:  CT ABDOMEN AND PELVIS IC        PROCEDURE DATE:  2021         INTERPRETATION:  CLINICAL INFORMATION: Endometrial carcinoma. Rule out obstruction    COMPARISON: CT abdomen pelvis 2021.    CONTRAST/COMPLICATIONS:  IV Contrast: Omnipaque 350  90 cc administered   10 cc discarded  Oral Contrast: NONE  Complications: None reported at time of study completion    PROCEDURE:  CT of the Abdomen and Pelvis was performed.  Sagittal and coronal reformats were performed.    FINDINGS:  LOWER CHEST: Mild bibasilar atelectasis.    LIVER: Multiple subcapsular soft tissue implants most prominent along the tip of the right hepatic lobe. Scalloping of the liver surface.. Mild periportal edema.  BILE DUCTS: Normal caliber.  GALLBLADDER: Withinnormal limits.  SPLEEN: Within normal limits.  PANCREAS: Within normal limits.  ADRENALS: Within normal limits.  KIDNEYS/URETERS: Within normal limits.    BLADDER: Loss of the fat plane between the bladder and uterus, concerning for bladder invasion.(3:63). Small nodule along the dome of the bladder measures 2.1 cm, concerning for bladder metastasis.  REPRODUCTIVE ORGANS: Enlarged heterogeneous uterus with distended endometrial cavity and underlying vague endometrial mass. The endometrial cavityis dilated to approximate 4 cm. Bilateral adnexal masses, consistent with metastases.    BOWEL: Dilated and the fecalized ileum with transition point in the right lower quadrant (series 2, image 112) new from prior study. Appendix is not visualized Stool and air are present in the colon.  PERITONEUM: Extensive peritoneal disease with soft tissue implants along the anterior peritoneum, base of the cecum, anterior to the spleen, and along the left paracolic gutter. Implants in the cul de sac extendto the rectum. The right adenxal mass abuts the small bowel. Large volume ascites.  VESSELS: Within normal limits.  RETROPERITONEUM/LYMPH NODES: Left obturator lymph node measures 1.5 x 2.5 cm (series 2, image 122) unchanged from prior. Right obturator lymph node measures 1.5 x 1.6 cm (series 2, image 131) unchanged from prior.  ABDOMINAL WALL: Within normal limits.  BONES: Innumerable foci of sclerotic osseous lesions.    IMPRESSION:  Findings in keeping with known endometrial cancer with extensive peritoneal disease, as describe.    Dilated and fecalized ileum with transition point in the right lower quadrant new from prior study, concerning for partial small bowel obstruction.    Findings concerning for bladder wall invasion and metastasis.    Redemonstration of diffuse metastatic osseous lesions. Grossly unchanged retroperitoneal lymphadenopathy.    INTERPRETATION:  CLINICAL INFORMATION: Endometrial cancer.    COMPARISON: Contrast-enhanced CT of the abdomen and pelvis dated May 31, 2019, pelvic ultrasound dated May 31, 2019.    CONTRAST/COMPLICATIONS:  IV Contrast: Omnipaque 350 (accession [id number]), IV contrast documented in associated exam (accession [id number])  90 cc administered   10 cc discarded  Oral Contrast: Omnipaque 300 (accession [id number]), NONE (accession [id number])  Complications: None reported at time of study completion    PROCEDURE:  CT of the Chest, Abdomen and Pelvis was performed.  Sagittal and coronal reformats were performed.    FINDINGS:  CHEST:  LUNGS AND LARGE AIRWAYS: Patent central airways. No pulmonary nodules.  PLEURA: No pleural effusion.  VESSELS: Within normal limits.  HEART: Heart size is normal. No pericardial effusion.  MEDIASTINUM AND KARLO: No lymphadenopathy.  CHEST WALL AND LOWER NECK: Within normal limits.    ABDOMEN AND PELVIS:  LIVER: There is a 3.7 x 1.8 cm hypoattenuating scalloping lesion along the right lobe of the liver posteriorly and similar scalloping adjacent to Morison's pouch, compatible with serosal implants. Otherwise, the liver is unremarkable in appearance and enhancement. The hepatic veins and portal vein are patent.  BILE DUCTS: Normal caliber.  GALLBLADDER: Within normal limits.  SPLEEN: Within normal limits.  PANCREAS: Within normal limits.  ADRENALS: Within normal limits.  KIDNEYS/URETERS: Within normal limits.    BLADDER: Moderate concentric wall thickening of the minimally distended bladder.  REPRODUCTIVE ORGANS: The uterus is bulky and contains multiple heterogeneously hypoenhancing myometrial fibroids. There is abnormal thickening of the endometrial canal to 5.6 cm with heterogeneous enhancement, compatible with known malignancy. There is extensive soft tissue infiltration of the bilateral adnexa, compatible with implants.    BOWEL: There is extensive soft tissue infiltration along the serosal margins of the colon and small bowel, compatible with serosal implants. There is no evidence of bowel obstruction.  PERITONEUM: There is extensive enhancing soft tissue infiltration of the omentum and peritoneal reflections, most prominent in the pelvis, compatible with carcinomatosis and omental caking. There is mild fluid tracking throughout the peritoneal cavity, compatible with malignant ascites.  VESSELS: Within normal limits.  RETROPERITONEUM/LYMPH NODES: There are pathologically enlarged bilateral obturator lymph nodes measuring up to 2.5 x 1.9 cm. There are pathologically enlarged para-aortic and aortocaval lymph nodes measuring up to 1.6 x 1.1 cm.  ABDOMINAL WALL: Within normal limits.  BONES:There are scattered patchy and rounded sclerotic lesions throughout the vertebral bodies of the spine, pelvis, and inferior sternum, compatible with metastases.    IMPRESSION: Extensive omental caking and peritoneal carcinomatosis. Retroperitoneal and pelvic adenopathy. Serosal implants. Bone metastases. Heterogeneously enhancing endometrial mass, as described on pelvic ultrasound of May 31, 2019, compatible with known malignancy.

## 2021-06-08 NOTE — CONSULT NOTE ADULT - ATTENDING COMMENTS
Mrs. Fung likely has a partial small bowel obstruction due to either adhesions or malignancy. In either case, given her tolerance of a PO diet and reliable GI function, there is no surgical intervention warranted. In the case she cannot tolerate PO intake or her quality of life is effect by this we can consider a palliative bypass of the obstructed area. This would be a high risk case for anastomotic complications and infection prone. For these reasons we would wait to consider any surgical intervention.     Giuseppe Cooper MD  Acute Care Surgery
hypervolemic hyponatremia  limit free water intake   combination of diuretics and albumin as she is intravascular depleted as well   check uric acid   control sugar  Na monitoring q12

## 2021-06-08 NOTE — PROGRESS NOTE ADULT - SUBJECTIVE AND OBJECTIVE BOX
Patient is a 56y old  Female who presents with a chief complaint of Recent Endometrial Cancer, Abdominal Distention, SOB (08 Jun 2021 13:20)      SUBJECTIVE / OVERNIGHT EVENTS: Pt seen and examined at 12:20pm, no overnight events, pt still has not had a bm yet, took MOM per nsg today, does not want enema,  no nausea, vomiting or abdominal pain. Asking for ensure says she ate some food, says she is passing flatus. No other new issues reported.      MEDICATIONS  (STANDING):  bisacodyl Suppository 10 milliGRAM(s) Rectal daily  dextrose 40% Gel 15 Gram(s) Oral once  dextrose 5%. 1000 milliLiter(s) (50 mL/Hr) IV Continuous <Continuous>  dextrose 5%. 1000 milliLiter(s) (100 mL/Hr) IV Continuous <Continuous>  dextrose 50% Injectable 25 Gram(s) IV Push once  dextrose 50% Injectable 12.5 Gram(s) IV Push once  dextrose 50% Injectable 25 Gram(s) IV Push once  enoxaparin Injectable 70 milliGRAM(s) SubCutaneous two times a day  ethacrynic acid 25 milliGRAM(s) Oral every 12 hours  glucagon  Injectable 1 milliGRAM(s) IntraMuscular once  insulin glargine Injectable (LANTUS) 24 Unit(s) SubCutaneous at bedtime  insulin lispro (ADMELOG) corrective regimen sliding scale   SubCutaneous three times a day before meals  insulin lispro (ADMELOG) corrective regimen sliding scale   SubCutaneous at bedtime  insulin lispro Injectable (ADMELOG) 7 Unit(s) SubCutaneous three times a day before meals  magnesium citrate Oral Solution 1 Bottle Oral once  magnesium hydroxide Suspension 30 milliLiter(s) Oral daily  morphine ER Tablet 15 milliGRAM(s) Oral two times a day  polyethylene glycol 3350 17 Gram(s) Oral daily  senna 2 Tablet(s) Oral at bedtime  urea Oral Powder 15 Gram(s) Oral daily    MEDICATIONS  (PRN):  acetaminophen   Tablet .. 650 milliGRAM(s) Oral every 6 hours PRN Mild Pain (1 - 3), Moderate Pain (4 - 6)  oxyCODONE    IR 5 milliGRAM(s) Oral every 4 hours PRN Moderate Pain (4 - 6)  witch hazel Pads 1 Application(s) Topical three times a day PRN discomfort      Vital Signs Last 24 Hrs  T(C): 36.2 (08 Jun 2021 13:42), Max: 36.8 (07 Jun 2021 22:03)  T(F): 97.2 (08 Jun 2021 13:42), Max: 98.2 (07 Jun 2021 22:03)  HR: 100 (08 Jun 2021 13:42) (100 - 102)  BP: 124/60 (08 Jun 2021 13:42) (124/60 - 129/63)  BP(mean): --  RR: 18 (08 Jun 2021 13:42) (18 - 18)  SpO2: 98% (08 Jun 2021 13:42) (96% - 99%)  CAPILLARY BLOOD GLUCOSE      POCT Blood Glucose.: 108 mg/dL (08 Jun 2021 12:15)  POCT Blood Glucose.: 136 mg/dL (08 Jun 2021 08:27)  POCT Blood Glucose.: 122 mg/dL (07 Jun 2021 22:49)  POCT Blood Glucose.: 121 mg/dL (07 Jun 2021 17:53)    I&O's Summary    07 Jun 2021 07:01  -  08 Jun 2021 07:00  --------------------------------------------------------  IN: 1160 mL / OUT: 1800 mL / NET: -640 mL    08 Jun 2021 07:01  -  08 Jun 2021 15:04  --------------------------------------------------------  IN: 150 mL / OUT: 330 mL / NET: -180 mL        PHYSICAL EXAM:  GENERAL: cachectic  CHEST/LUNG: Clear to auscultation bilaterally; No wheeze  HEART: mild tachycardia to 104/mt  ABDOMEN: soft, distended, Nontender  EXTREMITIES:  +LE edema L>R  PSYCH: Calm  NEUROLOGY: AAOx3  SKIN: No rashes or lesions    LABS:                        8.2    16.78 )-----------( 397      ( 08 Jun 2021 07:16 )             28.0     06-08    130<L>  |  93<L>  |  20  ----------------------------<  130<H>  4.2   |  20<L>  |  0.39<L>    Ca    8.8      08 Jun 2021 07:16  Phos  2.7     06-08  Mg     2.0     06-08    TPro  6.7  /  Alb  3.1<L>  /  TBili  0.5  /  DBili  x   /  AST  29  /  ALT  12  /  AlkPhos  148<H>  06-07    PT/INR - ( 07 Jun 2021 18:30 )   PT: 14.9 sec;   INR: 1.31 ratio         PTT - ( 07 Jun 2021 18:30 )  PTT:27.8 sec          RADIOLOGY & ADDITIONAL TESTS:  < from: CT Abdomen and Pelvis w/ IV Cont (06.07.21 @ 19:54) >  CT ABDOMEN AND PELVIS IC        < end of copied text >  < from: CT Abdomen and Pelvis w/ IV Cont (06.07.21 @ 19:54) >  Findings in keeping with known endometrial cancer with extensive peritoneal disease, as describe.    Dilated and fecalized ileum with transition point in the right lower quadrant new from prior study, concerning for partial small bowel obstruction.    Findings concerning for bladder wall invasion and metastasis.    Redemonstration of diffuse metastatic osseous lesions. Grossly unchanged retroperitoneal lymphadenopathy.        < end of copied text >    Imaging Personally Reviewed:    Consultant(s) Notes Reviewed:      Care Discussed with Consultants/Other Providers:

## 2021-06-09 LAB
ANION GAP SERPL CALC-SCNC: 16 MMOL/L — HIGH (ref 7–14)
ANION GAP SERPL CALC-SCNC: 21 MMOL/L — HIGH (ref 7–14)
BUN SERPL-MCNC: 22 MG/DL — SIGNIFICANT CHANGE UP (ref 7–23)
BUN SERPL-MCNC: 26 MG/DL — HIGH (ref 7–23)
CALCIUM SERPL-MCNC: 8.6 MG/DL — SIGNIFICANT CHANGE UP (ref 8.4–10.5)
CALCIUM SERPL-MCNC: 9.4 MG/DL — SIGNIFICANT CHANGE UP (ref 8.4–10.5)
CHLORIDE SERPL-SCNC: 91 MMOL/L — LOW (ref 98–107)
CHLORIDE SERPL-SCNC: 92 MMOL/L — LOW (ref 98–107)
CO2 SERPL-SCNC: 19 MMOL/L — LOW (ref 22–31)
CO2 SERPL-SCNC: 22 MMOL/L — SIGNIFICANT CHANGE UP (ref 22–31)
CREAT SERPL-MCNC: 0.58 MG/DL — SIGNIFICANT CHANGE UP (ref 0.5–1.3)
CREAT SERPL-MCNC: 0.69 MG/DL — SIGNIFICANT CHANGE UP (ref 0.5–1.3)
GLUCOSE BLDC GLUCOMTR-MCNC: 165 MG/DL — HIGH (ref 70–99)
GLUCOSE BLDC GLUCOMTR-MCNC: 183 MG/DL — HIGH (ref 70–99)
GLUCOSE BLDC GLUCOMTR-MCNC: 192 MG/DL — HIGH (ref 70–99)
GLUCOSE BLDC GLUCOMTR-MCNC: 220 MG/DL — HIGH (ref 70–99)
GLUCOSE BLDC GLUCOMTR-MCNC: 260 MG/DL — HIGH (ref 70–99)
GLUCOSE BLDC GLUCOMTR-MCNC: 320 MG/DL — HIGH (ref 70–99)
GLUCOSE SERPL-MCNC: 224 MG/DL — HIGH (ref 70–99)
GLUCOSE SERPL-MCNC: 291 MG/DL — HIGH (ref 70–99)
HCT VFR BLD CALC: 32.3 % — LOW (ref 34.5–45)
HGB BLD-MCNC: 9.3 G/DL — LOW (ref 11.5–15.5)
MAGNESIUM SERPL-MCNC: 2.2 MG/DL — SIGNIFICANT CHANGE UP (ref 1.6–2.6)
MAGNESIUM SERPL-MCNC: 2.6 MG/DL — SIGNIFICANT CHANGE UP (ref 1.6–2.6)
MCHC RBC-ENTMCNC: 20.6 PG — LOW (ref 27–34)
MCHC RBC-ENTMCNC: 28.8 GM/DL — LOW (ref 32–36)
MCV RBC AUTO: 71.5 FL — LOW (ref 80–100)
NRBC # BLD: 0 /100 WBCS — SIGNIFICANT CHANGE UP
NRBC # FLD: 0 K/UL — SIGNIFICANT CHANGE UP
PHOSPHATE SERPL-MCNC: 2.6 MG/DL — SIGNIFICANT CHANGE UP (ref 2.5–4.5)
PHOSPHATE SERPL-MCNC: 3.9 MG/DL — SIGNIFICANT CHANGE UP (ref 2.5–4.5)
PLATELET # BLD AUTO: 457 K/UL — HIGH (ref 150–400)
POTASSIUM SERPL-MCNC: 4 MMOL/L — SIGNIFICANT CHANGE UP (ref 3.5–5.3)
POTASSIUM SERPL-MCNC: 4.3 MMOL/L — SIGNIFICANT CHANGE UP (ref 3.5–5.3)
POTASSIUM SERPL-SCNC: 4 MMOL/L — SIGNIFICANT CHANGE UP (ref 3.5–5.3)
POTASSIUM SERPL-SCNC: 4.3 MMOL/L — SIGNIFICANT CHANGE UP (ref 3.5–5.3)
RBC # BLD: 4.52 M/UL — SIGNIFICANT CHANGE UP (ref 3.8–5.2)
RBC # FLD: 19.9 % — HIGH (ref 10.3–14.5)
SODIUM SERPL-SCNC: 130 MMOL/L — LOW (ref 135–145)
SODIUM SERPL-SCNC: 131 MMOL/L — LOW (ref 135–145)
WBC # BLD: 19.81 K/UL — HIGH (ref 3.8–10.5)
WBC # FLD AUTO: 19.81 K/UL — HIGH (ref 3.8–10.5)

## 2021-06-09 PROCEDURE — 99233 SBSQ HOSP IP/OBS HIGH 50: CPT

## 2021-06-09 PROCEDURE — 99232 SBSQ HOSP IP/OBS MODERATE 35: CPT

## 2021-06-09 PROCEDURE — 93010 ELECTROCARDIOGRAM REPORT: CPT

## 2021-06-09 PROCEDURE — 99232 SBSQ HOSP IP/OBS MODERATE 35: CPT | Mod: GC

## 2021-06-09 PROCEDURE — 99233 SBSQ HOSP IP/OBS HIGH 50: CPT | Mod: GC

## 2021-06-09 RX ORDER — ALBUMIN HUMAN 25 %
50 VIAL (ML) INTRAVENOUS EVERY 6 HOURS
Refills: 0 | Status: COMPLETED | OUTPATIENT
Start: 2021-06-09 | End: 2021-06-10

## 2021-06-09 RX ORDER — MORPHINE SULFATE 50 MG/1
2 CAPSULE, EXTENDED RELEASE ORAL ONCE
Refills: 0 | Status: DISCONTINUED | OUTPATIENT
Start: 2021-06-09 | End: 2021-06-09

## 2021-06-09 RX ORDER — INSULIN GLARGINE 100 [IU]/ML
26 INJECTION, SOLUTION SUBCUTANEOUS AT BEDTIME
Refills: 0 | Status: DISCONTINUED | OUTPATIENT
Start: 2021-06-09 | End: 2021-06-10

## 2021-06-09 RX ADMIN — INSULIN GLARGINE 26 UNIT(S): 100 INJECTION, SOLUTION SUBCUTANEOUS at 23:55

## 2021-06-09 RX ADMIN — Medication 7 UNIT(S): at 18:31

## 2021-06-09 RX ADMIN — MAGNESIUM HYDROXIDE 30 MILLILITER(S): 400 TABLET, CHEWABLE ORAL at 13:00

## 2021-06-09 RX ADMIN — MORPHINE SULFATE 15 MILLIGRAM(S): 50 CAPSULE, EXTENDED RELEASE ORAL at 06:40

## 2021-06-09 RX ADMIN — MORPHINE SULFATE 15 MILLIGRAM(S): 50 CAPSULE, EXTENDED RELEASE ORAL at 18:31

## 2021-06-09 RX ADMIN — ETHACRYNIC ACID 50 MILLIGRAM(S): 25 TABLET ORAL at 06:40

## 2021-06-09 RX ADMIN — Medication 3: at 09:09

## 2021-06-09 RX ADMIN — ENOXAPARIN SODIUM 70 MILLIGRAM(S): 100 INJECTION SUBCUTANEOUS at 06:40

## 2021-06-09 RX ADMIN — MORPHINE SULFATE 2 MILLIGRAM(S): 50 CAPSULE, EXTENDED RELEASE ORAL at 01:57

## 2021-06-09 RX ADMIN — ENOXAPARIN SODIUM 70 MILLIGRAM(S): 100 INJECTION SUBCUTANEOUS at 18:33

## 2021-06-09 RX ADMIN — SENNA PLUS 2 TABLET(S): 8.6 TABLET ORAL at 23:10

## 2021-06-09 RX ADMIN — Medication 7 UNIT(S): at 09:09

## 2021-06-09 RX ADMIN — POLYETHYLENE GLYCOL 3350 17 GRAM(S): 17 POWDER, FOR SOLUTION ORAL at 13:00

## 2021-06-09 RX ADMIN — MORPHINE SULFATE 15 MILLIGRAM(S): 50 CAPSULE, EXTENDED RELEASE ORAL at 18:34

## 2021-06-09 RX ADMIN — Medication 4: at 18:32

## 2021-06-09 RX ADMIN — MORPHINE SULFATE 15 MILLIGRAM(S): 50 CAPSULE, EXTENDED RELEASE ORAL at 07:12

## 2021-06-09 RX ADMIN — MORPHINE SULFATE 2 MILLIGRAM(S): 50 CAPSULE, EXTENDED RELEASE ORAL at 01:42

## 2021-06-09 RX ADMIN — Medication 2: at 13:00

## 2021-06-09 NOTE — PROGRESS NOTE ADULT - SUBJECTIVE AND OBJECTIVE BOX
SUBJECTIVE AND OBJECTIVE:  pt sitting in chair.  lethargic.  pain fairly well controlled.  +BM   Min po intake, poor functional status due to ascites  INTERVAL HPI/OVERNIGHT EVENTS:    DNR on chart:   Allergies    Sulf-10 (Rash; Short breath)    Intolerances    MEDICATIONS  (STANDING):  albumin human 25% IVPB 50 milliLiter(s) IV Intermittent every 6 hours  bisacodyl Suppository 10 milliGRAM(s) Rectal daily  dextrose 40% Gel 15 Gram(s) Oral once  dextrose 5%. 1000 milliLiter(s) (50 mL/Hr) IV Continuous <Continuous>  dextrose 5%. 1000 milliLiter(s) (100 mL/Hr) IV Continuous <Continuous>  dextrose 50% Injectable 25 Gram(s) IV Push once  dextrose 50% Injectable 12.5 Gram(s) IV Push once  dextrose 50% Injectable 25 Gram(s) IV Push once  enoxaparin Injectable 70 milliGRAM(s) SubCutaneous two times a day  glucagon  Injectable 1 milliGRAM(s) IntraMuscular once  insulin glargine Injectable (LANTUS) 24 Unit(s) SubCutaneous at bedtime  insulin lispro (ADMELOG) corrective regimen sliding scale   SubCutaneous three times a day before meals  insulin lispro (ADMELOG) corrective regimen sliding scale   SubCutaneous at bedtime  insulin lispro Injectable (ADMELOG) 7 Unit(s) SubCutaneous three times a day before meals  magnesium hydroxide Suspension 30 milliLiter(s) Oral daily  morphine ER Tablet 15 milliGRAM(s) Oral two times a day  polyethylene glycol 3350 17 Gram(s) Oral daily  senna 2 Tablet(s) Oral at bedtime  urea Oral Powder 15 Gram(s) Oral daily    MEDICATIONS  (PRN):  acetaminophen   Tablet .. 650 milliGRAM(s) Oral every 6 hours PRN Mild Pain (1 - 3), Moderate Pain (4 - 6)  oxyCODONE    IR 5 milliGRAM(s) Oral every 4 hours PRN Moderate Pain (4 - 6)  witch hazel Pads 1 Application(s) Topical three times a day PRN discomfort      ITEMS UNCHECKED ARE NOT PRESENT    PRESENT SYMPTOMS: [ ]Unable to obtain due to poor mentation   Source if other than patient:  [ ]Family   [ ]Team     Pain (Impact on QOL):  yes  Location:  abdomen  Minimal acceptable level (0-10 scale): 3/10           Aggravating factors:  none  Quality:  dull  Radiation:  none  Severity (0-10 scale):  3/10  Timing: controlled    Dyspnea:                           [ ]Mild [ ]Moderate [ ]Severe  Anxiety:                             [ ]Mild [ ]Moderate [ ]Severe  Fatigue:                             [ ]Mild [ ]Moderate [x ]Severe  Nausea:                             [ ]Mild [ ]Moderate [ ]Severe  Loss of appetite:              [ ]Mild [ ]Moderate [x ]Severe  Constipation:                    [ ]Mild [ ]Moderate [ ]Severe    PAIN AD Score:	  [email or web address] (Ctrl + left click to view)    Other Symptoms:  [ x]All other review of systems negative     Karnofsky Performance Score/Palliative Performance Status Version 2:   50      %    http://palliative.info/resource_material/PPSv2.pdf  PHYSICAL EXAM:  Vital Signs Last 24 Hrs  T(C): 36.2 (09 Jun 2021 06:35), Max: 36.5 (08 Jun 2021 22:11)  T(F): 97.1 (09 Jun 2021 06:35), Max: 97.7 (08 Jun 2021 22:11)  HR: 117 (09 Jun 2021 12:44) (100 - 117)  BP: 127/76 (09 Jun 2021 12:44) (124/60 - 138/72)  BP(mean): --  RR: 16 (09 Jun 2021 12:44) (15 - 18)  SpO2: 98% (09 Jun 2021 12:44) (98% - 100%) I&O's Summary    08 Jun 2021 07:01  -  09 Jun 2021 07:00  --------------------------------------------------------  IN: 350 mL / OUT: 1110 mL / NET: -760 mL     GENERAL:  [x ]Alert  [x ]Oriented x 3  [x ]Lethargic  [ ]Cachexia  [ ]Unarousable  [ ]Verbal  [ ]Non-Verbal    Behavioral:   [ ] Anxiety  [ ] Delirium [ ] Agitation [ ] Other    HEENT:  [ x]Normal   [ ]Dry mouth   [ ]ET Tube/Trach  [ ]Oral lesions    PULMONARY:   [x ]Clear [ ]Tachypnea  [ ]Audible excessive secretions   [ ]Rhonchi        [ ]Right [ ]Left [ ]Bilateral  [ ]Crackles        [ ]Right [ ]Left [ ]Bilateral  [ ]Wheezing     [ ]Right [ ]Left [ ]Bilateral    CARDIOVASCULAR:    [ ]Regular [ ]Irregular [x ]Tachy  [ ]Glenn [ ]Murmur [ ]Other    GASTROINTESTINAL:  [ ]Soft  [x ]Distended   [ ]+BS  [ ]Non tender [ x]Tender  [ ]PEG [ ]OGT/ NGT   Last BM:    GENITOURINARY:  [ ]Normal [x ] Incontinent   [ ]Oliguria/Anuria   [ ][place]    MUSCULOSKELETAL:   [ ]Normal   [ x]Weakness  [ ]Bed/Wheelchair bound [ ]Edema    NEUROLOGIC:   [x ]No focal deficits  [ ] Cognitive impairment  [ ] Dysphagia [ ]Dysarthria [ ] Paresis [ ]Other     SKIN:   [x ]Normal   [ ]Pressure ulcer(s)  [ ]Rash    CRITICAL CARE:  [ ] Shock Present  [ ]Septic [ ]Cardiogenic [ ]Neurologic [ ]Hypovolemic  [ ]  Vasopressors [ ]  Inotropes   [ ] Respiratory failure present  [ ] Acute  [ ] Chronic [ ] Hypoxic  [ ] Hypercarbic [ ] Other  [ ] Other organ failure     LABS:                        9.3    19.81 )-----------( 457      ( 09 Jun 2021 07:30 )             32.3   06-09    131<L>  |  91<L>  |  22  ----------------------------<  224<H>  4.3   |  19<L>  |  0.69    Ca    9.4      09 Jun 2021 07:30  Phos  3.9     06-09  Mg     2.6     06-09    TPro  6.7  /  Alb  3.1<L>  /  TBili  0.5  /  DBili  x   /  AST  29  /  ALT  12  /  AlkPhos  148<H>  06-07  PT/INR - ( 07 Jun 2021 18:30 )   PT: 14.9 sec;   INR: 1.31 ratio         PTT - ( 07 Jun 2021 18:30 )  PTT:27.8 sec      RADIOLOGY & ADDITIONAL STUDIES:    Protein Calorie Malnutrition Present: [ ] yes [ ] no  [ ] PPSV2 < or = 30%  [ ] significant weight loss [ ] poor nutritional intake [ ] anasarca [ ] catabolic state Albumin, Serum: 3.1 g/dL (06-07-21 @ 18:30)  Artificial Nutrition [ ]     REFERRALS:   [ ]Chaplaincy  [ ] Hospice  [ ]Child Life  [ ]Social Work  [ ]Case management [ ]Holistic Therapy   Goals of Care Document:

## 2021-06-09 NOTE — PROGRESS NOTE ADULT - SUBJECTIVE AND OBJECTIVE BOX
Patient is a 56y old  Female who presents with a chief complaint of Recent Endometrial Cancer, Abdominal Distention, SOB (09 Jun 2021 08:32)      SUBJECTIVE / OVERNIGHT EVENTS: Pt seen and examined at 9:05am, no overnight events, pt reports that she had bms, abdominal distention feels slightly better today, sitting in a chair, waiting to eat breakfast, no nausea, vomiting or abdominal pain. No other new issues reported.      MEDICATIONS  (STANDING):  bisacodyl Suppository 10 milliGRAM(s) Rectal daily  dextrose 40% Gel 15 Gram(s) Oral once  dextrose 5%. 1000 milliLiter(s) (50 mL/Hr) IV Continuous <Continuous>  dextrose 5%. 1000 milliLiter(s) (100 mL/Hr) IV Continuous <Continuous>  dextrose 50% Injectable 25 Gram(s) IV Push once  dextrose 50% Injectable 12.5 Gram(s) IV Push once  dextrose 50% Injectable 25 Gram(s) IV Push once  enoxaparin Injectable 70 milliGRAM(s) SubCutaneous two times a day  ethacrynic acid 50 milliGRAM(s) Oral every 12 hours  glucagon  Injectable 1 milliGRAM(s) IntraMuscular once  insulin glargine Injectable (LANTUS) 24 Unit(s) SubCutaneous at bedtime  insulin lispro (ADMELOG) corrective regimen sliding scale   SubCutaneous three times a day before meals  insulin lispro (ADMELOG) corrective regimen sliding scale   SubCutaneous at bedtime  insulin lispro Injectable (ADMELOG) 7 Unit(s) SubCutaneous three times a day before meals  magnesium hydroxide Suspension 30 milliLiter(s) Oral daily  morphine ER Tablet 15 milliGRAM(s) Oral two times a day  polyethylene glycol 3350 17 Gram(s) Oral daily  senna 2 Tablet(s) Oral at bedtime  urea Oral Powder 15 Gram(s) Oral daily    MEDICATIONS  (PRN):  acetaminophen   Tablet .. 650 milliGRAM(s) Oral every 6 hours PRN Mild Pain (1 - 3), Moderate Pain (4 - 6)  oxyCODONE    IR 5 milliGRAM(s) Oral every 4 hours PRN Moderate Pain (4 - 6)  witch hazel Pads 1 Application(s) Topical three times a day PRN discomfort      Vital Signs Last 24 Hrs  T(C): 36.2 (09 Jun 2021 06:35), Max: 36.5 (08 Jun 2021 22:11)  T(F): 97.1 (09 Jun 2021 06:35), Max: 97.7 (08 Jun 2021 22:11)  HR: 111 (09 Jun 2021 06:35) (100 - 111)  BP: 127/82 (09 Jun 2021 06:35) (124/60 - 138/72)  BP(mean): --  RR: 15 (09 Jun 2021 06:35) (15 - 18)  SpO2: 100% (09 Jun 2021 06:35) (98% - 100%)  CAPILLARY BLOOD GLUCOSE      POCT Blood Glucose.: 192 mg/dL (09 Jun 2021 11:11)  POCT Blood Glucose.: 260 mg/dL (09 Jun 2021 08:29)  POCT Blood Glucose.: 179 mg/dL (08 Jun 2021 22:22)  POCT Blood Glucose.: 154 mg/dL (08 Jun 2021 17:54)  POCT Blood Glucose.: 108 mg/dL (08 Jun 2021 12:15)    I&O's Summary    08 Jun 2021 07:01  -  09 Jun 2021 07:00  --------------------------------------------------------  IN: 350 mL / OUT: 1110 mL / NET: -760 mL        PHYSICAL EXAM:  GENERAL: cachectic  CHEST/LUNG: Clear to auscultation bilaterally; No wheeze  HEART: mild tachycardia to 108/mt  ABDOMEN: soft, distended, Nontender  EXTREMITIES:  +LE edema L>R  PSYCH: Calm  NEUROLOGY: AAOx3  SKIN: No rashes or lesions    LABS:                        9.3    19.81 )-----------( 457      ( 09 Jun 2021 07:30 )             32.3     06-09    131<L>  |  91<L>  |  22  ----------------------------<  224<H>  4.3   |  19<L>  |  0.69    Ca    9.4      09 Jun 2021 07:30  Phos  3.9     06-09  Mg     2.6     06-09    TPro  6.7  /  Alb  3.1<L>  /  TBili  0.5  /  DBili  x   /  AST  29  /  ALT  12  /  AlkPhos  148<H>  06-07    PT/INR - ( 07 Jun 2021 18:30 )   PT: 14.9 sec;   INR: 1.31 ratio         PTT - ( 07 Jun 2021 18:30 )  PTT:27.8 sec          RADIOLOGY & ADDITIONAL TESTS:    Imaging Personally Reviewed:    Consultant(s) Notes Reviewed:      Care Discussed with Consultants/Other Providers:

## 2021-06-09 NOTE — PROGRESS NOTE ADULT - ASSESSMENT
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   Course c/b large ascites, SBP, partial bowel obstruction.    GOC discussed with patient and her sisters in detail. Poor prognosis and incurable nature of disease was explained. Pt is requesting to continue aggressive care.     -Therapeutic paracentesis, 4200ml removed, high PMN in ascites, s/p tx for SBP with IV CTX 2g daily x 7 days  -It seems like patient has reaccumulated, please repeat paracentesis  -Pt with partial bowel obstruction, had BM last night and feels better.  -Pal care input appreciated  -Will hold inpatient chemotherapy for now pending improvement of acute issues. Will consider inpt chemo when pt stable  -Noted to have RLE nonocclusive DVT located at mid-femur on recent dopplers ( 6/4). On Lovenox.  -Patient to followup with Dr. Hoang ([facility]) upon discharge  -C/w Supportive care, pain control, Nutrition, PT, DVT ppx  -Oncology will continue to follow with you    Case d/w Dr. Jacques HORN  Oncology Physician Assistant  Carina SHARMA/[facility]  Pager [phone]    If after 5pm or weekends please page On-call Oncology Fellow

## 2021-06-09 NOTE — PROGRESS NOTE ADULT - SUBJECTIVE AND OBJECTIVE BOX
[facility] DIVISION OF KIDNEY DISEASES AND HYPERTENSION -- FOLLOW UP NOTE  EDITH Fellow pager # [phone]  EDITH Attending phone # [phone]  Nephrology office # [phone]  -----------------------------------------------------------------------------  Chief Complaint:/subjective:  diaphoretic and tired. glucose 220, BP now 127/76      PAST HISTORY  --------------------------------------------------------------------------------  No significant changes to PMH, PSH, FHx, SHx, unless otherwise noted    ALLERGIES & MEDICATIONS  --------------------------------------------------------------------------------  Allergies    Sulf-10 (Rash; Short breath)    Intolerances      Standing Inpatient Medications  albumin human 25% IVPB 50 milliLiter(s) IV Intermittent every 6 hours  bisacodyl Suppository 10 milliGRAM(s) Rectal daily  dextrose 40% Gel 15 Gram(s) Oral once  dextrose 5%. 1000 milliLiter(s) IV Continuous <Continuous>  dextrose 5%. 1000 milliLiter(s) IV Continuous <Continuous>  dextrose 50% Injectable 25 Gram(s) IV Push once  dextrose 50% Injectable 12.5 Gram(s) IV Push once  dextrose 50% Injectable 25 Gram(s) IV Push once  enoxaparin Injectable 70 milliGRAM(s) SubCutaneous two times a day  glucagon  Injectable 1 milliGRAM(s) IntraMuscular once  insulin glargine Injectable (LANTUS) 24 Unit(s) SubCutaneous at bedtime  insulin lispro (ADMELOG) corrective regimen sliding scale   SubCutaneous three times a day before meals  insulin lispro (ADMELOG) corrective regimen sliding scale   SubCutaneous at bedtime  insulin lispro Injectable (ADMELOG) 7 Unit(s) SubCutaneous three times a day before meals  magnesium hydroxide Suspension 30 milliLiter(s) Oral daily  morphine ER Tablet 15 milliGRAM(s) Oral two times a day  polyethylene glycol 3350 17 Gram(s) Oral daily  senna 2 Tablet(s) Oral at bedtime  urea Oral Powder 15 Gram(s) Oral daily    PRN Inpatient Medications  acetaminophen   Tablet .. 650 milliGRAM(s) Oral every 6 hours PRN  oxyCODONE    IR 5 milliGRAM(s) Oral every 4 hours PRN  witch hazel Pads 1 Application(s) Topical three times a day PRN      REVIEW OF SYSTEMS  --------------------------------------------------------------------------------  Gen: No  fevers/chills,   Skin: No rashes  Respiratory: no SOB  CV: No chest pain,   GI: No abdominal pain  : makes  urine  MSK: +++swelling;     All other systems were reviewed and are negative, except as noted.      VITALS/PHYSICAL EXAM  --------------------------------------------------------------------------------  T(C): 36.2 (06-09-21 @ 06:35), Max: 36.5 (06-08-21 @ 22:11)  HR: 117 (06-09-21 @ 12:44) (100 - 117)  BP: 127/76 (06-09-21 @ 12:44) (124/60 - 138/72)  RR: 16 (06-09-21 @ 12:44) (15 - 18)  SpO2: 98% (06-09-21 @ 12:44) (98% - 100%)  Wt(kg): --        06-08-21 @ 07:01  -  06-09-21 @ 07:00  --------------------------------------------------------  IN: 350 mL / OUT: 1110 mL / NET: -760 mL      Physical Exam:  	Gen diaphoretic  	HEENT: no JVD  	Pulm: CTABL  	CV: S1S2,  	Abd: Soft, Obese, + BS, asitics   	Ext:  (2+) pitting edema B/L 	Neuro: Awake and alert  	Skin: Warm and dry          LABS/STUDIES  --------------------------------------------------------------------------------              9.3    19.81 >-----------<  457      [06-09-21 @ 07:30]              32.3     Hemoglobin: 9.3 g/dL (06-09-21 @ 07:30)  Hemoglobin: 8.2 g/dL (06-08-21 @ 07:16)    Platelet Count - Automated: 457 K/uL (06-09-21 @ 07:30)  Platelet Count - Automated: 397 K/uL (06-08-21 @ 07:16)    131  |  91  |  22  ----------------------------<  224      [06-09-21 @ 07:30]  4.3   |  19  |  0.69        Ca     9.4     [06-09-21 @ 07:30]      Mg     2.6     [06-09-21 @ 07:30]      Phos  3.9     [06-09-21 @ 07:30]    TPro  6.7  /  Alb  3.1  /  TBili  0.5  /  DBili  x   /  AST  29  /  ALT  12  /  AlkPhos  148  [06-07-21 @ 18:30]    PT/INR: PT 14.9 , INR 1.31       [06-07-21 @ 18:30]  PTT: 27.8       [06-07-21 @ 18:30]      Creatinine, Serum: 0.69 mg/dL (06-09-21 @ 07:30)  Creatinine, Serum: 0.40 mg/dL (06-08-21 @ 18:30)  Creatinine, Serum: 0.39 mg/dL (06-08-21 @ 07:16)  Creatinine, Serum: 0.42 mg/dL (06-07-21 @ 18:30)  Creatinine, Serum: 0.39 mg/dL (06-07-21 @ 06:59)  Creatinine, Serum: 0.36 mg/dL (06-06-21 @ 20:59)  Creatinine, Serum: 0.45 mg/dL (06-06-21 @ 07:54)  Creatinine, Serum: 0.45 mg/dL (06-05-21 @ 08:11)  Creatinine, Serum: 0.39 mg/dL (06-04-21 @ 07:05)  Creatinine, Serum: 0.37 mg/dL (06-03-21 @ 07:58)  Creatinine, Serum: 0.43 mg/dL (06-02-21 @ 06:33)  Creatinine, Serum: 0.35 mg/dL (06-01-21 @ 12:08)    SODIUM TREND:  Sodium 131 [06-09 @ 07:30]  Sodium 132 [06-08 @ 18:30]  Sodium 130 [06-08 @ 07:16]  Sodium 128 [06-07 @ 18:30]  Sodium 127 [06-07 @ 06:59]  Sodium 129 [06-06 @ 20:59]  Sodium 124 [06-06 @ 07:54]  Sodium 126 [06-05 @ 08:11]  Sodium 127 [06-04 @ 07:05]  Sodium 129 [06-03 @ 07:58]        SCr 0.69 [06-09 @ 07:30]  SCr 0.40 [06-08 @ 18:30]  SCr 0.39 [06-08 @ 07:16]  SCr 0.42 [06-07 @ 18:30]  SCr 0.39 [06-07 @ 06:59]      Urine Sodium <20      [06-06-21 @ 11:26]  Urine Osmolality 659      [06-06-21 @ 11:26]    Iron 14, TIBC 201, %sat 7      [06-02-21 @ 06:33]  Ferritin 646      [06-02-21 @ 06:33]  HbA1c 12.3      [06-02-19 @ 10:07]  Lipid: chol 115, , HDL 19, LDL --      [06-03-21 @ 07:58]

## 2021-06-09 NOTE — PROGRESS NOTE ADULT - PROBLEM SELECTOR PLAN 2
Pt with worsening hyponatremia, appears to be hypervolemic given ascites but with intravascular depletion.   -  nephrology consulted and follg  - per nephro cont ethacrynic acid dose increased to 50mg on 6/8  - completed albumin  - cont to monitor Na, f/u renal recs

## 2021-06-09 NOTE — PROGRESS NOTE ADULT - PROBLEM SELECTOR PLAN 3
Abdominal xray showed gaseous distention of the transverse colon with questionable small bowel distention in the left upper quadrant versus more distal transverse colon. Air is seen in the rectum but the possibility of a distal colonic obstruction is not excluded considering the absence of any air in the descending colon.  -s/p ct abd/pelvis showed Findings in keeping with known endometrial cancer with extensive peritoneal disease, as describe. Dilated and fecalized ileum with transition point in the right lower quadrant new from prior study, concerning for partial small bowel obstruction. Findings concerning for bladder wall invasion and metastasis.  Redemonstration of diffuse metastatic osseous lesions. Grossly unchanged retroperitoneal lymphadenopathy.  - Sx consult appreciated. pt now having bms, will monitor closely  - cont bowel regimen, monitor for bms  - plan discussed with ACP

## 2021-06-09 NOTE — PROGRESS NOTE ADULT - SUBJECTIVE AND OBJECTIVE BOX
GENERAL SURGERY PROGRESS NOTE    Patient: GANGA ANDREW , 56y (61)Female   MRN: [id number]  Location: [address]  Visit: 21 Inpatient  Date: 21 @ 08:32      Subjective: Patient seen and examined at bedside. Reports her abdomen feels a little better. Continuing to pass gas.    PAST MEDICAL & SURGICAL HISTORY:  History of migraine headaches  DM type 2 (diabetes mellitus, type 2)  History of irregular menstrual cycles  Ovarian cancer  Stage 4  History of   2003        Vitals: T(F): 97.1 (21 @ 06:35), Max: 97.7 (21 @ 22:11)  HR: 111 (21 @ 06:35)  BP: 127/82 (21 @ 06:35)  RR: 15 (21 @ 06:35)  SpO2: 100% (21 @ 06:35)      Diet, Regular:   Consistent Carbohydrate Evening Snack (CSTCHOSN)  1000mL Fluid Restriction ([id number])  [employer] Vegetarian (Accepts Fish)      21 @ 07:01  -  21 @ 07:00  --------------------------------------------------------  IN:    IV PiggyBack: 100 mL    Oral Fluid: 250 mL  Total IN: 350 mL    OUT:    Voided (mL): 1110 mL  Total OUT: 1110 mL    Total NET: -760 mL          PHYSICAL EXAM  General: NAD, resting comfortably  Respiratory: nonlabored breathing, normal chest wall expansion  Abdominal: Soft, NT, ND    LAB/STUDIES:  CAPILLARY BLOOD GLUCOSE      POCT Blood Glucose.: 260 mg/dL (2021 08:29)  POCT Blood Glucose.: 179 mg/dL (2021 22:22)  POCT Blood Glucose.: 154 mg/dL (2021 17:54)  POCT Blood Glucose.: 108 mg/dL (2021 12:15)                          9.3    19.81 )-----------( 457      ( 2021 07:30 )             32.3         131<L>  |  91<L>  |  22  ----------------------------<  224<H>  4.3   |  19<L>  |  0.69    Ca    9.4      2021 07:30  Phos  3.9       Mg     2.6         TPro  6.7  /  Alb  3.1<L>  /  TBili  0.5  /  DBili  x   /  AST  29  /  ALT  12  /  AlkPhos  148<H>                 6.7  | 0.5  | 29       ------------------[148     ( 2021 18:30 )  3.1  | x    | 12          Lipase:x      Amylase:x        PT/INR - ( 2021 18:30 )   PT: 14.9 sec;   INR: 1.31 ratio    PTT - ( 2021 18:30 )  PTT:27.8 sec

## 2021-06-09 NOTE — PROGRESS NOTE ADULT - ASSESSMENT
55yo Female w/ newly diagnosed Stage IV Endometrial Cancer and poorly controlled DM II presents to the ED with SOB, Abdominal pain/distention x2 weeks. CT A/P showed large volume ascites s/p diagnostic and therapeutic paracentesis (4L removed) c/b neutrocytic ascites on IV CTX. Found to have age indeterminate non occlusive L. mid femoral and peroneal veins DVT on therapeutic lovenox. Pt was scheduled for outpt chemotx 6/4, although tentative plan for inpt chemo 6/7. Palliative care following for pain management.    - No acute general surgery intervention at this time  - Patient claims she is passing gas, tolerating regular diet, and denies nausea, concern for SBO low  - Patient symptoms more consistent with constipation, can give stool softeners and suppositories for comfort   - If patient unable to tolerate PO intake for extended period of time, can consider palliative bypass    B team surgery  [id number]

## 2021-06-09 NOTE — PROGRESS NOTE ADULT - PROBLEM SELECTOR PLAN 1
s/p diagnostic and therapeutic (4L fluid removal) 6/2  - SAAG 0.2 suggesting malignant ascites although cell count w/ PMNs 844 (adjusted for 19K RBCs)  - given neutrocytic ascites will empirically tx with IV CTX 2g daily x 5 days (6/2 - 6/8),  -completed ceftriaxone  -  PERITONEAL FLUID, POSITIVE FOR MALIGNANT CELLS. Cytomorphologically consistent with mucinou adenocarcinoma  Pain control with Morphine ER, iv morphine and oxy IR, appreciate pall recs  -will need therapeutic tap prior to dc  - Sister Alexa updated on 6/8 at [phone]  - onc follg, f/u onc recs for further plan of care

## 2021-06-09 NOTE — PROVIDER CONTACT NOTE (OTHER) - ASSESSMENT
patient continues to refuse IV albumin, patient states she needs 24 hrs to take it and that it is too many chemicals in her body, patient states she is a , education provided

## 2021-06-09 NOTE — PROGRESS NOTE ADULT - ASSESSMENT
56 year old woman with PMH uncontrolled DM2, A1c 9.6%, migraines and newly diagnosed stage 4 endometrial cancer presented to the ED with increasing abdominal pain/distention and shortness of breath for 2 weeks. Endocrine following for management of uncontrolled DM2. BG goal 100-180 mg/dL.    1.  Uncontrolled type 2 diabetes mellitus with hyperglycemia.   - HbA1c 9.6%, not on treatment at home  - will increase lantus from 24 to 26 units at bedtime, fasting glucose was elevated, denies any snacking overnight.   - Cont Admelog 7 units TIDAC  - low correction scale qac and qhs   - consistent carb diet  - check FS qac and qhs  - RD consult  - will follow  - for discharge: ideally would discharge on basal insulin + oral agent such as Metformin, however patient declines treatment with orals and would like to start with once daily basal insulin for now. Thus, will plan on discharging on Lantus (or whichever basal insulin is covered), dose TBD. Send script for Lantus 26 units qhs to assess coverage    2.  Essential hypertension.    - BP at goal, less than 130/80  - continue to monitor.     3.  Hyperlipidemia, unspecified hyperlipidemia type.    - LDL 59  - okay to defer statin

## 2021-06-09 NOTE — PROGRESS NOTE ADULT - SUBJECTIVE AND OBJECTIVE BOX
INTERVAL HPI/OVERNIGHT EVENTS:  Patient seen at bedside.      VITAL SIGNS:  T(F): 97.1 (06-09-21 @ 06:35)  HR: 117 (06-09-21 @ 12:44)  BP: 127/76 (06-09-21 @ 12:44)  RR: 16 (06-09-21 @ 12:44)  SpO2: 98% (06-09-21 @ 12:44)  Wt(kg): --    PHYSICAL EXAM:    Constitutional: NAD, resting in bed comfortably  Eyes: EOMI, sclera non-icteric  Neck: supple, no LAP  Respiratory: CTA b/l, good air entry b/l, no wheezing, rhonchi or crackels  Cardiovascular: RRR, normal S1S2, no M/R/G  Gastrointestinal: soft, NTND  Extremities: no edema  Neurological: AAOx3, non focal  Skin: Normal temperature    MEDICATIONS  (STANDING):  albumin human 25% IVPB 50 milliLiter(s) IV Intermittent every 6 hours  dextrose 40% Gel 15 Gram(s) Oral once  dextrose 5%. 1000 milliLiter(s) (50 mL/Hr) IV Continuous <Continuous>  dextrose 5%. 1000 milliLiter(s) (100 mL/Hr) IV Continuous <Continuous>  dextrose 50% Injectable 25 Gram(s) IV Push once  dextrose 50% Injectable 12.5 Gram(s) IV Push once  dextrose 50% Injectable 25 Gram(s) IV Push once  enoxaparin Injectable 70 milliGRAM(s) SubCutaneous two times a day  glucagon  Injectable 1 milliGRAM(s) IntraMuscular once  insulin glargine Injectable (LANTUS) 24 Unit(s) SubCutaneous at bedtime  insulin lispro (ADMELOG) corrective regimen sliding scale   SubCutaneous three times a day before meals  insulin lispro (ADMELOG) corrective regimen sliding scale   SubCutaneous at bedtime  insulin lispro Injectable (ADMELOG) 7 Unit(s) SubCutaneous three times a day before meals  magnesium hydroxide Suspension 30 milliLiter(s) Oral daily  morphine ER Tablet 15 milliGRAM(s) Oral two times a day  polyethylene glycol 3350 17 Gram(s) Oral daily  senna 2 Tablet(s) Oral at bedtime  urea Oral Powder 15 Gram(s) Oral daily    MEDICATIONS  (PRN):  acetaminophen   Tablet .. 650 milliGRAM(s) Oral every 6 hours PRN Mild Pain (1 - 3), Moderate Pain (4 - 6)  oxyCODONE    IR 5 milliGRAM(s) Oral every 4 hours PRN Moderate Pain (4 - 6)  witch hazel Pads 1 Application(s) Topical three times a day PRN discomfort      Allergies    Sulf-10 (Rash; Short breath)    Intolerances        LABS:                        9.3    19.81 )-----------( 457      ( 09 Jun 2021 07:30 )             32.3     06-09    131<L>  |  91<L>  |  22  ----------------------------<  224<H>  4.3   |  19<L>  |  0.69    Ca    9.4      09 Jun 2021 07:30  Phos  3.9     06-09  Mg     2.6     06-09    TPro  6.7  /  Alb  3.1<L>  /  TBili  0.5  /  DBili  x   /  AST  29  /  ALT  12  /  AlkPhos  148<H>  06-07    PT/INR - ( 07 Jun 2021 18:30 )   PT: 14.9 sec;   INR: 1.31 ratio         PTT - ( 07 Jun 2021 18:30 )  PTT:27.8 sec      RADIOLOGY & ADDITIONAL TESTS:  Studies reviewed.   INTERVAL HPI/OVERNIGHT EVENTS:  Patient seen at bedside.  She has abdominal discomfort    VITAL SIGNS:  T(F): 97.1 (06-09-21 @ 06:35)  HR: 117 (06-09-21 @ 12:44)  BP: 127/76 (06-09-21 @ 12:44)  RR: 16 (06-09-21 @ 12:44)  SpO2: 98% (06-09-21 @ 12:44)  Wt(kg): --    MEDICATIONS  (STANDING):  albumin human 25% IVPB 50 milliLiter(s) IV Intermittent every 6 hours  dextrose 40% Gel 15 Gram(s) Oral once  dextrose 5%. 1000 milliLiter(s) (50 mL/Hr) IV Continuous <Continuous>  dextrose 5%. 1000 milliLiter(s) (100 mL/Hr) IV Continuous <Continuous>  dextrose 50% Injectable 25 Gram(s) IV Push once  dextrose 50% Injectable 12.5 Gram(s) IV Push once  dextrose 50% Injectable 25 Gram(s) IV Push once  enoxaparin Injectable 70 milliGRAM(s) SubCutaneous two times a day  glucagon  Injectable 1 milliGRAM(s) IntraMuscular once  insulin glargine Injectable (LANTUS) 24 Unit(s) SubCutaneous at bedtime  insulin lispro (ADMELOG) corrective regimen sliding scale   SubCutaneous three times a day before meals  insulin lispro (ADMELOG) corrective regimen sliding scale   SubCutaneous at bedtime  insulin lispro Injectable (ADMELOG) 7 Unit(s) SubCutaneous three times a day before meals  magnesium hydroxide Suspension 30 milliLiter(s) Oral daily  morphine ER Tablet 15 milliGRAM(s) Oral two times a day  polyethylene glycol 3350 17 Gram(s) Oral daily  senna 2 Tablet(s) Oral at bedtime  urea Oral Powder 15 Gram(s) Oral daily    MEDICATIONS  (PRN):  acetaminophen   Tablet .. 650 milliGRAM(s) Oral every 6 hours PRN Mild Pain (1 - 3), Moderate Pain (4 - 6)  oxyCODONE    IR 5 milliGRAM(s) Oral every 4 hours PRN Moderate Pain (4 - 6)  witch hazel Pads 1 Application(s) Topical three times a day PRN discomfort      Allergies    Sulf-10 (Rash; Short breath)    Intolerances        LABS:                        9.3    19.81 )-----------( 457      ( 09 Jun 2021 07:30 )             32.3     06-09    131<L>  |  91<L>  |  22  ----------------------------<  224<H>  4.3   |  19<L>  |  0.69    Ca    9.4      09 Jun 2021 07:30  Phos  3.9     06-09  Mg     2.6     06-09    TPro  6.7  /  Alb  3.1<L>  /  TBili  0.5  /  DBili  x   /  AST  29  /  ALT  12  /  AlkPhos  148<H>  06-07    PT/INR - ( 07 Jun 2021 18:30 )   PT: 14.9 sec;   INR: 1.31 ratio         PTT - ( 07 Jun 2021 18:30 )  PTT:27.8 sec      RADIOLOGY & ADDITIONAL TESTS:  Studies reviewed.

## 2021-06-09 NOTE — PROGRESS NOTE ADULT - SUBJECTIVE AND OBJECTIVE BOX
Contact info:   Castro Nogueira MD  pager [phone], please provide 10 digit call back number.   Please note that this patient may be followed by another provider tomorrow.   If no answer or after hours, please contact [phone]    Interval History/Subjective: Patient sitting at bedside.  Sister at the bedside feeding some food for patient.  Patient appears tired.  She states she's diaphoretic (room temperature was quiet warm).   I asked nursing staff to check glucose wand it was 192 mg/dl.     MEDICATIONS  (STANDING):  bisacodyl Suppository 10 milliGRAM(s) Rectal daily  dextrose 40% Gel 15 Gram(s) Oral once  dextrose 5%. 1000 milliLiter(s) (50 mL/Hr) IV Continuous <Continuous>  dextrose 5%. 1000 milliLiter(s) (100 mL/Hr) IV Continuous <Continuous>  dextrose 50% Injectable 25 Gram(s) IV Push once  dextrose 50% Injectable 12.5 Gram(s) IV Push once  dextrose 50% Injectable 25 Gram(s) IV Push once  enoxaparin Injectable 70 milliGRAM(s) SubCutaneous two times a day  ethacrynic acid 50 milliGRAM(s) Oral every 12 hours  glucagon  Injectable 1 milliGRAM(s) IntraMuscular once  insulin glargine Injectable (LANTUS) 24 Unit(s) SubCutaneous at bedtime  insulin lispro (ADMELOG) corrective regimen sliding scale   SubCutaneous three times a day before meals  insulin lispro (ADMELOG) corrective regimen sliding scale   SubCutaneous at bedtime  insulin lispro Injectable (ADMELOG) 7 Unit(s) SubCutaneous three times a day before meals  magnesium hydroxide Suspension 30 milliLiter(s) Oral daily  morphine ER Tablet 15 milliGRAM(s) Oral two times a day  polyethylene glycol 3350 17 Gram(s) Oral daily  senna 2 Tablet(s) Oral at bedtime  urea Oral Powder 15 Gram(s) Oral daily    MEDICATIONS  (PRN):  acetaminophen   Tablet .. 650 milliGRAM(s) Oral every 6 hours PRN Mild Pain (1 - 3), Moderate Pain (4 - 6)  oxyCODONE    IR 5 milliGRAM(s) Oral every 4 hours PRN Moderate Pain (4 - 6)  witch hazel Pads 1 Application(s) Topical three times a day PRN discomfort    Allergies  Sulf-10 (Rash; Short breath)    Intolerances      Review of Systems:  Constitutional: No fever  Eyes: No blurry vision  Neuro: No tremors  HEENT: No pain  Cardiovascular: No chest pain, palpitations  Respiratory: No SOB, no cough  GI: No nausea, vomiting, abdomen is distended.   : No dysuria  Skin: no rash  Psych: no depression  Endocrine: no polyuria, polydipsia  Hem/lymph: no swelling    ALL OTHER SYSTEMS REVIEWED AND NEGATIVE    PHYSICAL EXAM:  VITALS: T(C): 36.2 (06-09-21 @ 06:35)  T(F): 97.1 (06-09-21 @ 06:35), Max: 97.7 (06-08-21 @ 22:11)  HR: 111 (06-09-21 @ 06:35) (100 - 111)  BP: 127/82 (06-09-21 @ 06:35) (124/60 - 138/72)  RR:  (15 - 18)  SpO2:  (98% - 100%)  Wt(kg): --  GENERAL: appears cachetic, appears chronically ill   EYES: No proptosis, no lid lag, anicteric  HEENT:  Atraumatic, Normocephalic, moist mucous membranes  THYROID: Normal size, no palpable nodules  RESPIRATORY: Clear to auscultation bilaterally; No rales, rhonchi, wheezing, or rubs  CARDIOVASCULAR: Regular rate and rhythm; No murmurs; no peripheral edema  GI: Abdomen is distended with ascites.    SKIN: Dry, intact, No rashes or lesions  NEURO: sensation intact, extraocular movements intact, no tremor, normal reflexes  PSYCH: Alert and oriented x 3, normal affect, normal mood    POCT Blood Glucose.: 192 mg/dL (06-09-21 @ 11:11) feels a little diaphoretic therefore glucose check sooner.    POCT Blood Glucose.: 260 mg/dL (06-09-21 @ 08:29)  POCT Blood Glucose.: 179 mg/dL (06-08-21 @ 22:22) Lantus 24    POCT Blood Glucose.: 154 mg/dL (06-08-21 @ 17:54) 7+1  units   POCT Blood Glucose.: 108 mg/dL (06-08-21 @ 12:15) Held as NPO  POCT Blood Glucose.: 136 mg/dL (06-08-21 @ 08:27) 7 units  POCT Blood Glucose.: 122 mg/dL (06-07-21 @ 22:49)  POCT Blood Glucose.: 121 mg/dL (06-07-21 @ 17:53)  POCT Blood Glucose.: 146 mg/dL (06-07-21 @ 12:39)  POCT Blood Glucose.: 213 mg/dL (06-07-21 @ 08:43)  POCT Blood Glucose.: 101 mg/dL (06-06-21 @ 22:22)  POCT Blood Glucose.: 133 mg/dL (06-06-21 @ 17:49)      06-09    131<L>  |  91<L>  |  22  ----------------------------<  224<H>  4.3   |  19<L>  |  0.69    EGFR if : 111  EGFR if non : 96    Ca    9.4      06-09  Mg     2.6     06-09  Phos  3.9     06-09    TPro  6.7  /  Alb  3.1<L>  /  TBili  0.5  /  DBili  x   /  AST  29  /  ALT  12  /  AlkPhos  148<H>  06-07

## 2021-06-09 NOTE — PROGRESS NOTE ADULT - ASSESSMENT
55yo Female w/ newly diagnosed Stage IV Endometrial Cancer and poorly controlled DM II presents to the ED with SOB, Abdominal pain/distention x2 weeks. CT A/P showed large volume ascites s/p diagnostic and therapeutic paracentesis (4L removed) c/b neutrocytic ascites on IV CTX. Found to have age indeterminate non occlusive L. mid femoral and peroneal veins DVT on therapeutic lovenox.   Nephrology called for hyponatremia.       now with tachycardia  would DC ethacrynic acid. check EKG    Hypo-osmolar Hypervolemic Hyponatremia-  Upon review of [facility] pt's serum Na was 130 on 6/2/21. Currently Na 131  Posm 274; Uosm 663; Mark < 20; serum uric acid low/ normal which goes with ADH mediated hyponatremia  Monitor I & Os  Continue urena and albumin today as well ( given tachycardia)   F/U BMP  discussed with JUSTINA Martinez at bedside       Scooter Koroma MD, FACP.  Attending Nephrologist  Office: [phone]  Pager: [phone]  Email: [email or web address]

## 2021-06-10 LAB
ANION GAP SERPL CALC-SCNC: 13 MMOL/L — SIGNIFICANT CHANGE UP (ref 7–14)
BUN SERPL-MCNC: 24 MG/DL — HIGH (ref 7–23)
CALCIUM SERPL-MCNC: 8.7 MG/DL — SIGNIFICANT CHANGE UP (ref 8.4–10.5)
CHLORIDE SERPL-SCNC: 91 MMOL/L — LOW (ref 98–107)
CO2 SERPL-SCNC: 25 MMOL/L — SIGNIFICANT CHANGE UP (ref 22–31)
CREAT SERPL-MCNC: 0.48 MG/DL — LOW (ref 0.5–1.3)
GLUCOSE BLDC GLUCOMTR-MCNC: 157 MG/DL — HIGH (ref 70–99)
GLUCOSE BLDC GLUCOMTR-MCNC: 201 MG/DL — HIGH (ref 70–99)
GLUCOSE BLDC GLUCOMTR-MCNC: 84 MG/DL — SIGNIFICANT CHANGE UP (ref 70–99)
GLUCOSE BLDC GLUCOMTR-MCNC: 91 MG/DL — SIGNIFICANT CHANGE UP (ref 70–99)
GLUCOSE SERPL-MCNC: 192 MG/DL — HIGH (ref 70–99)
HCT VFR BLD CALC: 29.1 % — LOW (ref 34.5–45)
HGB BLD-MCNC: 8.6 G/DL — LOW (ref 11.5–15.5)
MAGNESIUM SERPL-MCNC: 2.4 MG/DL — SIGNIFICANT CHANGE UP (ref 1.6–2.6)
MCHC RBC-ENTMCNC: 20.7 PG — LOW (ref 27–34)
MCHC RBC-ENTMCNC: 29.6 GM/DL — LOW (ref 32–36)
MCV RBC AUTO: 70.1 FL — LOW (ref 80–100)
NRBC # BLD: 0 /100 WBCS — SIGNIFICANT CHANGE UP
NRBC # FLD: 0 K/UL — SIGNIFICANT CHANGE UP
PHOSPHATE SERPL-MCNC: 2 MG/DL — LOW (ref 2.5–4.5)
PLATELET # BLD AUTO: 407 K/UL — HIGH (ref 150–400)
POTASSIUM SERPL-MCNC: 4.5 MMOL/L — SIGNIFICANT CHANGE UP (ref 3.5–5.3)
POTASSIUM SERPL-SCNC: 4.5 MMOL/L — SIGNIFICANT CHANGE UP (ref 3.5–5.3)
RBC # BLD: 4.15 M/UL — SIGNIFICANT CHANGE UP (ref 3.8–5.2)
RBC # FLD: 19.9 % — HIGH (ref 10.3–14.5)
SODIUM SERPL-SCNC: 129 MMOL/L — LOW (ref 135–145)
WBC # BLD: 17.95 K/UL — HIGH (ref 3.8–10.5)
WBC # FLD AUTO: 17.95 K/UL — HIGH (ref 3.8–10.5)

## 2021-06-10 PROCEDURE — 99232 SBSQ HOSP IP/OBS MODERATE 35: CPT | Mod: GC

## 2021-06-10 PROCEDURE — 99233 SBSQ HOSP IP/OBS HIGH 50: CPT

## 2021-06-10 PROCEDURE — 99232 SBSQ HOSP IP/OBS MODERATE 35: CPT

## 2021-06-10 RX ORDER — MORPHINE SULFATE 50 MG/1
15 CAPSULE, EXTENDED RELEASE ORAL
Refills: 0 | Status: DISCONTINUED | OUTPATIENT
Start: 2021-06-10 | End: 2021-06-17

## 2021-06-10 RX ORDER — HEPARIN SODIUM 5000 [USP'U]/ML
6000 INJECTION INTRAVENOUS; SUBCUTANEOUS EVERY 6 HOURS
Refills: 0 | Status: DISCONTINUED | OUTPATIENT
Start: 2021-06-10 | End: 2021-06-11

## 2021-06-10 RX ORDER — OXYCODONE HYDROCHLORIDE 5 MG/1
5 TABLET ORAL EVERY 4 HOURS
Refills: 0 | Status: DISCONTINUED | OUTPATIENT
Start: 2021-06-10 | End: 2021-06-17

## 2021-06-10 RX ORDER — HEPARIN SODIUM 5000 [USP'U]/ML
INJECTION INTRAVENOUS; SUBCUTANEOUS
Qty: 25000 | Refills: 0 | Status: DISCONTINUED | OUTPATIENT
Start: 2021-06-10 | End: 2021-06-11

## 2021-06-10 RX ORDER — ETHACRYNIC ACID 25 MG/1
25 TABLET ORAL DAILY
Refills: 0 | Status: DISCONTINUED | OUTPATIENT
Start: 2021-06-10 | End: 2021-06-18

## 2021-06-10 RX ORDER — SODIUM,POTASSIUM PHOSPHATES 278-250MG
1 POWDER IN PACKET (EA) ORAL ONCE
Refills: 0 | Status: COMPLETED | OUTPATIENT
Start: 2021-06-10 | End: 2021-06-10

## 2021-06-10 RX ORDER — MORPHINE SULFATE 50 MG/1
15 CAPSULE, EXTENDED RELEASE ORAL ONCE
Refills: 0 | Status: DISCONTINUED | OUTPATIENT
Start: 2021-06-10 | End: 2021-06-10

## 2021-06-10 RX ORDER — INSULIN GLARGINE 100 [IU]/ML
28 INJECTION, SOLUTION SUBCUTANEOUS AT BEDTIME
Refills: 0 | Status: DISCONTINUED | OUTPATIENT
Start: 2021-06-10 | End: 2021-06-11

## 2021-06-10 RX ORDER — HEPARIN SODIUM 5000 [USP'U]/ML
3000 INJECTION INTRAVENOUS; SUBCUTANEOUS EVERY 6 HOURS
Refills: 0 | Status: DISCONTINUED | OUTPATIENT
Start: 2021-06-10 | End: 2021-06-11

## 2021-06-10 RX ORDER — UREA 15 G
15 POWDER IN PACKET (EA) ORAL
Refills: 0 | Status: DISCONTINUED | OUTPATIENT
Start: 2021-06-10 | End: 2021-06-18

## 2021-06-10 RX ADMIN — MORPHINE SULFATE 15 MILLIGRAM(S): 50 CAPSULE, EXTENDED RELEASE ORAL at 06:58

## 2021-06-10 RX ADMIN — Medication 15 GRAM(S): at 12:04

## 2021-06-10 RX ADMIN — HEPARIN SODIUM 1300 UNIT(S)/HR: 5000 INJECTION INTRAVENOUS; SUBCUTANEOUS at 20:10

## 2021-06-10 RX ADMIN — MORPHINE SULFATE 15 MILLIGRAM(S): 50 CAPSULE, EXTENDED RELEASE ORAL at 22:39

## 2021-06-10 RX ADMIN — Medication 7 UNIT(S): at 08:43

## 2021-06-10 RX ADMIN — ENOXAPARIN SODIUM 70 MILLIGRAM(S): 100 INJECTION SUBCUTANEOUS at 06:58

## 2021-06-10 RX ADMIN — MORPHINE SULFATE 15 MILLIGRAM(S): 50 CAPSULE, EXTENDED RELEASE ORAL at 07:49

## 2021-06-10 RX ADMIN — INSULIN GLARGINE 28 UNIT(S): 100 INJECTION, SOLUTION SUBCUTANEOUS at 22:39

## 2021-06-10 RX ADMIN — Medication 7 UNIT(S): at 18:32

## 2021-06-10 RX ADMIN — Medication 7 UNIT(S): at 13:07

## 2021-06-10 RX ADMIN — Medication 2: at 08:43

## 2021-06-10 NOTE — PROGRESS NOTE ADULT - SUBJECTIVE AND OBJECTIVE BOX
Patient is a 56y old  Female who presents with a chief complaint of Recent Endometrial Cancer, Abdominal Distention, SOB (09 Jun 2021 14:07)      SUBJECTIVE / OVERNIGHT EVENTS: Pt seen and examined at 10:35am, no overnight events, pt reports that she had bm yesterday and today,  abdominal distention feels better today, taking po, no nausea, vomiting or abdominal pain. No other new issues reported.        MEDICATIONS  (STANDING):  dextrose 40% Gel 15 Gram(s) Oral once  dextrose 5%. 1000 milliLiter(s) (50 mL/Hr) IV Continuous <Continuous>  dextrose 5%. 1000 milliLiter(s) (100 mL/Hr) IV Continuous <Continuous>  dextrose 50% Injectable 25 Gram(s) IV Push once  dextrose 50% Injectable 12.5 Gram(s) IV Push once  dextrose 50% Injectable 25 Gram(s) IV Push once  glucagon  Injectable 1 milliGRAM(s) IntraMuscular once  insulin glargine Injectable (LANTUS) 26 Unit(s) SubCutaneous at bedtime  insulin lispro (ADMELOG) corrective regimen sliding scale   SubCutaneous three times a day before meals  insulin lispro (ADMELOG) corrective regimen sliding scale   SubCutaneous at bedtime  insulin lispro Injectable (ADMELOG) 7 Unit(s) SubCutaneous three times a day before meals  magnesium hydroxide Suspension 30 milliLiter(s) Oral daily  morphine ER Tablet 15 milliGRAM(s) Oral two times a day  polyethylene glycol 3350 17 Gram(s) Oral daily  senna 2 Tablet(s) Oral at bedtime  urea Oral Powder 15 Gram(s) Oral daily    MEDICATIONS  (PRN):  acetaminophen   Tablet .. 650 milliGRAM(s) Oral every 6 hours PRN Mild Pain (1 - 3), Moderate Pain (4 - 6)  oxyCODONE    IR 5 milliGRAM(s) Oral every 4 hours PRN Moderate Pain (4 - 6)  witch hazel Pads 1 Application(s) Topical three times a day PRN discomfort      Vital Signs Last 24 Hrs  T(C): 36.5 (10 Terry 2021 13:56), Max: 36.5 (10 Terry 2021 13:56)  T(F): 97.7 (10 Terry 2021 13:56), Max: 97.7 (10 Terry 2021 13:56)  HR: 75 (10 Terry 2021 13:56) (75 - 97)  BP: 116/62 (10 Terry 2021 13:56) (116/62 - 125/61)  BP(mean): --  RR: 16 (10 Terry 2021 13:56) (16 - 17)  SpO2: 99% (10 Terry 2021 13:56) (98% - 100%)  CAPILLARY BLOOD GLUCOSE      POCT Blood Glucose.: 91 mg/dL (10 Terry 2021 12:07)  POCT Blood Glucose.: 201 mg/dL (10 Teryr 2021 08:22)  POCT Blood Glucose.: 165 mg/dL (09 Jun 2021 23:53)  POCT Blood Glucose.: 183 mg/dL (09 Jun 2021 22:48)  POCT Blood Glucose.: 320 mg/dL (09 Jun 2021 17:37)    I&O's Summary    09 Jun 2021 07:01  -  10 Terry 2021 07:00  --------------------------------------------------------  IN: 0 mL / OUT: 200 mL / NET: -200 mL        PHYSICAL EXAM:  GENERAL: cachectic  CHEST/LUNG: Clear to auscultation bilaterally; No wheeze  HEART: mild tachycardia to 108/mt  ABDOMEN: soft, distended, Nontender  EXTREMITIES:  +LE edema L>R  PSYCH: Calm  NEUROLOGY: AAOx3  SKIN: No rashes or lesions    LABS:                        8.6    17.95 )-----------( 407      ( 10 Terry 2021 07:39 )             29.1     06-10    129<L>  |  91<L>  |  24<H>  ----------------------------<  192<H>  4.5   |  25  |  0.48<L>    Ca    8.7      10 Terry 2021 07:39  Phos  2.0     06-10  Mg     2.4     06-10                RADIOLOGY & ADDITIONAL TESTS:    Imaging Personally Reviewed:    Consultant(s) Notes Reviewed:      Care Discussed with Consultants/Other Providers:

## 2021-06-10 NOTE — PROGRESS NOTE ADULT - ASSESSMENT
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   Course c/b large ascites, SBP, partial bowel obstruction.    GOC discussed with patient and her sisters in detail. Poor prognosis and incurable nature of disease was explained. Pt is requesting to continue aggressive care.     -Therapeutic paracentesis, 4200ml removed, high PMN in ascites, s/p tx for SBP with IV CTX 2g daily x 7 days  -It seems like patient has reaccumulated, please repeat paracentesis, planned for tomorrow. Lovenox on hold for procedure  -Pt with partial bowel obstruction, had BM and feels better.  -Pal care input appreciated  -Will hold inpatient chemotherapy for now pending improvement of acute issues. Will consider inpt chemo when pt stable  -Noted to have RLE nonocclusive DVT located at mid-femur on recent dopplers ( 6/4). Started on Lovenox.  -Patient to followup with Dr. Hoang ([facility]) upon discharge  -C/w Supportive care, pain control, Nutrition, PT, DVT ppx  -Oncology will continue to follow with you    Case d/w Dr. Jacques HORN  Oncology Physician Assistant  Carina SHARMA/[facility]  Pager [phone]    If after 5pm or weekends please page On-call Oncology Fellow   56f with recently diagnosed metastatic uterine carcinosarcoma, c/b ascites, with plan to start chemotherapy 6/4, presented to the ED with abdominal discomfort.   Course c/b large ascites, SBP, partial bowel obstruction.    GOC discussed with patient and her sisters in detail. Poor prognosis and incurable nature of disease was explained. Pt is requesting to continue aggressive care.     -Therapeutic paracentesis, 4200ml removed, high PMN in ascites, s/p tx for spontaneous bacterial peritonitis with IV CTX 2g daily x 7 days  -It seems like patient has reaccumulated, please repeat paracentesis, planned for tomorrow. Lovenox on hold for procedure  -Pt with partial bowel obstruction, had BM and feels better.  -Pal care input appreciated  -Will hold inpatient chemotherapy for now pending improvement of acute issues. Will consider inpt chemo when pt stable  -Noted to have RLE nonocclusive DVT located at mid-femur on recent dopplers ( 6/4). Started on Lovenox.  -Patient to followup with Dr. Hoang ([facility]) upon discharge  -C/w Supportive care, pain control, Nutrition, PT, DVT ppx  -Oncology will continue to follow with you    Case d/w Dr. Jacques HORN  Oncology Physician Assistant  Carina SHARMA/[facility]  Pager [phone]    If after 5pm or weekends please page On-call Oncology Fellow

## 2021-06-10 NOTE — PROGRESS NOTE ADULT - ASSESSMENT
56 year old woman with PMH uncontrolled DM2, A1c 9.6%, migraines and newly diagnosed stage 4 endometrial cancer presented to the ED with increasing abdominal pain/distention and shortness of breath for 2 weeks. Endocrine following for management of uncontrolled DM2. BG goal 100-180 mg/dL.    1.  Uncontrolled type 2 diabetes mellitus with hyperglycemia.   - HbA1c 9.6%, not on treatment at home  - will increase lantus from 26 to 28 units at bedtime, fasting glucose was elevated, denies any snacking overnight.   - Cont Admelog 7 units TIDAC  - low correction scale qac and qhs   - consistent carb diet  - check FS qac and qhs  - RD consult  - will follow  - for discharge: ideally would discharge on basal insulin + oral agent such as Metformin, however patient declines treatment with orals and would like to start with once daily basal insulin for now. Thus, will plan on discharging on Lantus (or whichever basal insulin is covered), dose TBD. Send script for Lantus pen 28 units qhs to assess coverage    2.  Essential hypertension.    - BP at goal, less than 130/80  - continue to monitor.     3.  Hyperlipidemia, unspecified hyperlipidemia type.    - LDL 59  - okay to defer statin       Hemal Giang MD  Division of Endocrinology  Pager: [phone]    If after 6PM or before 9AM, or on weekends/holidays, please call endocrine answering service for assistance ([phone]).  For nonurgent matters email [email or web address] for assistance.

## 2021-06-10 NOTE — PROGRESS NOTE ADULT - PROBLEM SELECTOR PLAN 1
s/p diagnostic and therapeutic (4L fluid removal) 6/2  - SAAG 0.2 suggesting malignant ascites although cell count w/ PMNs 844 (adjusted for 19K RBCs)  - given neutrocytic ascites will empirically tx with IV CTX 2g daily x 5 days (6/2 - 6/8),  -completed ceftriaxone  -  PERITONEAL FLUID, POSITIVE FOR MALIGNANT CELLS. Cytomorphologically consistent with mucinou adenocarcinoma  Pain control with Morphine ER, iv morphine and oxy IR, appreciate pall recs  -will need therapeutic tap planned for tomorrow, start on heparin drip  - Sister Alexa updated on 6/8 at [phone]  - onc follg,

## 2021-06-10 NOTE — PROGRESS NOTE ADULT - PROBLEM SELECTOR PLAN 2
Pt with worsening hyponatremia, appears to be hypervolemic given ascites but with intravascular depletion.   -  nephrology consulted and follg  - per nephro cont ethacrynic acid dose increased to 50mg on 6/8, dc on 6/9  - cont albumin  - cont to monitor Na, f/u renal recs

## 2021-06-10 NOTE — PROGRESS NOTE ADULT - ASSESSMENT
57yo Female w/ newly diagnosed Stage IV Endometrial Cancer and poorly controlled DM II presents to the ED with SOB, Abdominal pain/distention x2 weeks. CT A/P showed large volume ascites s/p diagnostic and therapeutic paracentesis (4L removed) c/b neutrocytic ascites on IV CTX. Found to have age indeterminate non occlusive L. mid femoral and peroneal veins DVT on therapeutic lovenox.   Nephrology called for hyponatremia.   partial SBO.     Hypo-osmolar Hypervolemic Hyponatremia-  Upon review of [facility] pt's serum Na was 130 on 6/2/21. Currently Na 129  Posm 274; Uosm 663; Mark < 20;   Monitor I & Os  increase urena   albumin if pt agrees   F/U BMP  will restart low dose ethacrynic acid   potential paracentesis tomorrow         Scooter Koroma MD, FACP.  Attending Nephrologist  Office: [phone]  Pager: [phone]  Email: [email or web address]

## 2021-06-10 NOTE — CHART NOTE - NSCHARTNOTEFT_GEN_A_CORE
Plan for therapeutic paracentesis tomorrow by procedure team. Patient is currently on therapeutic Lovenox for DVT- will need to be held for 24 hours prior to procedure. Discussed with Dr. Milner.

## 2021-06-10 NOTE — PROGRESS NOTE ADULT - SUBJECTIVE AND OBJECTIVE BOX
INTERVAL HPI/OVERNIGHT EVENTS:  Patient seen at bedside.  Patient states she feels slightly better today then yesterday  As per chart review, noted that patient has been refusing to   take her prescribed IV albumin  Patient endorsing that she could replete her proteins via diet  Educated the patient the importance of repleting albumin   s/p paracentesis   Patient endorsing that she will think about taking it      VITAL SIGNS:  T(F): 97.7 (06-10-21 @ 13:56)  HR: 75 (06-10-21 @ 13:56)  BP: 116/62 (06-10-21 @ 13:56)  RR: 16 (06-10-21 @ 13:56)  SpO2: 99% (06-10-21 @ 13:56)  Wt(kg): --    PHYSICAL EXAM:    Constitutional: NAD, resting in bed comfortably  Eyes: EOMI,   Neck: supple, no LAD  Respiratory: CTA b/l,   Cardiovascular: RRR,   Gastrointestinal: soft, NT but grossly distendend  Extremities: B/L pedal edema  Neurological: AAOx3,    MEDICATIONS  (STANDING):  dextrose 40% Gel 15 Gram(s) Oral once  dextrose 5%. 1000 milliLiter(s) (50 mL/Hr) IV Continuous <Continuous>  dextrose 5%. 1000 milliLiter(s) (100 mL/Hr) IV Continuous <Continuous>  dextrose 50% Injectable 25 Gram(s) IV Push once  dextrose 50% Injectable 12.5 Gram(s) IV Push once  dextrose 50% Injectable 25 Gram(s) IV Push once  glucagon  Injectable 1 milliGRAM(s) IntraMuscular once  insulin glargine Injectable (LANTUS) 26 Unit(s) SubCutaneous at bedtime  insulin lispro (ADMELOG) corrective regimen sliding scale   SubCutaneous three times a day before meals  insulin lispro (ADMELOG) corrective regimen sliding scale   SubCutaneous at bedtime  insulin lispro Injectable (ADMELOG) 7 Unit(s) SubCutaneous three times a day before meals  magnesium hydroxide Suspension 30 milliLiter(s) Oral daily  morphine ER Tablet 15 milliGRAM(s) Oral two times a day  polyethylene glycol 3350 17 Gram(s) Oral daily  senna 2 Tablet(s) Oral at bedtime  urea Oral Powder 15 Gram(s) Oral daily    MEDICATIONS  (PRN):  acetaminophen   Tablet .. 650 milliGRAM(s) Oral every 6 hours PRN Mild Pain (1 - 3), Moderate Pain (4 - 6)  oxyCODONE    IR 5 milliGRAM(s) Oral every 4 hours PRN Moderate Pain (4 - 6)  witch hazel Pads 1 Application(s) Topical three times a day PRN discomfort      Allergies    Sulf-10 (Rash; Short breath)    Intolerances        LABS:                        8.6    17.95 )-----------( 407      ( 10 Terry 2021 07:39 )             29.1     06-10    129<L>  |  91<L>  |  24<H>  ----------------------------<  192<H>  4.5   |  25  |  0.48<L>    Ca    8.7      10 Terry 2021 07:39  Phos  2.0     06-10  Mg     2.4     06-10            RADIOLOGY & ADDITIONAL TESTS:  Studies reviewed.

## 2021-06-10 NOTE — PROGRESS NOTE ADULT - SUBJECTIVE AND OBJECTIVE BOX
Chief Complaint: DM2    History: tolerating po   no hypoglycemia events or symptoms    MEDICATIONS  (STANDING):  dextrose 40% Gel 15 Gram(s) Oral once  dextrose 5%. 1000 milliLiter(s) (50 mL/Hr) IV Continuous <Continuous>  dextrose 5%. 1000 milliLiter(s) (100 mL/Hr) IV Continuous <Continuous>  dextrose 50% Injectable 25 Gram(s) IV Push once  dextrose 50% Injectable 12.5 Gram(s) IV Push once  dextrose 50% Injectable 25 Gram(s) IV Push once  ethacrynic acid 25 milliGRAM(s) Oral daily  glucagon  Injectable 1 milliGRAM(s) IntraMuscular once  heparin  Infusion.  Unit(s)/Hr (13 mL/Hr) IV Continuous <Continuous>  insulin glargine Injectable (LANTUS) 26 Unit(s) SubCutaneous at bedtime  insulin lispro (ADMELOG) corrective regimen sliding scale   SubCutaneous three times a day before meals  insulin lispro (ADMELOG) corrective regimen sliding scale   SubCutaneous at bedtime  insulin lispro Injectable (ADMELOG) 7 Unit(s) SubCutaneous three times a day before meals  magnesium hydroxide Suspension 30 milliLiter(s) Oral daily  morphine ER Tablet 15 milliGRAM(s) Oral two times a day  polyethylene glycol 3350 17 Gram(s) Oral daily  senna 2 Tablet(s) Oral at bedtime  urea Oral Powder 15 Gram(s) Oral two times a day    MEDICATIONS  (PRN):  acetaminophen   Tablet .. 650 milliGRAM(s) Oral every 6 hours PRN Mild Pain (1 - 3), Moderate Pain (4 - 6)  heparin   Injectable 6000 Unit(s) IV Push every 6 hours PRN For aPTT less than 40  heparin   Injectable 3000 Unit(s) IV Push every 6 hours PRN For aPTT between 40 - 57  oxyCODONE    IR 5 milliGRAM(s) Oral every 4 hours PRN Moderate Pain (4 - 6)  witch hazel Pads 1 Application(s) Topical three times a day PRN discomfort      Allergies    Sulf-10 (Rash; Short breath)    Intolerances      Review of Systems:  ALL OTHER SYSTEMS REVIEWED AND NEGATIVE      PHYSICAL EXAM:  VITALS: T(C): 36.5 (06-10-21 @ 13:56)  T(F): 97.7 (06-10-21 @ 13:56), Max: 97.7 (06-10-21 @ 13:56)  HR: 75 (06-10-21 @ 13:56) (75 - 97)  BP: 116/62 (06-10-21 @ 13:56) (116/62 - 125/61)  RR:  (16 - 17)  SpO2:  (98% - 100%)  Wt(kg): --  GENERAL: NAD, well-groomed, well-developed  EYES: No proptosis, no lid lag, anicteric  HEENT:  Atraumatic, Normocephalic, moist mucous membranes  RESPIRATORY: nonlabored respirations, no wheezing  PSYCH: Alert and oriented x 3, normal affect, normal mood    CAPILLARY BLOOD GLUCOSE      POCT Blood Glucose.: 91 mg/dL (10 Terry 2021 12:07)  POCT Blood Glucose.: 201 mg/dL (10 Terry 2021 08:22)  POCT Blood Glucose.: 165 mg/dL (09 Jun 2021 23:53)  POCT Blood Glucose.: 183 mg/dL (09 Jun 2021 22:48)  POCT Blood Glucose.: 320 mg/dL (09 Jun 2021 17:37)      06-10    129<L>  |  91<L>  |  24<H>  ----------------------------<  192<H>  4.5   |  25  |  0.48<L>    EGFR if : 125  EGFR if non : 108    Ca    8.7      06-10  Mg     2.4     06-10  Phos  2.0     06-10    TPro  6.7  /  Alb  3.1<L>  /  TBili  0.5  /  DBili  x   /  AST  29  /  ALT  12  /  AlkPhos  148<H>  06-07      A1C with Estimated Average Glucose Result: 9.6 % (06-02-21 @ 06:33)      Thyroid Function Tests:

## 2021-06-10 NOTE — PROGRESS NOTE ADULT - PROBLEM SELECTOR PLAN 2
s/p 4L drainage  now with sbp- iv abx for 7 days   abdomen to have paracentesis tomorrow to help with pain and constipation  consider placing pigtail catheter for future needs

## 2021-06-10 NOTE — PROGRESS NOTE ADULT - SUBJECTIVE AND OBJECTIVE BOX
SUBJECTIVE AND OBJECTIVE:  pt sitting up, eating.  pain controlled.  awaiting paracentesis tomorrow.  chemo planned for weekend  INTERVAL HPI/OVERNIGHT EVENTS:    DNR on chart:   Allergies    Sulf-10 (Rash; Short breath)    Intolerances    MEDICATIONS  (STANDING):  dextrose 40% Gel 15 Gram(s) Oral once  dextrose 5%. 1000 milliLiter(s) (50 mL/Hr) IV Continuous <Continuous>  dextrose 5%. 1000 milliLiter(s) (100 mL/Hr) IV Continuous <Continuous>  dextrose 50% Injectable 25 Gram(s) IV Push once  dextrose 50% Injectable 12.5 Gram(s) IV Push once  dextrose 50% Injectable 25 Gram(s) IV Push once  ethacrynic acid 25 milliGRAM(s) Oral daily  glucagon  Injectable 1 milliGRAM(s) IntraMuscular once  heparin  Infusion.  Unit(s)/Hr (13 mL/Hr) IV Continuous <Continuous>  insulin glargine Injectable (LANTUS) 28 Unit(s) SubCutaneous at bedtime  insulin lispro (ADMELOG) corrective regimen sliding scale   SubCutaneous three times a day before meals  insulin lispro (ADMELOG) corrective regimen sliding scale   SubCutaneous at bedtime  insulin lispro Injectable (ADMELOG) 7 Unit(s) SubCutaneous three times a day before meals  magnesium hydroxide Suspension 30 milliLiter(s) Oral daily  morphine ER Tablet 15 milliGRAM(s) Oral two times a day  polyethylene glycol 3350 17 Gram(s) Oral daily  senna 2 Tablet(s) Oral at bedtime  urea Oral Powder 15 Gram(s) Oral two times a day    MEDICATIONS  (PRN):  acetaminophen   Tablet .. 650 milliGRAM(s) Oral every 6 hours PRN Mild Pain (1 - 3), Moderate Pain (4 - 6)  heparin   Injectable 6000 Unit(s) IV Push every 6 hours PRN For aPTT less than 40  heparin   Injectable 3000 Unit(s) IV Push every 6 hours PRN For aPTT between 40 - 57  oxyCODONE    IR 5 milliGRAM(s) Oral every 4 hours PRN Severe Pain (7 - 10)  witch hazel Pads 1 Application(s) Topical three times a day PRN discomfort      ITEMS UNCHECKED ARE NOT PRESENT    PRESENT SYMPTOMS: [ ]Unable to obtain due to poor mentation   Source if other than patient:  [ ]Family   [ ]Team     Pain (Impact on QOL):  yes  Location: abdomen  Minimal acceptable level (0-10 scale):   3/10         Aggravating factors: increasing abdominal ascites  Quality:  dull  Radiation:  none  Severity (0-10 scale):  4/10  Timing: constant    Dyspnea:                           [ ]Mild [ ]Moderate [ ]Severe  Anxiety:                             [ ]Mild [ ]Moderate [ ]Severe  Fatigue:                             [ ]Mild [x ]Moderate [ ]Severe  Nausea:                             [ ]Mild [ ]Moderate [ ]Severe  Loss of appetite:              [ ]Mild [ ]Moderate [ ]Severe  Constipation:                    [ ]Mild [x ]Moderate [ ]Severe    PAIN AD Score:	  [email or web address] (Ctrl + left click to view)    Other Symptoms:  [x ]All other review of systems negative     Karnofsky Performance Score/Palliative Performance Status Version 2:   60      %    http://palliative.info/resource_material/[email or web address].pdf  PHYSICAL EXAM:  Vital Signs Last 24 Hrs  T(C): 36.5 (10 Terry 2021 13:56), Max: 36.5 (10 Terry 2021 13:56)  T(F): 97.7 (10 Terry 2021 13:56), Max: 97.7 (10 Terry 2021 13:56)  HR: 75 (10 Terry 2021 13:56) (75 - 97)  BP: 116/62 (10 Terry 2021 13:56) (116/62 - 125/61)  BP(mean): --  RR: 16 (10 Terry 2021 13:56) (16 - 17)  SpO2: 99% (10 Terry 2021 13:56) (98% - 100%) I&O's Summary    09 Jun 2021 07:01  -  10 Terry 2021 07:00  --------------------------------------------------------  IN: 0 mL / OUT: 200 mL / NET: -200 mL     GENERAL:  [x ]Alert  [x ]Oriented x3   [ ]Lethargic  [ ]Cachexia  [ ]Unarousable  [ ]Verbal  [ ]Non-Verbal    Behavioral:   [ ] Anxiety  [ ] Delirium [ ] Agitation [ ] Other    HEENT:  [x ]Normal   [ ]Dry mouth   [ ]ET Tube/Trach  [ ]Oral lesions    PULMONARY:   [x ]Clear [ ]Tachypnea  [ ]Audible excessive secretions   [ ]Rhonchi        [ ]Right [ ]Left [ ]Bilateral  [ ]Crackles        [ ]Right [ ]Left [ ]Bilateral  [ ]Wheezing     [ ]Right [ ]Left [ ]Bilateral    CARDIOVASCULAR:    [x ]Regular [ ]Irregular [ ]Tachy  [ ]Glenn [ ]Murmur [ ]Other    GASTROINTESTINAL:  [ ]Soft  [ x]Distended   [ ]+BS  [ ]Non tender [ x]Tender  [ ]PEG [ ]OGT/ NGT   Last BM:    GENITOURINARY:  [x ]Normal [ ] Incontinent   [ ]Oliguria/Anuria   [ ][place]    MUSCULOSKELETAL:   [ ]Normal   [x ]Weakness  [ ]Bed/Wheelchair bound [ ]Edema    NEUROLOGIC:   [x ]No focal deficits  [ ] Cognitive impairment  [ ] Dysphagia [ ]Dysarthria [ ] Paresis [ ]Other     SKIN:   [x ]Normal   [ ]Pressure ulcer(s)  [ ]Rash    CRITICAL CARE:  [ ] Shock Present  [ ]Septic [ ]Cardiogenic [ ]Neurologic [ ]Hypovolemic  [ ]  Vasopressors [ ]  Inotropes   [ ] Respiratory failure present  [ ] Acute  [ ] Chronic [ ] Hypoxic  [ ] Hypercarbic [ ] Other  [ ] Other organ failure     LABS:                        8.6    17.95 )-----------( 407      ( 10 Terry 2021 07:39 )             29.1   06-10    129<L>  |  91<L>  |  24<H>  ----------------------------<  192<H>  4.5   |  25  |  0.48<L>    Ca    8.7      10 Terry 2021 07:39  Phos  2.0     06-10  Mg     2.4     06-10          RADIOLOGY & ADDITIONAL STUDIES:    Protein Calorie Malnutrition Present: [ ] yes [ ] no  [ ] PPSV2 < or = 30%  [ ] significant weight loss [ ] poor nutritional intake [ ] anasarca [ ] catabolic state Albumin, Serum: 3.1 g/dL (06-07-21 @ 18:30)  Artificial Nutrition [ ]     REFERRALS:   [ ]Chaplaincy  [ ] Hospice  [ ]Child Life  [ ]Social Work  [ ]Case management [ ]Holistic Therapy   Goals of Care Document:

## 2021-06-10 NOTE — PROGRESS NOTE ADULT - SUBJECTIVE AND OBJECTIVE BOX
[facility] DIVISION OF KIDNEY DISEASES AND HYPERTENSION -- FOLLOW UP NOTE  EDITH Fellow pager # [phone]  EDITH Attending phone # [phone]  Nephrology office # [phone]  -----------------------------------------------------------------------------  Chief Complaint:/subjective: abdominal pain is better today.         PAST HISTORY  --------------------------------------------------------------------------------  No significant changes to PMH, PSH, FHx, SHx, unless otherwise noted    ALLERGIES & MEDICATIONS  --------------------------------------------------------------------------------  Allergies    Sulf-10 (Rash; Short breath)    Intolerances      Standing Inpatient Medications  dextrose 40% Gel 15 Gram(s) Oral once  dextrose 5%. 1000 milliLiter(s) IV Continuous <Continuous>  dextrose 5%. 1000 milliLiter(s) IV Continuous <Continuous>  dextrose 50% Injectable 25 Gram(s) IV Push once  dextrose 50% Injectable 12.5 Gram(s) IV Push once  dextrose 50% Injectable 25 Gram(s) IV Push once  glucagon  Injectable 1 milliGRAM(s) IntraMuscular once  heparin  Infusion.  Unit(s)/Hr IV Continuous <Continuous>  insulin glargine Injectable (LANTUS) 26 Unit(s) SubCutaneous at bedtime  insulin lispro (ADMELOG) corrective regimen sliding scale   SubCutaneous three times a day before meals  insulin lispro (ADMELOG) corrective regimen sliding scale   SubCutaneous at bedtime  insulin lispro Injectable (ADMELOG) 7 Unit(s) SubCutaneous three times a day before meals  magnesium hydroxide Suspension 30 milliLiter(s) Oral daily  morphine ER Tablet 15 milliGRAM(s) Oral two times a day  polyethylene glycol 3350 17 Gram(s) Oral daily  senna 2 Tablet(s) Oral at bedtime  urea Oral Powder 15 Gram(s) Oral daily    PRN Inpatient Medications  acetaminophen   Tablet .. 650 milliGRAM(s) Oral every 6 hours PRN  heparin   Injectable 6000 Unit(s) IV Push every 6 hours PRN  heparin   Injectable 3000 Unit(s) IV Push every 6 hours PRN  oxyCODONE    IR 5 milliGRAM(s) Oral every 4 hours PRN  witch hazel Pads 1 Application(s) Topical three times a day PRN      REVIEW OF SYSTEMS  --------------------------------------------------------------------------------  Gen: No  fevers/chills,   Skin: No rashes  Respiratory: no SOB  CV: No chest pain,   GI: No abdominal pain  : makes  urine  MSK: +++swelling;     All other systems were reviewed and are negative, except as noted.        VITALS/PHYSICAL EXAM  --------------------------------------------------------------------------------  T(C): 36.5 (06-10-21 @ 13:56), Max: 36.5 (06-10-21 @ 13:56)  HR: 75 (06-10-21 @ 13:56) (75 - 97)  BP: 116/62 (06-10-21 @ 13:56) (116/62 - 125/61)  RR: 16 (06-10-21 @ 13:56) (16 - 17)  SpO2: 99% (06-10-21 @ 13:56) (98% - 100%)  Wt(kg): --        06-09-21 @ 07:01  -  06-10-21 @ 07:00  --------------------------------------------------------  IN: 0 mL / OUT: 200 mL / NET: -200 mL      Physical Exam:  	Gen NAD  	HEENT: no JVD  	Pulm: CTABL  	CV: S1S2,  	Abd: Soft, Obese, + BS, asitics   	Ext:  (2+) pitting edema B/L 	               Neuro: Awake and alert  	Skin: Warm and dry            LABS/STUDIES  --------------------------------------------------------------------------------              8.6    17.95 >-----------<  407      [06-10-21 @ 07:39]              29.1     Hemoglobin: 8.6 g/dL (06-10-21 @ 07:39)  Hemoglobin: 9.3 g/dL (06-09-21 @ 07:30)    Platelet Count - Automated: 407 K/uL (06-10-21 @ 07:39)  Platelet Count - Automated: 457 K/uL (06-09-21 @ 07:30)    129  |  91  |  24  ----------------------------<  192      [06-10-21 @ 07:39]  4.5   |  25  |  0.48        Ca     8.7     [06-10-21 @ 07:39]      Mg     2.4     [06-10-21 @ 07:39]      Phos  2.0     [06-10-21 @ 07:39]            Creatinine, Serum: 0.48 mg/dL (06-10-21 @ 07:39)  Creatinine, Serum: 0.58 mg/dL (06-09-21 @ 18:13)  Creatinine, Serum: 0.69 mg/dL (06-09-21 @ 07:30)  Creatinine, Serum: 0.40 mg/dL (06-08-21 @ 18:30)  Creatinine, Serum: 0.39 mg/dL (06-08-21 @ 07:16)  Creatinine, Serum: 0.42 mg/dL (06-07-21 @ 18:30)  Creatinine, Serum: 0.39 mg/dL (06-07-21 @ 06:59)  Creatinine, Serum: 0.36 mg/dL (06-06-21 @ 20:59)  Creatinine, Serum: 0.45 mg/dL (06-06-21 @ 07:54)  Creatinine, Serum: 0.45 mg/dL (06-05-21 @ 08:11)  Creatinine, Serum: 0.39 mg/dL (06-04-21 @ 07:05)  Creatinine, Serum: 0.37 mg/dL (06-03-21 @ 07:58)  Creatinine, Serum: 0.43 mg/dL (06-02-21 @ 06:33)  Creatinine, Serum: 0.35 mg/dL (06-01-21 @ 12:08)    SODIUM TREND:  Sodium 129 [06-10 @ 07:39]  Sodium 130 [06-09 @ 18:13]  Sodium 131 [06-09 @ 07:30]  Sodium 132 [06-08 @ 18:30]  Sodium 130 [06-08 @ 07:16]  Sodium 128 [06-07 @ 18:30]  Sodium 127 [06-07 @ 06:59]  Sodium 129 [06-06 @ 20:59]  Sodium 124 [06-06 @ 07:54]  Sodium 126 [06-05 @ 08:11]        SCr 0.48 [06-10 @ 07:39]  SCr 0.58 [06-09 @ 18:13]  SCr 0.69 [06-09 @ 07:30]  SCr 0.40 [06-08 @ 18:30]  SCr 0.39 [06-08 @ 07:16]      Urine Sodium <20      [06-06-21 @ 11:26]  Urine Osmolality 659      [06-06-21 @ 11:26]    Iron 14, TIBC 201, %sat 7      [06-02-21 @ 06:33]  Ferritin 646      [06-02-21 @ 06:33]  HbA1c 12.3      [06-02-19 @ 10:07]  Lipid: chol 115, , HDL 19, LDL --      [06-03-21 @ 07:58]

## 2021-06-11 ENCOUNTER — APPOINTMENT (OUTPATIENT)
Dept: HEMATOLOGY ONCOLOGY | Facility: CLINIC | Age: 56
End: 2021-06-11

## 2021-06-11 LAB
ANION GAP SERPL CALC-SCNC: 13 MMOL/L — SIGNIFICANT CHANGE UP (ref 7–14)
APTT BLD: 31.1 SEC — SIGNIFICANT CHANGE UP (ref 27–36.3)
BUN SERPL-MCNC: 17 MG/DL — SIGNIFICANT CHANGE UP (ref 7–23)
CALCIUM SERPL-MCNC: 9 MG/DL — SIGNIFICANT CHANGE UP (ref 8.4–10.5)
CHLORIDE SERPL-SCNC: 92 MMOL/L — LOW (ref 98–107)
CO2 SERPL-SCNC: 24 MMOL/L — SIGNIFICANT CHANGE UP (ref 22–31)
CREAT SERPL-MCNC: 0.42 MG/DL — LOW (ref 0.5–1.3)
GLUCOSE BLDC GLUCOMTR-MCNC: 104 MG/DL — HIGH (ref 70–99)
GLUCOSE BLDC GLUCOMTR-MCNC: 117 MG/DL — HIGH (ref 70–99)
GLUCOSE BLDC GLUCOMTR-MCNC: 60 MG/DL — LOW (ref 70–99)
GLUCOSE BLDC GLUCOMTR-MCNC: 63 MG/DL — LOW (ref 70–99)
GLUCOSE BLDC GLUCOMTR-MCNC: 82 MG/DL — SIGNIFICANT CHANGE UP (ref 70–99)
GLUCOSE BLDC GLUCOMTR-MCNC: 88 MG/DL — SIGNIFICANT CHANGE UP (ref 70–99)
GLUCOSE BLDC GLUCOMTR-MCNC: 99 MG/DL — SIGNIFICANT CHANGE UP (ref 70–99)
GLUCOSE SERPL-MCNC: 74 MG/DL — SIGNIFICANT CHANGE UP (ref 70–99)
HCT VFR BLD CALC: 29.2 % — LOW (ref 34.5–45)
HGB BLD-MCNC: 8.6 G/DL — LOW (ref 11.5–15.5)
INR BLD: 1.37 RATIO — HIGH (ref 0.88–1.16)
MAGNESIUM SERPL-MCNC: 2.3 MG/DL — SIGNIFICANT CHANGE UP (ref 1.6–2.6)
MCHC RBC-ENTMCNC: 20.9 PG — LOW (ref 27–34)
MCHC RBC-ENTMCNC: 29.5 GM/DL — LOW (ref 32–36)
MCV RBC AUTO: 71 FL — LOW (ref 80–100)
PHOSPHATE SERPL-MCNC: 1.8 MG/DL — LOW (ref 2.5–4.5)
PLATELET # BLD AUTO: 383 K/UL — SIGNIFICANT CHANGE UP (ref 150–400)
POTASSIUM SERPL-MCNC: 4.6 MMOL/L — SIGNIFICANT CHANGE UP (ref 3.5–5.3)
POTASSIUM SERPL-SCNC: 4.6 MMOL/L — SIGNIFICANT CHANGE UP (ref 3.5–5.3)
PROTHROM AB SERPL-ACNC: 15.5 SEC — HIGH (ref 10.6–13.6)
RBC # BLD: 4.11 M/UL — SIGNIFICANT CHANGE UP (ref 3.8–5.2)
SODIUM SERPL-SCNC: 129 MMOL/L — LOW (ref 135–145)
WBC # BLD: 15.7 K/UL — HIGH (ref 3.8–10.5)
WBC # FLD AUTO: 15.7 K/UL — HIGH (ref 3.8–10.5)

## 2021-06-11 PROCEDURE — 99233 SBSQ HOSP IP/OBS HIGH 50: CPT

## 2021-06-11 PROCEDURE — 99232 SBSQ HOSP IP/OBS MODERATE 35: CPT

## 2021-06-11 PROCEDURE — 49083 ABD PARACENTESIS W/IMAGING: CPT

## 2021-06-11 PROCEDURE — 99232 SBSQ HOSP IP/OBS MODERATE 35: CPT | Mod: GC

## 2021-06-11 RX ORDER — INSULIN LISPRO 100/ML
5 VIAL (ML) SUBCUTANEOUS
Refills: 0 | Status: DISCONTINUED | OUTPATIENT
Start: 2021-06-11 | End: 2021-06-12

## 2021-06-11 RX ORDER — ENOXAPARIN SODIUM 100 MG/ML
70 INJECTION SUBCUTANEOUS
Refills: 0 | Status: DISCONTINUED | OUTPATIENT
Start: 2021-06-11 | End: 2021-06-18

## 2021-06-11 RX ORDER — MORPHINE SULFATE 50 MG/1
2 CAPSULE, EXTENDED RELEASE ORAL ONCE
Refills: 0 | Status: DISCONTINUED | OUTPATIENT
Start: 2021-06-11 | End: 2021-06-11

## 2021-06-11 RX ORDER — SODIUM,POTASSIUM PHOSPHATES 278-250MG
1 POWDER IN PACKET (EA) ORAL THREE TIMES A DAY
Refills: 0 | Status: COMPLETED | OUTPATIENT
Start: 2021-06-11 | End: 2021-06-12

## 2021-06-11 RX ORDER — INSULIN GLARGINE 100 [IU]/ML
25 INJECTION, SOLUTION SUBCUTANEOUS AT BEDTIME
Refills: 0 | Status: DISCONTINUED | OUTPATIENT
Start: 2021-06-11 | End: 2021-06-12

## 2021-06-11 RX ADMIN — Medication 5 UNIT(S): at 18:55

## 2021-06-11 RX ADMIN — MORPHINE SULFATE 2 MILLIGRAM(S): 50 CAPSULE, EXTENDED RELEASE ORAL at 12:35

## 2021-06-11 RX ADMIN — HEPARIN SODIUM 1600 UNIT(S)/HR: 5000 INJECTION INTRAVENOUS; SUBCUTANEOUS at 04:22

## 2021-06-11 RX ADMIN — Medication 1 TABLET(S): at 22:18

## 2021-06-11 RX ADMIN — HEPARIN SODIUM 6000 UNIT(S): 5000 INJECTION INTRAVENOUS; SUBCUTANEOUS at 04:28

## 2021-06-11 RX ADMIN — ENOXAPARIN SODIUM 70 MILLIGRAM(S): 100 INJECTION SUBCUTANEOUS at 18:59

## 2021-06-11 RX ADMIN — OXYCODONE HYDROCHLORIDE 5 MILLIGRAM(S): 5 TABLET ORAL at 22:58

## 2021-06-11 RX ADMIN — MORPHINE SULFATE 2 MILLIGRAM(S): 50 CAPSULE, EXTENDED RELEASE ORAL at 12:17

## 2021-06-11 RX ADMIN — Medication 7 UNIT(S): at 08:52

## 2021-06-11 RX ADMIN — Medication 15 GRAM(S): at 05:27

## 2021-06-11 RX ADMIN — INSULIN GLARGINE 25 UNIT(S): 100 INJECTION, SOLUTION SUBCUTANEOUS at 22:19

## 2021-06-11 RX ADMIN — OXYCODONE HYDROCHLORIDE 5 MILLIGRAM(S): 5 TABLET ORAL at 21:58

## 2021-06-11 RX ADMIN — ETHACRYNIC ACID 25 MILLIGRAM(S): 25 TABLET ORAL at 05:27

## 2021-06-11 NOTE — PROGRESS NOTE ADULT - SUBJECTIVE AND OBJECTIVE BOX
INTERVAL HPI/OVERNIGHT EVENTS:  Patient seen at bedside.      VITAL SIGNS:  T(F): 99 (06-11-21 @ 05:25)  HR: 94 (06-11-21 @ 05:25)  BP: 126/72 (06-11-21 @ 05:25)  RR: 18 (06-11-21 @ 05:25)  SpO2: 99% (06-11-21 @ 05:25)  Wt(kg): --    PHYSICAL EXAM:    Constitutional: NAD, resting in bed comfortably  Eyes: EOMI, sclera non-icteric  Neck: supple, no LAD  Respiratory: CTA b/l, good air entry b/l, no wheezing, rhonchi or crackles  Cardiovascular: RRR, normal S1S2, no M/R/G  Gastrointestinal: soft, NTND  Extremities: no edema  Neurological: AAOx3, non focal  Skin: Normal temperature    MEDICATIONS  (STANDING):  dextrose 40% Gel 15 Gram(s) Oral once  dextrose 5%. 1000 milliLiter(s) (50 mL/Hr) IV Continuous <Continuous>  dextrose 5%. 1000 milliLiter(s) (100 mL/Hr) IV Continuous <Continuous>  dextrose 50% Injectable 25 Gram(s) IV Push once  dextrose 50% Injectable 12.5 Gram(s) IV Push once  dextrose 50% Injectable 25 Gram(s) IV Push once  ethacrynic acid 25 milliGRAM(s) Oral daily  glucagon  Injectable 1 milliGRAM(s) IntraMuscular once  heparin  Infusion.  Unit(s)/Hr (13 mL/Hr) IV Continuous <Continuous>  insulin glargine Injectable (LANTUS) 28 Unit(s) SubCutaneous at bedtime  insulin lispro (ADMELOG) corrective regimen sliding scale   SubCutaneous three times a day before meals  insulin lispro (ADMELOG) corrective regimen sliding scale   SubCutaneous at bedtime  insulin lispro Injectable (ADMELOG) 7 Unit(s) SubCutaneous three times a day before meals  magnesium hydroxide Suspension 30 milliLiter(s) Oral daily  morphine ER Tablet 15 milliGRAM(s) Oral two times a day  polyethylene glycol 3350 17 Gram(s) Oral daily  senna 2 Tablet(s) Oral at bedtime  urea Oral Powder 15 Gram(s) Oral two times a day    MEDICATIONS  (PRN):  acetaminophen   Tablet .. 650 milliGRAM(s) Oral every 6 hours PRN Mild Pain (1 - 3), Moderate Pain (4 - 6)  heparin   Injectable 6000 Unit(s) IV Push every 6 hours PRN For aPTT less than 40  heparin   Injectable 3000 Unit(s) IV Push every 6 hours PRN For aPTT between 40 - 57  oxyCODONE    IR 5 milliGRAM(s) Oral every 4 hours PRN Severe Pain (7 - 10)  witch hazel Pads 1 Application(s) Topical three times a day PRN discomfort      Allergies    Sulf-10 (Rash; Short breath)    Intolerances        LABS:                        8.6    15.70 )-----------( 383      ( 11 Jun 2021 04:09 )             29.2     06-11    129<L>  |  92<L>  |  17  ----------------------------<  74  4.6   |  24  |  0.42<L>    Ca    9.0      11 Jun 2021 04:09  Phos  1.8     06-11  Mg     2.3     06-11      PT/INR - ( 11 Jun 2021 04:09 )   PT: 15.5 sec;   INR: 1.37 ratio         PTT - ( 11 Jun 2021 04:09 )  PTT:31.1 sec      RADIOLOGY & ADDITIONAL TESTS:  Studies reviewed.   INTERVAL HPI/OVERNIGHT EVENTS:  Patient seen at bedside at about .  Patient without acute complaints  Abdominal pain at its baseline but controlled with meds  Patient endorses that she has been taking prescribed meds  Denies n.v or diarrhea, having normal bowel movements  Denies fever or chills.  Anticipating paracenteses later today    VITAL SIGNS:  T(F): 99 (06-11-21 @ 05:25)  HR: 94 (06-11-21 @ 05:25)  BP: 126/72 (06-11-21 @ 05:25)  RR: 18 (06-11-21 @ 05:25)  SpO2: 99% (06-11-21 @ 05:25)  Wt(kg): --    PHYSICAL EXAM:    Constitutional: NAD, sitting comfortably in chair  Neck: supple, no LAD  Respiratory: CTA b/l, good air entry b/l,   Cardiovascular: RRR,   Gastrointestinal: soft, NTND  Extremities: B/L pedal edema  Neurological: AAOx3, non focal  Skin: Normal temperature    MEDICATIONS  (STANDING):  dextrose 40% Gel 15 Gram(s) Oral once  dextrose 5%. 1000 milliLiter(s) (50 mL/Hr) IV Continuous <Continuous>  dextrose 5%. 1000 milliLiter(s) (100 mL/Hr) IV Continuous <Continuous>  dextrose 50% Injectable 25 Gram(s) IV Push once  dextrose 50% Injectable 12.5 Gram(s) IV Push once  dextrose 50% Injectable 25 Gram(s) IV Push once  ethacrynic acid 25 milliGRAM(s) Oral daily  glucagon  Injectable 1 milliGRAM(s) IntraMuscular once  heparin  Infusion.  Unit(s)/Hr (13 mL/Hr) IV Continuous <Continuous>  insulin glargine Injectable (LANTUS) 28 Unit(s) SubCutaneous at bedtime  insulin lispro (ADMELOG) corrective regimen sliding scale   SubCutaneous three times a day before meals  insulin lispro (ADMELOG) corrective regimen sliding scale   SubCutaneous at bedtime  insulin lispro Injectable (ADMELOG) 7 Unit(s) SubCutaneous three times a day before meals  magnesium hydroxide Suspension 30 milliLiter(s) Oral daily  morphine ER Tablet 15 milliGRAM(s) Oral two times a day  polyethylene glycol 3350 17 Gram(s) Oral daily  senna 2 Tablet(s) Oral at bedtime  urea Oral Powder 15 Gram(s) Oral two times a day    MEDICATIONS  (PRN):  acetaminophen   Tablet .. 650 milliGRAM(s) Oral every 6 hours PRN Mild Pain (1 - 3), Moderate Pain (4 - 6)  heparin   Injectable 6000 Unit(s) IV Push every 6 hours PRN For aPTT less than 40  heparin   Injectable 3000 Unit(s) IV Push every 6 hours PRN For aPTT between 40 - 57  oxyCODONE    IR 5 milliGRAM(s) Oral every 4 hours PRN Severe Pain (7 - 10)  witch hazel Pads 1 Application(s) Topical three times a day PRN discomfort      Allergies    Sulf-10 (Rash; Short breath)    Intolerances        LABS:                        8.6    15.70 )-----------( 383      ( 11 Jun 2021 04:09 )             29.2     06-11    129<L>  |  92<L>  |  17  ----------------------------<  74  4.6   |  24  |  0.42<L>    Ca    9.0      11 Jun 2021 04:09  Phos  1.8     06-11  Mg     2.3     06-11      PT/INR - ( 11 Jun 2021 04:09 )   PT: 15.5 sec;   INR: 1.37 ratio         PTT - ( 11 Jun 2021 04:09 )  PTT:31.1 sec      RADIOLOGY & ADDITIONAL TESTS:  Studies reviewed.

## 2021-06-11 NOTE — PROGRESS NOTE ADULT - ASSESSMENT
55yo Female w/ newly diagnosed Stage IV Endometrial Cancer and poorly controlled DM II presents to the ED with SOB, Abdominal pain/distention x2 weeks. CT A/P showed large volume ascites s/p diagnostic and therapeutic paracentesis (4L removed) c/b neutrocytic ascites on IV CTX. Found to have age indeterminate non occlusive L. mid femoral and peroneal veins DVT on therapeutic lovenox.   Nephrology called for hyponatremia.   partial SBO.     Hypo-osmolar Hypervolemic Hyponatremia-  Upon review of [facility] pt's serum Na was 130 on 6/2/21. Currently Na 129  Posm 274; Uosm 663; Mark < 20;   Monitor I & Os  increased urena   albumin if pt agrees   F/U BMP  continue  low dose ethacrynic acid   potential paracentesis today  will follow intermittently

## 2021-06-11 NOTE — PROGRESS NOTE ADULT - SUBJECTIVE AND OBJECTIVE BOX
[facility] DIVISION OF KIDNEY DISEASES AND HYPERTENSION -- FOLLOW UP NOTE  EDITH Fellow pager # [phone]  Nephrology office # [phone]  -----------------------------------------------------------------------------  Chief Complaint:/subjective:  HPI:  57yo AA Female pmhx of DM II (currently on DM diet, denies taking her insulin), migraines and newly diagnosed stage 4 endometrial cancer presents to the ED with increasing abdominal pain/distention and shortness of breath for 2 weeks. Pt is scheduled to start Chemotherapy on Friday, 6/4. Patient was taking Tramadol for the pain but reported minimal relief so she stopped taking it. Pt denies the use of other medications to alleviate the symptoms. She states that pain is increased with movement and manipulation, received morphine in ED with improvement.  Pt denies N/V/D, fevers, chills, malaise, night sweats, chest pain or palpitations. Patient reports increasing shortness of breathe naa with exertion that she correlates with her abdominal distention. She denies having significant SOB in the past. She states that the SOB was not brought on by physical activity or trauma. Last BM this AM.    (01 Jun 2021 16:27)          24 hour events:  awaiting for possible Paracentesis today           ALLERGIES & MEDICATIONS  --------------------------------------------------------------------------------  Allergies    Sulf-10 (Rash; Short breath)    Intolerances      Standing Inpatient Medications  dextrose 40% Gel 15 Gram(s) Oral once  dextrose 5%. 1000 milliLiter(s) IV Continuous <Continuous>  dextrose 5%. 1000 milliLiter(s) IV Continuous <Continuous>  dextrose 50% Injectable 25 Gram(s) IV Push once  dextrose 50% Injectable 12.5 Gram(s) IV Push once  dextrose 50% Injectable 25 Gram(s) IV Push once  ethacrynic acid 25 milliGRAM(s) Oral daily  glucagon  Injectable 1 milliGRAM(s) IntraMuscular once  heparin  Infusion.  Unit(s)/Hr IV Continuous <Continuous>  insulin glargine Injectable (LANTUS) 28 Unit(s) SubCutaneous at bedtime  insulin lispro (ADMELOG) corrective regimen sliding scale   SubCutaneous three times a day before meals  insulin lispro (ADMELOG) corrective regimen sliding scale   SubCutaneous at bedtime  insulin lispro Injectable (ADMELOG) 7 Unit(s) SubCutaneous three times a day before meals  magnesium hydroxide Suspension 30 milliLiter(s) Oral daily  morphine ER Tablet 15 milliGRAM(s) Oral two times a day  polyethylene glycol 3350 17 Gram(s) Oral daily  senna 2 Tablet(s) Oral at bedtime  urea Oral Powder 15 Gram(s) Oral two times a day    PRN Inpatient Medications  acetaminophen   Tablet .. 650 milliGRAM(s) Oral every 6 hours PRN  heparin   Injectable 6000 Unit(s) IV Push every 6 hours PRN  heparin   Injectable 3000 Unit(s) IV Push every 6 hours PRN  oxyCODONE    IR 5 milliGRAM(s) Oral every 4 hours PRN  witch hazel Pads 1 Application(s) Topical three times a day PRN      REVIEW OF SYSTEMS  --------------------------------------------------------------------------------  Gen: No  fevers/chills  Skin: No rashes  Respiratory: + SOB  CV: No chest pain,   GI: No abdominal pain  :   -oliguria   MSK: No joint pain/  +++swelling;     All other systems were reviewed and are negative, except as noted.      VITALS/PHYSICAL EXAM  --------------------------------------------------------------------------------  T(C): 37.2 (06-11-21 @ 05:25), Max: 37.2 (06-11-21 @ 05:25)  HR: 94 (06-11-21 @ 05:25) (75 - 105)  BP: 126/72 (06-11-21 @ 05:25) (116/62 - 126/72)  RR: 18 (06-11-21 @ 05:25) (16 - 18)  SpO2: 99% (06-11-21 @ 05:25) (99% - 99%)  Wt(kg): --        06-10-21 @ 07:01  -  06-11-21 @ 07:00  --------------------------------------------------------  IN: 850 mL / OUT: 900 mL / NET: -50 mL      Physical Exam:  	Gen NAD  	HEENT: no JVD  	Pulm: CTABL  	CV: S1S2,  	Abd: Soft, ascitics   	Ext:  ++++ edema B/L LE   	Neuro: Awake and alert  	Skin: Warm and dry              LABS/STUDIES  --------------------------------------------------------------------------------              8.6    15.70 >-----------<  383      [06-11-21 @ 04:09]              29.2     Hemoglobin: 8.6 g/dL (06-11-21 @ 04:09)  Hemoglobin: 8.6 g/dL (06-10-21 @ 07:39)    Platelet Count - Automated: 383 K/uL (06-11-21 @ 04:09)  Platelet Count - Automated: 407 K/uL (06-10-21 @ 07:39)    129  |  92  |  17  ----------------------------<  74      [06-11-21 @ 04:09]  4.6   |  24  |  0.42        Ca     9.0     [06-11-21 @ 04:09]      Mg     2.3     [06-11-21 @ 04:09]      Phos  1.8     [06-11-21 @ 04:09]      PT/INR: PT 15.5 , INR 1.37       [06-11-21 @ 04:09]  PTT: 31.1       [06-11-21 @ 04:09]      Creatinine, Serum: 0.42 mg/dL (06-11-21 @ 04:09)  Creatinine, Serum: 0.48 mg/dL (06-10-21 @ 07:39)  Creatinine, Serum: 0.58 mg/dL (06-09-21 @ 18:13)  Creatinine, Serum: 0.69 mg/dL (06-09-21 @ 07:30)  Creatinine, Serum: 0.40 mg/dL (06-08-21 @ 18:30)  Creatinine, Serum: 0.39 mg/dL (06-08-21 @ 07:16)  Creatinine, Serum: 0.42 mg/dL (06-07-21 @ 18:30)  Creatinine, Serum: 0.39 mg/dL (06-07-21 @ 06:59)  Creatinine, Serum: 0.36 mg/dL (06-06-21 @ 20:59)  Creatinine, Serum: 0.45 mg/dL (06-06-21 @ 07:54)  Creatinine, Serum: 0.45 mg/dL (06-05-21 @ 08:11)  Creatinine, Serum: 0.39 mg/dL (06-04-21 @ 07:05)  Creatinine, Serum: 0.37 mg/dL (06-03-21 @ 07:58)  Creatinine, Serum: 0.43 mg/dL (06-02-21 @ 06:33)    SODIUM TREND:  Sodium 129 [06-11 @ 04:09]  Sodium 129 [06-10 @ 07:39]  Sodium 130 [06-09 @ 18:13]  Sodium 131 [06-09 @ 07:30]  Sodium 132 [06-08 @ 18:30]  Sodium 130 [06-08 @ 07:16]  Sodium 128 [06-07 @ 18:30]  Sodium 127 [06-07 @ 06:59]  Sodium 129 [06-06 @ 20:59]  Sodium 124 [06-06 @ 07:54]        SCr 0.42 [06-11 @ 04:09]  SCr 0.48 [06-10 @ 07:39]  SCr 0.58 [06-09 @ 18:13]  SCr 0.69 [06-09 @ 07:30]  SCr 0.40 [06-08 @ 18:30]      Urine Sodium <20      [06-06-21 @ 11:26]  Urine Osmolality 659      [06-06-21 @ 11:26]    Iron 14, TIBC 201, %sat 7      [06-02-21 @ 06:33]  Ferritin 646      [06-02-21 @ 06:33]  HbA1c 12.3      [06-02-19 @ 10:07]  Lipid: chol 115, , HDL 19, LDL --      [06-03-21 @ 07:58]

## 2021-06-11 NOTE — PROGRESS NOTE ADULT - SUBJECTIVE AND OBJECTIVE BOX
Patient is a 56y old  Female who presents with a chief complaint of Recent Endometrial Cancer, Abdominal Distention, SOB (11 Jun 2021 13:48)      SUBJECTIVE / OVERNIGHT EVENTS: Pt seen and examined at 10:50am, no overnight events, pt reports that she is having bm, reports mild abdominal pain, no nausea, vomiting or any other complaints, waiting for therapeutic paracentesis today. Per nsg she is refusing some meds. No other new issues reported.        MEDICATIONS  (STANDING):  dextrose 40% Gel 15 Gram(s) Oral once  dextrose 5%. 1000 milliLiter(s) (50 mL/Hr) IV Continuous <Continuous>  dextrose 5%. 1000 milliLiter(s) (100 mL/Hr) IV Continuous <Continuous>  dextrose 50% Injectable 25 Gram(s) IV Push once  dextrose 50% Injectable 12.5 Gram(s) IV Push once  dextrose 50% Injectable 25 Gram(s) IV Push once  ethacrynic acid 25 milliGRAM(s) Oral daily  glucagon  Injectable 1 milliGRAM(s) IntraMuscular once  heparin  Infusion.  Unit(s)/Hr (13 mL/Hr) IV Continuous <Continuous>  insulin glargine Injectable (LANTUS) 28 Unit(s) SubCutaneous at bedtime  insulin lispro (ADMELOG) corrective regimen sliding scale   SubCutaneous three times a day before meals  insulin lispro (ADMELOG) corrective regimen sliding scale   SubCutaneous at bedtime  insulin lispro Injectable (ADMELOG) 7 Unit(s) SubCutaneous three times a day before meals  magnesium hydroxide Suspension 30 milliLiter(s) Oral daily  morphine ER Tablet 15 milliGRAM(s) Oral two times a day  polyethylene glycol 3350 17 Gram(s) Oral daily  potassium phosphate / sodium phosphate Tablet (K-PHOS No. 2) 1 Tablet(s) Oral three times a day  senna 2 Tablet(s) Oral at bedtime  urea Oral Powder 15 Gram(s) Oral two times a day    MEDICATIONS  (PRN):  acetaminophen   Tablet .. 650 milliGRAM(s) Oral every 6 hours PRN Mild Pain (1 - 3), Moderate Pain (4 - 6)  heparin   Injectable 6000 Unit(s) IV Push every 6 hours PRN For aPTT less than 40  heparin   Injectable 3000 Unit(s) IV Push every 6 hours PRN For aPTT between 40 - 57  oxyCODONE    IR 5 milliGRAM(s) Oral every 4 hours PRN Severe Pain (7 - 10)  witch hazel Pads 1 Application(s) Topical three times a day PRN discomfort      Vital Signs Last 24 Hrs  T(C): 37.2 (11 Jun 2021 05:25), Max: 37.2 (11 Jun 2021 05:25)  T(F): 99 (11 Jun 2021 05:25), Max: 99 (11 Jun 2021 05:25)  HR: 94 (11 Jun 2021 05:25) (94 - 105)  BP: 126/72 (11 Jun 2021 05:25) (122/60 - 126/72)  BP(mean): --  RR: 18 (11 Jun 2021 05:25) (17 - 18)  SpO2: 99% (11 Jun 2021 05:25) (99% - 99%)  CAPILLARY BLOOD GLUCOSE      POCT Blood Glucose.: 117 mg/dL (11 Jun 2021 14:08)  POCT Blood Glucose.: 82 mg/dL (11 Jun 2021 13:35)  POCT Blood Glucose.: 60 mg/dL (11 Jun 2021 13:18)  POCT Blood Glucose.: 63 mg/dL (11 Jun 2021 13:15)  POCT Blood Glucose.: 88 mg/dL (11 Jun 2021 08:29)  POCT Blood Glucose.: 157 mg/dL (10 Terry 2021 21:54)  POCT Blood Glucose.: 84 mg/dL (10 Terry 2021 17:53)    I&O's Summary    10 Terry 2021 07:01  -  11 Jun 2021 07:00  --------------------------------------------------------  IN: 850 mL / OUT: 900 mL / NET: -50 mL        PHYSICAL EXAM:  GENERAL: cachectic  CHEST/LUNG: Clear to auscultation bilaterally; No wheeze  HEART: mild tachycardia to 104/mt  ABDOMEN: soft, distended, mild diffuse tenderness  EXTREMITIES:  +LE edema L>R  PSYCH: Calm  NEUROLOGY: AAOx3  SKIN: No rashes or lesions    LABS:                        8.6    15.70 )-----------( 383      ( 11 Jun 2021 04:09 )             29.2     06-11    129<L>  |  92<L>  |  17  ----------------------------<  74  4.6   |  24  |  0.42<L>    Ca    9.0      11 Jun 2021 04:09  Phos  1.8     06-11  Mg     2.3     06-11      PT/INR - ( 11 Jun 2021 04:09 )   PT: 15.5 sec;   INR: 1.37 ratio         PTT - ( 11 Jun 2021 04:09 )  PTT:31.1 sec          RADIOLOGY & ADDITIONAL TESTS:    Imaging Personally Reviewed:    Consultant(s) Notes Reviewed:      Care Discussed with Consultants/Other Providers:

## 2021-06-11 NOTE — PROGRESS NOTE ADULT - SUBJECTIVE AND OBJECTIVE BOX
Chief Complaint: DM2    History: hypoglycemia event prelunch  patient says she doesn't like the food provided so not eating well  denies hypo symptoms    MEDICATIONS  (STANDING):  dextrose 40% Gel 15 Gram(s) Oral once  dextrose 5%. 1000 milliLiter(s) (50 mL/Hr) IV Continuous <Continuous>  dextrose 5%. 1000 milliLiter(s) (100 mL/Hr) IV Continuous <Continuous>  dextrose 50% Injectable 25 Gram(s) IV Push once  dextrose 50% Injectable 12.5 Gram(s) IV Push once  dextrose 50% Injectable 25 Gram(s) IV Push once  enoxaparin Injectable 70 milliGRAM(s) SubCutaneous two times a day  ethacrynic acid 25 milliGRAM(s) Oral daily  glucagon  Injectable 1 milliGRAM(s) IntraMuscular once  insulin glargine Injectable (LANTUS) 25 Unit(s) SubCutaneous at bedtime  insulin lispro (ADMELOG) corrective regimen sliding scale   SubCutaneous three times a day before meals  insulin lispro (ADMELOG) corrective regimen sliding scale   SubCutaneous at bedtime  insulin lispro Injectable (ADMELOG) 5 Unit(s) SubCutaneous three times a day before meals  magnesium hydroxide Suspension 30 milliLiter(s) Oral daily  morphine ER Tablet 15 milliGRAM(s) Oral two times a day  polyethylene glycol 3350 17 Gram(s) Oral daily  potassium phosphate / sodium phosphate Tablet (K-PHOS No. 2) 1 Tablet(s) Oral three times a day  senna 2 Tablet(s) Oral at bedtime  urea Oral Powder 15 Gram(s) Oral two times a day    MEDICATIONS  (PRN):  acetaminophen   Tablet .. 650 milliGRAM(s) Oral every 6 hours PRN Mild Pain (1 - 3), Moderate Pain (4 - 6)  oxyCODONE    IR 5 milliGRAM(s) Oral every 4 hours PRN Severe Pain (7 - 10)  witch hazel Pads 1 Application(s) Topical three times a day PRN discomfort      Allergies    Sulf-10 (Rash; Short breath)    Intolerances      Review of Systems:  ALL OTHER SYSTEMS REVIEWED AND NEGATIVE    PHYSICAL EXAM:  VITALS: T(C): 36.3 (06-11-21 @ 14:42)  T(F): 97.4 (06-11-21 @ 14:42), Max: 99 (06-11-21 @ 05:25)  HR: 100 (06-11-21 @ 14:42) (94 - 105)  BP: 116/65 (06-11-21 @ 14:42) (116/65 - 126/72)  RR:  (16 - 18)  SpO2:  (99% - 99%)  Wt(kg): --  GENERAL: NAD, well-groomed, well-developed  EYES: No proptosis, no lid lag, anicteric  HEENT:  Atraumatic, Normocephalic, moist mucous membranes  RESPIRATORY: nonlabored respirations, no wheezing  PSYCH: Alert and oriented x 3, normal affect, normal mood    CAPILLARY BLOOD GLUCOSE      POCT Blood Glucose.: 117 mg/dL (11 Jun 2021 14:08)  POCT Blood Glucose.: 82 mg/dL (11 Jun 2021 13:35)  POCT Blood Glucose.: 60 mg/dL (11 Jun 2021 13:18)  POCT Blood Glucose.: 63 mg/dL (11 Jun 2021 13:15)  POCT Blood Glucose.: 88 mg/dL (11 Jun 2021 08:29)  POCT Blood Glucose.: 157 mg/dL (10 Terry 2021 21:54)  POCT Blood Glucose.: 84 mg/dL (10 Terry 2021 17:53)      06-11    129<L>  |  92<L>  |  17  ----------------------------<  74  4.6   |  24  |  0.42<L>    EGFR if : 131  EGFR if non : 113    Ca    9.0      06-11  Mg     2.3     06-11  Phos  1.8     06-11        A1C with Estimated Average Glucose Result: 9.6 % (06-02-21 @ 06:33)      Thyroid Function Tests:

## 2021-06-11 NOTE — PROGRESS NOTE ADULT - SUBJECTIVE AND OBJECTIVE BOX
SUBJECTIVE AND OBJECTIVE:  pt confused.  telling me and onc PA that she is taking her morphine but nurse is stating she is refusing.  denies pain.    INTERVAL HPI/OVERNIGHT EVENTS:    DNR on chart:   Allergies    Sulf-10 (Rash; Short breath)    Intolerances    MEDICATIONS  (STANDING):  dextrose 40% Gel 15 Gram(s) Oral once  dextrose 5%. 1000 milliLiter(s) (50 mL/Hr) IV Continuous <Continuous>  dextrose 5%. 1000 milliLiter(s) (100 mL/Hr) IV Continuous <Continuous>  dextrose 50% Injectable 25 Gram(s) IV Push once  dextrose 50% Injectable 12.5 Gram(s) IV Push once  dextrose 50% Injectable 25 Gram(s) IV Push once  ethacrynic acid 25 milliGRAM(s) Oral daily  glucagon  Injectable 1 milliGRAM(s) IntraMuscular once  heparin  Infusion.  Unit(s)/Hr (13 mL/Hr) IV Continuous <Continuous>  insulin glargine Injectable (LANTUS) 28 Unit(s) SubCutaneous at bedtime  insulin lispro (ADMELOG) corrective regimen sliding scale   SubCutaneous three times a day before meals  insulin lispro (ADMELOG) corrective regimen sliding scale   SubCutaneous at bedtime  insulin lispro Injectable (ADMELOG) 7 Unit(s) SubCutaneous three times a day before meals  magnesium hydroxide Suspension 30 milliLiter(s) Oral daily  morphine  - Injectable 2 milliGRAM(s) IV Push once  morphine ER Tablet 15 milliGRAM(s) Oral two times a day  polyethylene glycol 3350 17 Gram(s) Oral daily  senna 2 Tablet(s) Oral at bedtime  urea Oral Powder 15 Gram(s) Oral two times a day    MEDICATIONS  (PRN):  acetaminophen   Tablet .. 650 milliGRAM(s) Oral every 6 hours PRN Mild Pain (1 - 3), Moderate Pain (4 - 6)  heparin   Injectable 6000 Unit(s) IV Push every 6 hours PRN For aPTT less than 40  heparin   Injectable 3000 Unit(s) IV Push every 6 hours PRN For aPTT between 40 - 57  oxyCODONE    IR 5 milliGRAM(s) Oral every 4 hours PRN Severe Pain (7 - 10)  witch hazel Pads 1 Application(s) Topical three times a day PRN discomfort      ITEMS UNCHECKED ARE NOT PRESENT    PRESENT SYMPTOMS: [ ]Unable to obtain due to poor mentation   Source if other than patient:  [ ]Family   [ ]Team     Pain (Impact on QOL):  denies  Location:  Minimal acceptable level (0-10 scale):            Aggravating factors:  Quality:  Radiation:  Severity (0-10 scale):    Timing:    Dyspnea:                           [ ]Mild [ ]Moderate [ ]Severe  Anxiety:                             [ ]Mild [ ]Moderate [ ]Severe  Fatigue:                             [ ]Mild [x ]Moderate [ ]Severe  Nausea:                             [ ]Mild [ ]Moderate [ ]Severe  Loss of appetite:              [ ]Mild [ ]Moderate [ ]Severe  Constipation:                    [ ]Mild [x ]Moderate [ ]Severe    PAIN AD Score:	  [email or web address] (Ctrl + left click to view)    Other Symptoms:  [x ]All other review of systems negative     Karnofsky Performance Score/Palliative Performance Status Version 2:   50      %    http://palliative.info/resource_material/[email or web address].pdf  PHYSICAL EXAM:  Vital Signs Last 24 Hrs  T(C): 37.2 (11 Jun 2021 05:25), Max: 37.2 (11 Jun 2021 05:25)  T(F): 99 (11 Jun 2021 05:25), Max: 99 (11 Jun 2021 05:25)  HR: 94 (11 Jun 2021 05:25) (75 - 105)  BP: 126/72 (11 Jun 2021 05:25) (116/62 - 126/72)  BP(mean): --  RR: 18 (11 Jun 2021 05:25) (16 - 18)  SpO2: 99% (11 Jun 2021 05:25) (99% - 99%) I&O's Summary    10 Terry 2021 07:01  -  11 Jun 2021 07:00  --------------------------------------------------------  IN: 850 mL / OUT: 900 mL / NET: -50 mL     GENERAL:  [x ]Alert  [x ]Oriented x 2  [ ]Lethargic  [ ]Cachexia  [ ]Unarousable  [ ]Verbal  [ ]Non-Verbal    Behavioral:   [ ] Anxiety  [ x] Delirium [ ] Agitation [ ] Other    HEENT:  [ ]Normal   [ x]Dry mouth   [ ]ET Tube/Trach  [ ]Oral lesions    PULMONARY:   [x ]Clear [ ]Tachypnea  [ ]Audible excessive secretions   [ ]Rhonchi        [ ]Right [ ]Left [ ]Bilateral  [ ]Crackles        [ ]Right [ ]Left [ ]Bilateral  [ ]Wheezing     [ ]Right [ ]Left [ ]Bilateral    CARDIOVASCULAR:    [ ]Regular [ ]Irregular [x ]Tachy  [ ]Glenn [ ]Murmur [ ]Other    GASTROINTESTINAL:  [ ]Soft  [x ]Distended   [ x]+BS  [ ]Non tender [ ]Tender  [ ]PEG [ ]OGT/ NGT   Last BM:    GENITOURINARY:  [x ]Normal [ ] Incontinent   [ ]Oliguria/Anuria   [ ]Angeles    MUSCULOSKELETAL:   [ ]Normal   [x ]Weakness  [ ]Bed/Wheelchair bound [ ]Edema    NEUROLOGIC:   [ ]No focal deficits  [ x] Cognitive impairment  [ ] Dysphagia [ ]Dysarthria [ ] Paresis [ ]Other     SKIN:   [x ]Normal   [ ]Pressure ulcer(s)  [ ]Rash    CRITICAL CARE:  [ ] Shock Present  [ ]Septic [ ]Cardiogenic [ ]Neurologic [ ]Hypovolemic  [ ]  Vasopressors [ ]  Inotropes   [ ] Respiratory failure present  [ ] Acute  [ ] Chronic [ ] Hypoxic  [ ] Hypercarbic [ ] Other  [ ] Other organ failure     LABS:                        8.6    15.70 )-----------( 383      ( 11 Jun 2021 04:09 )             29.2   06-11    129<L>  |  92<L>  |  17  ----------------------------<  74  4.6   |  24  |  0.42<L>    Ca    9.0      11 Jun 2021 04:09  Phos  1.8     06-11  Mg     2.3     06-11    PT/INR - ( 11 Jun 2021 04:09 )   PT: 15.5 sec;   INR: 1.37 ratio         PTT - ( 11 Jun 2021 04:09 )  PTT:31.1 sec      RADIOLOGY & ADDITIONAL STUDIES:    Protein Calorie Malnutrition Present: [ ] yes [ ] no  [ ] PPSV2 < or = 30%  [ ] significant weight loss [ ] poor nutritional intake [ ] anasarca [ ] catabolic state Albumin, Serum: 3.1 g/dL (06-07-21 @ 18:30)  Artificial Nutrition [ ]     REFERRALS:   [ ]Chaplaincy  [ ] Hospice  [ ]Child Life  [ ]Social Work  [ ]Case management [ ]Holistic Therapy   Goals of Care Document:

## 2021-06-11 NOTE — PROGRESS NOTE ADULT - ASSESSMENT
56 year old woman with PMH uncontrolled DM2, A1c 9.6%, migraines and newly diagnosed stage 4 endometrial cancer presented to the ED with increasing abdominal pain/distention and shortness of breath for 2 weeks. Endocrine following for management of uncontrolled DM2. BG goal 100-180 mg/dL.    1.  Uncontrolled type 2 diabetes mellitus with hyperglycemia.   - HbA1c 9.6%, not on treatment at home  - Hypoglycemia today, likely related to not eating as much.  -Discussed with RN re hypoglycemia protocol provide juice 4 oz and repeat FS in 15 min  -Held lunch premeal dose.  -Reduce Lantus to 25 units at bedtime  - Reduce Admelog to 5 units TIDAC  - low correction scale qac and low qhs scale  - consistent carb diet  - check FS qac and qhs    - for discharge: ideally would discharge on basal insulin + oral agent such as Metformin, however patient declines treatment with orals and would like to start with once daily basal insulin for now. Thus, will plan on discharging on Lantus (or whichever basal insulin is covered), dose TBD. Send script for Lantus pen 25 units qhs to assess coverage    2.  Essential hypertension.    - BP at goal, less than 130/80  - continue to monitor.     3.  Hyperlipidemia, unspecified hyperlipidemia type.    - LDL 59  - okay to defer statin     Plan reviewed with ACP Michelle Giang MD  Division of Endocrinology  Pager: [phone]    If after 6PM or before 9AM, or on weekends/holidays, please call endocrine answering service for assistance ([phone]).  For nonurgent matters email [email or web address] for assistance.

## 2021-06-11 NOTE — PROGRESS NOTE ADULT - PROBLEM SELECTOR PLAN 2
s/p 4L drainage  now with sbp- iv abx for 7 days  pt to have paracentesis today of  abdomen to help with pain and constipation  consider placing pigtail catheter for future needs

## 2021-06-11 NOTE — PROGRESS NOTE ADULT - PROBLEM SELECTOR PLAN 1
s/p diagnostic and therapeutic (4L fluid removal) 6/2  - SAAG 0.2 suggesting malignant ascites although cell count w/ PMNs 844 (adjusted for 19K RBCs)  - given neutrocytic ascites will empirically tx with IV CTX 2g daily x 5 days (6/2 - 6/8),  -completed ceftriaxone  -  PERITONEAL FLUID, POSITIVE FOR MALIGNANT CELLS. Cytomorphologically consistent with mucinou adenocarcinoma  Pain control with Morphine ER,  and oxy IR, appreciate pall recs  - therapeutic tap planned for today, restart ac when appropriate after tap  - onc and pall care follg,

## 2021-06-11 NOTE — PROGRESS NOTE ADULT - PROBLEM SELECTOR PLAN 2
Pt with worsening hyponatremia, appears to be hypervolemic given ascites but with intravascular depletion.   -  nephrology consulted and follg  - per nephro cont ethacrynic acid at low dose per renal recs  - cont to monitor Na, f/u renal recs

## 2021-06-11 NOTE — PROGRESS NOTE ADULT - PROBLEM SELECTOR PLAN 1
continue ms contin 15mg bid with  oxy ir 5mg po q 4 hours prn- if she continues to refuse, can discontinue ms contin and continue oxy ir prn  bowel regimen senna- miralax

## 2021-06-11 NOTE — PROVIDER CONTACT NOTE (HYPOGLYCEMIA EVENT) - NS PROVIDER CONTACT BACKGROUND-HYPO
Age: 56y    Gender: Female    POCT Blood Glucose:  99 mg/dL (06-11-21 @ 18:41)  117 mg/dL (06-11-21 @ 14:08)  82 mg/dL (06-11-21 @ 13:35)  60 mg/dL (06-11-21 @ 13:18)  63 mg/dL (06-11-21 @ 13:15)  88 mg/dL (06-11-21 @ 08:29)  157 mg/dL (06-10-21 @ 21:54)      eMAR:  insulin glargine Injectable (LANTUS)   28 Unit(s) SubCutaneous (06-10-21 @ 22:39)    insulin lispro Injectable (ADMELOG)   7 Unit(s) SubCutaneous (06-11-21 @ 08:52)    insulin lispro Injectable (ADMELOG)   5 Unit(s) SubCutaneous (06-11-21 @ 18:55)

## 2021-06-11 NOTE — PROGRESS NOTE ADULT - ASSESSMENT
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   Course c/b large ascites, SBP, partial bowel obstruction.    GOC discussed with patient and her sisters in detail. Poor prognosis and incurable nature of disease was explained. Pt is requesting to continue aggressive care.     -Therapeutic paracentesis, 4200ml removed, high PMN in ascites, s/p tx for SBP with IV CTX 2g daily x 7 days  -It seems like patient has reaccumulated, s/p repeat paracentesis today 2L removed,  may resume Lovenox s/p r procedure  -Pt with partial bowel obstruction, had BM and feels better.  -Pal care input appreciated  -Will hold inpatient chemotherapy for now pending improvement of acute issues. Will monitor pt over the weekend, likely inpt chemo early next week if patient remain stable  -Noted to have RLE nonocclusive DVT located at mid-femur on recent dopplers ( 6/4). Started on Lovenox.  -Patient to followup with Dr. Hoang ([facility]) upon discharge  -C/w Supportive care, pain control, Nutrition, PT, DVT ppx  -Oncology will continue to follow with you    Case d/w Dr. Christine HORN  Oncology Physician Assistant  Carina SHARMA/[facility]  Pager [phone]    If after 5pm or weekends please page On-call Oncology Fellow

## 2021-06-12 LAB
ANION GAP SERPL CALC-SCNC: 15 MMOL/L — HIGH (ref 7–14)
BASOPHILS # BLD AUTO: 0.03 K/UL — SIGNIFICANT CHANGE UP (ref 0–0.2)
BASOPHILS NFR BLD AUTO: 0.2 % — SIGNIFICANT CHANGE UP (ref 0–2)
BUN SERPL-MCNC: 14 MG/DL — SIGNIFICANT CHANGE UP (ref 7–23)
CALCIUM SERPL-MCNC: 8.5 MG/DL — SIGNIFICANT CHANGE UP (ref 8.4–10.5)
CHLORIDE SERPL-SCNC: 92 MMOL/L — LOW (ref 98–107)
CO2 SERPL-SCNC: 23 MMOL/L — SIGNIFICANT CHANGE UP (ref 22–31)
CREAT SERPL-MCNC: 0.36 MG/DL — LOW (ref 0.5–1.3)
EOSINOPHIL # BLD AUTO: 0.01 K/UL — SIGNIFICANT CHANGE UP (ref 0–0.5)
EOSINOPHIL NFR BLD AUTO: 0.1 % — SIGNIFICANT CHANGE UP (ref 0–6)
GLUCOSE BLDC GLUCOMTR-MCNC: 109 MG/DL — HIGH (ref 70–99)
GLUCOSE BLDC GLUCOMTR-MCNC: 124 MG/DL — HIGH (ref 70–99)
GLUCOSE BLDC GLUCOMTR-MCNC: 138 MG/DL — HIGH (ref 70–99)
GLUCOSE BLDC GLUCOMTR-MCNC: 141 MG/DL — HIGH (ref 70–99)
GLUCOSE BLDC GLUCOMTR-MCNC: 78 MG/DL — SIGNIFICANT CHANGE UP (ref 70–99)
GLUCOSE SERPL-MCNC: 64 MG/DL — LOW (ref 70–99)
HCT VFR BLD CALC: 30.5 % — LOW (ref 34.5–45)
HGB BLD-MCNC: 8.9 G/DL — LOW (ref 11.5–15.5)
IANC: 12.63 K/UL — HIGH (ref 1.5–8.5)
IMM GRANULOCYTES NFR BLD AUTO: 1.3 % — SIGNIFICANT CHANGE UP (ref 0–1.5)
LYMPHOCYTES # BLD AUTO: 1.03 K/UL — SIGNIFICANT CHANGE UP (ref 1–3.3)
LYMPHOCYTES # BLD AUTO: 6.7 % — LOW (ref 13–44)
MAGNESIUM SERPL-MCNC: 2 MG/DL — SIGNIFICANT CHANGE UP (ref 1.6–2.6)
MCHC RBC-ENTMCNC: 20.6 PG — LOW (ref 27–34)
MCHC RBC-ENTMCNC: 29.2 GM/DL — LOW (ref 32–36)
MCV RBC AUTO: 70.4 FL — LOW (ref 80–100)
MONOCYTES # BLD AUTO: 1.43 K/UL — HIGH (ref 0–0.9)
MONOCYTES NFR BLD AUTO: 9.3 % — SIGNIFICANT CHANGE UP (ref 2–14)
NEUTROPHILS # BLD AUTO: 12.63 K/UL — HIGH (ref 1.8–7.4)
NEUTROPHILS NFR BLD AUTO: 82.4 % — HIGH (ref 43–77)
NRBC # BLD: 0 /100 WBCS — SIGNIFICANT CHANGE UP
NRBC # FLD: 0 K/UL — SIGNIFICANT CHANGE UP
PHOSPHATE SERPL-MCNC: 2.9 MG/DL — SIGNIFICANT CHANGE UP (ref 2.5–4.5)
PLATELET # BLD AUTO: 341 K/UL — SIGNIFICANT CHANGE UP (ref 150–400)
POTASSIUM SERPL-MCNC: 4.1 MMOL/L — SIGNIFICANT CHANGE UP (ref 3.5–5.3)
POTASSIUM SERPL-SCNC: 4.1 MMOL/L — SIGNIFICANT CHANGE UP (ref 3.5–5.3)
RBC # BLD: 4.33 M/UL — SIGNIFICANT CHANGE UP (ref 3.8–5.2)
RBC # FLD: 19.9 % — HIGH (ref 10.3–14.5)
SODIUM SERPL-SCNC: 130 MMOL/L — LOW (ref 135–145)
WBC # BLD: 15.33 K/UL — HIGH (ref 3.8–10.5)
WBC # FLD AUTO: 15.33 K/UL — HIGH (ref 3.8–10.5)

## 2021-06-12 PROCEDURE — 99233 SBSQ HOSP IP/OBS HIGH 50: CPT

## 2021-06-12 PROCEDURE — 99232 SBSQ HOSP IP/OBS MODERATE 35: CPT

## 2021-06-12 RX ORDER — INSULIN LISPRO 100/ML
4 VIAL (ML) SUBCUTANEOUS
Refills: 0 | Status: DISCONTINUED | OUTPATIENT
Start: 2021-06-12 | End: 2021-06-16

## 2021-06-12 RX ORDER — INSULIN GLARGINE 100 [IU]/ML
21 INJECTION, SOLUTION SUBCUTANEOUS AT BEDTIME
Refills: 0 | Status: DISCONTINUED | OUTPATIENT
Start: 2021-06-12 | End: 2021-06-13

## 2021-06-12 RX ADMIN — Medication 15 GRAM(S): at 06:27

## 2021-06-12 RX ADMIN — Medication 4 UNIT(S): at 18:21

## 2021-06-12 RX ADMIN — Medication 1 TABLET(S): at 15:20

## 2021-06-12 RX ADMIN — Medication 15 GRAM(S): at 18:22

## 2021-06-12 RX ADMIN — MORPHINE SULFATE 15 MILLIGRAM(S): 50 CAPSULE, EXTENDED RELEASE ORAL at 00:01

## 2021-06-12 RX ADMIN — ENOXAPARIN SODIUM 70 MILLIGRAM(S): 100 INJECTION SUBCUTANEOUS at 06:27

## 2021-06-12 RX ADMIN — MORPHINE SULFATE 15 MILLIGRAM(S): 50 CAPSULE, EXTENDED RELEASE ORAL at 12:20

## 2021-06-12 RX ADMIN — Medication 4 UNIT(S): at 12:17

## 2021-06-12 RX ADMIN — Medication 1 TABLET(S): at 06:27

## 2021-06-12 RX ADMIN — OXYCODONE HYDROCHLORIDE 5 MILLIGRAM(S): 5 TABLET ORAL at 06:38

## 2021-06-12 RX ADMIN — ENOXAPARIN SODIUM 70 MILLIGRAM(S): 100 INJECTION SUBCUTANEOUS at 18:21

## 2021-06-12 RX ADMIN — ETHACRYNIC ACID 25 MILLIGRAM(S): 25 TABLET ORAL at 06:27

## 2021-06-12 RX ADMIN — OXYCODONE HYDROCHLORIDE 5 MILLIGRAM(S): 5 TABLET ORAL at 07:38

## 2021-06-12 RX ADMIN — MORPHINE SULFATE 15 MILLIGRAM(S): 50 CAPSULE, EXTENDED RELEASE ORAL at 12:17

## 2021-06-12 RX ADMIN — INSULIN GLARGINE 21 UNIT(S): 100 INJECTION, SOLUTION SUBCUTANEOUS at 22:22

## 2021-06-12 NOTE — PROGRESS NOTE ADULT - SUBJECTIVE AND OBJECTIVE BOX
Chief Complaint: DM2    History:   patient says she doesn't like the food provided so not eating well but appetite has gotten a little better and is eating about 50% of meals  denies hypo symptoms    MEDICATIONS  (STANDING):  dextrose 40% Gel 15 Gram(s) Oral once  dextrose 5%. 1000 milliLiter(s) (50 mL/Hr) IV Continuous <Continuous>  dextrose 5%. 1000 milliLiter(s) (100 mL/Hr) IV Continuous <Continuous>  dextrose 50% Injectable 25 Gram(s) IV Push once  dextrose 50% Injectable 12.5 Gram(s) IV Push once  dextrose 50% Injectable 25 Gram(s) IV Push once  enoxaparin Injectable 70 milliGRAM(s) SubCutaneous two times a day  ethacrynic acid 25 milliGRAM(s) Oral daily  glucagon  Injectable 1 milliGRAM(s) IntraMuscular once  insulin glargine Injectable (LANTUS) 25 Unit(s) SubCutaneous at bedtime  insulin lispro (ADMELOG) corrective regimen sliding scale   SubCutaneous three times a day before meals  insulin lispro (ADMELOG) corrective regimen sliding scale   SubCutaneous at bedtime  insulin lispro Injectable (ADMELOG) 5 Unit(s) SubCutaneous three times a day before meals  magnesium hydroxide Suspension 30 milliLiter(s) Oral daily  morphine ER Tablet 15 milliGRAM(s) Oral two times a day  polyethylene glycol 3350 17 Gram(s) Oral daily  potassium phosphate / sodium phosphate Tablet (K-PHOS No. 2) 1 Tablet(s) Oral three times a day  senna 2 Tablet(s) Oral at bedtime  urea Oral Powder 15 Gram(s) Oral two times a day    MEDICATIONS  (PRN):  acetaminophen   Tablet .. 650 milliGRAM(s) Oral every 6 hours PRN Mild Pain (1 - 3), Moderate Pain (4 - 6)  oxyCODONE    IR 5 milliGRAM(s) Oral every 4 hours PRN Severe Pain (7 - 10)  witch hazel Pads 1 Application(s) Topical three times a day PRN discomfort      Allergies    Sulf-10 (Rash; Short breath)    Intolerances      Review of Systems:  ALL OTHER SYSTEMS REVIEWED AND NEGATIVE    Vital Signs Last 24 Hrs  T(C): 36.4 (12 Jun 2021 13:53), Max: 36.9 (11 Jun 2021 20:55)  T(F): 97.6 (12 Jun 2021 13:53), Max: 98.5 (11 Jun 2021 20:55)  HR: 104 (12 Jun 2021 13:53) (96 - 104)  BP: 102/61 (12 Jun 2021 13:53) (102/61 - 116/67)  BP(mean): --  RR: 18 (12 Jun 2021 13:53) (16 - 18)  SpO2: 97% (12 Jun 2021 13:53) (97% - 98%)  GENERAL: NAD, well-groomed, well-developed  EYES: No proptosis, no lid lag, anicteric  HEENT:  Atraumatic, Normocephalic, moist mucous membranes  RESPIRATORY: nonlabored respirations, no wheezing  PSYCH: Alert and oriented x 3, normal affect, normal mood    CAPILLARY BLOOD GLUCOSE  POCT Blood Glucose.: 138 mg/dL (12 Jun 2021 12:13)  POCT Blood Glucose.: 141 mg/dL (12 Jun 2021 10:43)  POCT Blood Glucose.: 78 mg/dL (12 Jun 2021 08:34)  POCT Blood Glucose.: 104 mg/dL (11 Jun 2021 22:03)  POCT Blood Glucose.: 99 mg/dL (11 Jun 2021 18:41)  POCT Blood Glucose.: 117 mg/dL (11 Jun 2021 14:08)  POCT Blood Glucose.: 82 mg/dL (11 Jun 2021 13:35)  POCT Blood Glucose.: 60 mg/dL (11 Jun 2021 13:18)  POCT Blood Glucose.: 63 mg/dL (11 Jun 2021 13:15)  POCT Blood Glucose.: 88 mg/dL (11 Jun 2021 08:29)  POCT Blood Glucose.: 157 mg/dL (10 Terry 2021 21:54)  POCT Blood Glucose.: 84 mg/dL (10 Terry 2021 17:53)      06-12    130<L>  |  92<L>  |  14  ----------------------------<  64<L>  4.1   |  23  |  0.36<L>    Ca    8.5      12 Jun 2021 08:44  Phos  2.9     06-12  Mg     2.0     06-12      A1C with Estimated Average Glucose Result: 9.6 % (06-02-21 @ 06:33)

## 2021-06-12 NOTE — PROGRESS NOTE ADULT - PROBLEM SELECTOR PLAN 3
imaging revealed poss obstruction  - Sx consult appreciated. pt now having bms, will monitor closely  - cont bowel regimen, monitor for bms  pt repots good freq of BMs, RN confirms

## 2021-06-12 NOTE — PROGRESS NOTE ADULT - ASSESSMENT
56 year old woman with PMH uncontrolled DM2, A1c 9.6%, migraines and newly diagnosed stage 4 endometrial cancer presented to the ED with increasing abdominal pain/distention and shortness of breath for 2 weeks. Endocrine following for management of uncontrolled DM2. BG goal 100-180 mg/dL.    1.  Uncontrolled type 2 diabetes mellitus with hyperglycemia.   - HbA1c 9.6%, not on treatment at home  - target bg 100-180 mg/dl  -Reduce Lantus to 21 units at bedtime  - Reduce Admelog to 4 units TIDAC  - low correction scale qac and low qhs scale  - consistent carb diet  - check FS qac and qhs    DM EDUCATION- please assure RN has patient self inject and also teaches insulin pens, and hypoglycemia management    - for discharge: ideally would discharge on basal insulin + oral agent such as Metformin, however patient declines treatment with orals and would like to start with once daily basal insulin for now. Thus, will plan on discharging on Lantus (or whichever basal insulin is covered), dose TBD.     Please send Lantus solsotar pen as test script to check insurance coverage.  Ok to send with current doses and update prior to d/c    If Lantus not covered- can try alternating with one of following   tresiba/basaglar/toujeo/Levemir      2.  Essential hypertension.    - BP at goal, less than 130/80  - continue to monitor.     3.  Hyperlipidemia, unspecified hyperlipidemia type.    - LDL 59  - okay to defer statin     Plan reviewed with ROS Giang MD  Division of Endocrinology  Pager: [phone]    If after 6PM or before 9AM, or on weekends/holidays, please call endocrine answering service for assistance ([phone]).  For nonurgent matters email [email or web address] for assistance. 56 year old woman with PMH uncontrolled DM2, A1c 9.6%, migraines and newly diagnosed stage 4 endometrial cancer presented to the ED with increasing abdominal pain/distention and shortness of breath for 2 weeks. Endocrine following for management of uncontrolled DM2. BG goal 100-180 mg/dL.    1.  Uncontrolled type 2 diabetes mellitus with hyperglycemia.   - HbA1c 9.6%, not on treatment at home  - target bg 100-180 mg/dl  -Reduce Lantus to 21 units at bedtime  - Reduce Admelog to 4 units TIDAC  - low correction scale qac and low qhs scale  - consistent carb diet  - check FS qac and qhs    DM EDUCATION- please assure RN has patient self inject and also teaches insulin pens, and hypoglycemia management    - for discharge: ideally would discharge on basal insulin + oral agent such as Metformin, however patient declines treatment with orals and would like to start with once daily basal insulin for now. Thus, will plan on discharging on Lantus (or whichever basal insulin is covered), dose TBD.     Please send Lantus solsotar pen as test script to check insurance coverage.  Ok to send with current doses and update prior to d/c    If Lantus not covered- can try alternating with one of following   tresiba/basaglar/toujeo/Levemir      2.  Essential hypertension.    - BP at goal, less than 130/80  - continue to monitor.     3.  Hyperlipidemia, unspecified hyperlipidemia type.    - LDL 59  - okay to defer statin

## 2021-06-12 NOTE — PROGRESS NOTE ADULT - PROBLEM SELECTOR PLAN 2
Pt with worsening hyponatremia, appears to be hypervolemic given ascites but with intravascular depletion.   -  nephrology consulted and follg  - per nephro cont ethacrynic acid at low dose per renal recs  - cont to monitor Na, f/u renal recs  level stable  asymptomatic

## 2021-06-12 NOTE — PROGRESS NOTE ADULT - SUBJECTIVE AND OBJECTIVE BOX
[facility] Division of Hospital Medicine  Nahed Bojorquez MD  Pager [phone]    Patient is a 56y old  Female who presents with a chief complaint of Recent Endometrial Cancer, Abdominal Distention, SOB    SUBJECTIVE / OVERNIGHT EVENTS: reports feeling better than yesterday + BM; eating breakfast when I saw her      MEDICATIONS  (STANDING):  dextrose 40% Gel 15 Gram(s) Oral once  dextrose 5%. 1000 milliLiter(s) (50 mL/Hr) IV Continuous <Continuous>  dextrose 5%. 1000 milliLiter(s) (100 mL/Hr) IV Continuous <Continuous>  dextrose 50% Injectable 25 Gram(s) IV Push once  dextrose 50% Injectable 12.5 Gram(s) IV Push once  dextrose 50% Injectable 25 Gram(s) IV Push once  enoxaparin Injectable 70 milliGRAM(s) SubCutaneous two times a day  ethacrynic acid 25 milliGRAM(s) Oral daily  glucagon  Injectable 1 milliGRAM(s) IntraMuscular once  insulin glargine Injectable (LANTUS) 21 Unit(s) SubCutaneous at bedtime  insulin lispro (ADMELOG) corrective regimen sliding scale   SubCutaneous three times a day before meals  insulin lispro (ADMELOG) corrective regimen sliding scale   SubCutaneous at bedtime  insulin lispro Injectable (ADMELOG) 4 Unit(s) SubCutaneous three times a day before meals  magnesium hydroxide Suspension 30 milliLiter(s) Oral daily  morphine ER Tablet 15 milliGRAM(s) Oral two times a day  polyethylene glycol 3350 17 Gram(s) Oral daily  potassium phosphate / sodium phosphate Tablet (K-PHOS No. 2) 1 Tablet(s) Oral three times a day  senna 2 Tablet(s) Oral at bedtime  urea Oral Powder 15 Gram(s) Oral two times a day    MEDICATIONS  (PRN):  acetaminophen   Tablet .. 650 milliGRAM(s) Oral every 6 hours PRN Mild Pain (1 - 3), Moderate Pain (4 - 6)  oxyCODONE    IR 5 milliGRAM(s) Oral every 4 hours PRN Severe Pain (7 - 10)  witch hazel Pads 1 Application(s) Topical three times a day PRN discomfort      CAPILLARY BLOOD GLUCOSE  POCT Blood Glucose.: 138 mg/dL (12 Jun 2021 12:13)  POCT Blood Glucose.: 141 mg/dL (12 Jun 2021 10:43)  POCT Blood Glucose.: 78 mg/dL (12 Jun 2021 08:34)  POCT Blood Glucose.: 104 mg/dL (11 Jun 2021 22:03)  POCT Blood Glucose.: 99 mg/dL (11 Jun 2021 18:41)  POCT Blood Glucose.: 117 mg/dL (11 Jun 2021 14:08)  POCT Blood Glucose.: 82 mg/dL (11 Jun 2021 13:35)        PHYSICAL EXAM:  Vital Signs Last 24 Hrs  T(F): 97.7 (12 Jun 2021 06:18), Max: 98.5 (11 Jun 2021 20:55)  HR: 96 (12 Jun 2021 06:18) (96 - 101)  BP: 110/69 (12 Jun 2021 06:18) (110/69 - 116/67)  RR: 18 (12 Jun 2021 06:18) (16 - 18)  SpO2: 97% (12 Jun 2021 06:18) (97% - 99%)    CONSTITUTIONAL: NAD, appears comfortable  ENMT: Moist oral mucosa  RESPIRATORY: Normal respiratory effort; grossly b/l AE  CARDIOVASCULAR: Regular rate and rhythm; No lower extremity edema;   ABDOMEN: Nontender to palpation, normoactive bowel sounds, distended, soft, +fluid wave  MUSCULOSKELETAL: no joint swelling or tenderness to palpation  PSYCH: affect appropriate  NEUROLOGY: no gross sensory deficits       LABS:                        8.9    15.33 )-----------( 341      ( 12 Jun 2021 09:11 )             30.5     06-12    130<L>  |  92<L>  |  14  ----------------------------<  64<L>  4.1   |  23  |  0.36<L>    Ca    8.5      12 Jun 2021 08:44  Phos  2.9     06-12  Mg     2.0     06-12

## 2021-06-12 NOTE — PROGRESS NOTE ADULT - PROBLEM SELECTOR PLAN 1
s/p diagnostic and therapeutic (4L fluid removal) 6/2  -completed ceftriaxone  -  PERITONEAL FLUID, POSITIVE FOR MALIGNANT CELLS. Cytomorphologically consistent with mucinous adenocarcinoma  Pain control with Morphine ER,  and oxy IR, appreciate pall recs  -s/p  therapeutic tap planned 6/11, 2L removed  - onc and pall care follg,

## 2021-06-13 LAB
ANION GAP SERPL CALC-SCNC: 14 MMOL/L — SIGNIFICANT CHANGE UP (ref 7–14)
ANION GAP SERPL CALC-SCNC: 14 MMOL/L — SIGNIFICANT CHANGE UP (ref 7–14)
BASOPHILS # BLD AUTO: 0.03 K/UL — SIGNIFICANT CHANGE UP (ref 0–0.2)
BASOPHILS NFR BLD AUTO: 0.2 % — SIGNIFICANT CHANGE UP (ref 0–2)
BLD GP AB SCN SERPL QL: NEGATIVE — SIGNIFICANT CHANGE UP
BUN SERPL-MCNC: 14 MG/DL — SIGNIFICANT CHANGE UP (ref 7–23)
BUN SERPL-MCNC: 17 MG/DL — SIGNIFICANT CHANGE UP (ref 7–23)
CALCIUM SERPL-MCNC: 8.3 MG/DL — LOW (ref 8.4–10.5)
CALCIUM SERPL-MCNC: 8.6 MG/DL — SIGNIFICANT CHANGE UP (ref 8.4–10.5)
CHLORIDE SERPL-SCNC: 91 MMOL/L — LOW (ref 98–107)
CHLORIDE SERPL-SCNC: 92 MMOL/L — LOW (ref 98–107)
CO2 SERPL-SCNC: 21 MMOL/L — LOW (ref 22–31)
CO2 SERPL-SCNC: 25 MMOL/L — SIGNIFICANT CHANGE UP (ref 22–31)
CREAT SERPL-MCNC: 0.33 MG/DL — LOW (ref 0.5–1.3)
CREAT SERPL-MCNC: 0.44 MG/DL — LOW (ref 0.5–1.3)
EOSINOPHIL # BLD AUTO: 0.01 K/UL — SIGNIFICANT CHANGE UP (ref 0–0.5)
EOSINOPHIL NFR BLD AUTO: 0.1 % — SIGNIFICANT CHANGE UP (ref 0–6)
GLUCOSE BLDC GLUCOMTR-MCNC: 116 MG/DL — HIGH (ref 70–99)
GLUCOSE BLDC GLUCOMTR-MCNC: 158 MG/DL — HIGH (ref 70–99)
GLUCOSE BLDC GLUCOMTR-MCNC: 169 MG/DL — HIGH (ref 70–99)
GLUCOSE BLDC GLUCOMTR-MCNC: 85 MG/DL — SIGNIFICANT CHANGE UP (ref 70–99)
GLUCOSE SERPL-MCNC: 161 MG/DL — HIGH (ref 70–99)
GLUCOSE SERPL-MCNC: 52 MG/DL — CRITICAL LOW (ref 70–99)
HCT VFR BLD CALC: 26.8 % — LOW (ref 34.5–45)
HCT VFR BLD CALC: 28.8 % — LOW (ref 34.5–45)
HGB BLD-MCNC: 7.8 G/DL — LOW (ref 11.5–15.5)
HGB BLD-MCNC: 8.4 G/DL — LOW (ref 11.5–15.5)
IANC: 10.99 K/UL — HIGH (ref 1.5–8.5)
IMM GRANULOCYTES NFR BLD AUTO: 1.5 % — SIGNIFICANT CHANGE UP (ref 0–1.5)
LYMPHOCYTES # BLD AUTO: 1.11 K/UL — SIGNIFICANT CHANGE UP (ref 1–3.3)
LYMPHOCYTES # BLD AUTO: 8.2 % — LOW (ref 13–44)
MAGNESIUM SERPL-MCNC: 1.9 MG/DL — SIGNIFICANT CHANGE UP (ref 1.6–2.6)
MCHC RBC-ENTMCNC: 20.6 PG — LOW (ref 27–34)
MCHC RBC-ENTMCNC: 20.7 PG — LOW (ref 27–34)
MCHC RBC-ENTMCNC: 29.1 GM/DL — LOW (ref 32–36)
MCHC RBC-ENTMCNC: 29.2 GM/DL — LOW (ref 32–36)
MCV RBC AUTO: 70.9 FL — LOW (ref 80–100)
MCV RBC AUTO: 70.9 FL — LOW (ref 80–100)
MONOCYTES # BLD AUTO: 1.26 K/UL — HIGH (ref 0–0.9)
MONOCYTES NFR BLD AUTO: 9.3 % — SIGNIFICANT CHANGE UP (ref 2–14)
NEUTROPHILS # BLD AUTO: 10.99 K/UL — HIGH (ref 1.8–7.4)
NEUTROPHILS NFR BLD AUTO: 80.7 % — HIGH (ref 43–77)
NRBC # BLD: 0 /100 WBCS — SIGNIFICANT CHANGE UP
NRBC # BLD: 0 /100 WBCS — SIGNIFICANT CHANGE UP
NRBC # FLD: 0 K/UL — SIGNIFICANT CHANGE UP
NRBC # FLD: 0 K/UL — SIGNIFICANT CHANGE UP
PHOSPHATE SERPL-MCNC: 2.9 MG/DL — SIGNIFICANT CHANGE UP (ref 2.5–4.5)
PLATELET # BLD AUTO: 310 K/UL — SIGNIFICANT CHANGE UP (ref 150–400)
PLATELET # BLD AUTO: 328 K/UL — SIGNIFICANT CHANGE UP (ref 150–400)
POTASSIUM SERPL-MCNC: 4.2 MMOL/L — SIGNIFICANT CHANGE UP (ref 3.5–5.3)
POTASSIUM SERPL-MCNC: 4.3 MMOL/L — SIGNIFICANT CHANGE UP (ref 3.5–5.3)
POTASSIUM SERPL-SCNC: 4.2 MMOL/L — SIGNIFICANT CHANGE UP (ref 3.5–5.3)
POTASSIUM SERPL-SCNC: 4.3 MMOL/L — SIGNIFICANT CHANGE UP (ref 3.5–5.3)
RBC # BLD: 3.78 M/UL — LOW (ref 3.8–5.2)
RBC # BLD: 4.06 M/UL — SIGNIFICANT CHANGE UP (ref 3.8–5.2)
RBC # FLD: 19.9 % — HIGH (ref 10.3–14.5)
RBC # FLD: 20 % — HIGH (ref 10.3–14.5)
RH IG SCN BLD-IMP: POSITIVE — SIGNIFICANT CHANGE UP
SODIUM SERPL-SCNC: 127 MMOL/L — LOW (ref 135–145)
SODIUM SERPL-SCNC: 130 MMOL/L — LOW (ref 135–145)
WBC # BLD: 13.6 K/UL — HIGH (ref 3.8–10.5)
WBC # BLD: 14.02 K/UL — HIGH (ref 3.8–10.5)
WBC # FLD AUTO: 13.6 K/UL — HIGH (ref 3.8–10.5)
WBC # FLD AUTO: 14.02 K/UL — HIGH (ref 3.8–10.5)

## 2021-06-13 PROCEDURE — 99233 SBSQ HOSP IP/OBS HIGH 50: CPT

## 2021-06-13 RX ORDER — INSULIN GLARGINE 100 [IU]/ML
16 INJECTION, SOLUTION SUBCUTANEOUS AT BEDTIME
Refills: 0 | Status: DISCONTINUED | OUTPATIENT
Start: 2021-06-13 | End: 2021-06-17

## 2021-06-13 RX ORDER — MORPHINE SULFATE 50 MG/1
2 CAPSULE, EXTENDED RELEASE ORAL ONCE
Refills: 0 | Status: DISCONTINUED | OUTPATIENT
Start: 2021-06-13 | End: 2021-06-13

## 2021-06-13 RX ADMIN — Medication 15 GRAM(S): at 17:35

## 2021-06-13 RX ADMIN — MORPHINE SULFATE 15 MILLIGRAM(S): 50 CAPSULE, EXTENDED RELEASE ORAL at 00:16

## 2021-06-13 RX ADMIN — ETHACRYNIC ACID 25 MILLIGRAM(S): 25 TABLET ORAL at 06:46

## 2021-06-13 RX ADMIN — ENOXAPARIN SODIUM 70 MILLIGRAM(S): 100 INJECTION SUBCUTANEOUS at 17:35

## 2021-06-13 RX ADMIN — OXYCODONE HYDROCHLORIDE 5 MILLIGRAM(S): 5 TABLET ORAL at 17:45

## 2021-06-13 RX ADMIN — MORPHINE SULFATE 2 MILLIGRAM(S): 50 CAPSULE, EXTENDED RELEASE ORAL at 00:31

## 2021-06-13 RX ADMIN — OXYCODONE HYDROCHLORIDE 5 MILLIGRAM(S): 5 TABLET ORAL at 11:40

## 2021-06-13 RX ADMIN — Medication 15 GRAM(S): at 06:46

## 2021-06-13 RX ADMIN — MORPHINE SULFATE 2 MILLIGRAM(S): 50 CAPSULE, EXTENDED RELEASE ORAL at 00:16

## 2021-06-13 RX ADMIN — Medication 1: at 17:46

## 2021-06-13 RX ADMIN — MORPHINE SULFATE 15 MILLIGRAM(S): 50 CAPSULE, EXTENDED RELEASE ORAL at 11:53

## 2021-06-13 RX ADMIN — INSULIN GLARGINE 16 UNIT(S): 100 INJECTION, SOLUTION SUBCUTANEOUS at 22:12

## 2021-06-13 RX ADMIN — POLYETHYLENE GLYCOL 3350 17 GRAM(S): 17 POWDER, FOR SOLUTION ORAL at 11:53

## 2021-06-13 RX ADMIN — ENOXAPARIN SODIUM 70 MILLIGRAM(S): 100 INJECTION SUBCUTANEOUS at 06:46

## 2021-06-13 RX ADMIN — OXYCODONE HYDROCHLORIDE 5 MILLIGRAM(S): 5 TABLET ORAL at 18:40

## 2021-06-13 RX ADMIN — OXYCODONE HYDROCHLORIDE 5 MILLIGRAM(S): 5 TABLET ORAL at 10:40

## 2021-06-13 RX ADMIN — MORPHINE SULFATE 15 MILLIGRAM(S): 50 CAPSULE, EXTENDED RELEASE ORAL at 11:54

## 2021-06-13 NOTE — PROGRESS NOTE ADULT - PROBLEM SELECTOR PLAN 3
imaging revealed poss obstruction  - Sx consult appreciated. pt now having bms, will monitor closely  - cont bowel regimen, monitor for bms  pt reports good freq of BMs, RN confirms

## 2021-06-13 NOTE — CHART NOTE - NSCHARTNOTEFT_GEN_A_CORE
56 year old woman with PMH uncontrolled DM2, A1c 9.6%, migraines and newly diagnosed stage 4 endometrial cancer presented to the ED with increasing abdominal pain/distention and shortness of breath for 2 weeks. Endocrine following for management of uncontrolled DM2. BG goal 100-180 mg/dL.    CAPILLARY BLOOD GLUCOSE  POCT Blood Glucose.: 116 mg/dL (13 Jun 2021 12:23)  POCT Blood Glucose.: 85 mg/dL (13 Jun 2021 08:41)  POCT Blood Glucose.: 124 mg/dL (12 Jun 2021 22:06)  POCT Blood Glucose.: 109 mg/dL (12 Jun 2021 17:53)    A1C with Estimated Average Glucose (06.02.21 @ 06:33)    A1C with Estimated Average Glucose Result: 9.6 %    Estimated Average Glucose: 229 mg/dL       1.  Uncontrolled type 2 diabetes mellitus with hyperglycemia.   - HbA1c 9.6%, not on treatment at home  - target bg 100-180 mg/dl  -Reduce Lantus to 16 units at bedtime  - continue Admelog 4 units TIDAC  - low correction scale qac and low qhs scale  - consistent carb diet  - check FS qac and qhs    DM EDUCATION- please assure RN has patient self inject and also teaches insulin pens, and hypoglycemia management    - for discharge: ideally would discharge on basal insulin + oral agent such as Metformin, however patient declines treatment with orals and would like to start with once daily basal insulin for now. Thus, will plan on discharging on Lantus (or whichever basal insulin is covered), dose TBD.     Please send Lantus solsotar pen as test script to check insurance coverage.  Ok to send with current doses and update prior to d/c    If Lantus not covered- can try alternating with one of following   tresiba/basaglar/toujeo/Levemir    Patient can follow up with endocrinology office [phone]

## 2021-06-13 NOTE — PROGRESS NOTE ADULT - SUBJECTIVE AND OBJECTIVE BOX
[facility] Division of Hospital Medicine  Nahed Bojorquez MD  Pager [phone]    Patient is a 56y old  Female who presents with a chief complaint of Recent Endometrial Cancer, Abdominal Distention, SOB     SUBJECTIVE / OVERNIGHT EVENTS: upon my arrival earlier this AM, RN at bedside; pt reporting breathlessness, however O2 sat 100%, oxygen given for comfort; pt denies feeling that abd is getting bigger; reporting BMs    MEDICATIONS  (STANDING):  dextrose 40% Gel 15 Gram(s) Oral once  dextrose 5%. 1000 milliLiter(s) (50 mL/Hr) IV Continuous <Continuous>  dextrose 5%. 1000 milliLiter(s) (100 mL/Hr) IV Continuous <Continuous>  dextrose 50% Injectable 25 Gram(s) IV Push once  dextrose 50% Injectable 12.5 Gram(s) IV Push once  dextrose 50% Injectable 25 Gram(s) IV Push once  enoxaparin Injectable 70 milliGRAM(s) SubCutaneous two times a day  ethacrynic acid 25 milliGRAM(s) Oral daily  glucagon  Injectable 1 milliGRAM(s) IntraMuscular once  insulin glargine Injectable (LANTUS) 16 Unit(s) SubCutaneous at bedtime  insulin lispro (ADMELOG) corrective regimen sliding scale   SubCutaneous three times a day before meals  insulin lispro (ADMELOG) corrective regimen sliding scale   SubCutaneous at bedtime  insulin lispro Injectable (ADMELOG) 4 Unit(s) SubCutaneous three times a day before meals  magnesium hydroxide Suspension 30 milliLiter(s) Oral daily  morphine ER Tablet 15 milliGRAM(s) Oral two times a day  polyethylene glycol 3350 17 Gram(s) Oral daily  senna 2 Tablet(s) Oral at bedtime  urea Oral Powder 15 Gram(s) Oral two times a day    MEDICATIONS  (PRN):  acetaminophen   Tablet .. 650 milliGRAM(s) Oral every 6 hours PRN Mild Pain (1 - 3), Moderate Pain (4 - 6)  oxyCODONE    IR 5 milliGRAM(s) Oral every 4 hours PRN Severe Pain (7 - 10)  witch hazel Pads 1 Application(s) Topical three times a day PRN discomfort      CAPILLARY BLOOD GLUCOSE  POCT Blood Glucose.: 116 mg/dL (13 Jun 2021 12:23)  POCT Blood Glucose.: 85 mg/dL (13 Jun 2021 08:41)  POCT Blood Glucose.: 124 mg/dL (12 Jun 2021 22:06)  POCT Blood Glucose.: 109 mg/dL (12 Jun 2021 17:53)      PHYSICAL EXAM:  Vital Signs Last 24 Hrs  T(F): 97.5 (13 Jun 2021 06:45), Max: 98.5 (12 Jun 2021 22:19)  HR: 102 (13 Jun 202110:33) (93 - 104)  BP: 105/63 (13 Jun 2021 10:33) (102/61 - 127/59)  RR: 17 (13 Jun 2021 10:33) (17 - 18)  SpO2: 100% (13 Jun 2021 10:33) (97% - 100%)    CONSTITUTIONAL: NAD, mild distress due to pain  EYES: PERRLA; conjunctiva and sclera clear  ENMT: Moist oral mucosa  RESPIRATORY: Normal respiratory effort; good AE ant/lat  CARDIOVASCULAR: Regular rate and rhythm; No lower extremity edema;   ABDOMEN: distended, similar girth as yesterday, +BS, softly distended  MUSCULOSKELETAL:   no joint swelling or tenderness to palpation  PSYCH: A+O to person, place, and time; affect appropriate  NEUROLOGY: no gross sensory deficits       LABS:                        7.8    13.60 )-----------( 310      ( 13 Jun 2021 06:52 )             26.8     06-13    127<L>  |  92<L>  |  17  ----------------------------<  52<LL>  4.3   |  21<L>  |  0.33<L>    Ca    8.6      13 Jun 2021 06:51  Phos  2.9     06-13  Mg     1.9     06-13

## 2021-06-13 NOTE — CHART NOTE - NSCHARTNOTEFT_GEN_A_CORE
Notified by RN. Pre-meal insulin not given. Pt not eating dinner. Pt encouraged to eat.  Will continue to monitor patients status. Sliding scale given.

## 2021-06-14 LAB
ANION GAP SERPL CALC-SCNC: 13 MMOL/L — SIGNIFICANT CHANGE UP (ref 7–14)
BASOPHILS # BLD AUTO: 0.02 K/UL — SIGNIFICANT CHANGE UP (ref 0–0.2)
BASOPHILS NFR BLD AUTO: 0.1 % — SIGNIFICANT CHANGE UP (ref 0–2)
BUN SERPL-MCNC: 10 MG/DL — SIGNIFICANT CHANGE UP (ref 7–23)
CALCIUM SERPL-MCNC: 9 MG/DL — SIGNIFICANT CHANGE UP (ref 8.4–10.5)
CHLORIDE SERPL-SCNC: 92 MMOL/L — LOW (ref 98–107)
CO2 SERPL-SCNC: 25 MMOL/L — SIGNIFICANT CHANGE UP (ref 22–31)
CREAT SERPL-MCNC: 0.35 MG/DL — LOW (ref 0.5–1.3)
EOSINOPHIL # BLD AUTO: 0.01 K/UL — SIGNIFICANT CHANGE UP (ref 0–0.5)
EOSINOPHIL NFR BLD AUTO: 0.1 % — SIGNIFICANT CHANGE UP (ref 0–6)
GLUCOSE BLDC GLUCOMTR-MCNC: 143 MG/DL — HIGH (ref 70–99)
GLUCOSE BLDC GLUCOMTR-MCNC: 147 MG/DL — HIGH (ref 70–99)
GLUCOSE BLDC GLUCOMTR-MCNC: 165 MG/DL — HIGH (ref 70–99)
GLUCOSE BLDC GLUCOMTR-MCNC: 178 MG/DL — HIGH (ref 70–99)
GLUCOSE SERPL-MCNC: 141 MG/DL — HIGH (ref 70–99)
HCT VFR BLD CALC: 29.6 % — LOW (ref 34.5–45)
HGB BLD-MCNC: 8.5 G/DL — LOW (ref 11.5–15.5)
IANC: 11.24 K/UL — HIGH (ref 1.5–8.5)
IMM GRANULOCYTES NFR BLD AUTO: 1.2 % — SIGNIFICANT CHANGE UP (ref 0–1.5)
LYMPHOCYTES # BLD AUTO: 1.11 K/UL — SIGNIFICANT CHANGE UP (ref 1–3.3)
LYMPHOCYTES # BLD AUTO: 8.1 % — LOW (ref 13–44)
MAGNESIUM SERPL-MCNC: 1.9 MG/DL — SIGNIFICANT CHANGE UP (ref 1.6–2.6)
MCHC RBC-ENTMCNC: 20.6 PG — LOW (ref 27–34)
MCHC RBC-ENTMCNC: 28.7 GM/DL — LOW (ref 32–36)
MCV RBC AUTO: 71.8 FL — LOW (ref 80–100)
MONOCYTES # BLD AUTO: 1.24 K/UL — HIGH (ref 0–0.9)
MONOCYTES NFR BLD AUTO: 9 % — SIGNIFICANT CHANGE UP (ref 2–14)
NEUTROPHILS # BLD AUTO: 11.24 K/UL — HIGH (ref 1.8–7.4)
NEUTROPHILS NFR BLD AUTO: 81.5 % — HIGH (ref 43–77)
NRBC # BLD: 0 /100 WBCS — SIGNIFICANT CHANGE UP
NRBC # FLD: 0 K/UL — SIGNIFICANT CHANGE UP
PHOSPHATE SERPL-MCNC: 2.4 MG/DL — LOW (ref 2.5–4.5)
PLATELET # BLD AUTO: 352 K/UL — SIGNIFICANT CHANGE UP (ref 150–400)
POTASSIUM SERPL-MCNC: 4.4 MMOL/L — SIGNIFICANT CHANGE UP (ref 3.5–5.3)
POTASSIUM SERPL-SCNC: 4.4 MMOL/L — SIGNIFICANT CHANGE UP (ref 3.5–5.3)
RBC # BLD: 4.12 M/UL — SIGNIFICANT CHANGE UP (ref 3.8–5.2)
RBC # FLD: 19.9 % — HIGH (ref 10.3–14.5)
SODIUM SERPL-SCNC: 130 MMOL/L — LOW (ref 135–145)
WBC # BLD: 13.78 K/UL — HIGH (ref 3.8–10.5)
WBC # FLD AUTO: 13.78 K/UL — HIGH (ref 3.8–10.5)

## 2021-06-14 PROCEDURE — 99233 SBSQ HOSP IP/OBS HIGH 50: CPT

## 2021-06-14 PROCEDURE — 99232 SBSQ HOSP IP/OBS MODERATE 35: CPT | Mod: GC

## 2021-06-14 PROCEDURE — 99232 SBSQ HOSP IP/OBS MODERATE 35: CPT

## 2021-06-14 RX ORDER — SODIUM,POTASSIUM PHOSPHATES 278-250MG
1 POWDER IN PACKET (EA) ORAL THREE TIMES A DAY
Refills: 0 | Status: DISCONTINUED | OUTPATIENT
Start: 2021-06-14 | End: 2021-06-14

## 2021-06-14 RX ORDER — SODIUM,POTASSIUM PHOSPHATES 278-250MG
1 POWDER IN PACKET (EA) ORAL ONCE
Refills: 0 | Status: COMPLETED | OUTPATIENT
Start: 2021-06-14 | End: 2021-06-14

## 2021-06-14 RX ORDER — DEXAMETHASONE 0.5 MG/5ML
20 ELIXIR ORAL AT BEDTIME
Refills: 0 | Status: COMPLETED | OUTPATIENT
Start: 2021-06-14 | End: 2021-06-14

## 2021-06-14 RX ADMIN — Medication 4 UNIT(S): at 09:09

## 2021-06-14 RX ADMIN — Medication 20 MILLIGRAM(S): at 21:53

## 2021-06-14 RX ADMIN — INSULIN GLARGINE 16 UNIT(S): 100 INJECTION, SOLUTION SUBCUTANEOUS at 22:30

## 2021-06-14 RX ADMIN — Medication 4 UNIT(S): at 13:19

## 2021-06-14 RX ADMIN — ENOXAPARIN SODIUM 70 MILLIGRAM(S): 100 INJECTION SUBCUTANEOUS at 05:26

## 2021-06-14 RX ADMIN — MORPHINE SULFATE 15 MILLIGRAM(S): 50 CAPSULE, EXTENDED RELEASE ORAL at 13:13

## 2021-06-14 RX ADMIN — Medication 1 TABLET(S): at 18:25

## 2021-06-14 RX ADMIN — MORPHINE SULFATE 15 MILLIGRAM(S): 50 CAPSULE, EXTENDED RELEASE ORAL at 00:10

## 2021-06-14 RX ADMIN — ENOXAPARIN SODIUM 70 MILLIGRAM(S): 100 INJECTION SUBCUTANEOUS at 18:18

## 2021-06-14 RX ADMIN — ETHACRYNIC ACID 25 MILLIGRAM(S): 25 TABLET ORAL at 05:26

## 2021-06-14 RX ADMIN — Medication 15 GRAM(S): at 05:26

## 2021-06-14 RX ADMIN — Medication 1: at 09:10

## 2021-06-14 RX ADMIN — MORPHINE SULFATE 15 MILLIGRAM(S): 50 CAPSULE, EXTENDED RELEASE ORAL at 14:15

## 2021-06-14 RX ADMIN — SENNA PLUS 2 TABLET(S): 8.6 TABLET ORAL at 21:52

## 2021-06-14 RX ADMIN — OXYCODONE HYDROCHLORIDE 5 MILLIGRAM(S): 5 TABLET ORAL at 10:51

## 2021-06-14 RX ADMIN — OXYCODONE HYDROCHLORIDE 5 MILLIGRAM(S): 5 TABLET ORAL at 11:15

## 2021-06-14 NOTE — PROGRESS NOTE ADULT - SUBJECTIVE AND OBJECTIVE BOX
INTERVAL HPI/OVERNIGHT EVENTS:  Patient seen at bedside.      VITAL SIGNS:  T(F): 98.2 (06-14-21 @ 13:53)  HR: 98 (06-14-21 @ 13:53)  BP: 102/65 (06-14-21 @ 13:53)  RR: 18 (06-14-21 @ 13:53)  SpO2: 100% (06-14-21 @ 13:53)  Wt(kg): --    PHYSICAL EXAM:    Constitutional: NAD, resting in bed comfortably  Eyes: EOMI, sclera non-icteric  Neck: supple, no LAP  Respiratory: CTA b/l, good air entry b/l, no wheezing, rhonchi or crackels  Cardiovascular: RRR, normal S1S2, no M/R/G  Gastrointestinal: soft, NTND  Extremities: no edema  Neurological: AAOx3, non focal  Skin: Normal temperature    MEDICATIONS  (STANDING):  dextrose 40% Gel 15 Gram(s) Oral once  dextrose 5%. 1000 milliLiter(s) (50 mL/Hr) IV Continuous <Continuous>  dextrose 5%. 1000 milliLiter(s) (100 mL/Hr) IV Continuous <Continuous>  dextrose 50% Injectable 25 Gram(s) IV Push once  dextrose 50% Injectable 12.5 Gram(s) IV Push once  dextrose 50% Injectable 25 Gram(s) IV Push once  enoxaparin Injectable 70 milliGRAM(s) SubCutaneous two times a day  ethacrynic acid 25 milliGRAM(s) Oral daily  glucagon  Injectable 1 milliGRAM(s) IntraMuscular once  insulin glargine Injectable (LANTUS) 16 Unit(s) SubCutaneous at bedtime  insulin lispro (ADMELOG) corrective regimen sliding scale   SubCutaneous three times a day before meals  insulin lispro (ADMELOG) corrective regimen sliding scale   SubCutaneous at bedtime  insulin lispro Injectable (ADMELOG) 4 Unit(s) SubCutaneous three times a day before meals  magnesium hydroxide Suspension 30 milliLiter(s) Oral daily  morphine ER Tablet 15 milliGRAM(s) Oral two times a day  polyethylene glycol 3350 17 Gram(s) Oral daily  potassium phosphate / sodium phosphate Tablet (K-PHOS No. 2) 1 Tablet(s) Oral once  senna 2 Tablet(s) Oral at bedtime  urea Oral Powder 15 Gram(s) Oral two times a day    MEDICATIONS  (PRN):  acetaminophen   Tablet .. 650 milliGRAM(s) Oral every 6 hours PRN Mild Pain (1 - 3), Moderate Pain (4 - 6)  oxyCODONE    IR 5 milliGRAM(s) Oral every 4 hours PRN Severe Pain (7 - 10)  witch hazel Pads 1 Application(s) Topical three times a day PRN discomfort      Allergies    Sulf-10 (Rash; Short breath)    Intolerances        LABS:                        8.5    13.78 )-----------( 352      ( 14 Jun 2021 06:43 )             29.6     06-14    130<L>  |  92<L>  |  10  ----------------------------<  141<H>  4.4   |  25  |  0.35<L>    Ca    9.0      14 Jun 2021 06:43  Phos  2.4     06-14  Mg     1.9     06-14            RADIOLOGY & ADDITIONAL TESTS:  Studies reviewed.   INTERVAL HPI/OVERNIGHT EVENTS:  Patient seen at bedside.  Feels better today. Appetite has improved. Is able to walk on the floor.   Is agreeable to receiving inpatient chemotherapy tomorrow.     VITAL SIGNS:  T(F): 98.2 (06-14-21 @ 13:53)  HR: 98 (06-14-21 @ 13:53)  BP: 102/65 (06-14-21 @ 13:53)  RR: 18 (06-14-21 @ 13:53)  SpO2: 100% (06-14-21 @ 13:53)  Wt(kg): --    MEDICATIONS  (STANDING):  dextrose 40% Gel 15 Gram(s) Oral once  dextrose 5%. 1000 milliLiter(s) (50 mL/Hr) IV Continuous <Continuous>  dextrose 5%. 1000 milliLiter(s) (100 mL/Hr) IV Continuous <Continuous>  dextrose 50% Injectable 25 Gram(s) IV Push once  dextrose 50% Injectable 12.5 Gram(s) IV Push once  dextrose 50% Injectable 25 Gram(s) IV Push once  enoxaparin Injectable 70 milliGRAM(s) SubCutaneous two times a day  ethacrynic acid 25 milliGRAM(s) Oral daily  glucagon  Injectable 1 milliGRAM(s) IntraMuscular once  insulin glargine Injectable (LANTUS) 16 Unit(s) SubCutaneous at bedtime  insulin lispro (ADMELOG) corrective regimen sliding scale   SubCutaneous three times a day before meals  insulin lispro (ADMELOG) corrective regimen sliding scale   SubCutaneous at bedtime  insulin lispro Injectable (ADMELOG) 4 Unit(s) SubCutaneous three times a day before meals  magnesium hydroxide Suspension 30 milliLiter(s) Oral daily  morphine ER Tablet 15 milliGRAM(s) Oral two times a day  polyethylene glycol 3350 17 Gram(s) Oral daily  potassium phosphate / sodium phosphate Tablet (K-PHOS No. 2) 1 Tablet(s) Oral once  senna 2 Tablet(s) Oral at bedtime  urea Oral Powder 15 Gram(s) Oral two times a day    MEDICATIONS  (PRN):  acetaminophen   Tablet .. 650 milliGRAM(s) Oral every 6 hours PRN Mild Pain (1 - 3), Moderate Pain (4 - 6)  oxyCODONE    IR 5 milliGRAM(s) Oral every 4 hours PRN Severe Pain (7 - 10)  witch hazel Pads 1 Application(s) Topical three times a day PRN discomfort      Allergies    Sulf-10 (Rash; Short breath)    Intolerances        LABS:                        8.5    13.78 )-----------( 352      ( 14 Jun 2021 06:43 )             29.6     06-14    130<L>  |  92<L>  |  10  ----------------------------<  141<H>  4.4   |  25  |  0.35<L>    Ca    9.0      14 Jun 2021 06:43  Phos  2.4     06-14  Mg     1.9     06-14            RADIOLOGY & ADDITIONAL TESTS:  Studies reviewed.

## 2021-06-14 NOTE — PROGRESS NOTE ADULT - ASSESSMENT
57yo Female w/ newly diagnosed Stage IV Endometrial Cancer and poorly controlled DM II presents to the ED with SOB, Abdominal pain/distention x2 weeks. CT A/P showed large volume ascites s/p diagnostic and therapeutic paracentesis (4L removed) c/b neutrocytic ascites on IV CTX. Found to have age indeterminate non occlusive L. mid femoral and peroneal veins DVT on therapeutic lovenox.   Nephrology called for hyponatremia.       Hypo-osmolar Hypervolemic Hyponatremia-  Upon review of [facility] pt's serum Na was 130 on 6/2/21. Serum Na trended down to 124. Currently Na back up to 130  Posm 274; Uosm 663; Mark < 20;   Monitor I & Os  Continue urena 15 gms bid  Encourage PO solute intake  Maintain < 1L free water restriction  albumin if pt agrees   continue  low dose ethacrynic acid 25mg PO daily  F/U BMP  s/p paracentesis on 6/11/21 with 2L removed with peritoneal fluid positive for malignant cells. Cytomorphologically consistent with mucinous adenocarcinoma      Hypophosphatemia- replete phos, f/u BMP      will follow intermittently      If you have any questions, please feel free to contact me  Lynn Shah  Nephrology Fellow  [phone]  (After 5pm or on weekends please page the on-call fellow)   55yo Female w/ newly diagnosed Stage IV Endometrial Cancer and poorly controlled DM II presents to the ED with SOB, Abdominal pain/distention x2 weeks. CT A/P showed large volume ascites s/p diagnostic and therapeutic paracentesis (4L removed) c/b neutrocytic ascites on IV CTX. Found to have age indeterminate non occlusive L. mid femoral and peroneal veins DVT on therapeutic lovenox.   Nephrology called for hyponatremia.       Hypo-osmolar Hypervolemic Hyponatremia-  Upon review of [facility] pt's serum Na was 130 on 6/2/21. Serum Na trended down to 124. Currently Na back up to 130  Posm 274; Uosm 663; Mark < 20;   Monitor I & Os  Continue urena 15 gms bid  Encourage PO solute intake  Maintain < 1L free water restriction  albumin if pt agrees   continue  low dose ethacrynic acid 25mg PO daily  F/U BMP  s/p paracentesis on 6/11/21 with 2L removed with peritoneal fluid positive for malignant cells. Cytomorphologically consistent with mucinous adenocarcinoma      Hypophosphatemia- replete phos, f/u BMP      If you have any questions, please feel free to contact me  Lynn Shah  Nephrology Fellow  [phone]  (After 5pm or on weekends please page the on-call fellow)

## 2021-06-14 NOTE — PROGRESS NOTE ADULT - PROBLEM SELECTOR PLAN 1
s/p diagnostic and therapeutic (4L fluid removal) 6/2  -completed ceftriaxone  -  PERITONEAL FLUID, POSITIVE FOR MALIGNANT CELLS. Cytomorphologically consistent with mucinous adenocarcinoma  Pain control with Morphine ER,  and oxy IR, appreciate pall recs  -s/p  therapeutic tap 6/11, 2L removed  - onc and pall care follg,  - Plan is for chemo per onc

## 2021-06-14 NOTE — PROGRESS NOTE ADULT - SUBJECTIVE AND OBJECTIVE BOX
Patient is a 56y old  Female who presents with a chief complaint of Recent Endometrial Cancer, Abdominal Distention, SOB (14 Jun 2021 13:12)      SUBJECTIVE / OVERNIGHT EVENTS: Pt seen and examined at 11:50am, no overnight events, pt reports that her last bm was yesterday, abdominal distention persists, no nausea, vomiting or any other complaints, sitting in a chair appears comfortable. No other new issues reported.      MEDICATIONS  (STANDING):  dextrose 40% Gel 15 Gram(s) Oral once  dextrose 5%. 1000 milliLiter(s) (50 mL/Hr) IV Continuous <Continuous>  dextrose 5%. 1000 milliLiter(s) (100 mL/Hr) IV Continuous <Continuous>  dextrose 50% Injectable 25 Gram(s) IV Push once  dextrose 50% Injectable 12.5 Gram(s) IV Push once  dextrose 50% Injectable 25 Gram(s) IV Push once  enoxaparin Injectable 70 milliGRAM(s) SubCutaneous two times a day  ethacrynic acid 25 milliGRAM(s) Oral daily  glucagon  Injectable 1 milliGRAM(s) IntraMuscular once  insulin glargine Injectable (LANTUS) 16 Unit(s) SubCutaneous at bedtime  insulin lispro (ADMELOG) corrective regimen sliding scale   SubCutaneous three times a day before meals  insulin lispro (ADMELOG) corrective regimen sliding scale   SubCutaneous at bedtime  insulin lispro Injectable (ADMELOG) 4 Unit(s) SubCutaneous three times a day before meals  magnesium hydroxide Suspension 30 milliLiter(s) Oral daily  morphine ER Tablet 15 milliGRAM(s) Oral two times a day  polyethylene glycol 3350 17 Gram(s) Oral daily  potassium phosphate / sodium phosphate Tablet (K-PHOS No. 2) 1 Tablet(s) Oral once  senna 2 Tablet(s) Oral at bedtime  urea Oral Powder 15 Gram(s) Oral two times a day    MEDICATIONS  (PRN):  acetaminophen   Tablet .. 650 milliGRAM(s) Oral every 6 hours PRN Mild Pain (1 - 3), Moderate Pain (4 - 6)  oxyCODONE    IR 5 milliGRAM(s) Oral every 4 hours PRN Severe Pain (7 - 10)  witch hazel Pads 1 Application(s) Topical three times a day PRN discomfort      Vital Signs Last 24 Hrs  T(C): 36.4 (14 Jun 2021 05:25), Max: 36.6 (13 Jun 2021 14:09)  T(F): 97.5 (14 Jun 2021 05:25), Max: 97.9 (13 Jun 2021 14:09)  HR: 99 (14 Jun 2021 05:25) (96 - 100)  BP: 136/58 (14 Jun 2021 05:25) (114/64 - 136/58)  BP(mean): --  RR: 18 (14 Jun 2021 05:25) (18 - 18)  SpO2: 100% (14 Jun 2021 05:25) (100% - 100%)  CAPILLARY BLOOD GLUCOSE      POCT Blood Glucose.: 143 mg/dL (14 Jun 2021 12:36)  POCT Blood Glucose.: 165 mg/dL (14 Jun 2021 08:26)  POCT Blood Glucose.: 169 mg/dL (13 Jun 2021 22:05)  POCT Blood Glucose.: 158 mg/dL (13 Jun 2021 17:44)    I&O's Summary    13 Jun 2021 07:01  -  14 Jun 2021 07:00  --------------------------------------------------------  IN: 690 mL / OUT: 800 mL / NET: -110 mL        PHYSICAL EXAM:  GENERAL: cachectic  CHEST/LUNG: Clear to auscultation bilaterally; No wheeze  HEART: mild tachycardia to 100/mt  ABDOMEN: soft, distended, nondistended  EXTREMITIES:  +LE edema L>R  PSYCH: Calm  NEUROLOGY: AAOx3  SKIN: No rashes or lesions    LABS:                        8.5    13.78 )-----------( 352      ( 14 Jun 2021 06:43 )             29.6     06-14    130<L>  |  92<L>  |  10  ----------------------------<  141<H>  4.4   |  25  |  0.35<L>    Ca    9.0      14 Jun 2021 06:43  Phos  2.4     06-14  Mg     1.9     06-14                RADIOLOGY & ADDITIONAL TESTS:    Imaging Personally Reviewed:    Consultant(s) Notes Reviewed:      Care Discussed with Consultants/Other Providers:

## 2021-06-14 NOTE — PROGRESS NOTE ADULT - PROBLEM SELECTOR PLAN 2
s/p 2L drainage  now with sbp- iv abx for 7 days  consider placing pigtail catheter for future needs

## 2021-06-14 NOTE — PROGRESS NOTE ADULT - SUBJECTIVE AND OBJECTIVE BOX
SUBJECTIVE AND OBJECTIVE:  pt sitting in chair.  s/p paracentesis with 2L off.  Pt states pain well controlled. +BM  INTERVAL HPI/OVERNIGHT EVENTS:    DNR on chart:   Allergies    Sulf-10 (Rash; Short breath)    Intolerances    MEDICATIONS  (STANDING):  dextrose 40% Gel 15 Gram(s) Oral once  dextrose 5%. 1000 milliLiter(s) (50 mL/Hr) IV Continuous <Continuous>  dextrose 5%. 1000 milliLiter(s) (100 mL/Hr) IV Continuous <Continuous>  dextrose 50% Injectable 25 Gram(s) IV Push once  dextrose 50% Injectable 12.5 Gram(s) IV Push once  dextrose 50% Injectable 25 Gram(s) IV Push once  enoxaparin Injectable 70 milliGRAM(s) SubCutaneous two times a day  ethacrynic acid 25 milliGRAM(s) Oral daily  glucagon  Injectable 1 milliGRAM(s) IntraMuscular once  insulin glargine Injectable (LANTUS) 16 Unit(s) SubCutaneous at bedtime  insulin lispro (ADMELOG) corrective regimen sliding scale   SubCutaneous three times a day before meals  insulin lispro (ADMELOG) corrective regimen sliding scale   SubCutaneous at bedtime  insulin lispro Injectable (ADMELOG) 4 Unit(s) SubCutaneous three times a day before meals  magnesium hydroxide Suspension 30 milliLiter(s) Oral daily  morphine ER Tablet 15 milliGRAM(s) Oral two times a day  polyethylene glycol 3350 17 Gram(s) Oral daily  potassium phosphate / sodium phosphate Tablet (K-PHOS No. 2) 1 Tablet(s) Oral once  senna 2 Tablet(s) Oral at bedtime  urea Oral Powder 15 Gram(s) Oral two times a day    MEDICATIONS  (PRN):  acetaminophen   Tablet .. 650 milliGRAM(s) Oral every 6 hours PRN Mild Pain (1 - 3), Moderate Pain (4 - 6)  oxyCODONE    IR 5 milliGRAM(s) Oral every 4 hours PRN Severe Pain (7 - 10)  witch hazel Pads 1 Application(s) Topical three times a day PRN discomfort      ITEMS UNCHECKED ARE NOT PRESENT    PRESENT SYMPTOMS: [ ]Unable to obtain due to poor mentation   Source if other than patient:  [ ]Family   [ ]Team     Pain (Impact on QOL):  yes  Location:  abdomen  Minimal acceptable level (0-10 scale):    3/10        Aggravating factors:  increased ascites  Quality:  dull  Radiation:  none  Severity (0-10 scale):  4/10  Timing:  comes and goes    Dyspnea:                           [ ]Mild [ ]Moderate [ ]Severe  Anxiety:                             [ ]Mild [ ]Moderate [ ]Severe  Fatigue:                             [ ]Mild [ ]Moderate [x ]Severe  Nausea:                             [ ]Mild [ ]Moderate [ ]Severe  Loss of appetite:              [x ]Mild [ ]Moderate [ ]Severe  Constipation:                    [ ]Mild [x ]Moderate [ ]Severe    PAIN AD Score:	  [email or web address] (Ctrl + left click to view)    Other Symptoms:  [x ]All other review of systems negative     Karnofsky Performance Score/Palliative Performance Status Version 2:   50      %    http://palliative.info/resource_material/[email or web address].pdf  PHYSICAL EXAM:  Vital Signs Last 24 Hrs  T(C): 36.4 (14 Jun 2021 05:25), Max: 36.6 (13 Jun 2021 14:09)  T(F): 97.5 (14 Jun 2021 05:25), Max: 97.9 (13 Jun 2021 14:09)  HR: 99 (14 Jun 2021 05:25) (96 - 100)  BP: 136/58 (14 Jun 2021 05:25) (114/64 - 136/58)  BP(mean): --  RR: 18 (14 Jun 2021 05:25) (18 - 18)  SpO2: 100% (14 Jun 2021 05:25) (100% - 100%) I&O's Summary    13 Jun 2021 07:01  -  14 Jun 2021 07:00  --------------------------------------------------------  IN: 690 mL / OUT: 800 mL / NET: -110 mL     GENERAL:  [x ]Alert  [x ]Oriented x 3  [ ]Lethargic  [ ]Cachexia  [ ]Unarousable  [ ]Verbal  [ ]Non-Verbal    Behavioral:   [ ] Anxiety  [ ] Delirium [ ] Agitation [ ] Other    HEENT:  [ ]Normal   [x ]Dry mouth   [ ]ET Tube/Trach  [ ]Oral lesions    PULMONARY:   [x ]Clear [ ]Tachypnea  [ ]Audible excessive secretions   [ ]Rhonchi        [ ]Right [ ]Left [ ]Bilateral  [ ]Crackles        [ ]Right [ ]Left [ ]Bilateral  [ ]Wheezing     [ ]Right [ ]Left [ ]Bilateral    CARDIOVASCULAR:    [ ]Regular [ ]Irregular [ x]Tachy  [ ]Glenn [ ]Murmur [ ]Other    GASTROINTESTINAL:  [x ]Soft  [ x]Distended   [x ]+BS  [ ]Non tender [x ]Tender  [ ]PEG [ ]OGT/ NGT   Last BM:    GENITOURINARY:  [ x]Normal [ ] Incontinent   [ ]Oliguria/Anuria   [ ]Angeles    MUSCULOSKELETAL:   [ ]Normal   [ x]Weakness  [ ]Bed/Wheelchair bound [ ]Edema    NEUROLOGIC:   [x ]No focal deficits  [ ] Cognitive impairment  [ ] Dysphagia [ ]Dysarthria [ ] Paresis [ ]Other     SKIN:   [x ]Normal   [ ]Pressure ulcer(s)  [ ]Rash    CRITICAL CARE:  [ ] Shock Present  [ ]Septic [ ]Cardiogenic [ ]Neurologic [ ]Hypovolemic  [ ]  Vasopressors [ ]  Inotropes   [ ] Respiratory failure present  [ ] Acute  [ ] Chronic [ ] Hypoxic  [ ] Hypercarbic [ ] Other  [ ] Other organ failure     LABS:                        8.5    13.78 )-----------( 352      ( 14 Jun 2021 06:43 )             29.6   06-14    130<L>  |  92<L>  |  10  ----------------------------<  141<H>  4.4   |  25  |  0.35<L>    Ca    9.0      14 Jun 2021 06:43  Phos  2.4     06-14  Mg     1.9     06-14          RADIOLOGY & ADDITIONAL STUDIES:    Protein Calorie Malnutrition Present: [ ] yes [ ] no  [ ] PPSV2 < or = 30%  [ ] significant weight loss [ ] poor nutritional intake [ ] anasarca [ ] catabolic state Albumin, Serum: 3.1 g/dL (06-07-21 @ 18:30)  Artificial Nutrition [ ]     REFERRALS:   [ ]Chaplaincy  [ ] Hospice  [ ]Child Life  [ ]Social Work  [ ]Case management [ ]Holistic Therapy   Goals of Care Document:

## 2021-06-14 NOTE — PROGRESS NOTE ADULT - ASSESSMENT
Planning for inpatient chemotherapy tomorrrow    Please give a one time dose of dexamethasone 20mg tonight. Order placed 56f with recently diagnosed metastatic uterine carcinosarcoma, c/b ascites, with plan to start chemotherapy 6/4, presented to the ED with abdominal discomfort.   Course c/b large ascites, SBP, partial bowel obstruction.    GOC discussed with patient and her sisters in detail. Poor prognosis and incurable nature of disease was explained. Pt is requesting to continue aggressive care.     Patient is doing better overall, has better appetite, bowel function is improving.   This might be her window to get chemotherapy, before another complication raises to try to decrease tumor load and improve symptoms and quality of life. Pt has signed consent for treatment with Dr Viktor patton.     Will plan on treatment with reduced dose chemotherapy 2/2 performance status, carboplatin AUC 5 and Taxol 150 mg/m2.   Will need dexamethasone 20mg x 1 tonight for chemo pre medication, order placed  Please check an accurate alexandra and weight for chemotherapy dose calculation purposes  Please add liver tests for tonight    -Therapeutic paracentesis x 2 during this admission, s/p tx for SBP with IV CTX 2g daily x 7 days after the first tap  -Pt with resolved partial bowel obstruction, monitor bowel function  -Pal care input appreciated  -Noted to have RLE nonocclusive DVT located at mid-femur on recent dopplers ( 6/4). Started on Lovenox.  -Supportive care, pain control, Nutrition, PT, DVT ppx  -Outpatient oncology f/u    Will follow. Please do not hesitate to call back with questions.     Aziza Hanna MD  Medical Oncology Attending  C: [phone]

## 2021-06-14 NOTE — PROGRESS NOTE ADULT - SUBJECTIVE AND OBJECTIVE BOX
[facility] Division of Kidney Diseases & Hypertension  FOLLOW UP NOTE  [phone]--------------------------------------------------------------------------------  Chief Complaint:Malignant neoplasm of ovary        24 hour events/subjective: Patient seen & examined. Labs & vitals reviewed. Pt is s/p paracentesis on 6/11/21. SOB improving as per her. Denies chest pain, fever, chills, increased frequency, dysuria, hematuria, pus in urine, frothy urine, loss of appetite, N/V, pruritis.         PAST HISTORY  --------------------------------------------------------------------------------  No significant changes to PMH, PSH, FHx, SHx, unless otherwise noted    ALLERGIES & MEDICATIONS  --------------------------------------------------------------------------------  Allergies    Sulf-10 (Rash; Short breath)    Intolerances      Standing Inpatient Medications  dextrose 40% Gel 15 Gram(s) Oral once  dextrose 5%. 1000 milliLiter(s) IV Continuous <Continuous>  dextrose 5%. 1000 milliLiter(s) IV Continuous <Continuous>  dextrose 50% Injectable 25 Gram(s) IV Push once  dextrose 50% Injectable 12.5 Gram(s) IV Push once  dextrose 50% Injectable 25 Gram(s) IV Push once  enoxaparin Injectable 70 milliGRAM(s) SubCutaneous two times a day  ethacrynic acid 25 milliGRAM(s) Oral daily  glucagon  Injectable 1 milliGRAM(s) IntraMuscular once  insulin glargine Injectable (LANTUS) 16 Unit(s) SubCutaneous at bedtime  insulin lispro (ADMELOG) corrective regimen sliding scale   SubCutaneous three times a day before meals  insulin lispro (ADMELOG) corrective regimen sliding scale   SubCutaneous at bedtime  insulin lispro Injectable (ADMELOG) 4 Unit(s) SubCutaneous three times a day before meals  magnesium hydroxide Suspension 30 milliLiter(s) Oral daily  morphine ER Tablet 15 milliGRAM(s) Oral two times a day  polyethylene glycol 3350 17 Gram(s) Oral daily  potassium phosphate / sodium phosphate Tablet (K-PHOS No. 2) 1 Tablet(s) Oral once  senna 2 Tablet(s) Oral at bedtime  urea Oral Powder 15 Gram(s) Oral two times a day    PRN Inpatient Medications  acetaminophen   Tablet .. 650 milliGRAM(s) Oral every 6 hours PRN  oxyCODONE    IR 5 milliGRAM(s) Oral every 4 hours PRN  witch hazel Pads 1 Application(s) Topical three times a day PRN      REVIEW OF SYSTEMS  --------------------------------------------------------------------------------  Gen: No  fevers/chills  Skin: No rashes  Head/Eyes/Ears/Mouth: No headache; Normal hearing; Normal vision w/o blurriness  Respiratory: No dyspnea, cough, wheezing, hemoptysis  CV: No chest pain, PND, orthopnea  GI: No abdominal pain, diarrhea, constipation, nausea, vomiting  : No increased frequency, dysuria, hematuria, nocturia  MSK: No joint pain/swelling; no back pain; no edema  Neuro: No dizziness/lightheadedness, weakness, seizures, numbness, tingling      All other systems were reviewed and are negative, except as noted.    VITALS/PHYSICAL EXAM  --------------------------------------------------------------------------------  T(C): 36.4 (06-14-21 @ 05:25), Max: 36.6 (06-13-21 @ 14:09)  HR: 99 (06-14-21 @ 05:25) (96 - 100)  BP: 136/58 (06-14-21 @ 05:25) (114/64 - 136/58)  RR: 18 (06-14-21 @ 05:25) (18 - 18)  SpO2: 100% (06-14-21 @ 05:25) (100% - 100%)  Wt(kg): --        06-13-21 @ 07:01  -  06-14-21 @ 07:00  --------------------------------------------------------  IN: 690 mL / OUT: 800 mL / NET: -110 mL      Physical Exam:  	Gen: NAD, well-appearing  	HEENT: PERRL, supple neck  	Pulm: CTA B/L  	CV: RRR, S1S2  	Back: No spinal or CVA tenderness  	Abd: +BS, soft, nontender/nondistended  	: No suprapubic tenderness          Extremities: no bilateral LE edema, no asterixis          Neuro: Awake, alert, No focal deficits  	Skin: Warm, without rashes  	Vascular access:    LABS/STUDIES  --------------------------------------------------------------------------------              8.5    13.78 >-----------<  352      [06-14-21 @ 06:43]              29.6     130  |  92  |  10  ----------------------------<  141      [06-14-21 @ 06:43]  4.4   |  25  |  0.35        Ca     9.0     [06-14-21 @ 06:43]      Mg     1.9     [06-14-21 @ 06:43]      Phos  2.4     [06-14-21 @ 06:43]            Creatinine Trend:  SCr 0.35 [06-14 @ 06:43]  SCr 0.44 [06-13 @ 17:07]  SCr 0.33 [06-13 @ 06:51]  SCr 0.36 [06-12 @ 08:44]  SCr 0.42 [06-11 @ 04:09]             [facility] Division of Kidney Diseases & Hypertension  FOLLOW UP NOTE  [phone]--------------------------------------------------------------------------------  Chief Complaint:Malignant neoplasm of ovary        24 hour events/subjective: Patient seen & examined. Labs & vitals reviewed. Pt is s/p paracentesis on 6/11/21. SOB improving as per her. Denies chest pain, fever, chills, increased frequency, dysuria, hematuria, pus in urine, frothy urine, loss of appetite, N/V, pruritis.         PAST HISTORY  --------------------------------------------------------------------------------  No significant changes to PMH, PSH, FHx, SHx, unless otherwise noted    ALLERGIES & MEDICATIONS  --------------------------------------------------------------------------------  Allergies    Sulf-10 (Rash; Short breath)    Intolerances      Standing Inpatient Medications  dextrose 40% Gel 15 Gram(s) Oral once  dextrose 5%. 1000 milliLiter(s) IV Continuous <Continuous>  dextrose 5%. 1000 milliLiter(s) IV Continuous <Continuous>  dextrose 50% Injectable 25 Gram(s) IV Push once  dextrose 50% Injectable 12.5 Gram(s) IV Push once  dextrose 50% Injectable 25 Gram(s) IV Push once  enoxaparin Injectable 70 milliGRAM(s) SubCutaneous two times a day  ethacrynic acid 25 milliGRAM(s) Oral daily  glucagon  Injectable 1 milliGRAM(s) IntraMuscular once  insulin glargine Injectable (LANTUS) 16 Unit(s) SubCutaneous at bedtime  insulin lispro (ADMELOG) corrective regimen sliding scale   SubCutaneous three times a day before meals  insulin lispro (ADMELOG) corrective regimen sliding scale   SubCutaneous at bedtime  insulin lispro Injectable (ADMELOG) 4 Unit(s) SubCutaneous three times a day before meals  magnesium hydroxide Suspension 30 milliLiter(s) Oral daily  morphine ER Tablet 15 milliGRAM(s) Oral two times a day  polyethylene glycol 3350 17 Gram(s) Oral daily  potassium phosphate / sodium phosphate Tablet (K-PHOS No. 2) 1 Tablet(s) Oral once  senna 2 Tablet(s) Oral at bedtime  urea Oral Powder 15 Gram(s) Oral two times a day    PRN Inpatient Medications  acetaminophen   Tablet .. 650 milliGRAM(s) Oral every 6 hours PRN  oxyCODONE    IR 5 milliGRAM(s) Oral every 4 hours PRN  witch hazel Pads 1 Application(s) Topical three times a day PRN      REVIEW OF SYSTEMS  --------------------------------------------------------------------------------  Gen: No  fevers/chills  Skin: No rashes  Head/Eyes/Ears/Mouth: No headache; Normal hearing; Normal vision w/o blurriness  Respiratory: No dyspnea, cough, wheezing, hemoptysis  CV: No chest pain, PND, orthopnea  GI: No abdominal pain, diarrhea, constipation, nausea, vomiting  : No increased frequency, dysuria, hematuria, nocturia  MSK: No joint pain/swelling; no back pain; no edema  Neuro: No dizziness/lightheadedness, weakness, seizures, numbness, tingling      All other systems were reviewed and are negative, except as noted.    VITALS/PHYSICAL EXAM  --------------------------------------------------------------------------------  T(C): 36.4 (06-14-21 @ 05:25), Max: 36.6 (06-13-21 @ 14:09)  HR: 99 (06-14-21 @ 05:25) (96 - 100)  BP: 136/58 (06-14-21 @ 05:25) (114/64 - 136/58)  RR: 18 (06-14-21 @ 05:25) (18 - 18)  SpO2: 100% (06-14-21 @ 05:25) (100% - 100%)  Wt(kg): --        06-13-21 @ 07:01  -  06-14-21 @ 07:00  --------------------------------------------------------  IN: 690 mL / OUT: 800 mL / NET: -110 mL      Physical Exam:  	Physical Exam:  	Gen NAD, ill appearing  	HEENT: no JVD  	Pulm: CTABL  	CV: S1S2,  	Abd: Soft, ascitics   	Ext:  ++edema B/L LE   	Neuro: Awake and alert  	Skin: Warm and dry        LABS/STUDIES  --------------------------------------------------------------------------------              8.5    13.78 >-----------<  352      [06-14-21 @ 06:43]              29.6     130  |  92  |  10  ----------------------------<  141      [06-14-21 @ 06:43]  4.4   |  25  |  0.35        Ca     9.0     [06-14-21 @ 06:43]      Mg     1.9     [06-14-21 @ 06:43]      Phos  2.4     [06-14-21 @ 06:43]            Creatinine Trend:  SCr 0.35 [06-14 @ 06:43]  SCr 0.44 [06-13 @ 17:07]  SCr 0.33 [06-13 @ 06:51]  SCr 0.36 [06-12 @ 08:44]  SCr 0.42 [06-11 @ 04:09]

## 2021-06-15 LAB
ALBUMIN SERPL ELPH-MCNC: 2.7 G/DL — LOW (ref 3.3–5)
ALP SERPL-CCNC: 133 U/L — HIGH (ref 40–120)
ALT FLD-CCNC: 9 U/L — SIGNIFICANT CHANGE UP (ref 4–33)
ANION GAP SERPL CALC-SCNC: 17 MMOL/L — HIGH (ref 7–14)
AST SERPL-CCNC: 24 U/L — SIGNIFICANT CHANGE UP (ref 4–32)
BASOPHILS # BLD AUTO: 0.03 K/UL — SIGNIFICANT CHANGE UP (ref 0–0.2)
BASOPHILS NFR BLD AUTO: 0.2 % — SIGNIFICANT CHANGE UP (ref 0–2)
BILIRUB SERPL-MCNC: 0.6 MG/DL — SIGNIFICANT CHANGE UP (ref 0.2–1.2)
BUN SERPL-MCNC: 16 MG/DL — SIGNIFICANT CHANGE UP (ref 7–23)
CALCIUM SERPL-MCNC: 8.9 MG/DL — SIGNIFICANT CHANGE UP (ref 8.4–10.5)
CHLORIDE SERPL-SCNC: 90 MMOL/L — LOW (ref 98–107)
CO2 SERPL-SCNC: 22 MMOL/L — SIGNIFICANT CHANGE UP (ref 22–31)
CREAT SERPL-MCNC: 0.45 MG/DL — LOW (ref 0.5–1.3)
EOSINOPHIL # BLD AUTO: 0 K/UL — SIGNIFICANT CHANGE UP (ref 0–0.5)
EOSINOPHIL NFR BLD AUTO: 0 % — SIGNIFICANT CHANGE UP (ref 0–6)
GLUCOSE BLDC GLUCOMTR-MCNC: 234 MG/DL — HIGH (ref 70–99)
GLUCOSE SERPL-MCNC: 210 MG/DL — HIGH (ref 70–99)
HCT VFR BLD CALC: 30.3 % — LOW (ref 34.5–45)
HGB BLD-MCNC: 8.6 G/DL — LOW (ref 11.5–15.5)
IANC: 14.51 K/UL — HIGH (ref 1.5–8.5)
IMM GRANULOCYTES NFR BLD AUTO: 1.2 % — SIGNIFICANT CHANGE UP (ref 0–1.5)
LYMPHOCYTES # BLD AUTO: 0.88 K/UL — LOW (ref 1–3.3)
LYMPHOCYTES # BLD AUTO: 5.4 % — LOW (ref 13–44)
MCHC RBC-ENTMCNC: 20.4 PG — LOW (ref 27–34)
MCHC RBC-ENTMCNC: 28.4 GM/DL — LOW (ref 32–36)
MCV RBC AUTO: 71.8 FL — LOW (ref 80–100)
MONOCYTES # BLD AUTO: 0.55 K/UL — SIGNIFICANT CHANGE UP (ref 0–0.9)
MONOCYTES NFR BLD AUTO: 3.4 % — SIGNIFICANT CHANGE UP (ref 2–14)
NEUTROPHILS # BLD AUTO: 14.51 K/UL — HIGH (ref 1.8–7.4)
NEUTROPHILS NFR BLD AUTO: 89.8 % — HIGH (ref 43–77)
NRBC # BLD: 0 /100 WBCS — SIGNIFICANT CHANGE UP
NRBC # FLD: 0 K/UL — SIGNIFICANT CHANGE UP
PLATELET # BLD AUTO: 352 K/UL — SIGNIFICANT CHANGE UP (ref 150–400)
POTASSIUM SERPL-MCNC: 4.8 MMOL/L — SIGNIFICANT CHANGE UP (ref 3.5–5.3)
POTASSIUM SERPL-SCNC: 4.8 MMOL/L — SIGNIFICANT CHANGE UP (ref 3.5–5.3)
PROT SERPL-MCNC: 6.8 G/DL — SIGNIFICANT CHANGE UP (ref 6–8.3)
RBC # BLD: 4.22 M/UL — SIGNIFICANT CHANGE UP (ref 3.8–5.2)
RBC # FLD: 20.2 % — HIGH (ref 10.3–14.5)
SODIUM SERPL-SCNC: 129 MMOL/L — LOW (ref 135–145)
WBC # BLD: 16.16 K/UL — HIGH (ref 3.8–10.5)
WBC # FLD AUTO: 16.16 K/UL — HIGH (ref 3.8–10.5)

## 2021-06-15 PROCEDURE — 99233 SBSQ HOSP IP/OBS HIGH 50: CPT

## 2021-06-15 PROCEDURE — 99232 SBSQ HOSP IP/OBS MODERATE 35: CPT

## 2021-06-15 RX ORDER — PACLITAXEL 6 MG/ML
268.5 INJECTION, SOLUTION, CONCENTRATE INTRAVENOUS ONCE
Refills: 0 | Status: COMPLETED | OUTPATIENT
Start: 2021-06-15 | End: 2021-06-15

## 2021-06-15 RX ORDER — PALONOSETRON HYDROCHLORIDE 0.25 MG/5ML
0.25 INJECTION, SOLUTION INTRAVENOUS ONCE
Refills: 0 | Status: COMPLETED | OUTPATIENT
Start: 2021-06-15 | End: 2021-06-15

## 2021-06-15 RX ORDER — DIPHENHYDRAMINE HCL 50 MG
50 CAPSULE ORAL ONCE
Refills: 0 | Status: COMPLETED | OUTPATIENT
Start: 2021-06-15 | End: 2021-06-15

## 2021-06-15 RX ORDER — CARBOPLATIN 50 MG
662 VIAL (EA) INTRAVENOUS ONCE
Refills: 0 | Status: COMPLETED | OUTPATIENT
Start: 2021-06-15 | End: 2021-06-15

## 2021-06-15 RX ORDER — FAMOTIDINE 10 MG/ML
20 INJECTION INTRAVENOUS ONCE
Refills: 0 | Status: COMPLETED | OUTPATIENT
Start: 2021-06-15 | End: 2021-06-15

## 2021-06-15 RX ORDER — FOSAPREPITANT DIMEGLUMINE 150 MG/5ML
150 INJECTION, POWDER, LYOPHILIZED, FOR SOLUTION INTRAVENOUS ONCE
Refills: 0 | Status: COMPLETED | OUTPATIENT
Start: 2021-06-15 | End: 2021-06-15

## 2021-06-15 RX ORDER — DEXAMETHASONE 0.5 MG/5ML
20 ELIXIR ORAL ONCE
Refills: 0 | Status: COMPLETED | OUTPATIENT
Start: 2021-06-15 | End: 2021-06-15

## 2021-06-15 RX ADMIN — Medication 2: at 18:16

## 2021-06-15 RX ADMIN — ETHACRYNIC ACID 25 MILLIGRAM(S): 25 TABLET ORAL at 06:22

## 2021-06-15 RX ADMIN — Medication 15 GRAM(S): at 18:15

## 2021-06-15 RX ADMIN — MORPHINE SULFATE 15 MILLIGRAM(S): 50 CAPSULE, EXTENDED RELEASE ORAL at 01:00

## 2021-06-15 RX ADMIN — MORPHINE SULFATE 15 MILLIGRAM(S): 50 CAPSULE, EXTENDED RELEASE ORAL at 12:17

## 2021-06-15 RX ADMIN — MORPHINE SULFATE 15 MILLIGRAM(S): 50 CAPSULE, EXTENDED RELEASE ORAL at 12:07

## 2021-06-15 RX ADMIN — Medication 1: at 12:39

## 2021-06-15 RX ADMIN — FAMOTIDINE 104 MILLIGRAM(S): 10 INJECTION INTRAVENOUS at 11:54

## 2021-06-15 RX ADMIN — FOSAPREPITANT DIMEGLUMINE 465 MILLIGRAM(S): 150 INJECTION, POWDER, LYOPHILIZED, FOR SOLUTION INTRAVENOUS at 12:24

## 2021-06-15 RX ADMIN — Medication 500 MILLIGRAM(S): at 18:43

## 2021-06-15 RX ADMIN — MAGNESIUM HYDROXIDE 30 MILLILITER(S): 400 TABLET, CHEWABLE ORAL at 12:07

## 2021-06-15 RX ADMIN — SENNA PLUS 2 TABLET(S): 8.6 TABLET ORAL at 22:09

## 2021-06-15 RX ADMIN — ENOXAPARIN SODIUM 70 MILLIGRAM(S): 100 INJECTION SUBCUTANEOUS at 06:21

## 2021-06-15 RX ADMIN — POLYETHYLENE GLYCOL 3350 17 GRAM(S): 17 POWDER, FOR SOLUTION ORAL at 12:06

## 2021-06-15 RX ADMIN — ENOXAPARIN SODIUM 70 MILLIGRAM(S): 100 INJECTION SUBCUTANEOUS at 18:16

## 2021-06-15 RX ADMIN — Medication 15 GRAM(S): at 06:22

## 2021-06-15 RX ADMIN — Medication 4 UNIT(S): at 08:44

## 2021-06-15 RX ADMIN — INSULIN GLARGINE 16 UNIT(S): 100 INJECTION, SOLUTION SUBCUTANEOUS at 22:09

## 2021-06-15 RX ADMIN — Medication 4 UNIT(S): at 12:40

## 2021-06-15 RX ADMIN — Medication 50 MILLIGRAM(S): at 13:57

## 2021-06-15 RX ADMIN — AER TRAVELER 1 APPLICATION(S): 0.5 SOLUTION RECTAL; TOPICAL at 18:17

## 2021-06-15 RX ADMIN — Medication 4 UNIT(S): at 18:16

## 2021-06-15 RX ADMIN — Medication 110 MILLIGRAM(S): at 13:11

## 2021-06-15 RX ADMIN — PALONOSETRON HYDROCHLORIDE 0.25 MILLIGRAM(S): 0.25 INJECTION, SOLUTION INTRAVENOUS at 13:56

## 2021-06-15 RX ADMIN — Medication 2: at 08:45

## 2021-06-15 RX ADMIN — MORPHINE SULFATE 15 MILLIGRAM(S): 50 CAPSULE, EXTENDED RELEASE ORAL at 00:01

## 2021-06-15 RX ADMIN — PACLITAXEL 166.67 MILLIGRAM(S): 6 INJECTION, SOLUTION, CONCENTRATE INTRAVENOUS at 14:46

## 2021-06-15 NOTE — PROGRESS NOTE ADULT - SUBJECTIVE AND OBJECTIVE BOX
INTERVAL HPI/OVERNIGHT EVENTS:  Patient seen at bedside.      VITAL SIGNS:  T(F): 97.9 (06-15-21 @ 12:09)  HR: 100 (06-15-21 @ 12:09)  BP: 115/- (06-15-21 @ 12:09)  RR: 18 (06-15-21 @ 12:09)  SpO2: 100% (06-15-21 @ 12:09)  Wt(kg): --    PHYSICAL EXAM:    Constitutional: NAD, resting in bed comfortably  Eyes: EOMI, sclera non-icteric  Neck: supple, no LAD  Respiratory: CTA b/l, good air entry b/l, no wheezing, rhonchi or crackles  Cardiovascular: RRR, normal S1S2, no M/R/G  Gastrointestinal: soft, NTND  Extremities: no edema  Neurological: AAOx3, non focal  Skin: Normal temperature    MEDICATIONS  (STANDING):  CARBOplatin IVPB (eMAR) 662 milliGRAM(s) IV Intermittent once  dextrose 40% Gel 15 Gram(s) Oral once  dextrose 5%. 1000 milliLiter(s) (50 mL/Hr) IV Continuous <Continuous>  dextrose 5%. 1000 milliLiter(s) (100 mL/Hr) IV Continuous <Continuous>  dextrose 50% Injectable 25 Gram(s) IV Push once  dextrose 50% Injectable 12.5 Gram(s) IV Push once  dextrose 50% Injectable 25 Gram(s) IV Push once  enoxaparin Injectable 70 milliGRAM(s) SubCutaneous two times a day  ethacrynic acid 25 milliGRAM(s) Oral daily  glucagon  Injectable 1 milliGRAM(s) IntraMuscular once  insulin glargine Injectable (LANTUS) 16 Unit(s) SubCutaneous at bedtime  insulin lispro (ADMELOG) corrective regimen sliding scale   SubCutaneous three times a day before meals  insulin lispro (ADMELOG) corrective regimen sliding scale   SubCutaneous at bedtime  insulin lispro Injectable (ADMELOG) 4 Unit(s) SubCutaneous three times a day before meals  magnesium hydroxide Suspension 30 milliLiter(s) Oral daily  morphine ER Tablet 15 milliGRAM(s) Oral two times a day  PACLitaxel IVPB (eMAR) 268.5 milliGRAM(s) IV Intermittent once  polyethylene glycol 3350 17 Gram(s) Oral daily  senna 2 Tablet(s) Oral at bedtime  urea Oral Powder 15 Gram(s) Oral two times a day    MEDICATIONS  (PRN):  acetaminophen   Tablet .. 650 milliGRAM(s) Oral every 6 hours PRN Mild Pain (1 - 3), Moderate Pain (4 - 6)  methylPREDNISolone sodium succinate Injectable 125 milliGRAM(s) IV Push once PRN PRN Chemotherapy Reactoin  oxyCODONE    IR 5 milliGRAM(s) Oral every 4 hours PRN Severe Pain (7 - 10)  witch hazel Pads 1 Application(s) Topical three times a day PRN discomfort      Allergies    Sulf-10 (Rash; Short breath)    Intolerances        LABS:                        8.6    16.16 )-----------( 352      ( 15 Terry 2021 06:51 )             30.3     06-15    129<L>  |  90<L>  |  16  ----------------------------<  210<H>  4.8   |  22  |  0.45<L>    Ca    8.9      15 Terry 2021 06:51  Phos  2.4     06-14  Mg     1.9     06-14    TPro  6.8  /  Alb  2.7<L>  /  TBili  0.6  /  DBili  x   /  AST  24  /  ALT  9   /  AlkPhos  133<H>  06-15          RADIOLOGY & ADDITIONAL TESTS:  Studies reviewed.   INTERVAL HPI/OVERNIGHT EVENTS:  Patient seen at bedside.  Patient with acute complaints  No events reported overnight      VITAL SIGNS:  T(F): 97.9 (06-15-21 @ 12:09)  HR: 100 (06-15-21 @ 12:09)  BP: 115/- (06-15-21 @ 12:09)  RR: 18 (06-15-21 @ 12:09)  SpO2: 100% (06-15-21 @ 12:09)  Wt(kg): --    PHYSICAL EXAM:  In accordance with current standard limiting patient contact, deferred physical exam  2/2 COVID pandemic  Please refer to physical exam of primary team.    MEDICATIONS  (STANDING):  CARBOplatin IVPB (eMAR) 662 milliGRAM(s) IV Intermittent once  dextrose 40% Gel 15 Gram(s) Oral once  dextrose 5%. 1000 milliLiter(s) (50 mL/Hr) IV Continuous <Continuous>  dextrose 5%. 1000 milliLiter(s) (100 mL/Hr) IV Continuous <Continuous>  dextrose 50% Injectable 25 Gram(s) IV Push once  dextrose 50% Injectable 12.5 Gram(s) IV Push once  dextrose 50% Injectable 25 Gram(s) IV Push once  enoxaparin Injectable 70 milliGRAM(s) SubCutaneous two times a day  ethacrynic acid 25 milliGRAM(s) Oral daily  glucagon  Injectable 1 milliGRAM(s) IntraMuscular once  insulin glargine Injectable (LANTUS) 16 Unit(s) SubCutaneous at bedtime  insulin lispro (ADMELOG) corrective regimen sliding scale   SubCutaneous three times a day before meals  insulin lispro (ADMELOG) corrective regimen sliding scale   SubCutaneous at bedtime  insulin lispro Injectable (ADMELOG) 4 Unit(s) SubCutaneous three times a day before meals  magnesium hydroxide Suspension 30 milliLiter(s) Oral daily  morphine ER Tablet 15 milliGRAM(s) Oral two times a day  PACLitaxel IVPB (eMAR) 268.5 milliGRAM(s) IV Intermittent once  polyethylene glycol 3350 17 Gram(s) Oral daily  senna 2 Tablet(s) Oral at bedtime  urea Oral Powder 15 Gram(s) Oral two times a day    MEDICATIONS  (PRN):  acetaminophen   Tablet .. 650 milliGRAM(s) Oral every 6 hours PRN Mild Pain (1 - 3), Moderate Pain (4 - 6)  methylPREDNISolone sodium succinate Injectable 125 milliGRAM(s) IV Push once PRN PRN Chemotherapy Reactoin  oxyCODONE    IR 5 milliGRAM(s) Oral every 4 hours PRN Severe Pain (7 - 10)  witch hazel Pads 1 Application(s) Topical three times a day PRN discomfort      Allergies    Sulf-10 (Rash; Short breath)    Intolerances        LABS:                        8.6    16.16 )-----------( 352      ( 15 Terry 2021 06:51 )             30.3     06-15    129<L>  |  90<L>  |  16  ----------------------------<  210<H>  4.8   |  22  |  0.45<L>    Ca    8.9      15 Terry 2021 06:51  Phos  2.4     06-14  Mg     1.9     06-14    TPro  6.8  /  Alb  2.7<L>  /  TBili  0.6  /  DBili  x   /  AST  24  /  ALT  9   /  AlkPhos  133<H>  06-15          RADIOLOGY & ADDITIONAL TESTS:  Studies reviewed.

## 2021-06-15 NOTE — PROGRESS NOTE ADULT - PROBLEM SELECTOR PLAN 3
imaging revealed poss partial SBO  - Sx consult appreciated. pt now having bms, will monitor closely  - cont bowel regimen, monitor for bms

## 2021-06-15 NOTE — PROGRESS NOTE ADULT - ASSESSMENT
56 year old woman with PMH uncontrolled DM2, A1c 9.6%, migraines and newly diagnosed stage 4 endometrial cancer presented to the ED with increasing abdominal pain/distention and shortness of breath for 2 weeks. Endocrine following for management of uncontrolled DM2. BG goal 100-180 mg/dL.    1.  Uncontrolled type 2 diabetes mellitus with hyperglycemia.   - HbA1c 9.6%, not on treatment at home  - target bg 100-180 mg/dl  - Continue Lantus 16 units at bedtime  - Continue Admelog 4 units TIDAC  - low correction scale qac and low qhs scale  - consistent carb diet  - check FS qac and qhs    DM EDUCATION- please assure RN has patient self inject and also teaches insulin pens, and hypoglycemia management    - for discharge: ideally would discharge on basal insulin + oral agent such as Metformin, however patient declines treatment with orals and would like to start with once daily basal insulin for now. Thus, will plan on discharging on Lantus (or whichever basal insulin is covered), dose TBD.     Please send Lantus solsotar pen as test script to check insurance coverage.  Ok to send with current doses and update prior to d/c    If Lantus not covered- can try alternating with one of following   tresiba/basaglar/toujeo/Levemir      2.  Essential hypertension.    - BP at goal, less than 130/80  - continue to monitor.     3.  Hyperlipidemia, unspecified hyperlipidemia type.    - LDL 59  - okay to defer statin

## 2021-06-15 NOTE — PROGRESS NOTE ADULT - SUBJECTIVE AND OBJECTIVE BOX
Chief Complaint: DM2    History:   Patient was asleep.  As per rn, patient appetite is fair.  Patient has not wanted to learn insulin injections as was not feeling well and asked for education to be delayed.  NO hypo     MEDICATIONS  (STANDING):  dextrose 40% Gel 15 Gram(s) Oral once  dextrose 5%. 1000 milliLiter(s) (50 mL/Hr) IV Continuous <Continuous>  dextrose 5%. 1000 milliLiter(s) (100 mL/Hr) IV Continuous <Continuous>  dextrose 50% Injectable 25 Gram(s) IV Push once  dextrose 50% Injectable 12.5 Gram(s) IV Push once  dextrose 50% Injectable 25 Gram(s) IV Push once  enoxaparin Injectable 70 milliGRAM(s) SubCutaneous two times a day  ethacrynic acid 25 milliGRAM(s) Oral daily  glucagon  Injectable 1 milliGRAM(s) IntraMuscular once  insulin glargine Injectable (LANTUS) 25 Unit(s) SubCutaneous at bedtime  insulin lispro (ADMELOG) corrective regimen sliding scale   SubCutaneous three times a day before meals  insulin lispro (ADMELOG) corrective regimen sliding scale   SubCutaneous at bedtime  insulin lispro Injectable (ADMELOG) 5 Unit(s) SubCutaneous three times a day before meals  magnesium hydroxide Suspension 30 milliLiter(s) Oral daily  morphine ER Tablet 15 milliGRAM(s) Oral two times a day  polyethylene glycol 3350 17 Gram(s) Oral daily  potassium phosphate / sodium phosphate Tablet (K-PHOS No. 2) 1 Tablet(s) Oral three times a day  senna 2 Tablet(s) Oral at bedtime  urea Oral Powder 15 Gram(s) Oral two times a day    MEDICATIONS  (PRN):  acetaminophen   Tablet .. 650 milliGRAM(s) Oral every 6 hours PRN Mild Pain (1 - 3), Moderate Pain (4 - 6)  oxyCODONE    IR 5 milliGRAM(s) Oral every 4 hours PRN Severe Pain (7 - 10)  witch hazel Pads 1 Application(s) Topical three times a day PRN discomfort      Allergies    Sulf-10 (Rash; Short breath)    Intolerances      Review of Systems:  ALL OTHER SYSTEMS REVIEWED AND NEGATIVE    Vital Signs Last 24 Hrs  T(C): 36.6 (15 Terry 2021 12:09), Max: 36.7 (14 Jun 2021 20:35)  T(F): 97.9 (15 Terry 2021 12:09), Max: 98 (14 Jun 2021 20:35)  HR: 100 (15 Terry 2021 12:09) (96 - 108)  BP: 115/- (15 Terry 2021 12:09) (113/67 - 115/-)  BP(mean): --  RR: 18 (15 Terry 2021 12:09) (18 - 18)  SpO2: 100% (15 Terry 2021 12:09) (100% - 100%)  GENERAL: NAD, well-groomed, well-developed  EYES: No proptosis, no lid lag, anicteric  HEENT:  Atraumatic, Normocephalic, moist mucous membranes  RESPIRATORY: nonlabored respirations, no wheezing  PSYCH: Alert and oriented x 3, normal affect, normal mood    CAPILLARY BLOOD GLUCOSE      POCT Blood Glucose.: 151 mg/dL (15 Terry 2021 12:25)  POCT Blood Glucose.: 234 mg/dL (15 Terry 2021 08:39)  POCT Blood Glucose.: 178 mg/dL (14 Jun 2021 22:16)    POCT Blood Glucose.: 138 mg/dL (12 Jun 2021 12:13)  POCT Blood Glucose.: 141 mg/dL (12 Jun 2021 10:43)  POCT Blood Glucose.: 78 mg/dL (12 Jun 2021 08:34)  POCT Blood Glucose.: 104 mg/dL (11 Jun 2021 22:03)  POCT Blood Glucose.: 99 mg/dL (11 Jun 2021 18:41)  POCT Blood Glucose.: 117 mg/dL (11 Jun 2021 14:08)  POCT Blood Glucose.: 82 mg/dL (11 Jun 2021 13:35)  POCT Blood Glucose.: 60 mg/dL (11 Jun 2021 13:18)  POCT Blood Glucose.: 63 mg/dL (11 Jun 2021 13:15)  POCT Blood Glucose.: 88 mg/dL (11 Jun 2021 08:29)  POCT Blood Glucose.: 157 mg/dL (10 Terry 2021 21:54)  POCT Blood Glucose.: 84 mg/dL (10 Terry 2021 17:53)      06-15    129<L>  |  90<L>  |  16  ----------------------------<  210<H>  4.8   |  22  |  0.45<L>    Ca    8.9      15 Terry 2021 06:51  Phos  2.4     06-14  Mg     1.9     06-14    TPro  6.8  /  Alb  2.7<L>  /  TBili  0.6  /  DBili  x   /  AST  24  /  ALT  9   /  AlkPhos  133<H>  06-15      A1C with Estimated Average Glucose Result: 9.6 % (06-02-21 @ 06:33)

## 2021-06-15 NOTE — PROGRESS NOTE ADULT - PROBLEM SELECTOR PLAN 1
s/p diagnostic and therapeutic (4L fluid removal) 6/2  -completed ceftriaxone  -  PERITONEAL FLUID, POSITIVE FOR MALIGNANT CELLS. Cytomorphologically consistent with mucinous adenocarcinoma  Pain control with Morphine ER,  and oxy IR, appreciate pall recs  -s/p  therapeutic tap 6/11, 2L removed  - onc and pall care follg,

## 2021-06-15 NOTE — PROGRESS NOTE ADULT - ASSESSMENT
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   Course c/b large ascites, SBP, partial bowel obstruction.    GOC discussed with patient and her sisters in detail. Poor prognosis and incurable nature of disease was explained. Pt is requesting to continue aggressive care.     Patient is doing better overall, has better appetite, bowel function is improving.   This might be her window to get chemotherapy, before another complication raises to try to decrease tumor load and improve symptoms and quality of life. Pt has signed consent for treatment with Dr Viktor patton.     Will plan on treatment with reduced dose chemotherapy 2/2 performance status, carboplatin AUC 5 and Taxol 150 mg/m2.   Will need dexamethasone 20mg x 1 tonight for chemo pre medication, order placed  Please check an accurate alexandra and weight for chemotherapy dose calculation purposes  Please add liver tests for tonight    -Therapeutic paracentesis x 2 during this admission, s/p tx for SBP with IV CTX 2g daily x 7 days after the first tap  -Pt with resolved partial bowel obstruction, monitor bowel function  -Pal care input appreciated  -Noted to have RLE nonocclusive DVT located at mid-femur on recent dopplers ( 6/4). Started on Lovenox.  -Supportive care, pain control, Nutrition, PT, DVT ppx  -Outpatient oncology f/u    Will follow. Please do not hesitate to call back with questions.     Aziaz Hanna MD  Medical Oncology Attending  C: [phone] 56f with recently diagnosed metastatic uterine carcinosarcoma, c/b ascites, with plan to start chemotherapy 6/4, presented to the ED with abdominal discomfort.   Course c/b large ascites, SBP, partial bowel obstruction.    GOC discussed with patient and her sisters in detail. Poor prognosis and incurable nature of disease was explained. Pt is requesting to continue aggressive care.     Patient is doing better overall, has better appetite, bowel function is improving.   This might be her window to get chemotherapy, before another complication raises to try to decrease tumor load and improve symptoms and quality of life. Pt has signed consent for treatment with Dr Hoang before.     Will plan on treatment with reduced dose chemotherapy 2/2 performance status, carboplatin AUC 5 and Taxol 150 mg/m2 today .   S/p pre med with dexamethasone 20mg x 1 last night for chemo   Please order daily liver tests    -Therapeutic paracentesis x 2 during this admission, s/p tx for SBP with IV CTX 2g daily x 7 days after the first tap  -Pt with resolved partial bowel obstruction, monitor bowel function  -Pal care input appreciated  -Noted to have RLE nonocclusive DVT located at mid-femur on recent dopplers ( 6/4). Started on Lovenox.  -Patient to followup with Dr. Hoang ([facility]) upon discharge  -C/w Supportive care, pain control, Nutrition, PT, DVT ppx  -Oncology will continue to follow with you      Case d/w Dr. Jacques HORN  Oncology Physician Assistant  Carina SHARMA/[facility]  Pager [phone]    If after 5pm or weekends please page On-call Oncology Fellow   56f with recently diagnosed metastatic uterine carcinosarcoma, c/b ascites, with plan to start chemotherapy 6/4, presented to the ED with abdominal discomfort.   Course c/b large ascites, SBP, partial bowel obstruction.    GOC discussed with patient and her sisters in detail. Poor prognosis and incurable nature of disease was explained. Pt is requesting to continue aggressive care.     Patient is doing better overall, has better appetite, bowel function is improving.   This might be her window to get chemotherapy, before another complication raises to try to decrease tumor load and improve symptoms and quality of life. Pt has signed consent for treatment with Dr Hoang before.     Will plan on treatment with reduced dose chemotherapy 2/2 performance status, carboplatin AUC 5 and Taxol 150 mg/m2 today .   S/p pre med with dexamethasone 20mg x 1 last night for chemo   Please order daily liver tests    -Therapeutic paracentesis x 2 during this admission, s/p tx for spontaneous bacterial peritonitis with IV CTX 2g daily x 7 days after the first tap  -Pt with resolved partial bowel obstruction, monitor bowel function  -Pal care input appreciated  -Noted to have RLE nonocclusive DVT located at mid-femur on recent dopplers ( 6/4). Started on Lovenox.  -Patient to followup with Dr. Hoang ([facility]) upon discharge  -C/w Supportive care, pain control, Nutrition, PT, DVT ppx  -Oncology will continue to follow with you      Case d/w Dr. Jacques HORN  Oncology Physician Assistant  Carina SHARMA/[facility]  Pager [phone]    If after 5pm or weekends please page On-call Oncology Fellow

## 2021-06-15 NOTE — PROGRESS NOTE ADULT - SUBJECTIVE AND OBJECTIVE BOX
[facility] Division of Hospital Medicine  Yaritza Lopez MD  Pager [phone]      Patient is a 56y old  Female who presents with a chief complaint of Recent Endometrial Cancer, Abdominal Distention, SOB (15 Terry 2021 14:01)      SUBJECTIVE / OVERNIGHT EVENTS:    pt reports feeling constipated but also has small amount of liquid stool leaking out. Denies N/v/abd pain     ADDITIONAL REVIEW OF SYSTEMS:    RESPIRATORY: No cough, wheezing, chills or hemoptysis; No shortness of breath  CARDIOVASCULAR: No chest pain, palpitations, dizziness, or leg swelling  GASTROINTESTINAL: No abdominal or epigastric pain. No nausea, vomiting, or hematemesis; No diarrhea or constipation. No melena or hematochezia.    MEDICATIONS  (STANDING):  CARBOplatin IVPB (eMAR) 662 milliGRAM(s) IV Intermittent once  dextrose 40% Gel 15 Gram(s) Oral once  dextrose 5%. 1000 milliLiter(s) (50 mL/Hr) IV Continuous <Continuous>  dextrose 5%. 1000 milliLiter(s) (100 mL/Hr) IV Continuous <Continuous>  dextrose 50% Injectable 25 Gram(s) IV Push once  dextrose 50% Injectable 12.5 Gram(s) IV Push once  dextrose 50% Injectable 25 Gram(s) IV Push once  enoxaparin Injectable 70 milliGRAM(s) SubCutaneous two times a day  ethacrynic acid 25 milliGRAM(s) Oral daily  glucagon  Injectable 1 milliGRAM(s) IntraMuscular once  insulin glargine Injectable (LANTUS) 16 Unit(s) SubCutaneous at bedtime  insulin lispro (ADMELOG) corrective regimen sliding scale   SubCutaneous three times a day before meals  insulin lispro (ADMELOG) corrective regimen sliding scale   SubCutaneous at bedtime  insulin lispro Injectable (ADMELOG) 4 Unit(s) SubCutaneous three times a day before meals  magnesium hydroxide Suspension 30 milliLiter(s) Oral daily  morphine ER Tablet 15 milliGRAM(s) Oral two times a day  polyethylene glycol 3350 17 Gram(s) Oral daily  senna 2 Tablet(s) Oral at bedtime  urea Oral Powder 15 Gram(s) Oral two times a day    MEDICATIONS  (PRN):  acetaminophen   Tablet .. 650 milliGRAM(s) Oral every 6 hours PRN Mild Pain (1 - 3), Moderate Pain (4 - 6)  methylPREDNISolone sodium succinate Injectable 125 milliGRAM(s) IV Push once PRN PRN Chemotherapy Reactoin  oxyCODONE    IR 5 milliGRAM(s) Oral every 4 hours PRN Severe Pain (7 - 10)  witch hazel Pads 1 Application(s) Topical three times a day PRN discomfort      CAPILLARY BLOOD GLUCOSE      POCT Blood Glucose.: 151 mg/dL (15 Terry 2021 12:25)  POCT Blood Glucose.: 234 mg/dL (15 Terry 2021 08:39)  POCT Blood Glucose.: 178 mg/dL (14 Jun 2021 22:16)  POCT Blood Glucose.: 147 mg/dL (14 Jun 2021 17:39)    I&O's Summary    14 Jun 2021 07:01  -  15 Terry 2021 07:00  --------------------------------------------------------  IN: 370 mL / OUT: 350 mL / NET: 20 mL    15 Terry 2021 07:01  -  15 Terry 2021 15:26  --------------------------------------------------------  IN: 225 mL / OUT: 0 mL / NET: 225 mL        PHYSICAL EXAM:  Vital Signs Last 24 Hrs  T(C): 36.6 (15 Terry 2021 12:09), Max: 36.7 (14 Jun 2021 20:35)  T(F): 97.9 (15 Terry 2021 12:09), Max: 98 (14 Jun 2021 20:35)  HR: 100 (15 Terry 2021 12:09) (96 - 108)  BP: 115/- (15 Terry 2021 12:09) (113/67 - 115/-)  BP(mean): --  RR: 18 (15 Terry 2021 12:09) (18 - 18)  SpO2: 100% (15 Terry 2021 12:09) (100% - 100%)    CONSTITUTIONAL: NAD,  EYES: PERRLA; conjunctiva and sclera clear  ENMT: Moist oral mucosa, no pharyngeal injection or exudates;   NECK: Supple, no palpable masses;  RESPIRATORY: Normal respiratory effort; lungs are clear to auscultation bilaterally  CARDIOVASCULAR: Regular rate and rhythm, normal S1 and S2, no murmur/rub/gallop; trace lower extremity edema; Peripheral pulses are 2+ bilaterally  ABDOMEN: Nontender to palpation, normoactive bowel sounds, no rebound/guarding;   MUSCLOSKELETAL:   no clubbing or cyanosis of digits; no joint swelling or tenderness to palpation  PSYCH: A+O to person, place,; affect appropriate  NEUROLOGY: CN 2-12 are intact and symmetric; no gross sensory deficits;   SKIN: No rashes;     LABS:                        8.6    16.16 )-----------( 352      ( 15 Terry 2021 06:51 )             30.3     06-15    129<L>  |  90<L>  |  16  ----------------------------<  210<H>  4.8   |  22  |  0.45<L>    Ca    8.9      15 Terry 2021 06:51  Phos  2.4     06-14  Mg     1.9     06-14    TPro  6.8  /  Alb  2.7<L>  /  TBili  0.6  /  DBili  x   /  AST  24  /  ALT  9   /  AlkPhos  133<H>  06-15                RADIOLOGY & ADDITIONAL TESTS:  Results Reviewed:   Imaging Personally Reviewed:  Electrocardiogram Personally Reviewed:    COORDINATION OF CARE:  Care Discussed with Consultants/Other Providers [Y/N]:  Prior or Outpatient Records Reviewed [Y/N]:

## 2021-06-15 NOTE — PROGRESS NOTE ADULT - ASSESSMENT
57yo Female w/ newly diagnosed Stage IV Endometrial Cancer and poorly controlled DM II presents to the ED with SOB, Abdominal pain/distention x2 weeks. CT A/P showed large volume ascites s/p diagnostic and therapeutic paracentesis (4L removed) c/b neutrocytic ascites on IV CTX. Found to have age indeterminate non occlusive L. mid femoral and peroneal veins DVT on therapeutic lovenox. plan for inpt chemo 6/15. Palliative care following for pain management.

## 2021-06-16 ENCOUNTER — TRANSCRIPTION ENCOUNTER (OUTPATIENT)
Age: 56
End: 2021-06-16

## 2021-06-16 DIAGNOSIS — E78.5 HYPERLIPIDEMIA, UNSPECIFIED: ICD-10-CM

## 2021-06-16 DIAGNOSIS — E11.65 TYPE 2 DIABETES MELLITUS WITH HYPERGLYCEMIA: ICD-10-CM

## 2021-06-16 DIAGNOSIS — I10 ESSENTIAL (PRIMARY) HYPERTENSION: ICD-10-CM

## 2021-06-16 LAB
ALBUMIN SERPL ELPH-MCNC: 2.7 G/DL — LOW (ref 3.3–5)
ALBUMIN SERPL ELPH-MCNC: 2.8 G/DL — LOW (ref 3.3–5)
ALP SERPL-CCNC: 107 U/L — SIGNIFICANT CHANGE UP (ref 40–120)
ALP SERPL-CCNC: 124 U/L — HIGH (ref 40–120)
ALT FLD-CCNC: 7 U/L — SIGNIFICANT CHANGE UP (ref 4–33)
ALT FLD-CCNC: 8 U/L — SIGNIFICANT CHANGE UP (ref 4–33)
ANION GAP SERPL CALC-SCNC: 15 MMOL/L — HIGH (ref 7–14)
ANION GAP SERPL CALC-SCNC: 18 MMOL/L — HIGH (ref 7–14)
AST SERPL-CCNC: 32 U/L — SIGNIFICANT CHANGE UP (ref 4–32)
AST SERPL-CCNC: 46 U/L — HIGH (ref 4–32)
BILIRUB SERPL-MCNC: 0.5 MG/DL — SIGNIFICANT CHANGE UP (ref 0.2–1.2)
BILIRUB SERPL-MCNC: 0.8 MG/DL — SIGNIFICANT CHANGE UP (ref 0.2–1.2)
BUN SERPL-MCNC: 29 MG/DL — HIGH (ref 7–23)
BUN SERPL-MCNC: 34 MG/DL — HIGH (ref 7–23)
CALCIUM SERPL-MCNC: 7.9 MG/DL — LOW (ref 8.4–10.5)
CALCIUM SERPL-MCNC: 8.6 MG/DL — SIGNIFICANT CHANGE UP (ref 8.4–10.5)
CHLORIDE SERPL-SCNC: 92 MMOL/L — LOW (ref 98–107)
CHLORIDE SERPL-SCNC: 95 MMOL/L — LOW (ref 98–107)
CO2 SERPL-SCNC: 19 MMOL/L — LOW (ref 22–31)
CO2 SERPL-SCNC: 24 MMOL/L — SIGNIFICANT CHANGE UP (ref 22–31)
CREAT SERPL-MCNC: 0.72 MG/DL — SIGNIFICANT CHANGE UP (ref 0.5–1.3)
CREAT SERPL-MCNC: 0.88 MG/DL — SIGNIFICANT CHANGE UP (ref 0.5–1.3)
GLUCOSE SERPL-MCNC: 145 MG/DL — HIGH (ref 70–99)
GLUCOSE SERPL-MCNC: 190 MG/DL — HIGH (ref 70–99)
HCT VFR BLD CALC: 31.4 % — LOW (ref 34.5–45)
HGB BLD-MCNC: 8.9 G/DL — LOW (ref 11.5–15.5)
LDH SERPL L TO P-CCNC: 1082 U/L — HIGH (ref 135–225)
LDH SERPL L TO P-CCNC: 1172 U/L — HIGH (ref 135–225)
MAGNESIUM SERPL-MCNC: 2 MG/DL — SIGNIFICANT CHANGE UP (ref 1.6–2.6)
MCHC RBC-ENTMCNC: 20.4 PG — LOW (ref 27–34)
MCHC RBC-ENTMCNC: 28.3 GM/DL — LOW (ref 32–36)
MCV RBC AUTO: 71.9 FL — LOW (ref 80–100)
NRBC # BLD: 0 /100 WBCS — SIGNIFICANT CHANGE UP
NRBC # FLD: 0.02 K/UL — HIGH
PHOSPHATE SERPL-MCNC: 2.8 MG/DL — SIGNIFICANT CHANGE UP (ref 2.5–4.5)
PLATELET # BLD AUTO: 338 K/UL — SIGNIFICANT CHANGE UP (ref 150–400)
POTASSIUM SERPL-MCNC: 4.5 MMOL/L — SIGNIFICANT CHANGE UP (ref 3.5–5.3)
POTASSIUM SERPL-MCNC: 5 MMOL/L — SIGNIFICANT CHANGE UP (ref 3.5–5.3)
POTASSIUM SERPL-SCNC: 4.5 MMOL/L — SIGNIFICANT CHANGE UP (ref 3.5–5.3)
POTASSIUM SERPL-SCNC: 5 MMOL/L — SIGNIFICANT CHANGE UP (ref 3.5–5.3)
PROT SERPL-MCNC: 6.1 G/DL — SIGNIFICANT CHANGE UP (ref 6–8.3)
PROT SERPL-MCNC: 6.6 G/DL — SIGNIFICANT CHANGE UP (ref 6–8.3)
RBC # BLD: 4.37 M/UL — SIGNIFICANT CHANGE UP (ref 3.8–5.2)
RBC # FLD: 19.9 % — HIGH (ref 10.3–14.5)
SODIUM SERPL-SCNC: 129 MMOL/L — LOW (ref 135–145)
SODIUM SERPL-SCNC: 134 MMOL/L — LOW (ref 135–145)
URATE SERPL-MCNC: 4.8 MG/DL — SIGNIFICANT CHANGE UP (ref 2.5–7)
URATE SERPL-MCNC: 9.2 MG/DL — HIGH (ref 2.5–7)
WBC # BLD: 24.48 K/UL — HIGH (ref 3.8–10.5)
WBC # FLD AUTO: 24.48 K/UL — HIGH (ref 3.8–10.5)

## 2021-06-16 PROCEDURE — 99233 SBSQ HOSP IP/OBS HIGH 50: CPT

## 2021-06-16 PROCEDURE — 99232 SBSQ HOSP IP/OBS MODERATE 35: CPT | Mod: GC

## 2021-06-16 PROCEDURE — 99232 SBSQ HOSP IP/OBS MODERATE 35: CPT

## 2021-06-16 RX ORDER — ALBUMIN HUMAN 25 %
250 VIAL (ML) INTRAVENOUS ONCE
Refills: 0 | Status: COMPLETED | OUTPATIENT
Start: 2021-06-16 | End: 2021-06-16

## 2021-06-16 RX ORDER — INSULIN LISPRO 100/ML
4 VIAL (ML) SUBCUTANEOUS
Refills: 0 | Status: DISCONTINUED | OUTPATIENT
Start: 2021-06-16 | End: 2021-06-18

## 2021-06-16 RX ORDER — INSULIN LISPRO 100/ML
5 VIAL (ML) SUBCUTANEOUS
Refills: 0 | Status: DISCONTINUED | OUTPATIENT
Start: 2021-06-16 | End: 2021-06-18

## 2021-06-16 RX ORDER — ONDANSETRON 8 MG/1
4 TABLET, FILM COATED ORAL ONCE
Refills: 0 | Status: COMPLETED | OUTPATIENT
Start: 2021-06-16 | End: 2021-06-16

## 2021-06-16 RX ORDER — RASBURICASE 7.5 MG
3 KIT INTRAVENOUS ONCE
Refills: 0 | Status: DISCONTINUED | OUTPATIENT
Start: 2021-06-16 | End: 2021-06-16

## 2021-06-16 RX ORDER — INSULIN LISPRO 100/ML
5 VIAL (ML) SUBCUTANEOUS
Refills: 0 | Status: DISCONTINUED | OUTPATIENT
Start: 2021-06-16 | End: 2021-06-17

## 2021-06-16 RX ORDER — SODIUM CHLORIDE 9 MG/ML
1000 INJECTION INTRAMUSCULAR; INTRAVENOUS; SUBCUTANEOUS
Refills: 0 | Status: DISCONTINUED | OUTPATIENT
Start: 2021-06-16 | End: 2021-06-16

## 2021-06-16 RX ORDER — INSULIN GLARGINE 100 [IU]/ML
13 INJECTION, SOLUTION SUBCUTANEOUS ONCE
Refills: 0 | Status: COMPLETED | OUTPATIENT
Start: 2021-06-16 | End: 2021-06-16

## 2021-06-16 RX ORDER — DEXTROSE 50 % IN WATER 50 %
15 SYRINGE (ML) INTRAVENOUS ONCE
Refills: 0 | Status: COMPLETED | OUTPATIENT
Start: 2021-06-16 | End: 2021-06-16

## 2021-06-16 RX ORDER — RASBURICASE 7.5 MG
7.5 KIT INTRAVENOUS ONCE
Refills: 0 | Status: COMPLETED | OUTPATIENT
Start: 2021-06-16 | End: 2021-06-16

## 2021-06-16 RX ORDER — ALBUMIN HUMAN 25 %
250 VIAL (ML) INTRAVENOUS EVERY 6 HOURS
Refills: 0 | Status: COMPLETED | OUTPATIENT
Start: 2021-06-16 | End: 2021-06-17

## 2021-06-16 RX ADMIN — Medication 15 GRAM(S): at 18:43

## 2021-06-16 RX ADMIN — MORPHINE SULFATE 15 MILLIGRAM(S): 50 CAPSULE, EXTENDED RELEASE ORAL at 12:47

## 2021-06-16 RX ADMIN — ETHACRYNIC ACID 25 MILLIGRAM(S): 25 TABLET ORAL at 05:57

## 2021-06-16 RX ADMIN — Medication 50 MILLILITER(S): at 18:44

## 2021-06-16 RX ADMIN — MORPHINE SULFATE 15 MILLIGRAM(S): 50 CAPSULE, EXTENDED RELEASE ORAL at 01:04

## 2021-06-16 RX ADMIN — ONDANSETRON 4 MILLIGRAM(S): 8 TABLET, FILM COATED ORAL at 03:42

## 2021-06-16 RX ADMIN — ENOXAPARIN SODIUM 70 MILLIGRAM(S): 100 INJECTION SUBCUTANEOUS at 18:43

## 2021-06-16 RX ADMIN — Medication 15 GRAM(S): at 21:43

## 2021-06-16 RX ADMIN — Medication 1: at 08:37

## 2021-06-16 RX ADMIN — Medication 125 MILLILITER(S): at 12:54

## 2021-06-16 RX ADMIN — Medication 5 UNIT(S): at 18:43

## 2021-06-16 RX ADMIN — MORPHINE SULFATE 15 MILLIGRAM(S): 50 CAPSULE, EXTENDED RELEASE ORAL at 00:04

## 2021-06-16 RX ADMIN — ENOXAPARIN SODIUM 70 MILLIGRAM(S): 100 INJECTION SUBCUTANEOUS at 05:56

## 2021-06-16 RX ADMIN — MORPHINE SULFATE 15 MILLIGRAM(S): 50 CAPSULE, EXTENDED RELEASE ORAL at 12:44

## 2021-06-16 RX ADMIN — RASBURICASE 110 MILLIGRAM(S): KIT at 16:07

## 2021-06-16 RX ADMIN — Medication 15 GRAM(S): at 05:56

## 2021-06-16 RX ADMIN — Medication 4 UNIT(S): at 08:38

## 2021-06-16 RX ADMIN — INSULIN GLARGINE 13 UNIT(S): 100 INJECTION, SOLUTION SUBCUTANEOUS at 23:37

## 2021-06-16 NOTE — PROGRESS NOTE ADULT - PROBLEM SELECTOR PLAN 3
Va duplex 6/3 showed age indeterminate non occlusive L. mid femoral and peroneal veins DVT  - Lovenox BID resumed post paracentesis  - monitor for bleeding

## 2021-06-16 NOTE — PROGRESS NOTE ADULT - SUBJECTIVE AND OBJECTIVE BOX
[facility] Division of [facility] Medicine  Yaritza Lopez MD  Pager [phone]      Patient is a 56y old  Female who presents with a chief complaint of Recent Endometrial Cancer, Abdominal Distention, SOB (16 Jun 2021 14:41)      SUBJECTIVE / OVERNIGHT EVENTS:    pt received chemo yesterday, reports nausea, sob and generalized weakness today.     ADDITIONAL REVIEW OF SYSTEMS:    RESPIRATORY: No cough, wheezing, chills or hemoptysis; + shortness of breath  CARDIOVASCULAR: No chest pain, palpitations, dizziness, or leg swelling  GASTROINTESTINAL: No abdominal or epigastric pain. +nausea, no vomiting, or hematemesis; No diarrhea or constipation. No melena or hematochezia.      MEDICATIONS  (STANDING):  albumin human  5% IVPB 250 milliLiter(s) IV Intermittent every 6 hours  dextrose 40% Gel 15 Gram(s) Oral once  dextrose 5%. 1000 milliLiter(s) (50 mL/Hr) IV Continuous <Continuous>  dextrose 5%. 1000 milliLiter(s) (100 mL/Hr) IV Continuous <Continuous>  dextrose 50% Injectable 25 Gram(s) IV Push once  dextrose 50% Injectable 12.5 Gram(s) IV Push once  dextrose 50% Injectable 25 Gram(s) IV Push once  enoxaparin Injectable 70 milliGRAM(s) SubCutaneous two times a day  ethacrynic acid 25 milliGRAM(s) Oral daily  glucagon  Injectable 1 milliGRAM(s) IntraMuscular once  insulin glargine Injectable (LANTUS) 16 Unit(s) SubCutaneous at bedtime  insulin lispro (ADMELOG) corrective regimen sliding scale   SubCutaneous three times a day before meals  insulin lispro (ADMELOG) corrective regimen sliding scale   SubCutaneous at bedtime  insulin lispro Injectable (ADMELOG) 5 Unit(s) SubCutaneous before lunch  insulin lispro Injectable (ADMELOG) 4 Unit(s) SubCutaneous before breakfast  insulin lispro Injectable (ADMELOG) 5 Unit(s) SubCutaneous before dinner  magnesium hydroxide Suspension 30 milliLiter(s) Oral daily  morphine ER Tablet 15 milliGRAM(s) Oral two times a day  polyethylene glycol 3350 17 Gram(s) Oral daily  senna 2 Tablet(s) Oral at bedtime  urea Oral Powder 15 Gram(s) Oral two times a day    MEDICATIONS  (PRN):  acetaminophen   Tablet .. 650 milliGRAM(s) Oral every 6 hours PRN Mild Pain (1 - 3), Moderate Pain (4 - 6)  methylPREDNISolone sodium succinate Injectable 125 milliGRAM(s) IV Push once PRN PRN Chemotherapy Reactoin  oxyCODONE    IR 5 milliGRAM(s) Oral every 4 hours PRN Severe Pain (7 - 10)  witch hazel Pads 1 Application(s) Topical three times a day PRN discomfort      CAPILLARY BLOOD GLUCOSE      POCT Blood Glucose.: 138 mg/dL (16 Jun 2021 12:23)  POCT Blood Glucose.: 180 mg/dL (16 Jun 2021 08:28)  POCT Blood Glucose.: 210 mg/dL (15 Terry 2021 21:41)  POCT Blood Glucose.: 203 mg/dL (15 Terry 2021 17:50)    I&O's Summary    15 Terry 2021 07:01  -  16 Jun 2021 07:00  --------------------------------------------------------  IN: 455 mL / OUT: 200 mL / NET: 255 mL    16 Jun 2021 07:01  -  16 Jun 2021 16:21  --------------------------------------------------------  IN: 354 mL / OUT: 200 mL / NET: 154 mL        PHYSICAL EXAM:  Vital Signs Last 24 Hrs  T(C): 36.7 (16 Jun 2021 13:38), Max: 36.8 (16 Jun 2021 05:54)  T(F): 98 (16 Jun 2021 13:38), Max: 98.3 (16 Jun 2021 05:54)  HR: 108 (16 Jun 2021 13:38) (95 - 108)  BP: 110/58 (16 Jun 2021 14:29) (95/58 - 140/82)  BP(mean): --  RR: 18 (16 Jun 2021 13:38) (18 - 18)  SpO2: 100% (16 Jun 2021 13:38) (100% - 100%)    CONSTITUTIONAL: NAD,  EYES: PERRLA; conjunctiva and sclera clear  ENMT: Moist oral mucosa, no pharyngeal injection or exudates;   NECK: Supple, no palpable masses;  RESPIRATORY: Normal respiratory effort; lungs are clear to auscultation bilaterally  CARDIOVASCULAR: Regular rate and rhythm, normal S1 and S2, no murmur/rub/gallop; trace lower extremity edema; Peripheral pulses are 2+ bilaterally  ABDOMEN: Nontender to palpation, normoactive bowel sounds, no rebound/guarding;   MUSCLOSKELETAL:   no clubbing or cyanosis of digits; no joint swelling or tenderness to palpation  PSYCH: A+O to person, place,; affect appropriate  NEUROLOGY: CN 2-12 are intact and symmetric; no gross sensory deficits;   SKIN: No rashes;     LABS:                        8.9    24.48 )-----------( 338      ( 16 Jun 2021 07:37 )             31.4     06-16    129<L>  |  92<L>  |  29<H>  ----------------------------<  190<H>  5.0   |  19<L>  |  0.72    Ca    8.6      16 Jun 2021 07:37  Phos  3.7     06-16  Mg     2.2     06-16    TPro  6.6  /  Alb  2.8<L>  /  TBili  0.8  /  DBili  x   /  AST  32  /  ALT  7   /  AlkPhos  124<H>  06-16                RADIOLOGY & ADDITIONAL TESTS:  Results Reviewed:   Imaging Personally Reviewed:  Electrocardiogram Personally Reviewed:    COORDINATION OF CARE:  Care Discussed with Consultants/Other Providers [Y/N]:  Prior or Outpatient Records Reviewed [Y/N]:

## 2021-06-16 NOTE — PROGRESS NOTE ADULT - ASSESSMENT
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   Course c/b large ascites, SBP, partial bowel obstruction.    GOC discussed with patient and her sisters in detail. Poor prognosis and incurable nature of disease was explained. Pt is requesting to continue aggressive care.     Patient is doing better overall, has better appetite, bowel function is improving.   This might be her window to get chemotherapy, before another complication raises to try to decrease tumor load and improve symptoms and quality of life. Pt has signed consent for treatment with Dr Hoang before.     S/p treatment with reduced dose chemotherapy 2/2 performance status, carboplatin AUC 5 and Taxol 150 mg/m2 on 6/18.   Please order daily liver tests    -Noted to have elevated uric acid and LDH, suspect tumor lysis. Please order rasburicase 7.5mg x1 and albumin. CMP q12h. Please also order daily Mg and Ph. Will monitor  -Would hold off fluids for now, given patient h/o ascites.  -Therapeutic paracentesis x 2 during this admission, s/p tx for SBP with IV CTX 2g daily x 7 days after the first tap  -Pt with resolved partial bowel obstruction, monitor bowel function  -Pal care input appreciated  -Noted to have RLE nonocclusive DVT located at mid-femur on recent dopplers ( 6/4). Started on Lovenox.  -Please consider starting famotidine or PPI for heartburn.  -Patient to followup with Dr. Hoang ([facility]) upon discharge  -C/w Supportive care, pain control, Nutrition, PT, DVT ppx  -Oncology will continue to follow with you      Case d/w Dr. Jacques OHRN  Oncology Physician Assistant  Carina SHARMA/[facility]  Pager [phone]    If after 5pm or weekends please page On-call Oncology Fellow   56f with recently diagnosed metastatic uterine carcinosarcoma, c/b ascites, with plan to start chemotherapy 6/4, presented to the ED with abdominal discomfort.   Course c/b large ascites, SBP, partial bowel obstruction.    GOC discussed with patient and her sisters in detail. Poor prognosis and incurable nature of disease was explained. Pt is requesting to continue aggressive care.     Patient is doing better overall, has better appetite, bowel function is improving.   This might be her window to get chemotherapy, before another complication raises to try to decrease tumor load and improve symptoms and quality of life. Pt has signed consent for treatment with Dr Hoang before.     S/p treatment with reduced dose chemotherapy 2/2 performance status, carboplatin AUC 5 and Taxol 150 mg/m2 on 6/18.   Please order daily liver tests    -Noted to have elevated uric acid and LDH, rise in Cr, K, suspect tumor lysis. Please order rasburicase 7.5mg x1 and albumin. CMP q12h. Please also order daily Mg and Ph. Will monitor closely.   -IVF, consider 5% albumin at 50cc/h, monitor closely for overload.   -Therapeutic paracentesis x 2 during this admission, s/p tx for SBP with IV CTX 2g daily x 7 days after the first tap  -Pt with resolved partial bowel obstruction, monitor bowel function  -Pal care input appreciated  -Noted to have RLE nonocclusive DVT located at mid-femur on recent dopplers ( 6/4). Started on Lovenox.  -Please consider starting famotidine or PPI for heartburn.  -Patient to followup with Dr. oHang ([facility]) upon discharge  -C/w Supportive care, pain control, Nutrition, PT, DVT ppx  -Oncology will continue to follow with you      Case d/w Dr. Jacques HORN  Oncology Physician Assistant  [facility] SHARMA/[facility]  Pager [phone]    If after 5pm or weekends please page On-call Oncology Fellow   56f with recently diagnosed metastatic uterine carcinosarcoma, c/b ascites, with plan to start chemotherapy 6/4, presented to the ED with abdominal discomfort.   Course c/b large ascites, SBP, partial bowel obstruction.    GOC discussed with patient and her sisters in detail. Poor prognosis and incurable nature of disease was explained. Pt is requesting to continue aggressive care.     Patient is doing better overall, has better appetite, bowel function is improving.   This might be her window to get chemotherapy, before another complication raises to try to decrease tumor load and improve symptoms and quality of life. Pt has signed consent for treatment with Dr Hoang before.     S/p treatment with reduced dose chemotherapy 2/2 performance status, carboplatin AUC 5 and Taxol 150 mg/m2 on 6/18.   Please order daily liver tests    -Noted to have elevated uric acid and LDH, rise in Cr, K, suspect tumor lysis. Please order rasburicase 7.5mg x1 and albumin. CMP q12h. Please also order daily Mg and Ph. Will monitor closely.   -IVF, consider 5% albumin at 50cc/h, monitor closely for overload.   -Therapeutic paracentesis x 2 during this admission, s/p tx for spontaneous bacterial peritonitis with IV CTX 2g daily x 7 days after the first tap  -Pt with resolved partial bowel obstruction, monitor bowel function  -Pal care input appreciated  -Noted to have RLE nonocclusive DVT located at mid-femur on recent dopplers ( 6/4). Started on Lovenox.  -Please consider starting famotidine or PPI for heartburn.  -Patient to followup with Dr. Hoang ([facility]) upon discharge  -C/w Supportive care, pain control, Nutrition, PT, DVT ppx  -Oncology will continue to follow with you      Case d/w Dr. Jacques HORN  Oncology Physician Assistant  Carina SHARMA/[facility]  Pager [phone]    If after 5pm or weekends please page On-call Oncology Fellow

## 2021-06-16 NOTE — CHART NOTE - NSCHARTNOTEFT_GEN_A_CORE
Notified by RN patient with an episode of hypoglycemia per RN no change in mental status, otherwise stable s/p glucose gel with improvement to 80s, given juice with improvement to 120s. Notified by RN patient with an episode of hypoglycemia per RN no change in mental status, otherwise stable s/p glucose gel with improvement to 80s, given juice with improvement to 120-130. Will order reduced dose lantus for bedtime 80 % - 13 U of lantus x 1 tonight.

## 2021-06-16 NOTE — PROGRESS NOTE ADULT - SUBJECTIVE AND OBJECTIVE BOX
SUBJECTIVE AND OBJECTIVE:  pt very tired today.  Pain well controlled.  TLS, receiving treatment after chemo  INTERVAL HPI/OVERNIGHT EVENTS:    DNR on chart:   Allergies    Sulf-10 (Rash; Short breath)    Intolerances    MEDICATIONS  (STANDING):  albumin human  5% IVPB 250 milliLiter(s) IV Intermittent every 6 hours  dextrose 40% Gel 15 Gram(s) Oral once  dextrose 5%. 1000 milliLiter(s) (50 mL/Hr) IV Continuous <Continuous>  dextrose 5%. 1000 milliLiter(s) (100 mL/Hr) IV Continuous <Continuous>  dextrose 50% Injectable 25 Gram(s) IV Push once  dextrose 50% Injectable 12.5 Gram(s) IV Push once  dextrose 50% Injectable 25 Gram(s) IV Push once  enoxaparin Injectable 70 milliGRAM(s) SubCutaneous two times a day  ethacrynic acid 25 milliGRAM(s) Oral daily  glucagon  Injectable 1 milliGRAM(s) IntraMuscular once  insulin glargine Injectable (LANTUS) 16 Unit(s) SubCutaneous at bedtime  insulin lispro (ADMELOG) corrective regimen sliding scale   SubCutaneous three times a day before meals  insulin lispro (ADMELOG) corrective regimen sliding scale   SubCutaneous at bedtime  insulin lispro Injectable (ADMELOG) 5 Unit(s) SubCutaneous before lunch  insulin lispro Injectable (ADMELOG) 4 Unit(s) SubCutaneous before breakfast  insulin lispro Injectable (ADMELOG) 5 Unit(s) SubCutaneous before dinner  magnesium hydroxide Suspension 30 milliLiter(s) Oral daily  morphine ER Tablet 15 milliGRAM(s) Oral two times a day  polyethylene glycol 3350 17 Gram(s) Oral daily  rasburicase IVPB 7.5 milliGRAM(s) IV Intermittent once  senna 2 Tablet(s) Oral at bedtime  urea Oral Powder 15 Gram(s) Oral two times a day    MEDICATIONS  (PRN):  acetaminophen   Tablet .. 650 milliGRAM(s) Oral every 6 hours PRN Mild Pain (1 - 3), Moderate Pain (4 - 6)  methylPREDNISolone sodium succinate Injectable 125 milliGRAM(s) IV Push once PRN PRN Chemotherapy Reactoin  oxyCODONE    IR 5 milliGRAM(s) Oral every 4 hours PRN Severe Pain (7 - 10)  witch hazel Pads 1 Application(s) Topical three times a day PRN discomfort      ITEMS UNCHECKED ARE NOT PRESENT    PRESENT SYMPTOMS: [ ]Unable to obtain due to poor mentation   Source if other than patient:  [ ]Family   [ ]Team     Pain (Impact on QOL):  denies  Location:  Minimal acceptable level (0-10 scale):            Aggravating factors:  Quality:  Radiation:  Severity (0-10 scale):    Timing:    Dyspnea:                           [ ]Mild [ ]Moderate [ ]Severe  Anxiety:                             [ ]Mild [ ]Moderate [ ]Severe  Fatigue:                             [ ]Mild [ ]Moderate [x ]Severe  Nausea:                             [ ]Mild [ ]Moderate [ ]Severe  Loss of appetite:              [ ]Mild [ ]Moderate [x ]Severe  Constipation:                    [ ]Mild [ ]Moderate [ ]Severe    PAIN AD Score:	  [email or web address] (Ctrl + left click to view)    Other Symptoms:  [x ]All other review of systems negative     Karnofsky Performance Score/Palliative Performance Status Version 2:    50     %    http://palliative.info/resource_material/[email or web address].pdf  PHYSICAL EXAM:  Vital Signs Last 24 Hrs  T(C): 36.7 (16 Jun 2021 13:38), Max: 36.8 (16 Jun 2021 05:54)  T(F): 98 (16 Jun 2021 13:38), Max: 98.3 (16 Jun 2021 05:54)  HR: 108 (16 Jun 2021 13:38) (95 - 108)  BP: 110/58 (16 Jun 2021 14:29) (95/58 - 140/82)  BP(mean): --  RR: 18 (16 Jun 2021 13:38) (18 - 18)  SpO2: 100% (16 Jun 2021 13:38) (100% - 100%) I&O's Summary    15 Terry 2021 07:01  -  16 Jun 2021 07:00  --------------------------------------------------------  IN: 455 mL / OUT: 200 mL / NET: 255 mL    16 Jun 2021 07:01  -  16 Jun 2021 14:41  --------------------------------------------------------  IN: 354 mL / OUT: 200 mL / NET: 154 mL     GENERAL:  [ x]Alert  [ xOriented x 3  [xLethargic  [ ]Cachexia  [ ]Unarousable  [ ]Verbal  [ ]Non-Verbal    Behavioral:   [ ] Anxiety  [ ] Delirium [ ] Agitation [ ] Other    HEENT:  [ ]Normal   [ x]Dry mouth   [ ]ET Tube/Trach  [ ]Oral lesions    PULMONARY:   [x ]Clear [ ]Tachypnea  [ ]Audible excessive secretions   [ ]Rhonchi        [ ]Right [ ]Left [ ]Bilateral  [ ]Crackles        [ ]Right [ ]Left [ ]Bilateral  [ ]Wheezing     [ ]Right [ ]Left [ ]Bilateral    CARDIOVASCULAR:    [ ]Regular [ ]Irregular [x ]Tachy  [ ]Glenn [ ]Murmur [ ]Other    GASTROINTESTINAL:  [x ]Soft  [x ]Distended   [x ]+BS  [ x]Non tender [ ]Tender  [ ]PEG [ ]OGT/ NGT   Last BM:    GENITOURINARY:  [ ]Normal [x ] Incontinent   [ ]Oliguria/Anuria   [ ]Ang[place]    MUSCULOSKELETAL:   [ ]Normal   [xx ]Weakness  [ ]Bed/Wheelchair bound [ ]Edema    NEUROLOGIC:   [ x]No focal deficits  [ ] Cognitive impairment  [ ] Dysphagia [ ]Dysarthria [ ] Paresis [ ]Other     SKIN:   [x ]Normal   [ ]Pressure ulcer(s)  [ ]Rash    CRITICAL CARE:  [ ] Shock Present  [ ]Septic [ ]Cardiogenic [ ]Neurologic [ ]Hypovolemic  [ ]  Vasopressors [ ]  Inotropes   [ ] Respiratory failure present  [ ] Acute  [ ] Chronic [ ] Hypoxic  [ ] Hypercarbic [ ] Other  [ ] Other organ failure     LABS:                        8.9    24.48 )-----------( 338      ( 16 Jun 2021 07:37 )             31.4   06-16    129<L>  |  92<L>  |  29<H>  ----------------------------<  190<H>  5.0   |  19<L>  |  0.72    Ca    8.6      16 Jun 2021 07:37  Phos  3.7     06-16  Mg     2.2     06-16    TPro  6.6  /  Alb  2.8<L>  /  TBili  0.8  /  DBili  x   /  AST  32  /  ALT  7   /  AlkPhos  124<H>  06-16        RADIOLOGY & ADDITIONAL STUDIES:    Protein Calorie Malnutrition Present: [ ] yes [ ] no  [ ] PPSV2 < or = 30%  [ ] significant weight loss [ ] poor nutritional intake [ ] anasarca [ ] catabolic state Albumin, Serum: 2.8 g/dL (06-16-21 @ 07:37)  Artificial Nutrition [ ]     REFERRALS:   [ ]Chaplaincy  [ ] Hospice  [ ]Child Life  [ ]Social Work  [ ]Case management [ ]Holistic Therapy   Goals of Care Document:

## 2021-06-16 NOTE — PROVIDER CONTACT NOTE (OTHER) - SITUATION
Patient c/o nausea
Ducolax suppository given around 22:00 last night, pt. complains of discomfort at rectal area
patient noted to have blood streaked BM
Pt for K-Phos , albumin , miralix, magnesium hydroxide and urea powder
patient c/o 10/10 abdominal and back pain
patient continues to refuse IV albumin

## 2021-06-16 NOTE — PROGRESS NOTE ADULT - ASSESSMENT
56 year old woman with PMH uncontrolled DM2, A1c 9.6%, migraines and newly diagnosed stage 4 endometrial cancer presented to the ED with increasing abdominal pain/distention and shortness of breath for 2 weeks. Endocrine following for management of uncontrolled DM2. BG goal 100-180 mg/dL.    1.  Uncontrolled type 2 diabetes mellitus with hyperglycemia.   - HbA1c 9.6%, not on treatment at home  - target bg 100-180 mg/dl  - Continue Lantus 16 units at bedtime  Pt with mild hyperglycemia after lunch and dinner yesterday, reporting increased appetite and po intake  - Increase admelog before lunch and dinner to 5 units, Continue Admelog 4 units before breakfast  - low correction scale qac and low qhs scale  - consistent carb diet  - check FS qac and qhs    DM EDUCATION- please assure RN has patient self inject and also teaches insulin pens, and hypoglycemia management    - for discharge: ideally would discharge on basal insulin + oral agent such as Metformin, however patient declines treatment with orals and would like to start with once daily basal insulin for now. Thus, will plan on discharging on Lantus (or whichever basal insulin is covered), dose TBD.     Please send Lantus solsotar pen as test script to check insurance coverage.  Ok to send with current doses and update prior to d/c    If Lantus not covered- can try alternating with one of following   tresiba/basaglar/toujeo/Levemir      2.  Essential hypertension.    - BP at goal, less than 130/80  - continue to monitor.     3.  Hyperlipidemia, unspecified hyperlipidemia type.    - LDL 59  - okay to defer statin     Sofi Griggs MD  pager  on 6/16/21  Other times:  Diabetes team: [phone] business hours  [phone] night/weekend

## 2021-06-16 NOTE — PROGRESS NOTE ADULT - SUBJECTIVE AND OBJECTIVE BOX
Diabetes  Follow up note    Interval History: Pt reports increased appetite, eating more.  Has occasional nausea, no vomitting.    MEDICATIONS  (STANDING):  dextrose 40% Gel 15 Gram(s) Oral once  dextrose 5%. 1000 milliLiter(s) (50 mL/Hr) IV Continuous <Continuous>  dextrose 5%. 1000 milliLiter(s) (100 mL/Hr) IV Continuous <Continuous>  dextrose 50% Injectable 25 Gram(s) IV Push once  dextrose 50% Injectable 12.5 Gram(s) IV Push once  dextrose 50% Injectable 25 Gram(s) IV Push once  enoxaparin Injectable 70 milliGRAM(s) SubCutaneous two times a day  ethacrynic acid 25 milliGRAM(s) Oral daily  glucagon  Injectable 1 milliGRAM(s) IntraMuscular once  insulin glargine Injectable (LANTUS) 16 Unit(s) SubCutaneous at bedtime  insulin lispro (ADMELOG) corrective regimen sliding scale   SubCutaneous three times a day before meals  insulin lispro (ADMELOG) corrective regimen sliding scale   SubCutaneous at bedtime  insulin lispro Injectable (ADMELOG) 4 Unit(s) SubCutaneous three times a day before meals  magnesium hydroxide Suspension 30 milliLiter(s) Oral daily  morphine ER Tablet 15 milliGRAM(s) Oral two times a day  polyethylene glycol 3350 17 Gram(s) Oral daily  rasburicase IVPB 3 milliGRAM(s) IV Intermittent once  senna 2 Tablet(s) Oral at bedtime  urea Oral Powder 15 Gram(s) Oral two times a day    MEDICATIONS  (PRN):  acetaminophen   Tablet .. 650 milliGRAM(s) Oral every 6 hours PRN Mild Pain (1 - 3), Moderate Pain (4 - 6)  methylPREDNISolone sodium succinate Injectable 125 milliGRAM(s) IV Push once PRN PRN Chemotherapy Reactoin  oxyCODONE    IR 5 milliGRAM(s) Oral every 4 hours PRN Severe Pain (7 - 10)  witch hazel Pads 1 Application(s) Topical three times a day PRN discomfort      Allergies    Sulf-10 (Rash; Short breath)    Intolerances          PHYSICAL EXAM:  VITALS: T(C): 36.8 (06-16-21 @ 05:54)  T(F): 98.3 (06-16-21 @ 05:54), Max: 98.3 (06-16-21 @ 05:54)  HR: 99 (06-16-21 @ 05:54) (95 - 100)  BP: 140/82 (06-16-21 @ 05:54) (115/- - 140/82)  RR:  (18 - 18)  SpO2:  (100% - 100%)  Wt(kg): 66  GENERAL: Sitting up in chair in NAD,   EYES: No proptosis,  HEENT:  Atraumatic, Normocephalic,   RESPIRATORY: Clear to auscultation bilaterally  CARDIOVASCULAR: Regular rhythm; No murmurs; no peripheral edema  GI: Soft, nontender, non distended, normal bowel sounds        POCT Blood Glucose.: 180 mg/dL (06-16-21 @ 08:28)  POCT Blood Glucose.: 210 mg/dL (06-15-21 @ 21:41)  POCT Blood Glucose.: 203 mg/dL (06-15-21 @ 17:50)  POCT Blood Glucose.: 151 mg/dL (06-15-21 @ 12:25)  POCT Blood Glucose.: 234 mg/dL (06-15-21 @ 08:39)  POCT Blood Glucose.: 178 mg/dL (06-14-21 @ 22:16)  POCT Blood Glucose.: 147 mg/dL (06-14-21 @ 17:39)  POCT Blood Glucose.: 143 mg/dL (06-14-21 @ 12:36)  POCT Blood Glucose.: 165 mg/dL (06-14-21 @ 08:26)  POCT Blood Glucose.: 169 mg/dL (06-13-21 @ 22:05)  POCT Blood Glucose.: 158 mg/dL (06-13-21 @ 17:44)  POCT Blood Glucose.: 116 mg/dL (06-13-21 @ 12:23)        06-16    129<L>  |  92<L>  |  29<H>  ----------------------------<  190<H>  5.0   |  19<L>  |  0.72    EGFR if : 107  EGFR if non : 92    Ca    8.6      06-16  Mg     1.9     06-14  Phos  2.4     06-14    TPro  6.6  /  Alb  2.8<L>  /  TBili  0.8  /  DBili  x   /  AST  32  /  ALT  7   /  AlkPhos  124<H>  06-16        A1C with Estimated Average Glucose Result: 9.6 % (06-02-21 @ 06:33)

## 2021-06-16 NOTE — PROGRESS NOTE ADULT - PROBLEM SELECTOR PLAN 1
- s/p chemo  6/14. post chemo lab concerning for TLS  - Rasburicase 7.5 x 1 on 6/16. started on 5% albumin @50cc/hr. monitor fluid status, K, phos, LDH  - onc and pall care follg,

## 2021-06-16 NOTE — PROGRESS NOTE ADULT - PROBLEM SELECTOR PLAN 2
s/p diagnostic and therapeutic (4L fluid removal) 6/2  -completed ceftriaxone x 7 days for possible SBP  - cytology from ascites POSITIVE FOR MALIGNANT CELLS. Cytomorphologically consistent with mucinous adenocarcinoma  -Pain control with Morphine ER,  and oxy IR, appreciate pall recs  -s/p  therapeutic tap 6/11, 2L removed

## 2021-06-16 NOTE — PROGRESS NOTE ADULT - ASSESSMENT
55yo Female w/ newly diagnosed Stage IV Endometrial Cancer and poorly controlled DM II presents to the ED with SOB, Abdominal pain/distention x2 weeks. CT A/P showed large volume ascites s/p diagnostic and therapeutic paracentesis (4L removed) c/b neutrocytic ascites on IV CTX. Found to have age indeterminate non occlusive L. mid femoral and peroneal veins DVT on therapeutic lovenox. s/p inpt chemo 6/15, course c/b possible TLS. Palliative care following for pain management.

## 2021-06-16 NOTE — PROVIDER CONTACT NOTE (OTHER) - BACKGROUND
h/o newly diagnosed stage IV endometrial CA
stage 4 endometrial ca, ascites
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
stage 4 endometrial ca
malignant neoplasm of ovary
stage 4 endometrial ca

## 2021-06-16 NOTE — PROVIDER CONTACT NOTE (MEDICATION) - ACTION/TREATMENT ORDERED:
RN discussed the need for Albumin and its actions.  Pt continues to refuse.
Provider aware
premeal insulin held

## 2021-06-16 NOTE — PROGRESS NOTE ADULT - SUBJECTIVE AND OBJECTIVE BOX
INTERVAL HPI/OVERNIGHT EVENTS:  Patient seen at bedside.  Patient with heartburn and nausea  No other acute complaints or events reported      VITAL SIGNS:  T(F): 98 (06-16-21 @ 13:38)  HR: 108 (06-16-21 @ 13:38)  BP: 95/58 (06-16-21 @ 13:38)  RR: 18 (06-16-21 @ 13:38)  SpO2: 100% (06-16-21 @ 13:38)  Wt(kg): --    PHYSICAL EXAM:    In accordance with current standard limiting patient contact, deferred physical exam  2/2 COVID pandemic  Please refer to physical exam of primary team.    MEDICATIONS  (STANDING):  dextrose 40% Gel 15 Gram(s) Oral once  dextrose 5%. 1000 milliLiter(s) (50 mL/Hr) IV Continuous <Continuous>  dextrose 5%. 1000 milliLiter(s) (100 mL/Hr) IV Continuous <Continuous>  dextrose 50% Injectable 25 Gram(s) IV Push once  dextrose 50% Injectable 12.5 Gram(s) IV Push once  dextrose 50% Injectable 25 Gram(s) IV Push once  enoxaparin Injectable 70 milliGRAM(s) SubCutaneous two times a day  ethacrynic acid 25 milliGRAM(s) Oral daily  glucagon  Injectable 1 milliGRAM(s) IntraMuscular once  insulin glargine Injectable (LANTUS) 16 Unit(s) SubCutaneous at bedtime  insulin lispro (ADMELOG) corrective regimen sliding scale   SubCutaneous three times a day before meals  insulin lispro (ADMELOG) corrective regimen sliding scale   SubCutaneous at bedtime  insulin lispro Injectable (ADMELOG) 5 Unit(s) SubCutaneous before lunch  insulin lispro Injectable (ADMELOG) 4 Unit(s) SubCutaneous before breakfast  insulin lispro Injectable (ADMELOG) 5 Unit(s) SubCutaneous before dinner  magnesium hydroxide Suspension 30 milliLiter(s) Oral daily  morphine ER Tablet 15 milliGRAM(s) Oral two times a day  polyethylene glycol 3350 17 Gram(s) Oral daily  rasburicase IVPB 7.5 milliGRAM(s) IV Intermittent once  senna 2 Tablet(s) Oral at bedtime  urea Oral Powder 15 Gram(s) Oral two times a day    MEDICATIONS  (PRN):  acetaminophen   Tablet .. 650 milliGRAM(s) Oral every 6 hours PRN Mild Pain (1 - 3), Moderate Pain (4 - 6)  methylPREDNISolone sodium succinate Injectable 125 milliGRAM(s) IV Push once PRN PRN Chemotherapy Reactoin  oxyCODONE    IR 5 milliGRAM(s) Oral every 4 hours PRN Severe Pain (7 - 10)  witch hazel Pads 1 Application(s) Topical three times a day PRN discomfort      Allergies    Sulf-10 (Rash; Short breath)    Intolerances        LABS:                        8.9    24.48 )-----------( 338      ( 16 Jun 2021 07:37 )             31.4     06-16    129<L>  |  92<L>  |  29<H>  ----------------------------<  190<H>  5.0   |  19<L>  |  0.72    Ca    8.6      16 Jun 2021 07:37  Phos  3.7     06-16  Mg     2.2     06-16    TPro  6.6  /  Alb  2.8<L>  /  TBili  0.8  /  DBili  x   /  AST  32  /  ALT  7   /  AlkPhos  124<H>  06-16          RADIOLOGY & ADDITIONAL TESTS:  Studies reviewed.

## 2021-06-16 NOTE — CHART NOTE - NSCHARTNOTEFT_GEN_A_CORE
Discussed with oncology, recommends rasburicase 7.5mg and albumin 5%. Will monitor labs q12. Attending Dr. Lopez made aware.

## 2021-06-17 LAB
ALBUMIN SERPL ELPH-MCNC: 2.8 G/DL — LOW (ref 3.3–5)
ALBUMIN SERPL ELPH-MCNC: 3.3 G/DL — SIGNIFICANT CHANGE UP (ref 3.3–5)
ALP SERPL-CCNC: 97 U/L — SIGNIFICANT CHANGE UP (ref 40–120)
ALP SERPL-CCNC: 97 U/L — SIGNIFICANT CHANGE UP (ref 40–120)
ALT FLD-CCNC: 10 U/L — SIGNIFICANT CHANGE UP (ref 4–33)
ALT FLD-CCNC: 6 U/L — SIGNIFICANT CHANGE UP (ref 4–33)
ANION GAP SERPL CALC-SCNC: 13 MMOL/L — SIGNIFICANT CHANGE UP (ref 7–14)
ANION GAP SERPL CALC-SCNC: 14 MMOL/L — SIGNIFICANT CHANGE UP (ref 7–14)
AST SERPL-CCNC: 42 U/L — HIGH (ref 4–32)
AST SERPL-CCNC: 53 U/L — HIGH (ref 4–32)
BASOPHILS # BLD AUTO: 0.01 K/UL — SIGNIFICANT CHANGE UP (ref 0–0.2)
BASOPHILS # BLD AUTO: 0.01 K/UL — SIGNIFICANT CHANGE UP (ref 0–0.2)
BASOPHILS NFR BLD AUTO: 0.1 % — SIGNIFICANT CHANGE UP (ref 0–2)
BASOPHILS NFR BLD AUTO: 0.1 % — SIGNIFICANT CHANGE UP (ref 0–2)
BILIRUB SERPL-MCNC: 0.6 MG/DL — SIGNIFICANT CHANGE UP (ref 0.2–1.2)
BILIRUB SERPL-MCNC: 0.7 MG/DL — SIGNIFICANT CHANGE UP (ref 0.2–1.2)
BLD GP AB SCN SERPL QL: NEGATIVE — SIGNIFICANT CHANGE UP
BUN SERPL-MCNC: 25 MG/DL — HIGH (ref 7–23)
BUN SERPL-MCNC: 29 MG/DL — HIGH (ref 7–23)
CALCIUM SERPL-MCNC: 8.2 MG/DL — LOW (ref 8.4–10.5)
CALCIUM SERPL-MCNC: 8.5 MG/DL — SIGNIFICANT CHANGE UP (ref 8.4–10.5)
CHLORIDE SERPL-SCNC: 94 MMOL/L — LOW (ref 98–107)
CHLORIDE SERPL-SCNC: 96 MMOL/L — LOW (ref 98–107)
CO2 SERPL-SCNC: 25 MMOL/L — SIGNIFICANT CHANGE UP (ref 22–31)
CO2 SERPL-SCNC: 27 MMOL/L — SIGNIFICANT CHANGE UP (ref 22–31)
CREAT SERPL-MCNC: 0.58 MG/DL — SIGNIFICANT CHANGE UP (ref 0.5–1.3)
CREAT SERPL-MCNC: 0.59 MG/DL — SIGNIFICANT CHANGE UP (ref 0.5–1.3)
EOSINOPHIL # BLD AUTO: 0 K/UL — SIGNIFICANT CHANGE UP (ref 0–0.5)
EOSINOPHIL # BLD AUTO: 0 K/UL — SIGNIFICANT CHANGE UP (ref 0–0.5)
EOSINOPHIL NFR BLD AUTO: 0 % — SIGNIFICANT CHANGE UP (ref 0–6)
EOSINOPHIL NFR BLD AUTO: 0 % — SIGNIFICANT CHANGE UP (ref 0–6)
GLUCOSE SERPL-MCNC: 152 MG/DL — HIGH (ref 70–99)
GLUCOSE SERPL-MCNC: 165 MG/DL — HIGH (ref 70–99)
HCT VFR BLD CALC: 26.2 % — LOW (ref 34.5–45)
HCT VFR BLD CALC: 26.4 % — LOW (ref 34.5–45)
HGB BLD-MCNC: 7.2 G/DL — LOW (ref 11.5–15.5)
HGB BLD-MCNC: 7.5 G/DL — LOW (ref 11.5–15.5)
IANC: 12.81 K/UL — HIGH (ref 1.5–8.5)
IANC: 12.98 K/UL — HIGH (ref 1.5–8.5)
IMM GRANULOCYTES NFR BLD AUTO: 0.8 % — SIGNIFICANT CHANGE UP (ref 0–1.5)
IMM GRANULOCYTES NFR BLD AUTO: 0.8 % — SIGNIFICANT CHANGE UP (ref 0–1.5)
LDH SERPL L TO P-CCNC: 1133 U/L — HIGH (ref 135–225)
LDH SERPL L TO P-CCNC: 1381 U/L — HIGH (ref 135–225)
LYMPHOCYTES # BLD AUTO: 0.69 K/UL — LOW (ref 1–3.3)
LYMPHOCYTES # BLD AUTO: 0.79 K/UL — LOW (ref 1–3.3)
LYMPHOCYTES # BLD AUTO: 4.9 % — LOW (ref 13–44)
LYMPHOCYTES # BLD AUTO: 5.7 % — LOW (ref 13–44)
MAGNESIUM SERPL-MCNC: 2 MG/DL — SIGNIFICANT CHANGE UP (ref 1.6–2.6)
MAGNESIUM SERPL-MCNC: 2.2 MG/DL — SIGNIFICANT CHANGE UP (ref 1.6–2.6)
MCHC RBC-ENTMCNC: 19.7 PG — LOW (ref 27–34)
MCHC RBC-ENTMCNC: 20.4 PG — LOW (ref 27–34)
MCHC RBC-ENTMCNC: 27.3 GM/DL — LOW (ref 32–36)
MCHC RBC-ENTMCNC: 28.6 GM/DL — LOW (ref 32–36)
MCV RBC AUTO: 71.4 FL — LOW (ref 80–100)
MCV RBC AUTO: 72.3 FL — LOW (ref 80–100)
MONOCYTES # BLD AUTO: 0.16 K/UL — SIGNIFICANT CHANGE UP (ref 0–0.9)
MONOCYTES # BLD AUTO: 0.26 K/UL — SIGNIFICANT CHANGE UP (ref 0–0.9)
MONOCYTES NFR BLD AUTO: 1.2 % — LOW (ref 2–14)
MONOCYTES NFR BLD AUTO: 1.9 % — LOW (ref 2–14)
NEUTROPHILS # BLD AUTO: 12.81 K/UL — HIGH (ref 1.8–7.4)
NEUTROPHILS # BLD AUTO: 12.98 K/UL — HIGH (ref 1.8–7.4)
NEUTROPHILS NFR BLD AUTO: 92.2 % — HIGH (ref 43–77)
NEUTROPHILS NFR BLD AUTO: 92.3 % — HIGH (ref 43–77)
NRBC # BLD: 0 /100 WBCS — SIGNIFICANT CHANGE UP
NRBC # BLD: 0 /100 WBCS — SIGNIFICANT CHANGE UP
NRBC # FLD: 0 K/UL — SIGNIFICANT CHANGE UP
NRBC # FLD: 0.02 K/UL — HIGH
PHOSPHATE SERPL-MCNC: 2 MG/DL — LOW (ref 2.5–4.5)
PHOSPHATE SERPL-MCNC: 2.2 MG/DL — LOW (ref 2.5–4.5)
PLATELET # BLD AUTO: 219 K/UL — SIGNIFICANT CHANGE UP (ref 150–400)
PLATELET # BLD AUTO: 251 K/UL — SIGNIFICANT CHANGE UP (ref 150–400)
POTASSIUM SERPL-MCNC: 3.9 MMOL/L — SIGNIFICANT CHANGE UP (ref 3.5–5.3)
POTASSIUM SERPL-MCNC: 3.9 MMOL/L — SIGNIFICANT CHANGE UP (ref 3.5–5.3)
POTASSIUM SERPL-SCNC: 3.9 MMOL/L — SIGNIFICANT CHANGE UP (ref 3.5–5.3)
POTASSIUM SERPL-SCNC: 3.9 MMOL/L — SIGNIFICANT CHANGE UP (ref 3.5–5.3)
PROT SERPL-MCNC: 5.8 G/DL — LOW (ref 6–8.3)
PROT SERPL-MCNC: 6.5 G/DL — SIGNIFICANT CHANGE UP (ref 6–8.3)
RBC # BLD: 3.65 M/UL — LOW (ref 3.8–5.2)
RBC # BLD: 3.67 M/UL — LOW (ref 3.8–5.2)
RBC # FLD: 19.5 % — HIGH (ref 10.3–14.5)
RBC # FLD: 19.7 % — HIGH (ref 10.3–14.5)
RH IG SCN BLD-IMP: POSITIVE — SIGNIFICANT CHANGE UP
SODIUM SERPL-SCNC: 132 MMOL/L — LOW (ref 135–145)
SODIUM SERPL-SCNC: 137 MMOL/L — SIGNIFICANT CHANGE UP (ref 135–145)
URATE SERPL-MCNC: 0.3 MG/DL — LOW (ref 2.5–7)
URATE SERPL-MCNC: 1.2 MG/DL — LOW (ref 2.5–7)
WBC # BLD: 13.88 K/UL — HIGH (ref 3.8–10.5)
WBC # BLD: 14.05 K/UL — HIGH (ref 3.8–10.5)
WBC # FLD AUTO: 13.88 K/UL — HIGH (ref 3.8–10.5)
WBC # FLD AUTO: 14.05 K/UL — HIGH (ref 3.8–10.5)

## 2021-06-17 PROCEDURE — 99232 SBSQ HOSP IP/OBS MODERATE 35: CPT

## 2021-06-17 PROCEDURE — 99233 SBSQ HOSP IP/OBS HIGH 50: CPT

## 2021-06-17 RX ORDER — INSULIN LISPRO 100/ML
4 VIAL (ML) SUBCUTANEOUS
Refills: 0 | Status: DISCONTINUED | OUTPATIENT
Start: 2021-06-17 | End: 2021-06-18

## 2021-06-17 RX ORDER — MORPHINE SULFATE 50 MG/1
15 CAPSULE, EXTENDED RELEASE ORAL
Refills: 0 | Status: DISCONTINUED | OUTPATIENT
Start: 2021-06-17 | End: 2021-06-18

## 2021-06-17 RX ORDER — OXYCODONE HYDROCHLORIDE 5 MG/1
5 TABLET ORAL EVERY 4 HOURS
Refills: 0 | Status: DISCONTINUED | OUTPATIENT
Start: 2021-06-17 | End: 2021-06-18

## 2021-06-17 RX ORDER — INSULIN GLARGINE 100 [IU]/ML
17 INJECTION, SOLUTION SUBCUTANEOUS AT BEDTIME
Refills: 0 | Status: DISCONTINUED | OUTPATIENT
Start: 2021-06-17 | End: 2021-06-18

## 2021-06-17 RX ORDER — NYSTATIN 500MM UNIT
500000 POWDER (EA) MISCELLANEOUS EVERY 6 HOURS
Refills: 0 | Status: DISCONTINUED | OUTPATIENT
Start: 2021-06-17 | End: 2021-06-18

## 2021-06-17 RX ORDER — ONDANSETRON 8 MG/1
4 TABLET, FILM COATED ORAL ONCE
Refills: 0 | Status: DISCONTINUED | OUTPATIENT
Start: 2021-06-17 | End: 2021-06-18

## 2021-06-17 RX ADMIN — POLYETHYLENE GLYCOL 3350 17 GRAM(S): 17 POWDER, FOR SOLUTION ORAL at 13:04

## 2021-06-17 RX ADMIN — ETHACRYNIC ACID 25 MILLIGRAM(S): 25 TABLET ORAL at 07:05

## 2021-06-17 RX ADMIN — MORPHINE SULFATE 15 MILLIGRAM(S): 50 CAPSULE, EXTENDED RELEASE ORAL at 19:05

## 2021-06-17 RX ADMIN — Medication 500000 UNIT(S): at 23:29

## 2021-06-17 RX ADMIN — SENNA PLUS 2 TABLET(S): 8.6 TABLET ORAL at 22:32

## 2021-06-17 RX ADMIN — MORPHINE SULFATE 15 MILLIGRAM(S): 50 CAPSULE, EXTENDED RELEASE ORAL at 00:23

## 2021-06-17 RX ADMIN — Medication 2: at 18:58

## 2021-06-17 RX ADMIN — INSULIN GLARGINE 17 UNIT(S): 100 INJECTION, SOLUTION SUBCUTANEOUS at 22:31

## 2021-06-17 RX ADMIN — SENNA PLUS 2 TABLET(S): 8.6 TABLET ORAL at 00:23

## 2021-06-17 RX ADMIN — Medication 2: at 12:50

## 2021-06-17 RX ADMIN — Medication 500000 UNIT(S): at 13:04

## 2021-06-17 RX ADMIN — Medication 50 MILLILITER(S): at 13:05

## 2021-06-17 RX ADMIN — Medication 50 MILLILITER(S): at 06:55

## 2021-06-17 RX ADMIN — MAGNESIUM HYDROXIDE 30 MILLILITER(S): 400 TABLET, CHEWABLE ORAL at 13:04

## 2021-06-17 RX ADMIN — ENOXAPARIN SODIUM 70 MILLIGRAM(S): 100 INJECTION SUBCUTANEOUS at 18:12

## 2021-06-17 RX ADMIN — ENOXAPARIN SODIUM 70 MILLIGRAM(S): 100 INJECTION SUBCUTANEOUS at 07:05

## 2021-06-17 RX ADMIN — Medication 15 GRAM(S): at 07:05

## 2021-06-17 RX ADMIN — Medication 50 MILLILITER(S): at 00:24

## 2021-06-17 RX ADMIN — OXYCODONE HYDROCHLORIDE 5 MILLIGRAM(S): 5 TABLET ORAL at 23:29

## 2021-06-17 RX ADMIN — Medication 5 UNIT(S): at 12:47

## 2021-06-17 RX ADMIN — Medication 15 GRAM(S): at 22:31

## 2021-06-17 RX ADMIN — Medication 2: at 08:46

## 2021-06-17 RX ADMIN — Medication 4 UNIT(S): at 08:46

## 2021-06-17 NOTE — PROGRESS NOTE ADULT - ASSESSMENT
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   Course c/b large ascites, SBP, partial bowel obstruction.    GOC discussed with patient and her sisters in detail. Poor prognosis and incurable nature of disease was explained. Pt is requesting to continue aggressive care.     Patient is doing better overall, has better appetite, bowel function is improving.   This might be her window to get chemotherapy, before another complication raises to try to decrease tumor load and improve symptoms and quality of life. Pt has signed consent for treatment with Dr Hoang before.     S/p treatment with reduced dose chemotherapy 2/2 performance status, carboplatin AUC 5 and Taxol 150 mg/m2 on 6/18.   Please order daily liver tests    -Noted to have elevated uric acid and LDH, rise in Cr, K, suspect tumor lysis.S/p  rasburicase 7.5mg x1 and albumin. CMP q12h. Please also order daily Mg and Ph. Will monitor closely.   -Electrolytes improved  -Please transfuse 1 unit of blood today, pt aware and agreeable  -Therapeutic paracentesis x 2 during this admission, s/p tx for spontaneous bacterial peritonitis with IV CTX 2g daily x 7 days after the first tap  -Pt with resolved partial bowel obstruction, monitor bowel function  -Pal care input appreciated  -Noted to have RLE nonocclusive DVT located at mid-femur on recent dopplers ( 6/4). Started on Lovenox.  -Please consider starting famotidine or PPI for heartburn.  -Patient to followup with Dr. Hoang ([facility]) upon discharge  -C/w Supportive care, pain control, Nutrition, PT, DVT ppx  -Oncology will continue to follow with you      Case d/w Dr. Jacques Acharya PA  Oncology Physician Assistant  Carina SHARMA/[facility]  Pager [phone]    If after 5pm or weekends please page On-call Oncology Fellow   56f with recently diagnosed metastatic uterine carcinosarcoma, c/b ascites, with plan to start chemotherapy 6/4, presented to the ED with abdominal discomfort.   Course c/b large ascites, SBP, partial bowel obstruction.    GOC discussed with patient and her sisters in detail. Poor prognosis and incurable nature of disease was explained. Pt is requesting to continue aggressive care.     Patient is doing better overall, has better appetite, bowel function is improving.   This might be her window to get chemotherapy, before another complication raises to try to decrease tumor load and improve symptoms and quality of life. Pt has signed consent for treatment with Dr Hoang before.     S/p treatment with reduced dose chemotherapy 2/2 performance status, carboplatin AUC 5 and Taxol 150 mg/m2 on 6/18.   Please order daily liver tests    -Noted to have elevated uric acid and LDH, rise in Cr, K, suspect tumor lysis. S/p  rasburicase 7.5mg x1 and albumin 5%. CMP q12h. Please also order daily Mg and Ph. Will monitor closely.   -Electrolytes improved  -Please transfuse 1 unit of blood today, pt aware and agreeable  -Therapeutic paracentesis x 2 during this admission, s/p tx for spontaneous bacterial peritonitis with IV CTX 2g daily x 7 days after the first tap  -Pt with resolved partial bowel obstruction, monitor bowel function  -Pal care input appreciated  -Noted to have RLE nonocclusive DVT located at mid-femur on recent dopplers ( 6/4). Started on Lovenox.  -Please consider starting famotidine or PPI for heartburn.  -Patient to followup with Dr. Hoang ([facility]) upon discharge  -C/w Supportive care, pain control, Nutrition, PT, DVT ppx  -Oncology will continue to follow with you      Case d/w Dr. Jacques HORN  Oncology Physician Assistant  Car[facility] SHARMA/[facility]  Pager [phone]    If after 5pm or weekends please page On-call Oncology Fellow

## 2021-06-17 NOTE — PROGRESS NOTE ADULT - SUBJECTIVE AND OBJECTIVE BOX
[facility] Division of Hospital Medicine  Yaritza Lopez MD  Pager [phone]      Patient is a 56y old  Female who presents with a chief complaint of Recent Endometrial Cancer, Abdominal Distention, SOB (17 Jun 2021 13:53)      SUBJECTIVE / OVERNIGHT EVENTS:    pt found to have FS 60 yesterday pm, received juice, lantus dose reduced.  Pt reports her N/V has much improved since yesterday. still has sob, wants to know if she can get O2 at home. No new complaint     ADDITIONAL REVIEW OF SYSTEMS:    RESPIRATORY: No cough, wheezing, chills or hemoptysis; + shortness of breath  CARDIOVASCULAR: No chest pain, palpitations, dizziness, or leg swelling  GASTROINTESTINAL: No abdominal or epigastric pain. No nausea, vomiting, or hematemesis; No diarrhea or constipation. No melena or hematochezia.      MEDICATIONS  (STANDING):  dextrose 40% Gel 15 Gram(s) Oral once  dextrose 5%. 1000 milliLiter(s) (50 mL/Hr) IV Continuous <Continuous>  dextrose 5%. 1000 milliLiter(s) (100 mL/Hr) IV Continuous <Continuous>  dextrose 50% Injectable 25 Gram(s) IV Push once  dextrose 50% Injectable 12.5 Gram(s) IV Push once  dextrose 50% Injectable 25 Gram(s) IV Push once  enoxaparin Injectable 70 milliGRAM(s) SubCutaneous two times a day  ethacrynic acid 25 milliGRAM(s) Oral daily  glucagon  Injectable 1 milliGRAM(s) IntraMuscular once  insulin glargine Injectable (LANTUS) 16 Unit(s) SubCutaneous at bedtime  insulin lispro (ADMELOG) corrective regimen sliding scale   SubCutaneous three times a day before meals  insulin lispro (ADMELOG) corrective regimen sliding scale   SubCutaneous at bedtime  insulin lispro Injectable (ADMELOG) 5 Unit(s) SubCutaneous before lunch  insulin lispro Injectable (ADMELOG) 4 Unit(s) SubCutaneous before breakfast  insulin lispro Injectable (ADMELOG) 5 Unit(s) SubCutaneous before dinner  magnesium hydroxide Suspension 30 milliLiter(s) Oral daily  morphine ER Tablet 15 milliGRAM(s) Oral two times a day  nystatin    Suspension 852083 Unit(s) Oral every 6 hours  ondansetron Injectable 4 milliGRAM(s) IV Push once  polyethylene glycol 3350 17 Gram(s) Oral daily  senna 2 Tablet(s) Oral at bedtime  urea Oral Powder 15 Gram(s) Oral two times a day    MEDICATIONS  (PRN):  acetaminophen   Tablet .. 650 milliGRAM(s) Oral every 6 hours PRN Mild Pain (1 - 3), Moderate Pain (4 - 6)  methylPREDNISolone sodium succinate Injectable 125 milliGRAM(s) IV Push once PRN PRN Chemotherapy Reactoin  oxyCODONE    IR 5 milliGRAM(s) Oral every 4 hours PRN Severe Pain (7 - 10)  witch hazel Pads 1 Application(s) Topical three times a day PRN discomfort      CAPILLARY BLOOD GLUCOSE      POCT Blood Glucose.: 225 mg/dL (17 Jun 2021 12:06)  POCT Blood Glucose.: 206 mg/dL (17 Jun 2021 08:25)  POCT Blood Glucose.: 132 mg/dL (16 Jun 2021 22:46)  POCT Blood Glucose.: 121 mg/dL (16 Jun 2021 22:19)  POCT Blood Glucose.: 81 mg/dL (16 Jun 2021 21:39)  POCT Blood Glucose.: 60 mg/dL (16 Jun 2021 21:16)  POCT Blood Glucose.: 62 mg/dL (16 Jun 2021 21:14)  POCT Blood Glucose.: 139 mg/dL (16 Jun 2021 18:01)    I&O's Summary    16 Jun 2021 07:01  -  17 Jun 2021 07:00  --------------------------------------------------------  IN: 854 mL / OUT: 200 mL / NET: 654 mL    17 Jun 2021 07:01  -  17 Jun 2021 15:29  --------------------------------------------------------  IN: 373 mL / OUT: 300 mL / NET: 73 mL        PHYSICAL EXAM:  Vital Signs Last 24 Hrs  T(C): 36.8 (17 Jun 2021 13:44), Max: 36.9 (17 Jun 2021 06:47)  T(F): 98.2 (17 Jun 2021 13:44), Max: 98.4 (17 Jun 2021 06:47)  HR: 110 (17 Jun 2021 13:44) (99 - 110)  BP: 105/54 (17 Jun 2021 13:44) (105/54 - 120/57)  BP(mean): --  RR: 18 (17 Jun 2021 13:44) (18 - 18)  SpO2: 100% (17 Jun 2021 13:44) (100% - 100%)    CONSTITUTIONAL: NAD,  EYES: PERRLA; conjunctiva and sclera clear  ENMT: Moist oral mucosa, no pharyngeal injection or exudates;   NECK: Supple, no palpable masses;  RESPIRATORY: Normal respiratory effort; lungs are clear to auscultation bilaterally  CARDIOVASCULAR: Regular rate and rhythm, normal S1 and S2, no murmur/rub/gallop; trace lower extremity edema; Peripheral pulses are 2+ bilaterally  ABDOMEN: Nontender to palpation, normoactive bowel sounds, no rebound/guarding;   MUSCLOSKELETAL:   no clubbing or cyanosis of digits; no joint swelling or tenderness to palpation  PSYCH: A+O to person, place,; affect appropriate  NEUROLOGY: CN 2-12 are intact and symmetric; no gross sensory deficits;   SKIN: No rashes;     LABS:                        7.2    14.05 )-----------( 251      ( 17 Jun 2021 06:26 )             26.4     06-17    132<L>  |  94<L>  |  29<H>  ----------------------------<  152<H>  3.9   |  25  |  0.59    Ca    8.2<L>      17 Jun 2021 06:26  Phos  2.0     06-17  Mg     2.2     06-17    TPro  5.8<L>  /  Alb  2.8<L>  /  TBili  0.6  /  DBili  x   /  AST  53<H>  /  ALT  6   /  AlkPhos  97  06-17                RADIOLOGY & ADDITIONAL TESTS:  Results Reviewed:   Imaging Personally Reviewed:  Electrocardiogram Personally Reviewed:    COORDINATION OF CARE:  Care Discussed with Consultants/Other Providers [Y/N]:  Prior or Outpatient Records Reviewed [Y/N]:

## 2021-06-17 NOTE — PROGRESS NOTE ADULT - PROBLEM SELECTOR PLAN 1
- s/p chemo  6/14. post chemo lab concerning for TLS  - Rasburicase 7.5 x 1 on 6/16. started on 5% albumin @50cc/hr. monitor fluid status, K, phos, LDH  - onc and pall care follg, - s/p chemo  6/14. post chemo lab concerning for TLS, now improved   - Rasburicase 7.5 x 1 on 6/16. s/p 5% albumin @50cc/hr x 24 hr. monitor fluid status, K, phos, LDH  - onc and pall care follg,

## 2021-06-17 NOTE — PROGRESS NOTE ADULT - ASSESSMENT
56 year old woman with PMH uncontrolled DM2, A1c 9.6%, migraines and newly diagnosed stage 4 endometrial cancer presented to the ED with increasing abdominal pain/distention and shortness of breath for 2 weeks. Endocrine following for management of uncontrolled DM2. BG goal 100-180 mg/dL.    1.  Uncontrolled type 2 diabetes mellitus with hyperglycemia.   - HbA1c 9.6%, not on treatment at home  - target bg 100-180 mg/dl  - increase Lantus 17 units at bedtime  Pt with mild hyperglycemia after lunch and dinner yesterday, reporting increased appetite and po intake  - continue admelog before lunch and decrease dinner to 4 units, Continue Admelog 4 units before breakfast  - low correction scale qac and low qhs scale  - consistent carb diet  - check FS qac and qhs    DM EDUCATION- please assure RN has patient self inject and also teaches insulin pens, and hypoglycemia management    - for discharge: ideally would discharge on basal insulin + oral agent such as Metformin, however patient declines treatment with orals and would like to start with once daily basal insulin for now. Thus, will plan on discharging on Lantus (or whichever basal insulin is covered), dose TBD.     Please send Lantus solsotar pen as test script to check insurance coverage.  Ok to send with current doses and update prior to d/c    If Lantus not covered- can try alternating with one of following   tresiba/basaglar/toujeo/Levemir      2.  Essential hypertension.    - BP at goal, less than 130/80  - continue to monitor.     3.  Hyperlipidemia, unspecified hyperlipidemia type.    - LDL 59  - okay to defer statin       discussed plan with ACP and nursing staff

## 2021-06-17 NOTE — PROVIDER CONTACT NOTE (HYPOGLYCEMIA EVENT) - NS PROVIDER CONTACT RECOMMEND-HYPO
21:20 1 Glucose gel tube   21:30 4 oz fruit juice   22:10 4 oz fruit juice   22:40 4 oz fruit juice

## 2021-06-17 NOTE — PROVIDER CONTACT NOTE (HYPOGLYCEMIA EVENT) - NS PROVIDER CONTACT BACKGROUND-HYPO
Age: 56y    Gender: Female    POCT Blood Glucose:  132 mg/dL (06-16-21 @ 22:46)  121 mg/dL (06-16-21 @ 22:19)  81 mg/dL (06-16-21 @ 21:39)  60 mg/dL (06-16-21 @ 21:16)  62 mg/dL (06-16-21 @ 21:14)  139 mg/dL (06-16-21 @ 18:01)  138 mg/dL (06-16-21 @ 12:23)  180 mg/dL (06-16-21 @ 08:28)      eMAR:  dextrose 40% Gel   15 Gram(s) Oral (06-16-21 @ 21:43)    insulin glargine Injectable (LANTUS)   13 Unit(s) SubCutaneous (06-16-21 @ 23:37)    insulin lispro (ADMELOG) corrective regimen sliding scale   1 Unit(s) SubCutaneous (06-16-21 @ 08:37)    insulin lispro Injectable (ADMELOG)   4 Unit(s) SubCutaneous (06-16-21 @ 08:38)    insulin lispro Injectable (ADMELOG)   5 Unit(s) SubCutaneous (06-16-21 @ 18:43)    rasburicase IVPB   110 mL/Hr IV Intermittent (06-16-21 @ 16:07)

## 2021-06-17 NOTE — PROGRESS NOTE ADULT - SUBJECTIVE AND OBJECTIVE BOX
Diabetes  Follow up note    Interval History: Pt reports increased appetite, eating more.  Reports nausea, denies vomiting.  Had hypoglycemia last night at HS    MEDICATIONS  (STANDING):  dextrose 40% Gel 15 Gram(s) Oral once  dextrose 5%. 1000 milliLiter(s) (50 mL/Hr) IV Continuous <Continuous>  dextrose 5%. 1000 milliLiter(s) (100 mL/Hr) IV Continuous <Continuous>  dextrose 50% Injectable 25 Gram(s) IV Push once  dextrose 50% Injectable 12.5 Gram(s) IV Push once  dextrose 50% Injectable 25 Gram(s) IV Push once  enoxaparin Injectable 70 milliGRAM(s) SubCutaneous two times a day  ethacrynic acid 25 milliGRAM(s) Oral daily  glucagon  Injectable 1 milliGRAM(s) IntraMuscular once  insulin glargine Injectable (LANTUS) 16 Unit(s) SubCutaneous at bedtime  insulin lispro (ADMELOG) corrective regimen sliding scale   SubCutaneous three times a day before meals  insulin lispro (ADMELOG) corrective regimen sliding scale   SubCutaneous at bedtime  insulin lispro Injectable (ADMELOG) 4 Unit(s) SubCutaneous three times a day before meals  magnesium hydroxide Suspension 30 milliLiter(s) Oral daily  morphine ER Tablet 15 milliGRAM(s) Oral two times a day  polyethylene glycol 3350 17 Gram(s) Oral daily  rasburicase IVPB 3 milliGRAM(s) IV Intermittent once  senna 2 Tablet(s) Oral at bedtime  urea Oral Powder 15 Gram(s) Oral two times a day    MEDICATIONS  (PRN):  acetaminophen   Tablet .. 650 milliGRAM(s) Oral every 6 hours PRN Mild Pain (1 - 3), Moderate Pain (4 - 6)  methylPREDNISolone sodium succinate Injectable 125 milliGRAM(s) IV Push once PRN PRN Chemotherapy Reactoin  oxyCODONE    IR 5 milliGRAM(s) Oral every 4 hours PRN Severe Pain (7 - 10)  witch hazel Pads 1 Application(s) Topical three times a day PRN discomfort      Allergies    Sulf-10 (Rash; Short breath)    Intolerances          PHYSICAL EXAM:  Vital Signs Last 24 Hrs  T(C): 36.8 (17 Jun 2021 13:44), Max: 36.9 (17 Jun 2021 06:47)  T(F): 98.2 (17 Jun 2021 13:44), Max: 98.4 (17 Jun 2021 06:47)  HR: 110 (17 Jun 2021 13:44) (99 - 110)  BP: 105/54 (17 Jun 2021 13:44) (105/54 - 120/57)  BP(mean): --  RR: 18 (17 Jun 2021 13:44) (18 - 18)  SpO2: 100% (17 Jun 2021 13:44) (100% - 100%)  GENERAL: Sitting up in chair in NAD,   EYES: No proptosis,  HEENT:  Atraumatic, Normocephalic,   RESPIRATORY: Clear to auscultation bilaterally  CARDIOVASCULAR: Regular rhythm; No murmurs; no peripheral edema  GI: Soft, nontender, non distended, normal bowel sounds      CAPILLARY BLOOD GLUCOSE  POCT Blood Glucose.: 225 mg/dL (17 Jun 2021 12:06)  POCT Blood Glucose.: 206 mg/dL (17 Jun 2021 08:25)  POCT Blood Glucose.: 132 mg/dL (16 Jun 2021 22:46)  POCT Blood Glucose.: 121 mg/dL (16 Jun 2021 22:19)  POCT Blood Glucose.: 81 mg/dL (16 Jun 2021 21:39)  POCT Blood Glucose.: 60 mg/dL (16 Jun 2021 21:16)  POCT Blood Glucose.: 62 mg/dL (16 Jun 2021 21:14)  POCT Blood Glucose.: 139 mg/dL (16 Jun 2021 18:01)  POCT Blood Glucose.: 180 mg/dL (06-16-21 @ 08:28)  POCT Blood Glucose.: 210 mg/dL (06-15-21 @ 21:41)  POCT Blood Glucose.: 203 mg/dL (06-15-21 @ 17:50)  POCT Blood Glucose.: 151 mg/dL (06-15-21 @ 12:25)  POCT Blood Glucose.: 234 mg/dL (06-15-21 @ 08:39)  POCT Blood Glucose.: 178 mg/dL (06-14-21 @ 22:16)  POCT Blood Glucose.: 147 mg/dL (06-14-21 @ 17:39)  POCT Blood Glucose.: 143 mg/dL (06-14-21 @ 12:36)  POCT Blood Glucose.: 165 mg/dL (06-14-21 @ 08:26)  POCT Blood Glucose.: 169 mg/dL (06-13-21 @ 22:05)  POCT Blood Glucose.: 158 mg/dL (06-13-21 @ 17:44)  POCT Blood Glucose.: 116 mg/dL (06-13-21 @ 12:23)        06-17    132<L>  |  94<L>  |  29<H>  ----------------------------<  152<H>  3.9   |  25  |  0.59    Ca    8.2<L>      17 Jun 2021 06:26  Phos  2.0     06-17  Mg     2.2     06-17    TPro  5.8<L>  /  Alb  2.8<L>  /  TBili  0.6  /  DBili  x   /  AST  53<H>  /  ALT  6   /  AlkPhos  97  06-17        A1C with Estimated Average Glucose Result: 9.6 % (06-02-21 @ 06:33)

## 2021-06-17 NOTE — PROGRESS NOTE ADULT - SUBJECTIVE AND OBJECTIVE BOX
INTERVAL HPI/OVERNIGHT EVENTS:  Patient seen at bedside.  Patient with improved symptoms  Feels better today than yesterday  Had a bowel movement today  No acute complaints      VITAL SIGNS:  T(F): 98.2 (06-17-21 @ 13:44)  HR: 110 (06-17-21 @ 13:44)  BP: 105/54 (06-17-21 @ 13:44)  RR: 18 (06-17-21 @ 13:44)  SpO2: 100% (06-17-21 @ 13:44)  Wt(kg): --    PHYSICAL EXAM:  In accordance with current standard limiting patient contact, deferred physical exam  2/2 COVID pandemic  Please refer to physical exam of primary team.    MEDICATIONS  (STANDING):  dextrose 40% Gel 15 Gram(s) Oral once  dextrose 5%. 1000 milliLiter(s) (50 mL/Hr) IV Continuous <Continuous>  dextrose 5%. 1000 milliLiter(s) (100 mL/Hr) IV Continuous <Continuous>  dextrose 50% Injectable 25 Gram(s) IV Push once  dextrose 50% Injectable 12.5 Gram(s) IV Push once  dextrose 50% Injectable 25 Gram(s) IV Push once  enoxaparin Injectable 70 milliGRAM(s) SubCutaneous two times a day  ethacrynic acid 25 milliGRAM(s) Oral daily  glucagon  Injectable 1 milliGRAM(s) IntraMuscular once  insulin glargine Injectable (LANTUS) 16 Unit(s) SubCutaneous at bedtime  insulin lispro (ADMELOG) corrective regimen sliding scale   SubCutaneous three times a day before meals  insulin lispro (ADMELOG) corrective regimen sliding scale   SubCutaneous at bedtime  insulin lispro Injectable (ADMELOG) 5 Unit(s) SubCutaneous before lunch  insulin lispro Injectable (ADMELOG) 4 Unit(s) SubCutaneous before breakfast  insulin lispro Injectable (ADMELOG) 5 Unit(s) SubCutaneous before dinner  magnesium hydroxide Suspension 30 milliLiter(s) Oral daily  morphine ER Tablet 15 milliGRAM(s) Oral two times a day  nystatin    Suspension 718217 Unit(s) Oral every 6 hours  polyethylene glycol 3350 17 Gram(s) Oral daily  senna 2 Tablet(s) Oral at bedtime  urea Oral Powder 15 Gram(s) Oral two times a day    MEDICATIONS  (PRN):  acetaminophen   Tablet .. 650 milliGRAM(s) Oral every 6 hours PRN Mild Pain (1 - 3), Moderate Pain (4 - 6)  methylPREDNISolone sodium succinate Injectable 125 milliGRAM(s) IV Push once PRN PRN Chemotherapy Reactoin  oxyCODONE    IR 5 milliGRAM(s) Oral every 4 hours PRN Severe Pain (7 - 10)  witch hazel Pads 1 Application(s) Topical three times a day PRN discomfort      Allergies    Sulf-10 (Rash; Short breath)    Intolerances        LABS:                        7.2    14.05 )-----------( 251      ( 17 Jun 2021 06:26 )             26.4     06-17    132<L>  |  94<L>  |  29<H>  ----------------------------<  152<H>  3.9   |  25  |  0.59    Ca    8.2<L>      17 Jun 2021 06:26  Phos  2.0     06-17  Mg     2.2     06-17    TPro  5.8<L>  /  Alb  2.8<L>  /  TBili  0.6  /  DBili  x   /  AST  53<H>  /  ALT  6   /  AlkPhos  97  06-17          RADIOLOGY & ADDITIONAL TESTS:  Studies reviewed.

## 2021-06-17 NOTE — PROVIDER CONTACT NOTE (HYPOGLYCEMIA EVENT) - NS PROVIDER CONTACT SITUATION-HYPO
Hypoglycemia episode at 21:14 on 6/16 Finger stick 62 than 60 on repeat   h/o poorly controlled DM A1C 9.2  
Pt diabetic with poor apetite, blood glucose 63 at this time, same repeated and now 60.Pt asymptomatic,offered apple juice, and pt took some lunch repeated at 1335hr now 82, at 1408 blood glucose 117

## 2021-06-17 NOTE — PROGRESS NOTE ADULT - ASSESSMENT
57yo Female w/ newly diagnosed Stage IV Endometrial Cancer and poorly controlled DM II presents to the ED with SOB, Abdominal pain/distention x2 weeks. CT A/P showed large volume ascites s/p diagnostic and therapeutic paracentesis (4L removed) c/b neutrocytic ascites on IV CTX. Found to have age indeterminate non occlusive L. mid femoral and peroneal veins DVT on therapeutic lovenox. s/p inpt chemo 6/15, course c/b possible TLS. Palliative care following for pain management.

## 2021-06-18 ENCOUNTER — TRANSCRIPTION ENCOUNTER (OUTPATIENT)
Age: 56
End: 2021-06-18

## 2021-06-18 ENCOUNTER — APPOINTMENT (OUTPATIENT)
Dept: HEMATOLOGY ONCOLOGY | Facility: CLINIC | Age: 56
End: 2021-06-18

## 2021-06-18 VITALS — DIASTOLIC BLOOD PRESSURE: 60 MMHG | SYSTOLIC BLOOD PRESSURE: 118 MMHG

## 2021-06-18 LAB
ALBUMIN SERPL ELPH-MCNC: 3.2 G/DL — LOW (ref 3.3–5)
ALP SERPL-CCNC: 101 U/L — SIGNIFICANT CHANGE UP (ref 40–120)
ALT FLD-CCNC: 8 U/L — SIGNIFICANT CHANGE UP (ref 4–33)
ANION GAP SERPL CALC-SCNC: 16 MMOL/L — HIGH (ref 7–14)
AST SERPL-CCNC: 43 U/L — HIGH (ref 4–32)
BASOPHILS # BLD AUTO: 0.01 K/UL — SIGNIFICANT CHANGE UP (ref 0–0.2)
BASOPHILS NFR BLD AUTO: 0.1 % — SIGNIFICANT CHANGE UP (ref 0–2)
BILIRUB SERPL-MCNC: 1 MG/DL — SIGNIFICANT CHANGE UP (ref 0.2–1.2)
BLD GP AB SCN SERPL QL: NEGATIVE — SIGNIFICANT CHANGE UP
BUN SERPL-MCNC: 23 MG/DL — SIGNIFICANT CHANGE UP (ref 7–23)
CALCIUM SERPL-MCNC: 9 MG/DL — SIGNIFICANT CHANGE UP (ref 8.4–10.5)
CHLORIDE SERPL-SCNC: 96 MMOL/L — LOW (ref 98–107)
CO2 SERPL-SCNC: 23 MMOL/L — SIGNIFICANT CHANGE UP (ref 22–31)
CREAT SERPL-MCNC: 0.57 MG/DL — SIGNIFICANT CHANGE UP (ref 0.5–1.3)
EOSINOPHIL # BLD AUTO: 0 K/UL — SIGNIFICANT CHANGE UP (ref 0–0.5)
EOSINOPHIL NFR BLD AUTO: 0 % — SIGNIFICANT CHANGE UP (ref 0–6)
GLUCOSE SERPL-MCNC: 180 MG/DL — HIGH (ref 70–99)
HCT VFR BLD CALC: 31.9 % — LOW (ref 34.5–45)
HGB BLD-MCNC: 9.1 G/DL — LOW (ref 11.5–15.5)
IANC: 14.26 K/UL — HIGH (ref 1.5–8.5)
IMM GRANULOCYTES NFR BLD AUTO: 0.6 % — SIGNIFICANT CHANGE UP (ref 0–1.5)
LDH SERPL L TO P-CCNC: 1692 U/L — HIGH (ref 135–225)
LYMPHOCYTES # BLD AUTO: 0.7 K/UL — LOW (ref 1–3.3)
LYMPHOCYTES # BLD AUTO: 4.6 % — LOW (ref 13–44)
MAGNESIUM SERPL-MCNC: 2.1 MG/DL — SIGNIFICANT CHANGE UP (ref 1.6–2.6)
MCHC RBC-ENTMCNC: 20.8 PG — LOW (ref 27–34)
MCHC RBC-ENTMCNC: 28.5 GM/DL — LOW (ref 32–36)
MCV RBC AUTO: 72.8 FL — LOW (ref 80–100)
MONOCYTES # BLD AUTO: 0.12 K/UL — SIGNIFICANT CHANGE UP (ref 0–0.9)
MONOCYTES NFR BLD AUTO: 0.8 % — LOW (ref 2–14)
NEUTROPHILS # BLD AUTO: 14.26 K/UL — HIGH (ref 1.8–7.4)
NEUTROPHILS NFR BLD AUTO: 93.9 % — HIGH (ref 43–77)
NRBC # BLD: 0 /100 WBCS — SIGNIFICANT CHANGE UP
NRBC # FLD: 0 K/UL — SIGNIFICANT CHANGE UP
PHOSPHATE SERPL-MCNC: 2 MG/DL — LOW (ref 2.5–4.5)
PLATELET # BLD AUTO: 227 K/UL — SIGNIFICANT CHANGE UP (ref 150–400)
POTASSIUM SERPL-MCNC: 4.2 MMOL/L — SIGNIFICANT CHANGE UP (ref 3.5–5.3)
POTASSIUM SERPL-SCNC: 4.2 MMOL/L — SIGNIFICANT CHANGE UP (ref 3.5–5.3)
PROT SERPL-MCNC: 6.5 G/DL — SIGNIFICANT CHANGE UP (ref 6–8.3)
RBC # BLD: 4.38 M/UL — SIGNIFICANT CHANGE UP (ref 3.8–5.2)
RBC # FLD: 19.9 % — HIGH (ref 10.3–14.5)
RH IG SCN BLD-IMP: POSITIVE — SIGNIFICANT CHANGE UP
SODIUM SERPL-SCNC: 135 MMOL/L — SIGNIFICANT CHANGE UP (ref 135–145)
URATE SERPL-MCNC: 1.7 MG/DL — LOW (ref 2.5–7)
WBC # BLD: 15.18 K/UL — HIGH (ref 3.8–10.5)
WBC # FLD AUTO: 15.18 K/UL — HIGH (ref 3.8–10.5)

## 2021-06-18 PROCEDURE — 99239 HOSP IP/OBS DSCHRG MGMT >30: CPT

## 2021-06-18 RX ORDER — ACETAMINOPHEN 500 MG
2 TABLET ORAL
Qty: 0 | Refills: 0 | DISCHARGE
Start: 2021-06-18

## 2021-06-18 RX ORDER — OXYCODONE HYDROCHLORIDE 5 MG/1
1 TABLET ORAL
Qty: 84 | Refills: 0
Start: 2021-06-18 | End: 2021-07-01

## 2021-06-18 RX ORDER — NYSTATIN 500MM UNIT
5 POWDER (EA) MISCELLANEOUS
Qty: 140 | Refills: 0
Start: 2021-06-18 | End: 2021-06-24

## 2021-06-18 RX ORDER — MAGNESIUM HYDROXIDE 400 MG/1
30 TABLET, CHEWABLE ORAL
Qty: 900 | Refills: 0
Start: 2021-06-18 | End: 2021-07-17

## 2021-06-18 RX ORDER — SENNA PLUS 8.6 MG/1
2 TABLET ORAL
Qty: 0 | Refills: 0 | DISCHARGE
Start: 2021-06-18

## 2021-06-18 RX ORDER — ONDANSETRON 8 MG/1
1 TABLET, FILM COATED ORAL
Qty: 21 | Refills: 0
Start: 2021-06-18 | End: 2021-06-24

## 2021-06-18 RX ORDER — UREA 15 G
1 POWDER IN PACKET (EA) ORAL
Qty: 60 | Refills: 0
Start: 2021-06-18 | End: 2021-07-17

## 2021-06-18 RX ORDER — MORPHINE SULFATE 50 MG/1
1 CAPSULE, EXTENDED RELEASE ORAL
Qty: 60 | Refills: 0
Start: 2021-06-18 | End: 2021-07-17

## 2021-06-18 RX ORDER — ENOXAPARIN SODIUM 100 MG/ML
18 INJECTION SUBCUTANEOUS
Qty: 0 | Refills: 0 | DISCHARGE
Start: 2021-06-18 | End: 2021-07-17

## 2021-06-18 RX ORDER — ENOXAPARIN SODIUM 100 MG/ML
70 INJECTION SUBCUTANEOUS
Qty: 30 | Refills: 0
Start: 2021-06-18 | End: 2021-07-17

## 2021-06-18 RX ORDER — METFORMIN HYDROCHLORIDE 850 MG/1
2 TABLET ORAL
Qty: 0 | Refills: 0 | DISCHARGE
Start: 2021-06-18 | End: 2021-07-17

## 2021-06-18 RX ORDER — AER TRAVELER 0.5 G/1
1 SOLUTION RECTAL; TOPICAL
Qty: 90 | Refills: 0
Start: 2021-06-18 | End: 2021-07-17

## 2021-06-18 RX ORDER — ENOXAPARIN SODIUM 100 MG/ML
18 INJECTION SUBCUTANEOUS
Qty: 5 | Refills: 0
Start: 2021-06-18 | End: 2021-07-17

## 2021-06-18 RX ORDER — POLYETHYLENE GLYCOL 3350 17 G/17G
17 POWDER, FOR SOLUTION ORAL
Qty: 0 | Refills: 0 | DISCHARGE
Start: 2021-06-18

## 2021-06-18 RX ORDER — METFORMIN HYDROCHLORIDE 850 MG/1
1 TABLET ORAL
Qty: 109 | Refills: 0
Start: 2021-06-18 | End: 2021-07-17

## 2021-06-18 RX ORDER — ETHACRYNIC ACID 25 MG/1
1 TABLET ORAL
Qty: 30 | Refills: 0
Start: 2021-06-18 | End: 2021-07-17

## 2021-06-18 RX ORDER — ISOPROPYL ALCOHOL, BENZOCAINE .7; .06 ML/ML; ML/ML
1 SWAB TOPICAL
Qty: 100 | Refills: 1
Start: 2021-06-18 | End: 2021-08-06

## 2021-06-18 RX ADMIN — Medication 4 UNIT(S): at 19:26

## 2021-06-18 RX ADMIN — MAGNESIUM HYDROXIDE 30 MILLILITER(S): 400 TABLET, CHEWABLE ORAL at 12:01

## 2021-06-18 RX ADMIN — SENNA PLUS 2 TABLET(S): 8.6 TABLET ORAL at 22:05

## 2021-06-18 RX ADMIN — MORPHINE SULFATE 15 MILLIGRAM(S): 50 CAPSULE, EXTENDED RELEASE ORAL at 06:17

## 2021-06-18 RX ADMIN — POLYETHYLENE GLYCOL 3350 17 GRAM(S): 17 POWDER, FOR SOLUTION ORAL at 12:01

## 2021-06-18 RX ADMIN — MORPHINE SULFATE 15 MILLIGRAM(S): 50 CAPSULE, EXTENDED RELEASE ORAL at 18:09

## 2021-06-18 RX ADMIN — Medication 15 GRAM(S): at 17:25

## 2021-06-18 RX ADMIN — Medication 5 UNIT(S): at 12:56

## 2021-06-18 RX ADMIN — Medication 500000 UNIT(S): at 12:01

## 2021-06-18 RX ADMIN — OXYCODONE HYDROCHLORIDE 5 MILLIGRAM(S): 5 TABLET ORAL at 22:04

## 2021-06-18 RX ADMIN — MORPHINE SULFATE 15 MILLIGRAM(S): 50 CAPSULE, EXTENDED RELEASE ORAL at 18:07

## 2021-06-18 RX ADMIN — ETHACRYNIC ACID 25 MILLIGRAM(S): 25 TABLET ORAL at 06:18

## 2021-06-18 RX ADMIN — INSULIN GLARGINE 17 UNIT(S): 100 INJECTION, SOLUTION SUBCUTANEOUS at 22:07

## 2021-06-18 RX ADMIN — ENOXAPARIN SODIUM 70 MILLIGRAM(S): 100 INJECTION SUBCUTANEOUS at 06:18

## 2021-06-18 RX ADMIN — Medication 4 UNIT(S): at 08:39

## 2021-06-18 RX ADMIN — Medication 2: at 08:40

## 2021-06-18 RX ADMIN — Medication 500000 UNIT(S): at 17:25

## 2021-06-18 RX ADMIN — OXYCODONE HYDROCHLORIDE 5 MILLIGRAM(S): 5 TABLET ORAL at 00:29

## 2021-06-18 RX ADMIN — Medication 1: at 19:26

## 2021-06-18 RX ADMIN — ENOXAPARIN SODIUM 70 MILLIGRAM(S): 100 INJECTION SUBCUTANEOUS at 19:27

## 2021-06-18 RX ADMIN — Medication 15 GRAM(S): at 06:18

## 2021-06-18 RX ADMIN — OXYCODONE HYDROCHLORIDE 5 MILLIGRAM(S): 5 TABLET ORAL at 23:04

## 2021-06-18 RX ADMIN — Medication 1: at 12:55

## 2021-06-18 RX ADMIN — Medication 500000 UNIT(S): at 06:17

## 2021-06-18 NOTE — PROGRESS NOTE ADULT - PROBLEM SELECTOR PLAN 7
A1c 9.6  - c/w basal/bolus  - adjust prn  apprec endo involvement
A1c 9.6  - c/w basal/bolus adjust prn  - adjust prn  gluc 52 on chem this AM, FS 2 hr later 85  apprec endo involvement
A1c 9.6  - appreciate endo reccs  - c/w basal/bolus adjust prn  - adjust prn
A1c 9.6  - c/w basal/bolus, dose adjusted for hypoglycemia last night   - adjust prn  -apprec endo involvement
A1c 9.6  - c/w basal/bolus  - adjust prn  -apprec endo involvement
A1c 9.6  - appreciate endo reccs  - c/w basal/bolus  - adjust prn
A1c 9.6  - appreciate endo reccs  - c/w basal/bolus  - adjust prn
A1c 9.6  - appreciate endo reccs  - c/w basal/bolus adjust prn  - adjust prn  episode of hypoglycemia around lunch time yesterday; will monitor closely for any needed adjustments  endo following
- DVT ppx: therapeutic Lovenox   lovenox covered by insurance ($5 copay)    Dispo: home after completion of inpatient chemo 6/7
A1c 9.6  - appreciate endo reccs  - c/w basal/bolus  - adjust prn
A1c 9.6  - appreciate endo reccs  - c/w basal/bolus  - adjust prn
A1c 9.6  - c/w basal/bolus  - adjust prn  -apprec endo involvement
A1c 9.6  - c/w basal/bolus  - adjust prn  apprec endo involvement

## 2021-06-18 NOTE — CHART NOTE - NSCHARTNOTEFT_GEN_A_CORE
Spoke with endocrine for final recs. Pt can be dc'ed on Lantus 18 units and Metformin 500 mg BID for 7 days then 1000 mg PO BID

## 2021-06-18 NOTE — PROVIDER CONTACT NOTE (OTHER) - ACTION/TREATMENT ORDERED:
Fede Henley notified. Zofran IVP ordered. Will continue to monitor.
continue to educate patient, continue to monitor
Morphine 2mg IVP ordered and given
Provider aware.
Will continue to encourage pt with nmedications
continue to monitor at this time, will order AM labs to be drawn early, notify of any changes
Provider aware, continue to monitor

## 2021-06-18 NOTE — DISCHARGE NOTE PROVIDER - HOSPITAL COURSE
55yo AA Female pmhx of DM II, migraines and newly diagnosed stage 4 endometrial cancer presents to the ED with increasing abdominal pain/distention and shortness of breath for 2 weeks. Pt is scheduled to start Chemotherapy on Friday, 6/4.     Hospital Course   Ascites  - s/p diagnostic and therapeutic (4L fluid removal) 6/2  S/p paracentesis on 6/11, 2 L removed  - SAAG 0.2 suggesting malignant ascites although cell count w/ PMNs 844 (adjusted for 19K RBCs)  - given neutrocytic ascites will empirically tx with IV CTX 2g daily x 5 days (6/2), cultures-neg, cytopath-+malignanancy  - Pain control w/ IV Morphine 2mg IV Q4 prn for severe and PO oxy IR 5 mg q4h prn for mod pain, appreciate pall reccs.     Hyponatremia  -Neprho consulted  -hypervolemic hyponatremia  -Albumin and ethacrynic acid     Deep vein thrombosis (DVT) of femoral vein of left lower extremity, unspecified chronicity.    - Va duplex 6/3 showed age indeterminate non occlusive L. mid femoral and peroneal veins DVT  - given active malignancy start on therapeutic Lovenox BID  - monitored for bleeding.     Endometrial cancer  - was initiating chemotx on 6/4, givne ongoing infection will defer tx until completion of abx  - plan for in pt chemo on 6/7  - palliative following    Anemia  - s/p 1u PRBC 6/2 S/p 1 unit PRBC 6/17   - iron panel c/w DOUGLAS, administer IV venofer 200 mg [id number].     DM type 2   - A1c 9.6  - appreciate endo reccs  - c/w basal/bolus  - will send lantus 24 units qhs to assess for insurance coverage--> $60 copay    Prophylactic measure  - DVT ppx: therapeutic lovenox.    On 6/18/21 , discussed with Dr. Lopez , patient is medically cleared and optimized for discharge today. All medications were reviewed with attending, and sent to mutually agreed upon pharmacy.

## 2021-06-18 NOTE — DISCHARGE NOTE PROVIDER - NSFOLLOWUPCLINICS_GEN_ALL_ED_FT
[facility]  Endocrinology  [address] [place] [address]  Phone: [phone]  Fax:

## 2021-06-18 NOTE — DISCHARGE NOTE PROVIDER - NSDCFUADDAPPT_GEN_ALL_CORE_FT
If you are in need of a general medicine physician and post-discharge medical follow-up for further care/recommendations you may contact the [facility] for an appointment ([phone]).

## 2021-06-18 NOTE — PROGRESS NOTE ADULT - NSICDXPILOT_GEN_ALL_CORE
[place]

## 2021-06-18 NOTE — PROGRESS NOTE ADULT - ASSESSMENT
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   Course c/b large ascites, SBP, partial bowel obstruction.    GOC discussed with patient and her sisters in detail. Poor prognosis and incurable nature of disease was explained. Pt is requesting to continue aggressive care.     Patient is doing better overall, has better appetite, bowel function is improving.   This might be her window to get chemotherapy, before another complication raises to try to decrease tumor load and improve symptoms and quality of life. Pt has signed consent for treatment with Dr Hoang before.     S/p treatment with reduced dose chemotherapy 2/2 performance status, carboplatin AUC 5 and Taxol 150 mg/m2 on 6/18.   Please order daily liver tests    -Noted to have elevated uric acid and LDH, rise in Cr, K, suspect tumor lysis. S/p  rasburicase 7.5mg x1 and albumin 5%. CMP q12h. Please also order daily Mg and Ph. Will monitor closely.   -Electrolytes improved  -Please transfuse 1 unit of blood today, pt aware and agreeable  -Therapeutic paracentesis x 2 during this admission, s/p tx for spontaneous bacterial peritonitis with IV CTX 2g daily x 7 days after the first tap  -Pt with resolved partial bowel obstruction, monitor bowel function  -Pal care input appreciated  -Noted to have RLE nonocclusive DVT located at mid-femur on recent dopplers ( 6/4). Started on Lovenox.  -Please consider starting famotidine or PPI for heartburn.  -Patient to followup with Dr. Hoang ([facility]) upon discharge  -C/w Supportive care, pain control, Nutrition, PT, DVT ppx  -Oncology will continue to follow with you      Case d/w Dr. Jacques HORN  Oncology Physician Assistant  Carina SHARMA/[facility]  Pager [phone]    If after 5pm or weekends please page On-call Oncology Fellow   56f with recently diagnosed metastatic uterine carcinosarcoma, c/b ascites, with plan to start chemotherapy 6/4, presented to the ED with abdominal discomfort.   Course c/b large ascites, SBP, partial bowel obstruction.    GOC discussed with patient and her sisters in detail. Poor prognosis and incurable nature of disease was explained. Pt is requesting to continue aggressive care.     Patient is doing better overall, has better appetite, bowel function is improving.   This might be her window to get chemotherapy, before another complication raises to try to decrease tumor load and improve symptoms and quality of life. Pt has signed consent for treatment with Dr Hoang before.     S/p treatment with reduced dose chemotherapy 2/2 performance status, carboplatin AUC 5 and Taxol 150 mg/m2 on 6/18.   Please order daily liver tests    -Noted to have elevated uric acid and LDH, rise in Cr, K, suspect tumor lysis. S/p  rasburicase 7.5mg x1 and albumin 5%. CMP q12h.  uric acid levels now improved.   -Continue to monitor daily Mg and Ph. Will monitor closely.   -S/p 1 unit of blood 6/17, H/H with good response  -Therapeutic paracentesis x 2 during this admission, s/p tx for spontaneous bacterial peritonitis with IV CTX 2g daily x 7 days after the first tap  -Pt with resolved partial bowel obstruction, monitor bowel function  -Pal care input appreciated  -Noted to have RLE nonocclusive DVT located at mid-femur on recent dopplers ( 6/4). Started on Lovenox.  -Dispo: likely home with home services  -Patient to followup with Dr. Hoang ([facility]) upon discharge  -C/w Supportive care, pain control, Nutrition, PT, DVT ppx  -Oncology will continue to follow with you      Case d/w Dr. Jacques HORN  Oncology Physician Assistant  Carina SHARMA/[facility]  Pager [phone]    If after 5pm or weekends please page On-call Oncology Fellow

## 2021-06-18 NOTE — PROGRESS NOTE ADULT - ATTENDING COMMENTS
Patient seen at bedside. Case discussed with JUSTINA Acharya. Plan as above.   On abx for SBP through Sunday  Pal care for pain control  Will consider inpt chemo next week if stable
Patient seen at bedside. Case discussed with JUSTINA Acharya. Plan as above.   Pt feeling quite anxious and has abdominal pain and constipation.   xray concerning for obstruction.   Please obtain CT a/p to eval further. In the meantime, please keep NPO.
Patient seen at bedside. Case discussed with JUSTINA Acharya. Plan as above.  Labs concerning for TLS s/p chemo.   Please check CMP, Mg, Phos, LDH, Uric acid Q12h  Rasburicase 7.5 x 1  IVF, pt has ascites and LE edema, but will need some IVF given TLS, consider 5% albumin at low rate, monitor closely for fluid overload.   Low threshold for renal consult.   Monitor closely
hypervolemic hyponatremia  continue with diuretics/albumin combination  N improving  edema massive. increase diuretic dose   palliative and surgery notes reviewed       Scooter Koroma MD, FACP.  Attending Nephrologist  Office: [phone]  Pager: [phone]  Email: [email or web address]
hypervolemic hyponatremia'  continue with diuretics/albumin combination
Patient seen at bedside. Case discussed with JUSTINA Acharya. Plan as above.   Continue with TLS labs q12h  1u prbc transfusion today  DC planning
Patient seen at bedside. Case discussed with JUSTINA Acharya. Plan as above.   GOC discussed with patient and her sisters.   Hope is to be able to receive chemo.  Paracentesis planned for tomorrow.   Will administer inpatient chemotherapy if pt is stable.
hypervolemic hyponatremia   keep on urena and ethacrynic acid   will sign off . please call with qs   Scooter Koroma MD, FACP.  Attending Nephrologist  Office: [phone]  Pager: [phone]  Email: [email or web address]
Patient seen at bedside. Case discussed with JUSTINA Acharya. Plan as above.   Chemo today as above.  Disease trajectory and incurable nature of the disease discussed with pt. Rational, risks and benefits and side effects of chemotherapy explained to the patient in great detail. All questions answered. Consent signed by pt and witnessed by JUSTINA Acharya.   Monitor CBC, may need growth factor support.
Patient seen at bedside. Case discussed with JUSTINA Acharya. Plan as above.   OK to dc from onc perspective.   Spoke to her sister and updated her.

## 2021-06-18 NOTE — PROGRESS NOTE ADULT - PROBLEM SELECTOR PROBLEM 2
Hyponatremia
Malignant ascites
Hyponatremia
Malignant ascites
Deep vein thrombosis (DVT) of femoral vein of left lower extremity, unspecified chronicity
Malignant ascites
Hypertension, essential, benign
Essential hypertension
Hypertension, essential, benign
Essential hypertension
Malignant ascites
Endometrial cancer
Hyponatremia
Deep vein thrombosis (DVT) of femoral vein of left lower extremity, unspecified chronicity
Hyponatremia
Malignant ascites
Malignant ascites
Ascites
Ascites
Deep vein thrombosis (DVT) of femoral vein of left lower extremity, unspecified chronicity
Hyponatremia
Ascites

## 2021-06-18 NOTE — PROGRESS NOTE ADULT - SUBJECTIVE AND OBJECTIVE BOX
[facility] Division of Hospital Medicine  Yaritza Lopez MD  Pager [phone]      Patient is a 56y old  Female who presents with a chief complaint of Recent Endometrial Cancer, Abdominal Distention, SOB (18 Jun 2021 14:38)      SUBJECTIVE / OVERNIGHT EVENTS:    ADDITIONAL REVIEW OF SYSTEMS:    CONSTITUTIONAL: No fever, weight loss, or fatigue  EYES: No eye pain, visual disturbances, or discharge  ENMT:  No difficulty hearing, tinnitus, vertigo; No sinus or throat pain  NECK: No pain or stiffness  RESPIRATORY: No cough, wheezing, chills or hemoptysis; No shortness of breath  CARDIOVASCULAR: No chest pain, palpitations, dizziness, or leg swelling  GASTROINTESTINAL: No abdominal or epigastric pain. No nausea, vomiting, or hematemesis; No diarrhea or constipation. No melena or hematochezia.  GENITOURINARY: No dysuria, frequency, hematuria, or incontinence  NEUROLOGICAL: No headaches, memory loss, loss of strength, numbness, or tremors  SKIN: No itching, burning, rashes, or lesions   MUSCULOSKELETAL: No joint pain or swelling; No muscle, back, or extremity pain  PSYCHIATRIC: No depression, anxiety, mood swings, or difficulty sleeping    MEDICATIONS  (STANDING):  dextrose 40% Gel 15 Gram(s) Oral once  dextrose 5%. 1000 milliLiter(s) (50 mL/Hr) IV Continuous <Continuous>  dextrose 5%. 1000 milliLiter(s) (100 mL/Hr) IV Continuous <Continuous>  dextrose 50% Injectable 25 Gram(s) IV Push once  dextrose 50% Injectable 12.5 Gram(s) IV Push once  dextrose 50% Injectable 25 Gram(s) IV Push once  enoxaparin Injectable 70 milliGRAM(s) SubCutaneous two times a day  ethacrynic acid 25 milliGRAM(s) Oral daily  glucagon  Injectable 1 milliGRAM(s) IntraMuscular once  insulin glargine Injectable (LANTUS) 17 Unit(s) SubCutaneous at bedtime  insulin lispro (ADMELOG) corrective regimen sliding scale   SubCutaneous three times a day before meals  insulin lispro (ADMELOG) corrective regimen sliding scale   SubCutaneous at bedtime  insulin lispro Injectable (ADMELOG) 4 Unit(s) SubCutaneous before dinner  insulin lispro Injectable (ADMELOG) 5 Unit(s) SubCutaneous before lunch  insulin lispro Injectable (ADMELOG) 4 Unit(s) SubCutaneous before breakfast  magnesium hydroxide Suspension 30 milliLiter(s) Oral daily  morphine ER Tablet 15 milliGRAM(s) Oral two times a day  nystatin    Suspension 277562 Unit(s) Oral every 6 hours  ondansetron Injectable 4 milliGRAM(s) IV Push once  polyethylene glycol 3350 17 Gram(s) Oral daily  senna 2 Tablet(s) Oral at bedtime  urea Oral Powder 15 Gram(s) Oral two times a day    MEDICATIONS  (PRN):  acetaminophen   Tablet .. 650 milliGRAM(s) Oral every 6 hours PRN Mild Pain (1 - 3), Moderate Pain (4 - 6)  methylPREDNISolone sodium succinate Injectable 125 milliGRAM(s) IV Push once PRN PRN Chemotherapy Reactoin  oxyCODONE    IR 5 milliGRAM(s) Oral every 4 hours PRN Severe Pain (7 - 10)  witch hazel Pads 1 Application(s) Topical three times a day PRN discomfort      CAPILLARY BLOOD GLUCOSE      POCT Blood Glucose.: 154 mg/dL (18 Jun 2021 12:28)  POCT Blood Glucose.: 202 mg/dL (18 Jun 2021 08:17)  POCT Blood Glucose.: 167 mg/dL (17 Jun 2021 22:25)  POCT Blood Glucose.: 202 mg/dL (17 Jun 2021 18:14)    I&O's Summary    17 Jun 2021 07:01  -  18 Jun 2021 07:00  --------------------------------------------------------  IN: 823 mL / OUT: 800 mL / NET: 23 mL    18 Jun 2021 07:01  -  18 Jun 2021 15:37  --------------------------------------------------------  IN: 345 mL / OUT: 1100 mL / NET: -755 mL        PHYSICAL EXAM:  Vital Signs Last 24 Hrs  T(C): 36.3 (18 Jun 2021 13:37), Max: 37.2 (18 Jun 2021 00:35)  T(F): 97.4 (18 Jun 2021 13:37), Max: 99 (18 Jun 2021 00:35)  HR: 112 (18 Jun 2021 13:50) (102 - 120)  BP: 107/57 (18 Jun 2021 13:37) (107/57 - 125/67)  BP(mean): --  RR: 20 (18 Jun 2021 13:37) (18 - 20)  SpO2: 97% (18 Jun 2021 13:37) (97% - 100%)    CONSTITUTIONAL: NAD,  EYES: PERRLA; conjunctiva and sclera clear  ENMT: Moist oral mucosa, no pharyngeal injection or exudates;   NECK: Supple, no palpable masses;  RESPIRATORY: Normal respiratory effort; lungs are clear to auscultation bilaterally  CARDIOVASCULAR: Regular rate and rhythm, normal S1 and S2, no murmur/rub/gallop; No lower extremity edema; Peripheral pulses are 2+ bilaterally  ABDOMEN: Nontender to palpation, normoactive bowel sounds, no rebound/guarding;   MUSCLOSKELETAL:   no clubbing or cyanosis of digits; no joint swelling or tenderness to palpation  PSYCH: A+O to person, place, and time; affect appropriate  NEUROLOGY: CN 2-12 are intact and symmetric; no gross sensory deficits;   SKIN: No rashes;     LABS:                        9.1    15.18 )-----------( 227      ( 18 Jun 2021 07:23 )             31.9     06-18    135  |  96<L>  |  23  ----------------------------<  180<H>  4.2   |  23  |  0.57    Ca    9.0      18 Jun 2021 07:23  Phos  2.0     06-18  Mg     2.1     06-18    TPro  6.5  /  Alb  3.2<L>  /  TBili  1.0  /  DBili  x   /  AST  43<H>  /  ALT  8   /  AlkPhos  101  06-18                RADIOLOGY & ADDITIONAL TESTS:  Results Reviewed:   Imaging Personally Reviewed:  Electrocardiogram Personally Reviewed:    COORDINATION OF CARE:  Care Discussed with Consultants/Other Providers [Y/N]:  Prior or Outpatient Records Reviewed [Y/N]:

## 2021-06-18 NOTE — PROGRESS NOTE ADULT - PROBLEM SELECTOR PLAN 5
- chemo planned for 6/14  - palliative following
A1c 9.6  - appreciate endo reccs  - c/w basal/bolus  - will send lantus 24 units qhs to assess for insurance coverage
- was initiating chemotx on 6/4, given ongoing infection will defer tx until completion of abx  - f/u with onc about chemo  - palliative following
- was initiating chemotx on 6/4, given ongoing infection will defer tx until completion of abx  - f/u with onc about chemo  - palliative following
A1c 9.6  - appreciate endo reccs  - c/w basal/bolus  - will send lantus 24 units qhs to assess for insurance coverage ($60.00/month)
- was initiating chemotx on 6/4, given ongoing infection will defer tx until completion of abx  - chemo planned for Monday per onc  - palliative following
- B/l LE Doppler to r/o DVT  - DVT ppx: lovenox qd
imaging revealed poss partial SBO  - Sx consult appreciated. pt now having bms, will monitor closely  - cont bowel regimen, monitor for bms
- was initiating chemotx on 6/4, given ongoing infection will defer tx until completion of abx  - f/u with onc about chemo  - palliative following
- was to initiate chemotx on 6/4, given ongoing infection will defer tx until completion of abx  - chemo planned for 6/14  - palliative following
imaging revealed poss partial SBO  - Sx consult appreciated. pt now having bms, will monitor closely  - cont bowel regimen, monitor for bms
A1c 9.6  - appreciate endo reccs  - c/w basal/bolus  - will send lantus 24 units qhs to assess for insurance coverage ($60.00/month)
imaging revealed poss partial SBO  - Sx consult appreciated. pt now having bms, will monitor closely  - cont bowel regimen, monitor for bms
- s/p 1u PRBC 6/2  H&H stable   - iron panel c/w DOUGLAS, administer IV venofer 200 mg [id number]
- was initiating chemotx on 6/4, given ongoing infection will defer tx until completion of abx  - f/u with onc about chemo  - palliative following

## 2021-06-18 NOTE — PROGRESS NOTE ADULT - PROBLEM SELECTOR PROBLEM 1
Type 2 diabetes mellitus with hyperglycemia, without long-term current use of insulin
Pain
Pain
Ascites
Pain
Pain
Ascites
Pain
Ascites
Uncontrolled type 2 diabetes mellitus with hyperglycemia
Endometrial cancer
Pain
Type 2 diabetes mellitus with hyperglycemia, without long-term current use of insulin
Uncontrolled type 2 diabetes mellitus with hyperglycemia
Ascites
Uncontrolled type 2 diabetes mellitus with hyperglycemia
Uncontrolled type 2 diabetes mellitus with hyperglycemia
Ascites
Pain
Ascites
Endometrial cancer
Pain
Ascites
Pain
Ascites
Endometrial cancer
Ascites

## 2021-06-18 NOTE — PROGRESS NOTE ADULT - PROBLEM SELECTOR PLAN 6
- s/p 1u PRBC 6/2  H&H stable   - iron panel c/w DOUGLAS, cont to monitor Hb
hgb stable. no active bleeding   - s/p 1u PRBC 6/2. Hgb 7.2 will transfuse 1 unit today
hgb stable. no active bleeding   - s/p 1u PRBC 6/2
hgb stable. no active bleeding   - s/p 1u PRBC 6/2. and 6/17
- s/p 1u PRBC 6/2  H&H stable   - iron panel c/w DOUGLAS, cont to monitor Hb
- s/p 1u PRBC 6/2  H&H stable   - iron panel c/w DOUGLAS, cont to monitor Hb
- DVT ppx: therapeutic lovenox   lovenox covered by insurance ($5 copay)    Dispo: home after completion of inpatient chemo 6/7
- s/p 1u PRBC 6/2  H&H stable   - iron panel c/w DOUGLAS, cont to monitor Hb
- s/p 1u PRBC 6/2  H&H stable   drop in hg today, will repeat to comfirm and trend  type and screen ordered to stay active in case of need
A1c 9.6  - appreciate endo reccs  - c/w basal/bolus  - will send lantus 24 units qhs to assess for insurance coverage ($60.00/month)
- DVT ppx: therapeutic lovenox
- s/p 1u PRBC 6/2  H&H stable   - iron panel c/w DOUGLAS, cont to monitor Hb
- s/p 1u PRBC 6/2  H&H stable   type and screen ordered to stay active in case of need
- DVT ppx: therapeutic Lovenox   lovenox covered by insurance ($5 copay)    Dispo: home after completion of inpatient chemo 6/7
- s/p 1u PRBC 6/2  H&H stable   - iron panel c/w DOUGLAS, cont to monitor Hb
- s/p 1u PRBC 6/2  H&H stable   type and screen ordered to stay active in case of need

## 2021-06-18 NOTE — PROVIDER CONTACT NOTE (OTHER) - REASON
Pt refusing medication
Ambulance
Heartrate= 120, manual palpated repeat HR= 112
Ducolax suppository given, pt. complains of discomfort at rectal area
patient continues to refuse IV albumin
patient c/o 10/10 abdominal and back pain
Patient c/o nausea
patient noted to have blood streaked BM

## 2021-06-18 NOTE — PROVIDER CONTACT NOTE (OTHER) - DATE AND TIME:
18-Jun-2021 13:51
08-Jun-2021 22:54
16-Jun-2021 03:20
09-Jun-2021 01:40
09-Jun-2021 23:16
10-Terry-2021 12:30
07-Jun-2021 03:19

## 2021-06-18 NOTE — PROGRESS NOTE ADULT - PROBLEM SELECTOR PROBLEM 5
Endometrial cancer
DM type 2 (diabetes mellitus, type 2)
DM type 2 (diabetes mellitus, type 2)
Endometrial cancer
Constipation
DM type 2 (diabetes mellitus, type 2)
Prophylactic measure
Anemia
Constipation
Endometrial cancer
Endometrial cancer
Constipation
Endometrial cancer

## 2021-06-18 NOTE — PROGRESS NOTE ADULT - PROBLEM SELECTOR PLAN 8
- DVT ppx: therapeutic Lovenox   lovenox covered by insurance ($5 copay)    -Hypophosphatemia- replete phos    Dispo: home once medically stable
- DVT ppx: therapeutic Lovenox   lovenox covered by insurance ($5 copay)      Dispo: home once medically stable
- DVT ppx: therapeutic Lovenox   lovenox covered by insurance ($5 copay)    -Hypophosphatemia- replete phos    Dispo: home once medically stable
- DVT ppx: therapeutic Lovenox   lovenox covered by insurance ($5 copay)    -Hypophosphatemia- replete phos    Dispo: home once medically stable
- DVT ppx: therapeutic Lovenox   lovenox covered by insurance ($5 copay)      stable for discharge home today. total time spent on dc 45min
- DVT ppx: therapeutic Lovenox   lovenox covered by insurance ($5 copay)    Dispo: home once medically stable
- DVT ppx: therapeutic Lovenox   lovenox covered by insurance ($5 copay)    Dispo: home once medically stable
- DVT ppx: therapeutic Lovenox   lovenox covered by insurance ($5 copay)    -Hypophosphatemia- replete phos    Dispo: home once medically stable
- DVT ppx: therapeutic Lovenox   lovenox covered by insurance ($5 copay)      Dispo: home once medically stable
- DVT ppx: therapeutic Lovenox   lovenox covered by insurance ($5 copay)    -Hypophosphatemia- replete phos    Dispo: home once medically stable

## 2021-06-18 NOTE — PROGRESS NOTE ADULT - PROBLEM SELECTOR PLAN 1
- s/p chemo  6/14. post chemo lab concerning for TLS, now improved   - Rasburicase 7.5 x 1 on 6/16. s/p 5% albumin @50cc/hr x 24 hr. monitor fluid status, K, phos, LDH  - onc and pall care follg,

## 2021-06-18 NOTE — PROGRESS NOTE ADULT - PROBLEM SELECTOR PROBLEM 3
Malignant neoplasm of ovary, unspecified laterality
Anemia
Hyperlipidemia, unspecified hyperlipidemia type
Hyperlipidemia, unspecified hyperlipidemia type
Constipation
Constipation
Malignant neoplasm of ovary, unspecified laterality
Malignant neoplasm of ovary, unspecified laterality
Deep vein thrombosis (DVT) of femoral vein of left lower extremity, unspecified chronicity
Hyperlipidemia, acquired
Hyperlipidemia, acquired
Hyperlipidemia, unspecified hyperlipidemia type
Constipation
Hyperlipidemia, unspecified hyperlipidemia type
Endometrial cancer
Malignant neoplasm of ovary, unspecified laterality
Endometrial cancer
Malignant neoplasm of ovary, unspecified laterality
Deep vein thrombosis (DVT) of femoral vein of left lower extremity, unspecified chronicity
Malignant neoplasm of ovary, unspecified laterality
Malignant neoplasm of ovary, unspecified laterality
Endometrial cancer
Constipation
Deep vein thrombosis (DVT) of femoral vein of left lower extremity, unspecified chronicity
Constipation
Constipation
Deep vein thrombosis (DVT) of femoral vein of left lower extremity, unspecified chronicity

## 2021-06-18 NOTE — PROGRESS NOTE ADULT - PROBLEM SELECTOR PROBLEM 7
Prophylactic measure
DM type 2 (diabetes mellitus, type 2)

## 2021-06-18 NOTE — PROGRESS NOTE ADULT - SUBJECTIVE AND OBJECTIVE BOX
INTERVAL HPI/OVERNIGHT EVENTS:  Patient seen at bedside.      VITAL SIGNS:  T(F): 97.4 (06-18-21 @ 13:37)  HR: 112 (06-18-21 @ 13:50)  BP: 107/57 (06-18-21 @ 13:37)  RR: 20 (06-18-21 @ 13:37)  SpO2: 97% (06-18-21 @ 13:37)  Wt(kg): --    PHYSICAL EXAM:    Constitutional: NAD, resting in bed comfortably  Eyes: EOMI, sclera non-icteric  Neck: supple, no LAD  Respiratory: CTA b/l, good air entry b/l, no wheezing, rhonchi or crackles  Cardiovascular: RRR, normal S1S2, no M/R/G  Gastrointestinal: soft, NTND  Extremities: no edema  Neurological: AAOx3, non focal  Skin: Normal temperature    MEDICATIONS  (STANDING):  dextrose 40% Gel 15 Gram(s) Oral once  dextrose 5%. 1000 milliLiter(s) (50 mL/Hr) IV Continuous <Continuous>  dextrose 5%. 1000 milliLiter(s) (100 mL/Hr) IV Continuous <Continuous>  dextrose 50% Injectable 25 Gram(s) IV Push once  dextrose 50% Injectable 12.5 Gram(s) IV Push once  dextrose 50% Injectable 25 Gram(s) IV Push once  enoxaparin Injectable 70 milliGRAM(s) SubCutaneous two times a day  ethacrynic acid 25 milliGRAM(s) Oral daily  glucagon  Injectable 1 milliGRAM(s) IntraMuscular once  insulin glargine Injectable (LANTUS) 17 Unit(s) SubCutaneous at bedtime  insulin lispro (ADMELOG) corrective regimen sliding scale   SubCutaneous three times a day before meals  insulin lispro (ADMELOG) corrective regimen sliding scale   SubCutaneous at bedtime  insulin lispro Injectable (ADMELOG) 4 Unit(s) SubCutaneous before dinner  insulin lispro Injectable (ADMELOG) 5 Unit(s) SubCutaneous before lunch  insulin lispro Injectable (ADMELOG) 4 Unit(s) SubCutaneous before breakfast  magnesium hydroxide Suspension 30 milliLiter(s) Oral daily  morphine ER Tablet 15 milliGRAM(s) Oral two times a day  nystatin    Suspension 091478 Unit(s) Oral every 6 hours  ondansetron Injectable 4 milliGRAM(s) IV Push once  polyethylene glycol 3350 17 Gram(s) Oral daily  senna 2 Tablet(s) Oral at bedtime  urea Oral Powder 15 Gram(s) Oral two times a day    MEDICATIONS  (PRN):  acetaminophen   Tablet .. 650 milliGRAM(s) Oral every 6 hours PRN Mild Pain (1 - 3), Moderate Pain (4 - 6)  methylPREDNISolone sodium succinate Injectable 125 milliGRAM(s) IV Push once PRN PRN Chemotherapy Reactoin  oxyCODONE    IR 5 milliGRAM(s) Oral every 4 hours PRN Severe Pain (7 - 10)  witch hazel Pads 1 Application(s) Topical three times a day PRN discomfort      Allergies    Sulf-10 (Rash; Short breath)    Intolerances        LABS:                        9.1    15.18 )-----------( 227      ( 18 Jun 2021 07:23 )             31.9     06-18    135  |  96<L>  |  23  ----------------------------<  180<H>  4.2   |  23  |  0.57    Ca    9.0      18 Jun 2021 07:23  Phos  2.0     06-18  Mg     2.1     06-18    TPro  6.5  /  Alb  3.2<L>  /  TBili  1.0  /  DBili  x   /  AST  43<H>  /  ALT  8   /  AlkPhos  101  06-18          RADIOLOGY & ADDITIONAL TESTS:  Studies reviewed.   INTERVAL HPI/OVERNIGHT EVENTS:  Patient seen at bedside.  Patient without any acute complaints  Spoke to patient's sister Alexa via phone  Inquired about dispo and possible home services  All concerns and questions were addressed      VITAL SIGNS:  T(F): 97.4 (06-18-21 @ 13:37)  HR: 112 (06-18-21 @ 13:50)  BP: 107/57 (06-18-21 @ 13:37)  RR: 20 (06-18-21 @ 13:37)  SpO2: 97% (06-18-21 @ 13:37)  Wt(kg): --    PHYSICAL EXAM:    In accordance with current standard limiting patient contact, deferred physical exam  2/2 COVID pandemic  Please refer to physical exam of primary team.    MEDICATIONS  (STANDING):  dextrose 40% Gel 15 Gram(s) Oral once  dextrose 5%. 1000 milliLiter(s) (50 mL/Hr) IV Continuous <Continuous>  dextrose 5%. 1000 milliLiter(s) (100 mL/Hr) IV Continuous <Continuous>  dextrose 50% Injectable 25 Gram(s) IV Push once  dextrose 50% Injectable 12.5 Gram(s) IV Push once  dextrose 50% Injectable 25 Gram(s) IV Push once  enoxaparin Injectable 70 milliGRAM(s) SubCutaneous two times a day  ethacrynic acid 25 milliGRAM(s) Oral daily  glucagon  Injectable 1 milliGRAM(s) IntraMuscular once  insulin glargine Injectable (LANTUS) 17 Unit(s) SubCutaneous at bedtime  insulin lispro (ADMELOG) corrective regimen sliding scale   SubCutaneous three times a day before meals  insulin lispro (ADMELOG) corrective regimen sliding scale   SubCutaneous at bedtime  insulin lispro Injectable (ADMELOG) 4 Unit(s) SubCutaneous before dinner  insulin lispro Injectable (ADMELOG) 5 Unit(s) SubCutaneous before lunch  insulin lispro Injectable (ADMELOG) 4 Unit(s) SubCutaneous before breakfast  magnesium hydroxide Suspension 30 milliLiter(s) Oral daily  morphine ER Tablet 15 milliGRAM(s) Oral two times a day  nystatin    Suspension [id number] Unit(s) Oral every 6 hours  ondansetron Injectable 4 milliGRAM(s) IV Push once  polyethylene glycol 3350 17 Gram(s) Oral daily  senna 2 Tablet(s) Oral at bedtime  urea Oral Powder 15 Gram(s) Oral two times a day    MEDICATIONS  (PRN):  acetaminophen   Tablet .. 650 milliGRAM(s) Oral every 6 hours PRN Mild Pain (1 - 3), Moderate Pain (4 - 6)  methylPREDNISolone sodium succinate Injectable 125 milliGRAM(s) IV Push once PRN PRN Chemotherapy Reactoin  oxyCODONE    IR 5 milliGRAM(s) Oral every 4 hours PRN Severe Pain (7 - 10)  witch hazel Pads 1 Application(s) Topical three times a day PRN discomfort      Allergies    Sulf-10 (Rash; Short breath)    Intolerances        LABS:                        9.1    15.18 )-----------( 227      ( 18 Jun 2021 07:23 )             31.9     06-18    135  |  96<L>  |  23  ----------------------------<  180<H>  4.2   |  23  |  0.57    Ca    9.0      18 Jun 2021 07:23  Phos  2.0     06-18  Mg     2.1     06-18    TPro  6.5  /  Alb  3.2<L>  /  TBili  1.0  /  DBili  x   /  AST  43<H>  /  ALT  8   /  AlkPhos  101  06-18          RADIOLOGY & ADDITIONAL TESTS:  Studies reviewed.

## 2021-06-18 NOTE — PROGRESS NOTE ADULT - REASON FOR ADMISSION
Recent Endometrial Cancer, Abdominal Distention, SOB
Endometrial Cancer, Abdominal Distention, SOB
Recent Endometrial Cancer, Abdominal Distention, SOB

## 2021-06-18 NOTE — CHART NOTE - NSCHARTNOTEFT_GEN_A_CORE
Pt stable for discharge from primary team.  Continue ms contin 15mg bid with oxy ir 5mg po q 4 hours prn.  [place] [facility] [place]  if needs referral to PC would rec Dr. Salcedo at [address] [place] [address] [phone]  Will sign off.  If goals or symptoms change, please reconsult. Stella Mccord DO

## 2021-06-18 NOTE — DISCHARGE NOTE PROVIDER - NSDCMRMEDTOKEN_GEN_ALL_CORE_FT
acetaminophen 325 mg oral tablet: 2 tab(s) orally every 6 hours, As needed, Mild Pain (1 - 3), Moderate Pain (4 - 6)  alcohol swabs : Apply topically to affected area 4 times a day   ethacrynic acid 25 mg oral tablet: 1 tab(s) orally once a day   glucometer (per patient&#x27;s insurance): 1 application subcutaneous 4 times a day   Insulin Pen Needles, 4mm: 1 application subcutaneously 4 times a day . ** Use with insulin pen **   lancets: 1 application subcutaneously 4 times a day   Lantus Solostar Pen 100 units/mL subcutaneous solution: 18 unit(s) subcutaneous once a day    Lovenox 80 mg/0.8 mL injectable solution: 70 milligram(s) subcutaneously 2 times a day     magnesium hydroxide 8% oral suspension: 30 milliliter(s) orally once a day, As Needed   metFORMIN 500 mg oral tablet: 1 tab(s) orally 2 times a day for 7 days then  take 2 tabs PO 2 times a day  morphine 15 mg/8 to 12 hr oral tablet, extended release: 1 tab(s) orally 2 times a day MDD:2 tabs  nystatin 100,000 units/mL oral suspension: 5 milliliter(s) orally every 6 hours  ondansetron 4 mg oral disintegrating strip: 1 each orally 3 times a day, As Needed   oxyCODONE 5 mg oral tablet: 1 tab(s) orally every 4 hours, As needed, Severe Pain (7 - 10) MDD:6 tabs  polyethylene glycol 3350 oral powder for reconstitution: 17 gram(s) orally once a day  senna oral tablet: 2 tab(s) orally once a day (at bedtime)  test strips (per patient&#x27;s insurance): 1 application subcutaneously 4 times a day . ** Compatible with patient&#x27;s glucometer **   urea 15 g oral powder for reconstitution: 1 packet(s) orally 2 times a day  witch hazel 50% topical pad: 1 application topically 3 times a day, As needed, discomfort

## 2021-06-18 NOTE — PROGRESS NOTE ADULT - PROBLEM SELECTOR PLAN 4
Va duplex 6/3 showed age indeterminate non occlusive L. mid femoral and peroneal veins DVT  - cont therapeutic Lovenox BID- dc lovenox today and start on heparin drip in prep for paracentesis tomorrow  - monitor for bleeding
daughter at bedside- hcp  reviewed above with her- she asked for cm to discuss home care upon discharge
daughter at bedside- hcp  reviewed above with her- she asked for cm to discuss home care upon discharge  Please call [phone] with any questions
Na stable.  appears to be hypervolemic given ascites but with intravascular depletion.   -  cont ethacrynic acid at low dose per renal recs  - cont to monitor Na, f/u renal recs
daughter is- hcp    Please call [phone] with any questions
- s/p 1u PRBC 6/2  H&H stable today  - iron panel c/w DOUGLAS, administer IV venofer 200 mg [id number]
daughter is- hcp    Please call [phone] with any questions
daughter is- hcp    Please call [phone] with any questions
- s/p 1u PRBC 6/2  H&H stable   - iron panel c/w DOUGLAS, administer IV venofer 200 mg [id number]
A1c 9.6  - appreciate endo reccs  - c/w basal/bolus  - will send lantus 24 units qhs to assess for insurance coverage
Va duplex 6/3 showed age indeterminate non occlusive L. mid femoral and peroneal veins DVT  - cont therapeutic Lovenox BID  - monitor for bleeding
Va duplex 6/3 showed age indeterminate non occlusive L. mid femoral and peroneal veins DVT  - given active malignancy start on therapeutic Lovenox BID  - monitor for bleeding
Va duplex 6/3 showed age indeterminate non occlusive L. mid femoral and peroneal veins DVT   Lovenox BID resumed post paracentesis  - monitor for bleeding
- was initiating chemotx on 6/4, given ongoing infection will defer tx until completion of abx  - tentative plan for inpt chemo 6/7  - palliative following
daughter at bedside- hcp  reviewed above with her- she asked for cm to discuss home care upon discharge  Please call [phone] with any questions
daughter at bedside- hcp  reviewed above with her- she asked for cm to discuss home care upon discharge  Please call [phone] with any questions
Na stable.  appears to be hypervolemic given ascites but with intravascular depletion.   -  cont ethacrynic acid at low dose per renal recs  - cont to monitor Na, f/u renal recs
daughter is- hcp  goal is home with home care and follow up at [facility] [place] for further dmt    Please call [phone] with any questions
Va duplex 6/3 showed age indeterminate non occlusive L. mid femoral and peroneal veins DVT   Lovenox BID resumed post paracentesis  - monitor for bleeding
daughter is- hcp    Please call [phone] with any questions
Va duplex 6/3 showed age indeterminate non occlusive L. mid femoral and peroneal veins DVT   Lovenox BID resumed post paracentesis  - monitor for bleeding
- s/p 1u PRBC 6/2  H&H stable   - iron panel c/w DOUGLAS, administer IV venofer 200 mg [id number]
Va duplex 6/3 showed age indeterminate non occlusive L. mid femoral and peroneal veins DVT   Lovenox BID resumed post paracentesis  - monitor for bleeding
Va duplex 6/3 showed age indeterminate non occlusive L. mid femoral and peroneal veins DVT  - to restart Lovenox BID post paracentesis  - monitor for bleeding
Na stable.  appears to be hypervolemic given ascites but with intravascular depletion.   -  cont ethacrynic acid at low dose per renal recs  - cont to monitor Na, f/u renal recs
Va duplex 6/3 showed age indeterminate non occlusive L. mid femoral and peroneal veins DVT  - cont therapeutic Lovenox BID  - monitor for bleeding

## 2021-06-18 NOTE — DISCHARGE NOTE PROVIDER - CARE PROVIDER_API CALL
Tania Hoang)  Hematology; Internal Medicine; Medical Oncology  [address] [place] [address]  Phone: [phone]  Fax: [phone]  Follow Up Time:

## 2021-06-18 NOTE — PROGRESS NOTE ADULT - PROBLEM SELECTOR PROBLEM 4
Hyponatremia
DM type 2 (diabetes mellitus, type 2)
Palliative care encounter
Anemia
Deep vein thrombosis (DVT) of femoral vein of left lower extremity, unspecified chronicity
Palliative care encounter
Deep vein thrombosis (DVT) of femoral vein of left lower extremity, unspecified chronicity
Deep vein thrombosis (DVT) of femoral vein of left lower extremity, unspecified chronicity
Endometrial cancer
Palliative care encounter
Deep vein thrombosis (DVT) of femoral vein of left lower extremity, unspecified chronicity
Deep vein thrombosis (DVT) of femoral vein of left lower extremity, unspecified chronicity
Hyponatremia
Hyponatremia
Anemia
Anemia
Deep vein thrombosis (DVT) of femoral vein of left lower extremity, unspecified chronicity

## 2021-06-18 NOTE — PROGRESS NOTE ADULT - PROBLEM SELECTOR PROBLEM 6
DM type 2 (diabetes mellitus, type 2)
Prophylactic measure
Anemia
Prophylactic measure
Anemia
Prophylactic measure
Anemia

## 2021-06-18 NOTE — PROGRESS NOTE ADULT - PROVIDER SPECIALTY LIST ADULT
Heme/Onc
Nephrology
Nephrology
Heme/Onc
Hospitalist
Hospitalist
Nephrology
Endocrinology
Heme/Onc
Nephrology
Nephrology
Surgery
Endocrinology
Heme/Onc
Endocrinology
Hospitalist
Nephrology
Palliative Care
Hospitalist
Palliative Care
Palliative Care
Endocrinology
Hospitalist
Endocrinology
Palliative Care
Endocrinology
Palliative Care
Hospitalist
Palliative Care
Hospitalist

## 2021-06-18 NOTE — DISCHARGE NOTE PROVIDER - NSDCFUSCHEDAPPT_GEN_ALL_CORE_FT
GANGA LUNA ; 06/25/2021 ; [facility]  GANGA LUNA M ; 07/02/2021 ; [facility]  GANGA LUNA ; 07/16/2021 ; [facility]  GANGA LUNA ; 07/23/2021 ; [facility]

## 2021-06-18 NOTE — PROVIDER CONTACT NOTE (OTHER) - NAME OF MD/NP/PA/DO NOTIFIED:
wes [place] [id number]
PAUL [facility]
Michelle Rendon
Tereza HORN [id number]
Fede Henley
Katerine Wang
Tereza HORN [id number]

## 2021-06-18 NOTE — PROVIDER CONTACT NOTE (OTHER) - ASSESSMENT
Arranged [facility] [phone]. Trip# [id number]. P/U 10 PM. Pt has Commercial Insurance, unable to obtain Auth, so bill back to [facility].

## 2021-06-18 NOTE — PROGRESS NOTE ADULT - NUTRITIONAL ASSESSMENT
This patient has been assessed with a concern for Malnutrition and has been determined to have a diagnosis/diagnoses of Moderate protein-calorie malnutrition.    This patient is being managed with:   Diet Regular-  Consistent Carbohydrate {Evening Snack} (CSTCHOSN)  1500mL Fluid Restriction ([id number])  [employer] Vegetarian (Accepts Fish)  Entered: Jun 6 2021 11:35PM    
This patient has been assessed with a concern for Malnutrition and has been determined to have a diagnosis/diagnoses of Moderate protein-calorie malnutrition.    This patient is being managed with:   Diet Regular-  Consistent Carbohydrate {Evening Snack} (CSTCHOSN)  1000mL Fluid Restriction ([id number])  [employer] Vegetarian (Accepts Fish)  Entered: Jun 7 2021  3:52PM    
This patient has been assessed with a concern for Malnutrition and has been determined to have a diagnosis/diagnoses of Moderate protein-calorie malnutrition.    This patient is being managed with:   Diet Regular-  Consistent Carbohydrate {Evening Snack} (CSTCHOSN)  1000mL Fluid Restriction ([id number])  [employer] Vegetarian (Accepts Fish)  Entered: Jun 8 2021  2:54PM    
This patient has been assessed with a concern for Malnutrition and has been determined to have a diagnosis/diagnoses of Moderate protein-calorie malnutrition.    This patient is being managed with:   Diet Regular-  Consistent Carbohydrate {Evening Snack} (CSTCHOSN)  1000mL Fluid Restriction ([id number])  [employer] Vegetarian (Accepts Fish)  Entered: Jun 7 2021  3:52PM    
This patient has been assessed with a concern for Malnutrition and has been determined to have a diagnosis/diagnoses of Moderate protein-calorie malnutrition.    This patient is being managed with:   Diet Regular-  Consistent Carbohydrate {Evening Snack} (CSTCHOSN)  1500mL Fluid Restriction ([id number])  [employer] Vegetarian (Accepts Fish)  Entered: Jun 6 2021 11:35PM    
This patient has been assessed with a concern for Malnutrition and has been determined to have a diagnosis/diagnoses of Moderate protein-calorie malnutrition.    This patient is being managed with:   Diet Regular-  Consistent Carbohydrate {Evening Snack} (CSTCHOSN)  1000mL Fluid Restriction ([id number])  [employer] Vegetarian (Accepts Fish)  Entered: Jun 8 2021  2:54PM    
This patient has been assessed with a concern for Malnutrition and has been determined to have a diagnosis/diagnoses of Moderate protein-calorie malnutrition.    This patient is being managed with:   Diet Regular-  Consistent Carbohydrate {Evening Snack} (CSTCHOSN)  1000mL Fluid Restriction ([id number])  [employer] Vegetarian (Accepts Fish)  Entered: Jun 8 2021  2:54PM    
This patient has been assessed with a concern for Malnutrition and has been determined to have a diagnosis/diagnoses of Moderate protein-calorie malnutrition.    This patient is being managed with:   Diet Regular-  Consistent Carbohydrate {Evening Snack} (CSTCHOSN)  1000mL Fluid Restriction ([id number])  [employer] Vegetarian (Accepts Fish)  Entered: Jun 8 2021  2:54PM    
This patient has been assessed with a concern for Malnutrition and has been determined to have a diagnosis/diagnoses of Moderate protein-calorie malnutrition.    This patient is being managed with:   Diet Regular-  Consistent Carbohydrate {Evening Snack} (CSTCHOSN)  1000mL Fluid Restriction ([id number])  [employer] Vegetarian (Accepts Fish)  Entered: Jun 7 2021  3:52PM    
This patient has been assessed with a concern for Malnutrition and has been determined to have a diagnosis/diagnoses of Moderate protein-calorie malnutrition.    This patient is being managed with:   Diet Regular-  Consistent Carbohydrate {Evening Snack} (CSTCHOSN)  1500mL Fluid Restriction ([id number])  [employer] Vegetarian (Accepts Fish)  Entered: Jun 6 2021 11:35PM    
This patient has been assessed with a concern for Malnutrition and has been determined to have a diagnosis/diagnoses of Moderate protein-calorie malnutrition.    This patient is being managed with:   Diet Regular-  Consistent Carbohydrate {Evening Snack} (CSTCHOSN)  1000mL Fluid Restriction ([id number])  [employer] Vegetarian (Accepts Fish)  Entered: Jun 7 2021  3:52PM    
This patient has been assessed with a concern for Malnutrition and has been determined to have a diagnosis/diagnoses of Moderate protein-calorie malnutrition.    This patient is being managed with:   Diet Regular-  Consistent Carbohydrate {Evening Snack} (CSTCHOSN)  1000mL Fluid Restriction ([id number])  [employer] Vegetarian (Accepts Fish)  Entered: Jun 7 2021  3:52PM    
This patient has been assessed with a concern for Malnutrition and has been determined to have a diagnosis/diagnoses of Moderate protein-calorie malnutrition.    This patient is being managed with:   Diet Regular-  Consistent Carbohydrate {Evening Snack} (CSTCHOSN)  1000mL Fluid Restriction ([id number])  [employer] Vegetarian (Accepts Fish)  Entered: Jun 8 2021  2:54PM    
This patient has been assessed with a concern for Malnutrition and has been determined to have a diagnosis/diagnoses of Moderate protein-calorie malnutrition.    This patient is being managed with:   Diet Regular-  Consistent Carbohydrate {Evening Snack} (CSTCHOSN)  1000mL Fluid Restriction ([id number])  [employer] Vegetarian (Accepts Fish)  Entered: Jun 7 2021  3:52PM    
This patient has been assessed with a concern for Malnutrition and has been determined to have a diagnosis/diagnoses of Moderate protein-calorie malnutrition.    This patient is being managed with:   Diet Regular-  Consistent Carbohydrate {Evening Snack} (CSTCHOSN)  1000mL Fluid Restriction ([id number])  [employer] Vegetarian (Accepts Fish)  Entered: Jun 7 2021  3:52PM    
This patient has been assessed with a concern for Malnutrition and has been determined to have a diagnosis/diagnoses of Moderate protein-calorie malnutrition.    This patient is being managed with:   Diet Regular-  Consistent Carbohydrate {Evening Snack} (CSTCHOSN)  1000mL Fluid Restriction ([id number])  [employer] Vegetarian (Accepts Fish)  Entered: Jun 8 2021  2:54PM    
This patient has been assessed with a concern for Malnutrition and has been determined to have a diagnosis/diagnoses of Moderate protein-calorie malnutrition.    This patient is being managed with:   Diet Regular-  Consistent Carbohydrate {Evening Snack} (CSTCHOSN)  1000mL Fluid Restriction ([id number])  [employer] Vegetarian (Accepts Fish)  Entered: Jun 8 2021  2:54PM    
This patient has been assessed with a concern for Malnutrition and has been determined to have a diagnosis/diagnoses of Moderate protein-calorie malnutrition.    This patient is being managed with:   Diet Regular-  Consistent Carbohydrate {Evening Snack} (CSTCHOSN)  1000mL Fluid Restriction ([id number])  [employer] Vegetarian (Accepts Fish)  Entered: Jun 8 2021  2:54PM    
This patient has been assessed with a concern for Malnutrition and has been determined to have a diagnosis/diagnoses of Moderate protein-calorie malnutrition.    This patient is being managed with:   Diet Regular-  Consistent Carbohydrate {Evening Snack} (CSTCHOSN)  1000mL Fluid Restriction ([id number])  [employer] Vegetarian (Accepts Fish)  Entered: Jun 8 2021  2:54PM    
This patient has been assessed with a concern for Malnutrition and has been determined to have a diagnosis/diagnoses of Moderate protein-calorie malnutrition.    This patient is being managed with:   Diet Regular-  Consistent Carbohydrate {Evening Snack} (CSTCHOSN)  1000mL Fluid Restriction ([id number])  [employer] Vegetarian (Accepts Fish)  Entered: Jun 8 2021  2:54PM    
This patient has been assessed with a concern for Malnutrition and has been determined to have a diagnosis/diagnoses of Moderate protein-calorie malnutrition.    This patient is being managed with:   Diet Regular-  Consistent Carbohydrate {Evening Snack} (CSTCHOSN)  1000mL Fluid Restriction ([id number])  [employer] Vegetarian (Accepts Fish)  Entered: Jun 8 2021  2:54PM

## 2021-06-19 NOTE — CHART NOTE - NSCHARTNOTESELECT_GEN_ALL_CORE
ACP-NP Note/Event Note
ACP-NP note/Event Note
Event Note
Off Service Note
ACP- Hyponatremia/Event Note
ACP_NP Note/Event Note
CT report
Endocrine/Event Note
Event Note
endocrinology/Event Note

## 2021-06-19 NOTE — CHART NOTE - NSCHARTNOTEFT_GEN_A_CORE
[facility] did not have Urea Powder. Writer was advised to call [facility]. I called [facility] near pt's area and they did not have medication in stock.  Writer called [facility] pharmacy they did not have it in stock either but said they could order it and it would be in store by Monday. Spoke with pt's primary nurse  and she said she had a few packets that she can give pt for discharge. Notified attending and she said its okay to discharge patient.

## 2021-06-23 PROBLEM — C56.9 MALIGNANT NEOPLASM OF UNSPECIFIED OVARY: Chronic | Status: ACTIVE | Noted: 2021-06-01

## 2021-06-23 PROBLEM — E11.9 TYPE 2 DIABETES MELLITUS WITHOUT COMPLICATIONS: Chronic | Status: ACTIVE | Noted: 2021-05-27

## 2021-06-25 ENCOUNTER — APPOINTMENT (OUTPATIENT)
Dept: INFUSION THERAPY | Facility: HOSPITAL | Age: 56
End: 2021-06-25

## 2021-06-28 ENCOUNTER — NON-APPOINTMENT (OUTPATIENT)
Age: 56
End: 2021-06-28

## 2021-07-02 ENCOUNTER — APPOINTMENT (OUTPATIENT)
Dept: HEMATOLOGY ONCOLOGY | Facility: CLINIC | Age: 56
End: 2021-07-02

## 2021-07-03 ENCOUNTER — NON-APPOINTMENT (OUTPATIENT)
Age: 56
End: 2021-07-03

## 2021-07-03 ENCOUNTER — INPATIENT (INPATIENT)
Facility: HOSPITAL | Age: 56
LOS: 11 days | Discharge: HOSPICE | End: 2021-07-15
Attending: HOSPITALIST | Admitting: HOSPITALIST
Payer: COMMERCIAL

## 2021-07-03 VITALS
OXYGEN SATURATION: 100 % | HEART RATE: 124 BPM | HEIGHT: 68 IN | RESPIRATION RATE: 22 BRPM | SYSTOLIC BLOOD PRESSURE: 98 MMHG | DIASTOLIC BLOOD PRESSURE: 74 MMHG | TEMPERATURE: 98 F

## 2021-07-03 DIAGNOSIS — C54.1 MALIGNANT NEOPLASM OF ENDOMETRIUM: ICD-10-CM

## 2021-07-03 DIAGNOSIS — Z98.891 HISTORY OF UTERINE SCAR FROM PREVIOUS SURGERY: Chronic | ICD-10-CM

## 2021-07-03 DIAGNOSIS — I82.409 ACUTE EMBOLISM AND THROMBOSIS OF UNSPECIFIED DEEP VEINS OF UNSPECIFIED LOWER EXTREMITY: ICD-10-CM

## 2021-07-03 DIAGNOSIS — N39.0 URINARY TRACT INFECTION, SITE NOT SPECIFIED: ICD-10-CM

## 2021-07-03 DIAGNOSIS — E11.65 TYPE 2 DIABETES MELLITUS WITH HYPERGLYCEMIA: ICD-10-CM

## 2021-07-03 DIAGNOSIS — E87.0 HYPEROSMOLALITY AND HYPERNATREMIA: ICD-10-CM

## 2021-07-03 DIAGNOSIS — N17.9 ACUTE KIDNEY FAILURE, UNSPECIFIED: ICD-10-CM

## 2021-07-03 DIAGNOSIS — D64.9 ANEMIA, UNSPECIFIED: ICD-10-CM

## 2021-07-03 DIAGNOSIS — Z29.9 ENCOUNTER FOR PROPHYLACTIC MEASURES, UNSPECIFIED: ICD-10-CM

## 2021-07-03 DIAGNOSIS — A41.9 SEPSIS, UNSPECIFIED ORGANISM: ICD-10-CM

## 2021-07-03 DIAGNOSIS — I10 ESSENTIAL (PRIMARY) HYPERTENSION: ICD-10-CM

## 2021-07-03 LAB
A1C WITH ESTIMATED AVERAGE GLUCOSE RESULT: 8.2 % — HIGH (ref 4–5.6)
ALBUMIN SERPL ELPH-MCNC: 2.4 G/DL — LOW (ref 3.3–5)
ALP SERPL-CCNC: 150 U/L — HIGH (ref 40–120)
ALT FLD-CCNC: 18 U/L — SIGNIFICANT CHANGE UP (ref 4–33)
ANION GAP SERPL CALC-SCNC: 14 MMOL/L — SIGNIFICANT CHANGE UP (ref 7–14)
ANION GAP SERPL CALC-SCNC: 15 MMOL/L — HIGH (ref 7–14)
ANION GAP SERPL CALC-SCNC: 16 MMOL/L — HIGH (ref 7–14)
APPEARANCE UR: ABNORMAL
APTT BLD: 41 SEC — HIGH (ref 27–36.3)
APTT BLD: 89.7 SEC — HIGH (ref 27–36.3)
AST SERPL-CCNC: 45 U/L — HIGH (ref 4–32)
B PERT DNA SPEC QL NAA+PROBE: SIGNIFICANT CHANGE UP
B-OH-BUTYR SERPL-SCNC: 0.5 MMOL/L — HIGH (ref 0–0.4)
B-OH-BUTYR SERPL-SCNC: <0 MMOL/L — SIGNIFICANT CHANGE UP (ref 0–0.4)
BACTERIA # UR AUTO: ABNORMAL
BASE EXCESS BLDV CALC-SCNC: 8.5 MMOL/L — HIGH (ref -3–2)
BASOPHILS # BLD AUTO: 0.02 K/UL — SIGNIFICANT CHANGE UP (ref 0–0.2)
BASOPHILS NFR BLD AUTO: 0.1 % — SIGNIFICANT CHANGE UP (ref 0–2)
BILIRUB SERPL-MCNC: 0.5 MG/DL — SIGNIFICANT CHANGE UP (ref 0.2–1.2)
BILIRUB UR-MCNC: NEGATIVE — SIGNIFICANT CHANGE UP
BLOOD GAS VENOUS - CREATININE: 1.4 MG/DL — HIGH (ref 0.5–1.3)
BLOOD GAS VENOUS COMPREHENSIVE RESULT: SIGNIFICANT CHANGE UP
BUN SERPL-MCNC: 58 MG/DL — HIGH (ref 7–23)
BUN SERPL-MCNC: 68 MG/DL — HIGH (ref 7–23)
BUN SERPL-MCNC: 73 MG/DL — HIGH (ref 7–23)
C PNEUM DNA SPEC QL NAA+PROBE: SIGNIFICANT CHANGE UP
CALCIUM SERPL-MCNC: 8 MG/DL — LOW (ref 8.4–10.5)
CALCIUM SERPL-MCNC: 8.5 MG/DL — SIGNIFICANT CHANGE UP (ref 8.4–10.5)
CALCIUM SERPL-MCNC: 8.5 MG/DL — SIGNIFICANT CHANGE UP (ref 8.4–10.5)
CHLORIDE BLDV-SCNC: 112 MMOL/L — HIGH (ref 96–108)
CHLORIDE SERPL-SCNC: 106 MMOL/L — SIGNIFICANT CHANGE UP (ref 98–107)
CHLORIDE SERPL-SCNC: 107 MMOL/L — SIGNIFICANT CHANGE UP (ref 98–107)
CHLORIDE SERPL-SCNC: 110 MMOL/L — HIGH (ref 98–107)
CK SERPL-CCNC: 112 U/L — SIGNIFICANT CHANGE UP (ref 25–170)
CO2 SERPL-SCNC: 28 MMOL/L — SIGNIFICANT CHANGE UP (ref 22–31)
COLOR SPEC: YELLOW — SIGNIFICANT CHANGE UP
CREAT SERPL-MCNC: 1.16 MG/DL — SIGNIFICANT CHANGE UP (ref 0.5–1.3)
CREAT SERPL-MCNC: 1.23 MG/DL — SIGNIFICANT CHANGE UP (ref 0.5–1.3)
CREAT SERPL-MCNC: 1.36 MG/DL — HIGH (ref 0.5–1.3)
CULTURE RESULTS: SIGNIFICANT CHANGE UP
DIFF PNL FLD: ABNORMAL
EOSINOPHIL # BLD AUTO: 0 K/UL — SIGNIFICANT CHANGE UP (ref 0–0.5)
EOSINOPHIL NFR BLD AUTO: 0 % — SIGNIFICANT CHANGE UP (ref 0–6)
EPI CELLS # UR: 11 /HPF — HIGH (ref 0–5)
ESTIMATED AVERAGE GLUCOSE: 189 — SIGNIFICANT CHANGE UP
FLUAV SUBTYP SPEC NAA+PROBE: SIGNIFICANT CHANGE UP
FLUBV RNA SPEC QL NAA+PROBE: SIGNIFICANT CHANGE UP
GAS PNL BLDV: 152 MMOL/L — HIGH (ref 136–146)
GLUCOSE BLDC GLUCOMTR-MCNC: 192 MG/DL — HIGH (ref 70–99)
GLUCOSE BLDC GLUCOMTR-MCNC: 194 MG/DL — HIGH (ref 70–99)
GLUCOSE BLDC GLUCOMTR-MCNC: 279 MG/DL — HIGH (ref 70–99)
GLUCOSE BLDV-MCNC: 399 MG/DL — HIGH (ref 70–99)
GLUCOSE SERPL-MCNC: 160 MG/DL — HIGH (ref 70–99)
GLUCOSE SERPL-MCNC: 227 MG/DL — HIGH (ref 70–99)
GLUCOSE SERPL-MCNC: 373 MG/DL — HIGH (ref 70–99)
GLUCOSE UR QL: NEGATIVE — SIGNIFICANT CHANGE UP
HADV DNA SPEC QL NAA+PROBE: SIGNIFICANT CHANGE UP
HCO3 BLDV-SCNC: 31 MMOL/L — HIGH (ref 20–27)
HCOV 229E RNA SPEC QL NAA+PROBE: SIGNIFICANT CHANGE UP
HCOV HKU1 RNA SPEC QL NAA+PROBE: SIGNIFICANT CHANGE UP
HCOV NL63 RNA SPEC QL NAA+PROBE: SIGNIFICANT CHANGE UP
HCOV OC43 RNA SPEC QL NAA+PROBE: SIGNIFICANT CHANGE UP
HCT VFR BLD CALC: 26.7 % — LOW (ref 34.5–45)
HCT VFR BLD CALC: 32.2 % — LOW (ref 34.5–45)
HCT VFR BLDA CALC: 27.2 % — LOW (ref 34.5–46.5)
HGB BLD CALC-MCNC: 8.7 G/DL — LOW (ref 11.5–15.5)
HGB BLD-MCNC: 7.1 G/DL — LOW (ref 11.5–15.5)
HGB BLD-MCNC: 8.7 G/DL — LOW (ref 11.5–15.5)
HMPV RNA SPEC QL NAA+PROBE: SIGNIFICANT CHANGE UP
HPIV1 RNA SPEC QL NAA+PROBE: SIGNIFICANT CHANGE UP
HPIV2 RNA SPEC QL NAA+PROBE: SIGNIFICANT CHANGE UP
HPIV3 RNA SPEC QL NAA+PROBE: SIGNIFICANT CHANGE UP
HPIV4 RNA SPEC QL NAA+PROBE: SIGNIFICANT CHANGE UP
HYALINE CASTS # UR AUTO: 1 /LPF — SIGNIFICANT CHANGE UP (ref 0–7)
IANC: 11.87 K/UL — HIGH (ref 1.5–8.5)
IMM GRANULOCYTES NFR BLD AUTO: 1 % — SIGNIFICANT CHANGE UP (ref 0–1.5)
KETONES UR-MCNC: ABNORMAL
LACTATE BLDV-MCNC: 2.2 MMOL/L — HIGH (ref 0.5–2)
LACTATE BLDV-MCNC: 3.9 MMOL/L — HIGH (ref 0.5–2)
LEUKOCYTE ESTERASE UR-ACNC: ABNORMAL
LYMPHOCYTES # BLD AUTO: 1.41 K/UL — SIGNIFICANT CHANGE UP (ref 1–3.3)
LYMPHOCYTES # BLD AUTO: 9.7 % — LOW (ref 13–44)
MCHC RBC-ENTMCNC: 20.1 PG — LOW (ref 27–34)
MCHC RBC-ENTMCNC: 20.4 PG — LOW (ref 27–34)
MCHC RBC-ENTMCNC: 26.6 GM/DL — LOW (ref 32–36)
MCHC RBC-ENTMCNC: 27 GM/DL — LOW (ref 32–36)
MCV RBC AUTO: 75.4 FL — LOW (ref 80–100)
MCV RBC AUTO: 75.6 FL — LOW (ref 80–100)
MONOCYTES # BLD AUTO: 1.16 K/UL — HIGH (ref 0–0.9)
MONOCYTES NFR BLD AUTO: 7.9 % — SIGNIFICANT CHANGE UP (ref 2–14)
NEUTROPHILS # BLD AUTO: 11.87 K/UL — HIGH (ref 1.8–7.4)
NEUTROPHILS NFR BLD AUTO: 81.3 % — HIGH (ref 43–77)
NITRITE UR-MCNC: NEGATIVE — SIGNIFICANT CHANGE UP
NRBC # BLD: 0 /100 WBCS — SIGNIFICANT CHANGE UP
NRBC # BLD: 0 /100 WBCS — SIGNIFICANT CHANGE UP
NRBC # FLD: 0 K/UL — SIGNIFICANT CHANGE UP
NRBC # FLD: 0.02 K/UL — HIGH
PCO2 BLDV: 62 MMHG — HIGH (ref 41–51)
PH BLDV: 7.36 — SIGNIFICANT CHANGE UP (ref 7.32–7.43)
PH UR: 6 — SIGNIFICANT CHANGE UP (ref 5–8)
PLATELET # BLD AUTO: 135 K/UL — LOW (ref 150–400)
PLATELET # BLD AUTO: 170 K/UL — SIGNIFICANT CHANGE UP (ref 150–400)
PO2 BLDV: 29 MMHG — LOW (ref 35–40)
POTASSIUM BLDV-SCNC: 3.7 MMOL/L — SIGNIFICANT CHANGE UP (ref 3.4–4.5)
POTASSIUM SERPL-MCNC: 3 MMOL/L — LOW (ref 3.5–5.3)
POTASSIUM SERPL-MCNC: 3 MMOL/L — LOW (ref 3.5–5.3)
POTASSIUM SERPL-MCNC: SIGNIFICANT CHANGE UP MMOL/L (ref 3.5–5.3)
POTASSIUM SERPL-SCNC: 3 MMOL/L — LOW (ref 3.5–5.3)
POTASSIUM SERPL-SCNC: 3 MMOL/L — LOW (ref 3.5–5.3)
POTASSIUM SERPL-SCNC: SIGNIFICANT CHANGE UP MMOL/L (ref 3.5–5.3)
PROT SERPL-MCNC: 6.6 G/DL — SIGNIFICANT CHANGE UP (ref 6–8.3)
PROT UR-MCNC: ABNORMAL
RAPID RVP RESULT: SIGNIFICANT CHANGE UP
RBC # BLD: 3.54 M/UL — LOW (ref 3.8–5.2)
RBC # BLD: 4.26 M/UL — SIGNIFICANT CHANGE UP (ref 3.8–5.2)
RBC # FLD: 20.9 % — HIGH (ref 10.3–14.5)
RBC # FLD: 21.6 % — HIGH (ref 10.3–14.5)
RBC CASTS # UR COMP ASSIST: >720 /HPF — HIGH (ref 0–4)
RSV RNA SPEC QL NAA+PROBE: SIGNIFICANT CHANGE UP
RV+EV RNA SPEC QL NAA+PROBE: SIGNIFICANT CHANGE UP
SAO2 % BLDV: 38.9 % — LOW (ref 60–85)
SARS-COV-2 RNA SPEC QL NAA+PROBE: SIGNIFICANT CHANGE UP
SODIUM SERPL-SCNC: 149 MMOL/L — HIGH (ref 135–145)
SODIUM SERPL-SCNC: 151 MMOL/L — HIGH (ref 135–145)
SODIUM SERPL-SCNC: 152 MMOL/L — HIGH (ref 135–145)
SP GR SPEC: 1.02 — SIGNIFICANT CHANGE UP (ref 1.01–1.02)
SPECIMEN SOURCE: SIGNIFICANT CHANGE UP
UROBILINOGEN FLD QL: ABNORMAL
WBC # BLD: 11.63 K/UL — HIGH (ref 3.8–10.5)
WBC # BLD: 14.6 K/UL — HIGH (ref 3.8–10.5)
WBC # FLD AUTO: 11.63 K/UL — HIGH (ref 3.8–10.5)
WBC # FLD AUTO: 14.6 K/UL — HIGH (ref 3.8–10.5)
WBC UR QL: 238 /HPF — HIGH (ref 0–5)

## 2021-07-03 PROCEDURE — 99285 EMERGENCY DEPT VISIT HI MDM: CPT | Mod: 25

## 2021-07-03 PROCEDURE — 71045 X-RAY EXAM CHEST 1 VIEW: CPT | Mod: 26

## 2021-07-03 PROCEDURE — 99223 1ST HOSP IP/OBS HIGH 75: CPT

## 2021-07-03 PROCEDURE — 70450 CT HEAD/BRAIN W/O DYE: CPT | Mod: 26

## 2021-07-03 PROCEDURE — 93010 ELECTROCARDIOGRAM REPORT: CPT

## 2021-07-03 PROCEDURE — 71275 CT ANGIOGRAPHY CHEST: CPT | Mod: 26

## 2021-07-03 PROCEDURE — 99222 1ST HOSP IP/OBS MODERATE 55: CPT

## 2021-07-03 PROCEDURE — 99252 IP/OBS CONSLTJ NEW/EST SF 35: CPT | Mod: GC

## 2021-07-03 RX ORDER — ACETAMINOPHEN 500 MG
650 TABLET ORAL EVERY 6 HOURS
Refills: 0 | Status: DISCONTINUED | OUTPATIENT
Start: 2021-07-03 | End: 2021-07-15

## 2021-07-03 RX ORDER — INSULIN LISPRO 100/ML
3 VIAL (ML) SUBCUTANEOUS
Refills: 0 | Status: DISCONTINUED | OUTPATIENT
Start: 2021-07-03 | End: 2021-07-04

## 2021-07-03 RX ORDER — INSULIN LISPRO 100/ML
5 VIAL (ML) SUBCUTANEOUS ONCE
Refills: 0 | Status: COMPLETED | OUTPATIENT
Start: 2021-07-03 | End: 2021-07-03

## 2021-07-03 RX ORDER — PIPERACILLIN AND TAZOBACTAM 4; .5 G/20ML; G/20ML
3.38 INJECTION, POWDER, LYOPHILIZED, FOR SOLUTION INTRAVENOUS EVERY 8 HOURS
Refills: 0 | Status: COMPLETED | OUTPATIENT
Start: 2021-07-03 | End: 2021-07-10

## 2021-07-03 RX ORDER — SODIUM CHLORIDE 9 MG/ML
1000 INJECTION, SOLUTION INTRAVENOUS
Refills: 0 | Status: DISCONTINUED | OUTPATIENT
Start: 2021-07-03 | End: 2021-07-04

## 2021-07-03 RX ORDER — ONDANSETRON 8 MG/1
4 TABLET, FILM COATED ORAL EVERY 8 HOURS
Refills: 0 | Status: DISCONTINUED | OUTPATIENT
Start: 2021-07-03 | End: 2021-07-14

## 2021-07-03 RX ORDER — SODIUM CHLORIDE 9 MG/ML
1000 INJECTION, SOLUTION INTRAVENOUS ONCE
Refills: 0 | Status: COMPLETED | OUTPATIENT
Start: 2021-07-03 | End: 2021-07-03

## 2021-07-03 RX ORDER — INSULIN LISPRO 100/ML
VIAL (ML) SUBCUTANEOUS
Refills: 0 | Status: DISCONTINUED | OUTPATIENT
Start: 2021-07-03 | End: 2021-07-15

## 2021-07-03 RX ORDER — VANCOMYCIN HCL 1 G
1000 VIAL (EA) INTRAVENOUS ONCE
Refills: 0 | Status: COMPLETED | OUTPATIENT
Start: 2021-07-03 | End: 2021-07-03

## 2021-07-03 RX ORDER — INSULIN LISPRO 100/ML
4 VIAL (ML) SUBCUTANEOUS
Refills: 0 | Status: DISCONTINUED | OUTPATIENT
Start: 2021-07-03 | End: 2021-07-03

## 2021-07-03 RX ORDER — NYSTATIN 500MM UNIT
500000 POWDER (EA) MISCELLANEOUS EVERY 6 HOURS
Refills: 0 | Status: DISCONTINUED | OUTPATIENT
Start: 2021-07-03 | End: 2021-07-03

## 2021-07-03 RX ORDER — DEXTROSE 50 % IN WATER 50 %
25 SYRINGE (ML) INTRAVENOUS ONCE
Refills: 0 | Status: DISCONTINUED | OUTPATIENT
Start: 2021-07-03 | End: 2021-07-15

## 2021-07-03 RX ORDER — INSULIN GLARGINE 100 [IU]/ML
18 INJECTION, SOLUTION SUBCUTANEOUS AT BEDTIME
Refills: 0 | Status: DISCONTINUED | OUTPATIENT
Start: 2021-07-03 | End: 2021-07-04

## 2021-07-03 RX ORDER — HEPARIN SODIUM 5000 [USP'U]/ML
2000 INJECTION INTRAVENOUS; SUBCUTANEOUS EVERY 6 HOURS
Refills: 0 | Status: DISCONTINUED | OUTPATIENT
Start: 2021-07-03 | End: 2021-07-04

## 2021-07-03 RX ORDER — INSULIN GLARGINE 100 [IU]/ML
15 INJECTION, SOLUTION SUBCUTANEOUS AT BEDTIME
Refills: 0 | Status: DISCONTINUED | OUTPATIENT
Start: 2021-07-03 | End: 2021-07-03

## 2021-07-03 RX ORDER — ACETAMINOPHEN 500 MG
1000 TABLET ORAL ONCE
Refills: 0 | Status: COMPLETED | OUTPATIENT
Start: 2021-07-03 | End: 2021-07-03

## 2021-07-03 RX ORDER — HEPARIN SODIUM 5000 [USP'U]/ML
INJECTION INTRAVENOUS; SUBCUTANEOUS
Qty: 25000 | Refills: 0 | Status: DISCONTINUED | OUTPATIENT
Start: 2021-07-03 | End: 2021-07-04

## 2021-07-03 RX ORDER — SODIUM CHLORIDE 9 MG/ML
1000 INJECTION, SOLUTION INTRAVENOUS
Refills: 0 | Status: DISCONTINUED | OUTPATIENT
Start: 2021-07-03 | End: 2021-07-03

## 2021-07-03 RX ORDER — POTASSIUM CHLORIDE 20 MEQ
40 PACKET (EA) ORAL ONCE
Refills: 0 | Status: COMPLETED | OUTPATIENT
Start: 2021-07-03 | End: 2021-07-03

## 2021-07-03 RX ORDER — SENNA PLUS 8.6 MG/1
2 TABLET ORAL AT BEDTIME
Refills: 0 | Status: DISCONTINUED | OUTPATIENT
Start: 2021-07-03 | End: 2021-07-15

## 2021-07-03 RX ORDER — ACETAMINOPHEN 500 MG
650 TABLET ORAL ONCE
Refills: 0 | Status: DISCONTINUED | OUTPATIENT
Start: 2021-07-03 | End: 2021-07-03

## 2021-07-03 RX ORDER — MAGNESIUM HYDROXIDE 400 MG/1
30 TABLET, CHEWABLE ORAL DAILY
Refills: 0 | Status: DISCONTINUED | OUTPATIENT
Start: 2021-07-03 | End: 2021-07-15

## 2021-07-03 RX ORDER — HEPARIN SODIUM 5000 [USP'U]/ML
4500 INJECTION INTRAVENOUS; SUBCUTANEOUS EVERY 6 HOURS
Refills: 0 | Status: DISCONTINUED | OUTPATIENT
Start: 2021-07-03 | End: 2021-07-04

## 2021-07-03 RX ORDER — NYSTATIN 500MM UNIT
500000 POWDER (EA) MISCELLANEOUS EVERY 6 HOURS
Refills: 0 | Status: DISCONTINUED | OUTPATIENT
Start: 2021-07-03 | End: 2021-07-15

## 2021-07-03 RX ORDER — PIPERACILLIN AND TAZOBACTAM 4; .5 G/20ML; G/20ML
3.38 INJECTION, POWDER, LYOPHILIZED, FOR SOLUTION INTRAVENOUS ONCE
Refills: 0 | Status: COMPLETED | OUTPATIENT
Start: 2021-07-03 | End: 2021-07-03

## 2021-07-03 RX ORDER — INSULIN LISPRO 100/ML
VIAL (ML) SUBCUTANEOUS AT BEDTIME
Refills: 0 | Status: DISCONTINUED | OUTPATIENT
Start: 2021-07-03 | End: 2021-07-13

## 2021-07-03 RX ORDER — OXYCODONE HYDROCHLORIDE 5 MG/1
5 TABLET ORAL EVERY 4 HOURS
Refills: 0 | Status: DISCONTINUED | OUTPATIENT
Start: 2021-07-03 | End: 2021-07-08

## 2021-07-03 RX ORDER — POLYETHYLENE GLYCOL 3350 17 G/17G
17 POWDER, FOR SOLUTION ORAL DAILY
Refills: 0 | Status: DISCONTINUED | OUTPATIENT
Start: 2021-07-03 | End: 2021-07-15

## 2021-07-03 RX ADMIN — Medication 3: at 13:02

## 2021-07-03 RX ADMIN — Medication 400 MILLIGRAM(S): at 04:11

## 2021-07-03 RX ADMIN — SODIUM CHLORIDE 1000 MILLILITER(S): 9 INJECTION, SOLUTION INTRAVENOUS at 06:58

## 2021-07-03 RX ADMIN — Medication 3 UNIT(S): at 17:54

## 2021-07-03 RX ADMIN — HEPARIN SODIUM 1100 UNIT(S)/HR: 5000 INJECTION INTRAVENOUS; SUBCUTANEOUS at 14:29

## 2021-07-03 RX ADMIN — Medication 500000 UNIT(S): at 17:50

## 2021-07-03 RX ADMIN — Medication 500000 UNIT(S): at 23:02

## 2021-07-03 RX ADMIN — SODIUM CHLORIDE 75 MILLILITER(S): 9 INJECTION, SOLUTION INTRAVENOUS at 09:29

## 2021-07-03 RX ADMIN — Medication 250 MILLIGRAM(S): at 05:47

## 2021-07-03 RX ADMIN — PIPERACILLIN AND TAZOBACTAM 200 GRAM(S): 4; .5 INJECTION, POWDER, LYOPHILIZED, FOR SOLUTION INTRAVENOUS at 04:11

## 2021-07-03 RX ADMIN — SENNA PLUS 2 TABLET(S): 8.6 TABLET ORAL at 23:02

## 2021-07-03 RX ADMIN — SODIUM CHLORIDE 1000 MILLILITER(S): 9 INJECTION, SOLUTION INTRAVENOUS at 05:57

## 2021-07-03 RX ADMIN — HEPARIN SODIUM 2000 UNIT(S): 5000 INJECTION INTRAVENOUS; SUBCUTANEOUS at 21:27

## 2021-07-03 RX ADMIN — SODIUM CHLORIDE 1000 MILLILITER(S): 9 INJECTION, SOLUTION INTRAVENOUS at 04:11

## 2021-07-03 RX ADMIN — Medication 4 UNIT(S): at 13:01

## 2021-07-03 RX ADMIN — Medication 40 MILLIEQUIVALENT(S): at 11:23

## 2021-07-03 RX ADMIN — POLYETHYLENE GLYCOL 3350 17 GRAM(S): 17 POWDER, FOR SOLUTION ORAL at 11:24

## 2021-07-03 RX ADMIN — PIPERACILLIN AND TAZOBACTAM 25 GRAM(S): 4; .5 INJECTION, POWDER, LYOPHILIZED, FOR SOLUTION INTRAVENOUS at 19:57

## 2021-07-03 RX ADMIN — HEPARIN SODIUM 1200 UNIT(S)/HR: 5000 INJECTION INTRAVENOUS; SUBCUTANEOUS at 21:19

## 2021-07-03 RX ADMIN — PIPERACILLIN AND TAZOBACTAM 25 GRAM(S): 4; .5 INJECTION, POWDER, LYOPHILIZED, FOR SOLUTION INTRAVENOUS at 11:26

## 2021-07-03 RX ADMIN — Medication 500000 UNIT(S): at 11:24

## 2021-07-03 RX ADMIN — INSULIN GLARGINE 18 UNIT(S): 100 INJECTION, SOLUTION SUBCUTANEOUS at 22:58

## 2021-07-03 RX ADMIN — Medication 1: at 17:51

## 2021-07-03 RX ADMIN — Medication 5 UNIT(S): at 06:58

## 2021-07-03 RX ADMIN — SODIUM CHLORIDE 1000 MILLILITER(S): 9 INJECTION, SOLUTION INTRAVENOUS at 11:08

## 2021-07-03 NOTE — CONSULT NOTE ADULT - SUBJECTIVE AND OBJECTIVE BOX
HPI:  56 year old female with history of Stage IV endometrial cancer consistnet with malignant ascites s/p chemotherapy (carboplatin/taxol), Left side mid femoral/peroneal vein DVT on lovenox, T2DM ( A1C of 8% on insulin) presented with AMS and weakness.   He was recently admitted to [facility] in  for abdominal pain/distension attributed to malignant ascites s/p paracentersis.   The family a couple of days ago check her FS and they were ranging in the 300-400s which made the family bring the patient to the ER.     ED: Tm 102,  --> 105, /63, RR 18, 100% on RA  - s/p vanc, zosyn, 1L LR bolus    DM History:   -Noted to have DM starting in 2021   -She is currently on Lantus 18 units at bedtime and metformin 1000 mg BID   -Mostly BG at home were ranging in the 150-190 range until recently  -She denied any hypoglycemia  -No neuropathic symptoms   -She has a poor appetite       PAST MEDICAL & SURGICAL HISTORY:  History of migraine headaches    DM type 2 (diabetes mellitus, type 2)    History of irregular menstrual cycles    Ovarian cancer  Stage 4    History of   2003        FAMILY HISTORY:  FH: type 2 diabetes mellitus (Mother)  mother        Social History: No history of smoking     Outpatient Medications:    MEDICATIONS  (STANDING):  dextrose 50% Injectable 25 Gram(s) IV Push once  heparin  Infusion.  Unit(s)/Hr (11 mL/Hr) IV Continuous <Continuous>  insulin glargine Injectable (LANTUS) 15 Unit(s) SubCutaneous at bedtime  insulin lispro (ADMELOG) corrective regimen sliding scale   SubCutaneous three times a day before meals  insulin lispro (ADMELOG) corrective regimen sliding scale   SubCutaneous at bedtime  insulin lispro Injectable (ADMELOG) 4 Unit(s) SubCutaneous three times a day before meals  nystatin    Suspension 208199 Unit(s) Oral every 6 hours  piperacillin/tazobactam IVPB.. 3.375 Gram(s) IV Intermittent every 8 hours  polyethylene glycol 3350 17 Gram(s) Oral daily  senna 2 Tablet(s) Oral at bedtime  sodium chloride 0.45%. 1000 milliLiter(s) (75 mL/Hr) IV Continuous <Continuous>    MEDICATIONS  (PRN):  acetaminophen   Tablet .. 650 milliGRAM(s) Oral every 6 hours PRN Temp greater or equal to 38C (100.4F), Mild Pain (1 - 3), Moderate Pain (4 - 6)  heparin   Injectable 4500 Unit(s) IV Push every 6 hours PRN For aPTT less than 40  heparin   Injectable 2000 Unit(s) IV Push every 6 hours PRN For aPTT between 40 - 57  magnesium hydroxide Suspension 30 milliLiter(s) Oral daily PRN Constipation  ondansetron    Tablet 4 milliGRAM(s) Oral every 8 hours PRN Nausea and/or Vomiting  oxyCODONE    IR 5 milliGRAM(s) Oral every 4 hours PRN Severe Pain (7 - 10)      Allergies    Sulf-10 (Rash; Short breath)    Intolerances      Review of Systems:  Constitutional: + fever  Eyes: No blurry vision  Neuro: No tremors  HEENT: No pain  Cardiovascular: No chest pain, palpitations  Respiratory: No SOB, no cough  GI: No nausea, vomiting, abdominal pain  : No dysuria  Skin: no rash  Psych: no depression  Endocrine: no polyuria, polydipsia        PHYSICAL EXAM:  VITALS: T(C): 36.5 (21 @ 13:54)  T(F): 97.7 (21 @ 13:54), Max: 102 (21 @ 04:26)  HR: 101 (21 @ 13:54) (94 - 124)  BP: 106/68 (21 @ 13:54) (93/61 - 106/68)  RR:  (17 - 22)  SpO2:  (99% - 100%)    GENERAL: NAD, malnourished  EYES: No proptosis, no lid lag, anicteric  HEENT:  Atraumatic, Normocephalic, moist mucous membranes  RESPIRATORY: Clear to auscultation bilaterally; No rales, rhonchi, wheezing, or rubs  CARDIOVASCULAR: Regular rate and rhythm; No murmurs; no peripheral edema  GI: Soft, nontender, non distended, normal bowel sounds  SKIN: Dry, intact, No rashes or lesions  MUSCULOSKELETAL: Full range of motion, normal strength  NEURO: sensation intact, extraocular movements intact, no tremor, normal reflexes  PSYCH: Alert and oriented x 3, normal affect, normal mood      POCT Blood Glucose.: 279 mg/dL (21 @ 12:16)  POCT Blood Glucose.: 397 mg/dL (07-03-21 @ 06:49)  POCT Blood Glucose.: 373 mg/dL (21 @ 01:43)                            8.7    14.60 )-----------( 170      ( 2021 04:26 )             32.2           151<H>  |  107  |  68<H>  ----------------------------<  227<H>  3.0<L>   |  28  |  1.36<H>    EGFR if : 50<L>  EGFR if non : 43<L>    Ca    8.5          TPro  6.6  /  Alb  2.4<L>  /  TBili  0.5  /  DBili  x   /  AST  45<H>  /  ALT  18  /  AlkPhos  150<H>        Thyroid Function Tests:           Chol 115 Direct LDL -- LDL calculated 59 HDL 19<L> Trig 184<H>    Radiology:              HPI:  56 year old female with history of Stage IV endometrial cancer consistnet with malignant ascites s/p chemotherapy (carboplatin/taxol), Left side mid femoral/peroneal vein DVT on lovenox, T2DM ( A1C of 8% on insulin) presented with AMS and weakness.   He was recently admitted to [facility] in  for abdominal pain/distension attributed to malignant ascites s/p paracentersis.   The family a couple of days ago check her FS and they were ranging in the 300-400s which made the family bring the patient to the ER.     ED: Tm 102,  --> 105, /63, RR 18, 100% on RA  - s/p vanc, zosyn, 1L LR bolus    DM History:   -Noted to have DM starting in 2021   -She is currently on Lantus 18 units at bedtime and metformin 1000 mg BID   -Mostly BG at home were ranging in the 150-190 range until recently  -She denied any hypoglycemia  -No neuropathic symptoms   -She has a poor appetite       PAST MEDICAL & SURGICAL HISTORY:  History of migraine headaches    DM type 2 (diabetes mellitus, type 2)    History of irregular menstrual cycles    Ovarian cancer  Stage 4    History of   2003        FAMILY HISTORY:  FH: type 2 diabetes mellitus (Mother)  mother        Social History: No history of smoking     Outpatient Medications:    MEDICATIONS  (STANDING):  dextrose 50% Injectable 25 Gram(s) IV Push once  heparin  Infusion.  Unit(s)/Hr (11 mL/Hr) IV Continuous <Continuous>  insulin glargine Injectable (LANTUS) 15 Unit(s) SubCutaneous at bedtime  insulin lispro (ADMELOG) corrective regimen sliding scale   SubCutaneous three times a day before meals  insulin lispro (ADMELOG) corrective regimen sliding scale   SubCutaneous at bedtime  insulin lispro Injectable (ADMELOG) 4 Unit(s) SubCutaneous three times a day before meals  nystatin    Suspension 023359 Unit(s) Oral every 6 hours  piperacillin/tazobactam IVPB.. 3.375 Gram(s) IV Intermittent every 8 hours  polyethylene glycol 3350 17 Gram(s) Oral daily  senna 2 Tablet(s) Oral at bedtime  sodium chloride 0.45%. 1000 milliLiter(s) (75 mL/Hr) IV Continuous <Continuous>    MEDICATIONS  (PRN):  acetaminophen   Tablet .. 650 milliGRAM(s) Oral every 6 hours PRN Temp greater or equal to 38C (100.4F), Mild Pain (1 - 3), Moderate Pain (4 - 6)  heparin   Injectable 4500 Unit(s) IV Push every 6 hours PRN For aPTT less than 40  heparin   Injectable 2000 Unit(s) IV Push every 6 hours PRN For aPTT between 40 - 57  magnesium hydroxide Suspension 30 milliLiter(s) Oral daily PRN Constipation  ondansetron    Tablet 4 milliGRAM(s) Oral every 8 hours PRN Nausea and/or Vomiting  oxyCODONE    IR 5 milliGRAM(s) Oral every 4 hours PRN Severe Pain (7 - 10)      Allergies    Sulf-10 (Rash; Short breath)    Intolerances      Review of Systems:  Constitutional: + fever  Eyes: No blurry vision  Neuro: No tremors  HEENT: No pain  Cardiovascular: No chest pain, palpitations  Respiratory: No SOB, no cough  GI: No nausea, vomiting, abdominal pain  : No dysuria  Skin: no rash  Psych: no depression  Endocrine: no polyuria, polydipsia        PHYSICAL EXAM:  VITALS: T(C): 36.5 (21 @ 13:54)  T(F): 97.7 (21 @ 13:54), Max: 102 (21 @ 04:26)  HR: 101 (21 @ 13:54) (94 - 124)  BP: 106/68 (21 @ 13:54) (93/61 - 106/68)  RR:  (17 - 22)  SpO2:  (99% - 100%)    GENERAL: NAD, malnourished  EYES: No proptosis, no lid lag, anicteric  HEENT:  Atraumatic, Normocephalic, moist mucous membranes  RESPIRATORY: Clear to auscultation bilaterally; No rales, rhonchi, wheezing, or rubs  CARDIOVASCULAR: Regular rate and rhythm; No murmurs; no peripheral edema  GI: Soft, nontender, non distended, normal bowel sounds  SKIN: Dry, intact, No rashes or lesions  MUSCULOSKELETAL: Full range of motion, normal strength  NEURO: sensation intact, extraocular movements intact, no tremor, normal reflexes  PSYCH: Alert and oriented x 3, normal affect, normal mood      POCT Blood Glucose.: 279 mg/dL (21 @ 12:16)  POCT Blood Glucose.: 397 mg/dL (07-03-21 @ 06:49)  POCT Blood Glucose.: 373 mg/dL (21 @ 01:43)                            8.7    14.60 )-----------( 170      ( 2021 04:26 )             32.2           151<H>  |  107  |  68<H>  ----------------------------<  227<H>  3.0<L>   |  28  |  1.36<H>    EGFR if : 50<L>  EGFR if non : 43<L>    Ca    8.5          TPro  6.6  /  Alb  2.4<L>  /  TBili  0.5  /  DBili  x   /  AST  45<H>  /  ALT  18  /  AlkPhos  150<H>   Chol 115 Direct LDL -- LDL calculated 59 HDL 19<L> Trig 184<H>

## 2021-07-03 NOTE — H&P ADULT - PROBLEM SELECTOR PLAN 3
SCr 1.2 (baseline 0.5-0.6)  - likely pre-renal due to hemodynamic changes from sepsis  - c/w IVF, hold home ethacrynic acid as below for now   - trend BMP, renally dose medications SCr 1.3 (baseline 0.5-0.6)  - likely pre-renal due to hemodynamic changes from sepsis  - c/w IVF, hold home ethacrynic acid as below for now   - trend BMP, renally dose medications

## 2021-07-03 NOTE — ED ADULT TRIAGE NOTE - CHIEF COMPLAINT QUOTE
Patient bedbound, complaining of weakness, dizziness and nausea.   FS high at home and was given Lantus by family prior to EMS arriving.   in triage.  Arrived with 20g IV left AC and  ml.  License and Insurance card in chart.

## 2021-07-03 NOTE — H&P ADULT - PROBLEM SELECTOR PLAN 1
SIRS positive (febrile, tachycardic, leukocytosis)  - likely 2/2 UTI, UA positive, f/u BCx and UCx, CXR with no focal consolidations  - empiric zosyn pending cultures  - BPs soft SBP mid 90s (baseline 110s-120s), patient mentating, s/p 1L LR bolus, will administer additional bolus now, followed by maintenance fluids, if BPs remain soft will start midodrine 5mg TID  - trend fever curve SIRS positive (febrile, tachycardic, leukocytosis)  - likely 2/2 UTI, UA positive, f/u BCx and UCx, CXR with no focal consolidations  - empiric zosyn pending cultures  - BPs soft SBP mid 90s (baseline 110s-120s), patient mentating, s/p 1L LR bolus, will administer additional bolus now, followed by maintenance IVF, if BPs remain soft, start midodrine 5mg TID  - trend fever curve

## 2021-07-03 NOTE — ED PROVIDER NOTE - PHYSICAL EXAMINATION
GENERAL: cachectic, ill appearing  HEAD: normocephalic, atraumatic  HEENT: normal conjunctiva, oral mucosa moist, uvula midline, no tonsilar exudates, neck supple, no JVD  CARDIAC: regular rate and rhythm, normal S1S2, no appreciable murmurs, 2+ pulses in UE/LE b/l  PULM: normal breath sounds, clear to ascultation bilaterally, no rales, rhonchi, wheezing  GI: abdomen distended; soft, nontender, no guarding, rebound tenderness  NEURO: axo2  MSK: no peripheral edema, no calf tenderness b/l  SKIN: well-perfused, extremities warm, no visible rashes  PSYCH: appropriate mood and affect

## 2021-07-03 NOTE — ED PROVIDER NOTE - CLINICAL SUMMARY MEDICAL DECISION MAKING FREE TEXT BOX
57 yo Female pmhx of DM II, migraines and newly diagnosed stage 4 endometrial cancer, non ambulatory at bedside coming in w/ complaining of weakness, dizziness and nausea. Vitals consistent with sepsis.  Plan: sepsis protocol + treat empirically w/ AB

## 2021-07-03 NOTE — H&P ADULT - PROBLEM SELECTOR PLAN 4
A1C 8%  - hyperglycemic but does not meet DKA crtieria - +BHB, elevated anion gap in setting of sepsis, pH wnl   - home regimen: Lantus 18U at bedtime, metformin 1000mg BID  - hold home metformin while admitted, resume on discharge  - patient took Lantus PTA yesterday, start with 80% of home dose 15U qhs, Admelog 4U TID qac/hs  - SSI/FS qac and hs, repeat BMP and VBG   - endocrine consulted, f/u recommendations A1C 8%  - hyperglycemic but does not meet DKA criteria, elevated anion gap in setting of sepsis/EZE, pH wnl   - home regimen: Lantus 18U at bedtime, metformin 1000mg BID  - hold home metformin while admitted, resume on discharge  - patient took Lantus PTA yesterday, start with 80% of home dose 15U qhs, Admelog 4U TID qac/hs  - SSI/FS qac and hs, repeat BMP and VBG   - endocrine consulted, f/u recommendations A1C 8%  - hyperglycemic, does not meet DKA criteria, elevated anion gap in setting of sepsis/EZE, pH wnl   - home regimen: Lantus 18U at bedtime, metformin 1000mg BID  - hold home metformin while admitted, resume on discharge  - patient took Lantus PTA, start with 80% of home dose 15U qhs, Admelog 4U TID qac/hs  - SSI/FS qac and hs, repeat BMP and VBG   - endocrine consulted, f/u recommendations A1C 8%  - hyperglycemic, does not meet DKA criteria, elevated anion gap in setting of sepsis/EZE, pH wnl   - home regimen: Lantus 18U at bedtime, metformin 1000mg BID  - hold home metformin while admitted, resume on discharge  - appreciate endo recs: Lantus 18U qhs + Admelog 3U TID qc, FS/SSI qac and hs

## 2021-07-03 NOTE — PATIENT PROFILE ADULT - NSTOBACCONEVERSMOKERY/N_GEN_A
History reviewed;   episode of possible GTC this am at [facility] episode occurred out sleep; lasted 2-3 minutes  an episode of gagging, like choking out of sleep last month upon coming home from [place];  normal neuro exam  EEG- left centrotemporal spikes;  Benign epilepsy with centrotemporal spikes  Seizure precautions explained  Diastat 10 mg rectally for seizure > 3 minutes;  benefit and side effects of trileptal explained  Trileptal 300 mg/5 ml- 2.5 ml BID x 1 week; then 2.5 ml in am, 5 ml in pm  Follow-up in 2 weeks with neuro
No

## 2021-07-03 NOTE — H&P ADULT - ASSESSMENT
56F with hx of Stage IV Endometrial Cancer c/b malignant ascites s/p chemotherapy (6/18 carboplatin/taxol), L. mid femoral/peroneal vein DVT on Lovenox, T2DM (A1C 8% on insulin) who presents with altered mental status and weakness a/w sepsis and EZE due to UTI.

## 2021-07-03 NOTE — PATIENT PROFILE ADULT - NS PRO AD PATIENT TYPE
Health Care Proxy (HCP) patients daughter Marline was called to bring in copy of HCP form. Will continue to follow up./Health Care Proxy (HCP)

## 2021-07-03 NOTE — H&P ADULT - NSHPREVIEWOFSYSTEMS_GEN_ALL_CORE
Review of Systems:   CONSTITUTIONAL: No fever, +weakness   EYES: No eye pain or discharge  ENMT:  No sinus or throat pain  NECK: No pain or stiffness  RESPIRATORY: No cough, wheezing, chills or hemoptysis; No shortness of breath  CARDIOVASCULAR: No chest pain, palpitations, dizziness, or leg swelling  GASTROINTESTINAL: No abdominal or epigastric pain. No nausea, vomiting, or hematemesis; No diarrhea or constipation. No melena or hematochezia.  GENITOURINARY: No dysuria or incontinence  MUSCULOSKELETAL: No joint pain or swelling; No muscle, back, or extremity pain  NEUROLOGICAL: No headaches, memory loss, loss of strength, numbness, or tremors  PSYCHIATRIC: No depression, anxiety, mood swings, or difficulty sleeping  SKIN: No rashes, no skin changes

## 2021-07-03 NOTE — CONSULT NOTE ADULT - ASSESSMENT
56 year old female admitted 2/2 sepsis consulted for inpatient DM management. FS had been running high few days prior to admission. HgA1C of 8.2%

## 2021-07-03 NOTE — PATIENT PROFILE ADULT - HOW PATIENT ADDRESSED, PROFILE
Pts wife called last night to report that he had some bleeding from his incision on Saturday.  He states that it was just a little bit of blood and since then he has had a little bit of pink on his bandaids each day.  At the lateral end of the incision he has a 0.5cm scab and it appears that the blood is just from the scab.  No hematoma is present any longer, he finished his antibiotics and the rash he had post op has disappeared.  His Temp is 98.7F and he denies fevers or chills.  He is coming back to clinic on 11/30/17 for a quick wound check.       Wound check only   Emilee

## 2021-07-03 NOTE — ED ADULT NURSE REASSESSMENT NOTE - NS ED NURSE REASSESS COMMENT FT1
Pt admitted, Report to [id number] RN at this time. Pt transported at this time with [facility].

## 2021-07-03 NOTE — CONSULT NOTE ADULT - ASSESSMENT
55yo Female pmhx of DM II, migraines, and stage 4 endometrial cancer, non ambulatory at bedside coming in w/ complaining of weakness, dizziness and nausea. FS at home were 350s causing family to provide lantus and call EMS. Pt was recently admitted to [facility] for metastatic uterine carcinosarcoma c/b ascites and received carbo+paclitaxel on 6/18/21. However, her general condition quickly decompensated over the last few weeks and upon outpatient discussion with Dr. Hoang, she was deemed not to get further DMT and hospice was appropriate. Urgent hospice referral was made by Dr. Hoang's team. Now presenting to ED with fever and sepsis likely 2/2 UTI. Admitted to medicine for further management.    # Metastatic endometrial cancer  - Recently diagnosed metastatic uterine carcinosarcoma, c/b ascites, last chemotherapy with carbo+paclitaxel on 6/18, then her condition quickly decompensated over the last few weeks.  - Pt is not a candidate for DMT at this point, rec consulting palliative care and check hospice care referral status.  - Will require symptom oriented care at this point with pain management and stool regimen, consider pleurex catheter insertion to the abdomen if she requires frequent paracentesis.  - Treatment for urosepsis per primary team.  - Pt follows Dr. Hoang at [facility].      Denise Ron MD  PGY 6, Oncology/Hematology fellow  (P) [phone]  Covering Saturday only.   55yo Female pmhx of DM II, migraines, and stage 4 endometrial cancer, non ambulatory at bedside coming in w/ complaining of weakness, dizziness and nausea. FS at home were 350s causing family to provide lantus and call EMS. Pt was recently admitted to [facility] for metastatic uterine carcinosarcoma c/b ascites and received carbo+paclitaxel on 6/18/21. However, her general condition quickly decompensated over the last few weeks and upon outpatient discussion with Dr. Hoang, she was deemed not to get further DMT and hospice was appropriate. Urgent hospice referral was made by Dr. Hoang's team. Now presenting to ED with fever and sepsis likely 2/2 UTI. Admitted to medicine for further management.    # Metastatic endometrial cancer  - Recently diagnosed metastatic uterine carcinosarcoma, c/b ascites, last chemotherapy with carbo+paclitaxel on 6/18, then her condition quickly decompensated over the last few weeks.  - Pt is not a candidate for DMT at this point, rec consulting palliative care and check hospice care referral status. Per sister at the bedside, plan to meet hospice care network team on Tuesday at home.  - Will require symptom oriented care at this point with pain management and stool regimen, consider pleurex catheter insertion to the abdomen if she requires frequent paracentesis.  - Treatment for urosepsis per primary team.  - Pt follows Dr. Hoang at [facility].      Denise Ron MD  PGY 6, Oncology/Hematology fellow  (P) [phone]  Covering Saturday only.

## 2021-07-03 NOTE — H&P ADULT - NSICDXPASTMEDICALHX_GEN_ALL_CORE_FT
PAST MEDICAL HISTORY:  DM type 2 (diabetes mellitus, type 2)     History of irregular menstrual cycles     History of migraine headaches     Ovarian cancer Stage 4

## 2021-07-03 NOTE — ED ADULT NURSE NOTE - NSIMPLEMENTINTERV_GEN_ALL_ED
Implemented All Fall Risk Interventions:  [place] to call system. Call bell, personal items and telephone within reach. Instruct patient to call for assistance. Room bathroom lighting operational. Non-slip footwear when patient is off stretcher. Physically safe environment: no spills, clutter or unnecessary equipment. Stretcher in lowest position, wheels locked, appropriate side rails in place. Provide visual cue, wrist band, yellow gown, etc. Monitor gait and stability. Monitor for mental status changes and reorient to person, place, and time. Review medications for side effects contributing to fall risk. Reinforce activity limits and safety measures with patient and family.

## 2021-07-03 NOTE — CONSULT NOTE ADULT - SUBJECTIVE AND OBJECTIVE BOX
Oncology Consult Note    HPI: 57yo Female pmhx of DM II, migraines, and stage 4 endometrial cancer, non ambulatory at bedside coming in w/ complaining of weakness, dizziness and nausea. FS at home were 350s causing family to provide lantus and call EMS. Pt was recently admitted to [facility] for metastatic uterine carcinosarcoma c/b ascites and received carbo+paclitaxel on 21. However, her general condition quickly decompensated over the last few weeks and upon outpatient discussion with Dr. Hoang, she was deemed not to get further DMT and hospice was appropriate. Urgent hospice referral was made by Dr. Hoang's team. Now presenting to ED with fever and sepsis likely 2/2 UTI. Admitted to medicine.      Allergies    Sulf-10 (Rash; Short breath)    Intolerances        MEDICATIONS  (STANDING):    MEDICATIONS  (PRN):      PAST MEDICAL & SURGICAL HISTORY:  History of migraine headaches    DM type 2 (diabetes mellitus, type 2)    History of irregular menstrual cycles    Ovarian cancer  Stage 4    History of   ,         FAMILY HISTORY:  FH: type 2 diabetes mellitus (Mother)  mother        SOCIAL HISTORY: No EtOH, no tobacco    REVIEW OF SYSTEMS: limited    CONSTITUTIONAL: No weakness, fevers or chills  EYES/ENT: No visual changes;  No vertigo or throat pain   NECK: No pain or stiffness  RESPIRATORY: No cough, wheezing, hemoptysis; No shortness of breath  CARDIOVASCULAR: No chest pain or palpitations  GASTROINTESTINAL: No abdominal or epigastric pain. No nausea, vomiting, or hematemesis; No diarrhea or constipation. No melena or hematochezia.  GENITOURINARY: No dysuria, frequency or hematuria  NEUROLOGICAL: No numbness or weakness  SKIN: No itching, burning, rashes, or lesions   All other review of systems is negative unless indicated above.    Height (cm): 172.7 ( @ 01:32)    T(F): 97.6 (21 @ 07:29), Max: 102 (21 @ 04:26)  HR: 97 (21 @ 07:29)  BP: 99/62 (21 @ 07:29)  RR: 18 (21 @ 07:29)  SpO2: 100% (21 @ 07:29)  Wt(kg): --    Physical Exam  GENERAL: NAD, well-developed, ill looking  HEAD:  Atraumatic, Normocephalic  EYES: EOMI, PERRLA, conjunctiva and sclera clear  NECK: Supple, No JVD  CHEST/LUNG: Clear to auscultation bilaterally; No wheeze  HEART: Regular rate and rhythm; No murmurs, S1S2 present  ABDOMEN: distended  EXTREMITIES:  2+ Peripheral Pulses, No clubbing, cyanosis, or edema  NEUROLOGY: non-focal  SKIN: No rashes or lesions                          8.7    14.60 )-----------( 170      ( 2021 04:26 )             32.2       07    149<H>  |  106  |  73<H>  ----------------------------<  373<H>  TNP   |  28  |  1.23    Ca    8.5      2021 04:26    TPro  6.6  /  Alb  2.4<L>  /  TBili  0.5  /  DBili  x   /  AST  45<H>  /  ALT  18  /  AlkPhos  150<H>  -03      < from: CT Angio Chest PE Protocol w/ IV Cont (21 @ 04:48) >    IMPRESSION:  No pulmonary embolism.  No evidence of pneumonia or pulmonary edema.  Progression of metastatic cancer since 2021.    < end of copied text >    < from: CT Abdomen and Pelvis w/ IV Cont (21 @ 19:54) >    BLADDER: Loss of the fat plane between the bladder and uterus, concerning for bladder invasion.(3:63). Small nodule along the dome of the bladder measures 2.1 cm, concerning for bladder metastasis.  REPRODUCTIVE ORGANS: Enlarged heterogeneous uterus with distended endometrial cavity and underlying vague endometrial mass. The endometrial cavityis dilated to approximate 4 cm. Bilateral adnexal masses, consistent with metastases.    BOWEL: Dilated and the fecalized ileum with transition point in the right lower quadrant (series 2, image 112) new from prior study. Appendix is not visualized Stool and air are present in the colon.  PERITONEUM: Extensive peritoneal disease with soft tissue implants along the anterior peritoneum, base of the cecum, anterior to the spleen, and along the left paracolic gutter. Implants in the cul de sac extendto the rectum. The right adenxal mass abuts the small bowel. Large volume ascites.  VESSELS: Within normal limits.  RETROPERITONEUM/LYMPH NODES: Left obturator lymph node measures 1.5 x 2.5 cm (series 2, image 122) unchanged from prior. Right obturator lymph node measures 1.5 x 1.6 cm (series 2, image 131) unchanged from prior.  ABDOMINAL WALL: Within normal limits.  BONES: Innumerable foci of sclerotic osseous lesions.    IMPRESSION:  Findings in keeping with known endometrial cancer with extensive peritoneal disease, as describe.    Dilated and fecalized ileum with transition point in the right lower quadrant new from prior study, concerning for partial small bowel obstruction.    Findings concerning for bladder wall invasion and metastasis.    Redemonstration of diffuse metastatic osseous lesions. Grossly unchanged retroperitoneal lymphadenopathy.    < end of copied text >         Oncology Consult Note    HPI: 57yo Female pmhx of DM II, migraines, and stage 4 endometrial cancer, non ambulatory at bedside coming in w/ complaining of weakness, dizziness and nausea. FS at home were 350s causing family to provide lantus and call EMS. Pt was recently admitted to [facility] for metastatic uterine carcinosarcoma c/b ascites and received carbo+paclitaxel on 21. However, her general condition quickly decompensated over the last few weeks and upon outpatient discussion with Dr. Hoang, she was deemed not to get further DMT and hospice was appropriate. Urgent hospice referral was made by Dr. Hoang's team. Now presenting to ED with fever and sepsis likely 2/2 UTI. Admitted to medicine.      Allergies    Sulf-10 (Rash; Short breath)    Intolerances        MEDICATIONS  (STANDING):    MEDICATIONS  (PRN):      PAST MEDICAL & SURGICAL HISTORY:  History of migraine headaches    DM type 2 (diabetes mellitus, type 2)    History of irregular menstrual cycles    Ovarian cancer  Stage 4    History of   ,         FAMILY HISTORY:  FH: type 2 diabetes mellitus (Mother)  mother        SOCIAL HISTORY: No EtOH, no tobacco    REVIEW OF SYSTEMS: limited, patient is confused, denies having pain.    Height (cm): 172.7 ( @ 01:32)    T(F): 97.6 (21 @ 07:29), Max: 102 (21 @ 04:26)  HR: 97 (21 @ 07:29)  BP: 99/62 (21 @ 07:29)  RR: 18 (21 @ 07:29)  SpO2: 100% (21 @ 07:29)  Wt(kg): --    Physical Exam  GENERAL: NAD, chronic ill looking with temporal wasting  HEAD:  Atraumatic, Normocephalic  EYES: EOMI, conjunctiva and sclera clear  NECK: Supple, No JVD  CHEST/LUNG: Decreased lung sound bilaterally at base  HEART: Regular rate and rhythm; S1S2 present  ABDOMEN: non tender  EXTREMITIES:  2+ Peripheral Pulses, No clubbing, cyanosis, or edema  NEUROLOGY: non-focal, AAOx3 but pt is confused.  SKIN: No rashes or lesions                          8.7    14.60 )-----------( 170      ( 2021 04:26 )             32.2           149<H>  |  106  |  73<H>  ----------------------------<  373<H>  TNP   |  28  |  1.23    Ca    8.5      2021 04:26    TPro  6.6  /  Alb  2.4<L>  /  TBili  0.5  /  DBili  x   /  AST  45<H>  /  ALT  18  /  AlkPhos  150<H>        < from: CT Angio Chest PE Protocol w/ IV Cont (21 @ 04:48) >    IMPRESSION:  No pulmonary embolism.  No evidence of pneumonia or pulmonary edema.  Progression of metastatic cancer since 2021.    < end of copied text >    < from: CT Abdomen and Pelvis w/ IV Cont (21 @ 19:54) >    BLADDER: Loss of the fat plane between the bladder and uterus, concerning for bladder invasion.(3:63). Small nodule along the dome of the bladder measures 2.1 cm, concerning for bladder metastasis.  REPRODUCTIVE ORGANS: Enlarged heterogeneous uterus with distended endometrial cavity and underlying vague endometrial mass. The endometrial cavityis dilated to approximate 4 cm. Bilateral adnexal masses, consistent with metastases.    BOWEL: Dilated and the fecalized ileum with transition point in the right lower quadrant (series 2, image 112) new from prior study. Appendix is not visualized Stool and air are present in the colon.  PERITONEUM: Extensive peritoneal disease with soft tissue implants along the anterior peritoneum, base of the cecum, anterior to the spleen, and along the left paracolic gutter. Implants in the cul de sac extendto the rectum. The right adenxal mass abuts the small bowel. Large volume ascites.  VESSELS: Within normal limits.  RETROPERITONEUM/LYMPH NODES: Left obturator lymph node measures 1.5 x 2.5 cm (series 2, image 122) unchanged from prior. Right obturator lymph node measures 1.5 x 1.6 cm (series 2, image 131) unchanged from prior.  ABDOMINAL WALL: Within normal limits.  BONES: Innumerable foci of sclerotic osseous lesions.    IMPRESSION:  Findings in keeping with known endometrial cancer with extensive peritoneal disease, as describe.    Dilated and fecalized ileum with transition point in the right lower quadrant new from prior study, concerning for partial small bowel obstruction.    Findings concerning for bladder wall invasion and metastasis.    Redemonstration of diffuse metastatic osseous lesions. Grossly unchanged retroperitoneal lymphadenopathy.    < end of copied text >

## 2021-07-03 NOTE — CHART NOTE - NSCHARTNOTEFT_GEN_A_CORE
Paged by primary team with question on continuation of home morphine 15mg ER PO BID and oxy PRN in setting of EZE and likely dehydration. sCr 1.23. Patient received 1g IV tylenol, 1L LR bolus, and is on maintenance fluids. Currently pain-controlled. Anticipate improvement of renal function. Advised to continue home pain med regimen at home dose and monitor renal function. If worsening renal function, would switch patient's long active morphine to long acting oxycodone. Discussed with attending Dr. Peterson. Please call back with further questions.    Feliberto Ragsdale, PGY4  Palliative Care Fellow

## 2021-07-03 NOTE — H&P ADULT - HISTORY OF PRESENT ILLNESS
56F with hx of Stage IV Endometrial Cancer c/b malignant ascites s/p chemotherapy (6/18 carboplatin/taxol), L. mid femoral/peroneal vein DVT on Lovenox, T2DM (A1C 8% on insulin) who presents with altered mental status and weakness. Patient was recently admitted to [facility] in June for abdominal pain/distension, attributed to malignant ascites, s/p diagnostic and therapeutic paracentesis. Also received chemotherapy during that admission. Since discharge she was in her usual state of health, until yesterday when family noted her to be more confused and weak than baseline. They checked her FS which were in the 300-400s range, despite taking insulin, prompting family to bring her to the ED. Patient denies fevers, chills, cp, sob, abdominal pain, n/v/d, dysuria, change in urinary frequency, sick contacts, recent travel.     ED:  56F with hx of Stage IV Endometrial Cancer c/b malignant ascites s/p chemotherapy (6/18 carboplatin/taxol), L. mid femoral/peroneal vein DVT on Lovenox, T2DM (A1C 8% on insulin) who presents with altered mental status and weakness. Patient was recently admitted to [facility] in June for abdominal pain/distension, attributed to malignant ascites, s/p diagnostic and therapeutic paracentesis. Also received chemotherapy during that admission. Since discharge she was in her usual state of health, until yesterday when family noted her to be more confused and weak than baseline. They checked her FS which were in the 300-400s range, despite taking insulin, prompting family to bring her to the ED. Patient denies fevers, chills, cp, sob, abdominal pain, n/v/d, dysuria, change in urinary frequency, sick contacts, recent travel.     ED: Tm 102,  --> 105, /63, RR 18, 100% on RA  - s/p vanc, zosyn, 1L LR bolus

## 2021-07-03 NOTE — H&P ADULT - PROBLEM SELECTOR PLAN 7
Hx of L. mid femoral/peroneal vein DVT  - at home on Lovenox 70mg BID  - given EZE transition will do heparin for now, transition back to Lovenox once renal function improves Hx of L. mid femoral/peroneal vein DVT  - at home on Lovenox 70mg BID  - given EZE - heparin for now, transition back to Lovenox once renal function improves

## 2021-07-03 NOTE — ED PROVIDER NOTE - ATTENDING CONTRIBUTION TO CARE
57 y/o F with h/o DM, recently dx stage IV endometrial ca BIB Family for generalized malaise.  Pt noted by family to be weaker and more tired.  FS today was elevated.  No reported fever, pain out of the ordinary, vomiting or diarrhea.  Cachectic and ill appearing, lying in stretcher, awake and alert, nontoxic.  Febrile, tachycardic, VSS.  NCAT with temporal wasting, dry mucosa.  Lungs cta bl.  Cards nl S1/S2, tachy regular, no MRG.  Abd soft ntnd.  No pedal edema or calf tenderness.  Plan for labs, incl cx, chest xr, ua, will start broad spectrum abx, ivf hydration, admit - suspect underlying sepsis eval for viral vs bacterial (pna vs uti) course, r/o dka and other metabolic disturbances.

## 2021-07-03 NOTE — H&P ADULT - PROBLEM SELECTOR PLAN 8
Hb 8.7 (baseline ~8)  - iron studies checked on prior admission c/w ACD in setting of malignancy   - trend CBC, transfuse PRN

## 2021-07-03 NOTE — PATIENT PROFILE ADULT - PRO INTERPRETER NEED 2
Geovanna Archer, RD LD  Pager [phone]          English

## 2021-07-03 NOTE — H&P ADULT - NSHPPHYSICALEXAM_GEN_ALL_CORE
Vital Signs Last 24 Hrs  T(C): 36.7 (03 Jul 2021 09:52), Max: 38.9 (03 Jul 2021 04:26)  T(F): 98 (03 Jul 2021 09:52), Max: 102 (03 Jul 2021 04:26)  HR: 94 (03 Jul 2021 09:52) (94 - 124)  BP: 93/61 (03 Jul 2021 09:52) (93/61 - 105/71)  RR: 18 (03 Jul 2021 09:52) (17 - 22)  SpO2: 99% (03 Jul 2021 09:52) (99% - 100%)    CONSTITUTIONAL: resting in bed, appears fatigued   EYES: no conjunctival or scleral injection, non-icteric, PERRLA  ENMT: no external nasal lesions, oral mucosa with moist membranes  NECK: trachea midline, no palpable neck mass   RESPIRATORY: breathing comfortably, lungs CTA without wheeze/rales/rhonchi  CARDDIOVASCULAR: regular rate and rhythm; +S1S2, no murmurs, rubs, or gallops, no lower extremity edema, 2+ peripheral pulses  GASTROINTESTINAL: soft, nontender, nondistended; +BS throughout, no rebound/guarding  MUSCULOSKELETAL: no joint effusions, normal strength and tone of extremities   NEUROLOGIC: non-focal, sensation intact to light touch in b/l upper and lower extremities   PSYCHIATRIC: AAOx3, appropriate mood and affect  SKIN: no rashes or lesions, warm

## 2021-07-03 NOTE — ED PROVIDER NOTE - OBJECTIVE STATEMENT
55yo Female pmhx of DM II, migraines and newly diagnosed stage 4 endometrial cancer, non ambulatory at bedside coming in w/ complaining of weakness, dizziness and nausea. FS at home were 350s causing family to provide lantus and call EMS. Patient AxO2 on exam but unclear baseline.

## 2021-07-03 NOTE — H&P ADULT - PROBLEM SELECTOR PLAN 5
Na 149  - hypovolemic on exam, likely due to poor PO intake   - c/w IVF, monitor BMP, goal correction 6-8 meq/24hours  - hold home ethacrynic acid for now (started last admission by nephro for hypo-osmolar hypervolemic hyponatremia) Na 149  - hypovolemic on exam, likely due to poor PO intake   - c/w 1/2NS, BMP [id number], goal correction 6-8 meq/24hours  - hold home ethacrynic acid for now (started last admission by nephro for hypo-osmolar hypervolemic hyponatremia) Na 151  - hypovolemic on exam, likely due to poor PO intake   - c/w 1/2NS, BMP [id number], goal correction 6-8 meq/24hours  - hold home ethacrynic acid for now (started last admission by nephro for hypo-osmolar hypervolemic hyponatremia)

## 2021-07-03 NOTE — ED ADULT NURSE NOTE - OBJECTIVE STATEMENT
Pt received in rm #15, 56Y F aox2 minimally verbal, able to follow commands. PMHX Ovarian Ca,  T2DM. Pt bed bound, noted x2 sacral stage II 1.5cm x2cm and 1cmx.5cm. Arr Pt received in rm #15, 56Y F aox2 to self, and place. Pt minimally verbal, able to follow commands, bed bound at home. PMHX Ovarian Ca,  T2DM. Pt  arrives with sister a bedside. Sister reports pt appeared more lethargic at home not at baseline and FS was noted to be elevated. Pt arrived  and rectally 102.3F. Pt with IV 20 G Lft AC by EMS. IV placed 20 G Rt AC, labs sent, medicated as ordered, noted x2 sacral stage II 1.5cm x2cm and 1cmx.5cm. Pt respirations even and unlabored, appears in NAD, daughter at bedside, will continue to monitor.

## 2021-07-03 NOTE — H&P ADULT - PROBLEM SELECTOR PLAN 6
Stage IV Endometrial Cancer c/b malignant ascites s/p chemotherapy (6/18 carboplatin/taxol)  - last admission required diagnostic/therapeutic paracentesis, abdomen mildly distended, will get sonogram, if recurrent para may need to consider pleurx  - oncology following, patient not a candidate for DMT at this time, family still interested in pursuing therapy, palliative care consult to assist with further GOC  - pain control: at home on morphine ER 15mg BID, oxycodone 5mg [id number] PRN severe pain, discussed with palliative, anticipate improvement in renal function with fluids can continue morphine, however BPs now soft in high 90s (baseline 110s-120s) - will hold morphine ER for now, resume when BP stabilizes to prevent withdrawal

## 2021-07-04 LAB
ANION GAP SERPL CALC-SCNC: 13 MMOL/L — SIGNIFICANT CHANGE UP (ref 7–14)
ANION GAP SERPL CALC-SCNC: 16 MMOL/L — HIGH (ref 7–14)
APTT BLD: 36.8 SEC — HIGH (ref 27–36.3)
APTT BLD: 43.2 SEC — HIGH (ref 27–36.3)
BASE EXCESS BLDV CALC-SCNC: 7.5 MMOL/L — HIGH (ref -3–2)
BASOPHILS # BLD AUTO: 0.01 K/UL — SIGNIFICANT CHANGE UP (ref 0–0.2)
BASOPHILS NFR BLD AUTO: 0.1 % — SIGNIFICANT CHANGE UP (ref 0–2)
BLD GP AB SCN SERPL QL: NEGATIVE — SIGNIFICANT CHANGE UP
BLOOD GAS VENOUS - CREATININE: SIGNIFICANT CHANGE UP MG/DL (ref 0.5–1.3)
BLOOD GAS VENOUS COMPREHENSIVE RESULT: SIGNIFICANT CHANGE UP
BUN SERPL-MCNC: 50 MG/DL — HIGH (ref 7–23)
BUN SERPL-MCNC: 54 MG/DL — HIGH (ref 7–23)
CALCIUM SERPL-MCNC: 8.3 MG/DL — LOW (ref 8.4–10.5)
CALCIUM SERPL-MCNC: 8.5 MG/DL — SIGNIFICANT CHANGE UP (ref 8.4–10.5)
CHLORIDE BLDV-SCNC: 111 MMOL/L — HIGH (ref 96–108)
CHLORIDE SERPL-SCNC: 104 MMOL/L — SIGNIFICANT CHANGE UP (ref 98–107)
CHLORIDE SERPL-SCNC: 105 MMOL/L — SIGNIFICANT CHANGE UP (ref 98–107)
CHLORIDE UR-SCNC: <20 MMOL/L — SIGNIFICANT CHANGE UP
CO2 SERPL-SCNC: 26 MMOL/L — SIGNIFICANT CHANGE UP (ref 22–31)
CO2 SERPL-SCNC: 28 MMOL/L — SIGNIFICANT CHANGE UP (ref 22–31)
COVID-19 SPIKE DOMAIN AB INTERP: POSITIVE
COVID-19 SPIKE DOMAIN ANTIBODY RESULT: >250 U/ML — HIGH
CREAT SERPL-MCNC: 1.05 MG/DL — SIGNIFICANT CHANGE UP (ref 0.5–1.3)
CREAT SERPL-MCNC: 1.1 MG/DL — SIGNIFICANT CHANGE UP (ref 0.5–1.3)
CULTURE RESULTS: NO GROWTH — SIGNIFICANT CHANGE UP
EOSINOPHIL # BLD AUTO: 0.01 K/UL — SIGNIFICANT CHANGE UP (ref 0–0.5)
EOSINOPHIL NFR BLD AUTO: 0.1 % — SIGNIFICANT CHANGE UP (ref 0–6)
GAS PNL BLDV: 149 MMOL/L — HIGH (ref 136–146)
GLUCOSE BLDC GLUCOMTR-MCNC: 100 MG/DL — HIGH (ref 70–99)
GLUCOSE BLDC GLUCOMTR-MCNC: 131 MG/DL — HIGH (ref 70–99)
GLUCOSE BLDC GLUCOMTR-MCNC: 172 MG/DL — HIGH (ref 70–99)
GLUCOSE BLDC GLUCOMTR-MCNC: 244 MG/DL — HIGH (ref 70–99)
GLUCOSE BLDV-MCNC: 238 MG/DL — HIGH (ref 70–99)
GLUCOSE SERPL-MCNC: 166 MG/DL — HIGH (ref 70–99)
GLUCOSE SERPL-MCNC: 239 MG/DL — HIGH (ref 70–99)
HCO3 BLDV-SCNC: 31 MMOL/L — HIGH (ref 20–27)
HCT VFR BLD CALC: 25.4 % — LOW (ref 34.5–45)
HCT VFR BLD CALC: 28.7 % — LOW (ref 34.5–45)
HCT VFR BLDA CALC: 23.9 % — LOW (ref 34.5–46.5)
HGB BLD CALC-MCNC: 7.7 G/DL — LOW (ref 11.5–15.5)
HGB BLD-MCNC: 6.9 G/DL — CRITICAL LOW (ref 11.5–15.5)
HGB BLD-MCNC: 7.7 G/DL — LOW (ref 11.5–15.5)
IANC: 10.21 K/UL — HIGH (ref 1.5–8.5)
IMM GRANULOCYTES NFR BLD AUTO: 0.6 % — SIGNIFICANT CHANGE UP (ref 0–1.5)
LACTATE BLDV-MCNC: 2.4 MMOL/L — HIGH (ref 0.5–2)
LYMPHOCYTES # BLD AUTO: 1.14 K/UL — SIGNIFICANT CHANGE UP (ref 1–3.3)
LYMPHOCYTES # BLD AUTO: 9.3 % — LOW (ref 13–44)
MCHC RBC-ENTMCNC: 20.1 PG — LOW (ref 27–34)
MCHC RBC-ENTMCNC: 20.3 PG — LOW (ref 27–34)
MCHC RBC-ENTMCNC: 26.8 GM/DL — LOW (ref 32–36)
MCHC RBC-ENTMCNC: 27.2 GM/DL — LOW (ref 32–36)
MCV RBC AUTO: 74.7 FL — LOW (ref 80–100)
MCV RBC AUTO: 74.9 FL — LOW (ref 80–100)
MONOCYTES # BLD AUTO: 0.8 K/UL — SIGNIFICANT CHANGE UP (ref 0–0.9)
MONOCYTES NFR BLD AUTO: 6.5 % — SIGNIFICANT CHANGE UP (ref 2–14)
NEUTROPHILS # BLD AUTO: 10.21 K/UL — HIGH (ref 1.8–7.4)
NEUTROPHILS NFR BLD AUTO: 83.4 % — HIGH (ref 43–77)
NRBC # BLD: 0 /100 WBCS — SIGNIFICANT CHANGE UP
NRBC # BLD: 0 /100 WBCS — SIGNIFICANT CHANGE UP
NRBC # FLD: 0 K/UL — SIGNIFICANT CHANGE UP
NRBC # FLD: 0.02 K/UL — HIGH
OSMOLALITY SERPL: 334 MOSM/KG — HIGH (ref 275–295)
OSMOLALITY UR: 467 MOSM/KG — SIGNIFICANT CHANGE UP (ref 50–1200)
PCO2 BLDV: 48 MMHG — SIGNIFICANT CHANGE UP (ref 41–51)
PH BLDV: 7.43 — SIGNIFICANT CHANGE UP (ref 7.32–7.43)
PLATELET # BLD AUTO: 150 K/UL — SIGNIFICANT CHANGE UP (ref 150–400)
PLATELET # BLD AUTO: 158 K/UL — SIGNIFICANT CHANGE UP (ref 150–400)
PO2 BLDV: 61 MMHG — HIGH (ref 35–40)
POTASSIUM BLDV-SCNC: 3.2 MMOL/L — LOW (ref 3.4–4.5)
POTASSIUM SERPL-MCNC: 3 MMOL/L — LOW (ref 3.5–5.3)
POTASSIUM SERPL-MCNC: 3.1 MMOL/L — LOW (ref 3.5–5.3)
POTASSIUM SERPL-SCNC: 3 MMOL/L — LOW (ref 3.5–5.3)
POTASSIUM SERPL-SCNC: 3.1 MMOL/L — LOW (ref 3.5–5.3)
POTASSIUM UR-SCNC: 31.2 MMOL/L — SIGNIFICANT CHANGE UP
RBC # BLD: 3.4 M/UL — LOW (ref 3.8–5.2)
RBC # BLD: 3.83 M/UL — SIGNIFICANT CHANGE UP (ref 3.8–5.2)
RBC # FLD: 21.2 % — HIGH (ref 10.3–14.5)
RBC # FLD: 21.2 % — HIGH (ref 10.3–14.5)
RH IG SCN BLD-IMP: POSITIVE — SIGNIFICANT CHANGE UP
SAO2 % BLDV: 90 % — HIGH (ref 60–85)
SARS-COV-2 IGG+IGM SERPL QL IA: >250 U/ML — HIGH
SARS-COV-2 IGG+IGM SERPL QL IA: POSITIVE
SODIUM SERPL-SCNC: 145 MMOL/L — SIGNIFICANT CHANGE UP (ref 135–145)
SODIUM SERPL-SCNC: 147 MMOL/L — HIGH (ref 135–145)
SODIUM UR-SCNC: <20 MMOL/L — SIGNIFICANT CHANGE UP
SPECIMEN SOURCE: SIGNIFICANT CHANGE UP
WBC # BLD: 12.18 K/UL — HIGH (ref 3.8–10.5)
WBC # BLD: 12.24 K/UL — HIGH (ref 3.8–10.5)
WBC # FLD AUTO: 12.18 K/UL — HIGH (ref 3.8–10.5)
WBC # FLD AUTO: 12.24 K/UL — HIGH (ref 3.8–10.5)

## 2021-07-04 PROCEDURE — 99233 SBSQ HOSP IP/OBS HIGH 50: CPT

## 2021-07-04 PROCEDURE — 76705 ECHO EXAM OF ABDOMEN: CPT | Mod: 26

## 2021-07-04 RX ORDER — INSULIN LISPRO 100/ML
4 VIAL (ML) SUBCUTANEOUS
Refills: 0 | Status: DISCONTINUED | OUTPATIENT
Start: 2021-07-04 | End: 2021-07-06

## 2021-07-04 RX ORDER — ENOXAPARIN SODIUM 100 MG/ML
55 INJECTION SUBCUTANEOUS EVERY 12 HOURS
Refills: 0 | Status: DISCONTINUED | OUTPATIENT
Start: 2021-07-04 | End: 2021-07-07

## 2021-07-04 RX ORDER — POTASSIUM CHLORIDE 20 MEQ
10 PACKET (EA) ORAL
Refills: 0 | Status: COMPLETED | OUTPATIENT
Start: 2021-07-04 | End: 2021-07-04

## 2021-07-04 RX ORDER — POTASSIUM CHLORIDE 20 MEQ
40 PACKET (EA) ORAL ONCE
Refills: 0 | Status: COMPLETED | OUTPATIENT
Start: 2021-07-04 | End: 2021-07-04

## 2021-07-04 RX ORDER — POTASSIUM CHLORIDE 20 MEQ
40 PACKET (EA) ORAL EVERY 4 HOURS
Refills: 0 | Status: COMPLETED | OUTPATIENT
Start: 2021-07-04 | End: 2021-07-04

## 2021-07-04 RX ORDER — SODIUM CHLORIDE 9 MG/ML
1000 INJECTION, SOLUTION INTRAVENOUS
Refills: 0 | Status: DISCONTINUED | OUTPATIENT
Start: 2021-07-04 | End: 2021-07-05

## 2021-07-04 RX ORDER — INSULIN GLARGINE 100 [IU]/ML
20 INJECTION, SOLUTION SUBCUTANEOUS AT BEDTIME
Refills: 0 | Status: DISCONTINUED | OUTPATIENT
Start: 2021-07-04 | End: 2021-07-06

## 2021-07-04 RX ADMIN — Medication 500000 UNIT(S): at 17:50

## 2021-07-04 RX ADMIN — HEPARIN SODIUM 2000 UNIT(S): 5000 INJECTION INTRAVENOUS; SUBCUTANEOUS at 04:34

## 2021-07-04 RX ADMIN — HEPARIN SODIUM 1300 UNIT(S)/HR: 5000 INJECTION INTRAVENOUS; SUBCUTANEOUS at 04:19

## 2021-07-04 RX ADMIN — INSULIN GLARGINE 20 UNIT(S): 100 INJECTION, SOLUTION SUBCUTANEOUS at 23:10

## 2021-07-04 RX ADMIN — ENOXAPARIN SODIUM 55 MILLIGRAM(S): 100 INJECTION SUBCUTANEOUS at 18:36

## 2021-07-04 RX ADMIN — Medication 500000 UNIT(S): at 12:40

## 2021-07-04 RX ADMIN — Medication 40 MILLIEQUIVALENT(S): at 05:54

## 2021-07-04 RX ADMIN — Medication 40 MILLIEQUIVALENT(S): at 17:50

## 2021-07-04 RX ADMIN — Medication 3 UNIT(S): at 08:30

## 2021-07-04 RX ADMIN — Medication 100 MILLIEQUIVALENT(S): at 07:44

## 2021-07-04 RX ADMIN — HEPARIN SODIUM 4500 UNIT(S): 5000 INJECTION INTRAVENOUS; SUBCUTANEOUS at 12:40

## 2021-07-04 RX ADMIN — POLYETHYLENE GLYCOL 3350 17 GRAM(S): 17 POWDER, FOR SOLUTION ORAL at 12:41

## 2021-07-04 RX ADMIN — SODIUM CHLORIDE 75 MILLILITER(S): 9 INJECTION, SOLUTION INTRAVENOUS at 01:03

## 2021-07-04 RX ADMIN — HEPARIN SODIUM 1500 UNIT(S)/HR: 5000 INJECTION INTRAVENOUS; SUBCUTANEOUS at 12:39

## 2021-07-04 RX ADMIN — SENNA PLUS 2 TABLET(S): 8.6 TABLET ORAL at 23:10

## 2021-07-04 RX ADMIN — Medication 40 MILLIEQUIVALENT(S): at 16:03

## 2021-07-04 RX ADMIN — Medication 2: at 08:30

## 2021-07-04 RX ADMIN — Medication 100 MILLIEQUIVALENT(S): at 06:43

## 2021-07-04 RX ADMIN — Medication 500000 UNIT(S): at 05:54

## 2021-07-04 RX ADMIN — PIPERACILLIN AND TAZOBACTAM 25 GRAM(S): 4; .5 INJECTION, POWDER, LYOPHILIZED, FOR SOLUTION INTRAVENOUS at 16:03

## 2021-07-04 RX ADMIN — Medication 100 MILLIEQUIVALENT(S): at 05:34

## 2021-07-04 RX ADMIN — PIPERACILLIN AND TAZOBACTAM 25 GRAM(S): 4; .5 INJECTION, POWDER, LYOPHILIZED, FOR SOLUTION INTRAVENOUS at 04:12

## 2021-07-04 NOTE — PROGRESS NOTE ADULT - PROBLEM SELECTOR PLAN 5
Na 145  - hypovolemic on exam, likely due to poor PO intake   - c/w 1/2NS, BMP [id number], goal correction 6-8 meq/24hours  - hold home ethacrynic acid for now (started last admission by nephro for hypo-osmolar hypervolemic hyponatremia)

## 2021-07-04 NOTE — CHART NOTE - NSCHARTNOTEFT_GEN_A_CORE
Notified by RN that patient w/ Hgb of 6.9, patient told RN she is a  and would not receive blood products. Patient seen at bedside by provider. Patient A&Ox3 (name, , year, hospital, president) however patient unable to recall hospital name or town. Patient unable to articulate medical condition. Patient told writer she WOULD receive blood product, RN witness. States provider can call family to discuss situation.     Patient denies dizziness, lightheadedness, bleeding/bruising. No signs of acute bleed.       Plan:  Repeat CBC  Type and screen x2  Fecal occult blood Notified by RN that patient w/ Hgb of 6.9, patient told RN she is a  and would not receive blood products. Patient seen at bedside by provider. Patient A&Ox3 (name, , year, hospital, president) however patient unable to recall hospital name or town. Patient unable to articulate medical condition. Patient told writer she WOULD receive blood products, RN witness. States provider can call family to discuss situation further. Patient denies pain, dizziness, lightheadedness, bleeding/bruising. No signs of acute bleed on exam. VSS.     Provider attempted to call numbers in chart: Daughter Marline Fung () x2 and number is not in service. Left voicemail for Sister Sonya Cohen (). Instructed unit to notify provider if family calls back. Family regularly visits patient. Will f/u w/ family to obtain consent for blood products if necessary.       Plan:  Repeat CBC  Type and screen x2  Fecal occult blood  On Hep gtt for DVT- d/c if acute bleed of Occult pos    Treva Stubbs PA-C  Department of Medicine  [facility]   In House Pager #[phone]

## 2021-07-04 NOTE — PROGRESS NOTE ADULT - PROBLEM SELECTOR PLAN 1
SIRS positive (febrile, tachycardic, leukocytosis)  - likely 2/2 UTI, UA positive, f/u BCx and UCx, CXR with no focal consolidations  - empiric zosyn pending cultures  - On admission, BPs soft SBP mid 90s (baseline 110s-120s), patient mentating. Continue to monitor.   - trend fever curve

## 2021-07-04 NOTE — CHART NOTE - NSCHARTNOTEFT_GEN_A_CORE
Diabetes follow up   Please to complete consult for full plan of care   Chart reviewed   Glucose above target of 100-180 mg/dL   Will increase Admelog to 4 units TID before meals - HOLD if not eating   Will continue Lantus 20 units at bedtime   Will continue Admelog LOW correctional scale before meals and bedtime  Endocrine following      CAPILLARY BLOOD GLUCOSE  POCT Blood Glucose.: 244 mg/dL (04 Jul 2021 08:16)  POCT Blood Glucose.: 192 mg/dL (03 Jul 2021 22:10)  POCT Blood Glucose.: 194 mg/dL (03 Jul 2021 17:18)  POCT Blood Glucose.: 279 mg/dL (03 Jul 2021 12:16)    07-04    145  |  104  |  54<H>  ----------------------------<  239<H>  3.1<L>   |  28  |  1.10    Ca    8.3<L>      04 Jul 2021 03:52    TPro  6.6  /  Alb  2.4<L>  /  TBili  0.5  /  DBili  x   /  AST  45<H>  /  ALT  18  /  AlkPhos  150<H>  07-03    A1C with Estimated Average Glucose Result: 8.2 % (07-03-21 @ 04:26)  A1C with Estimated Average Glucose Result: 9.6 % (06-02-21 @ 06:33)    MEDICATIONS  (STANDING):  dextrose 50% Injectable 25 Gram(s) IV Push once  heparin  Infusion.  Unit(s)/Hr (11 mL/Hr) IV Continuous <Continuous>  insulin glargine Injectable (LANTUS) 18 Unit(s) SubCutaneous at bedtime  insulin lispro (ADMELOG) corrective regimen sliding scale   SubCutaneous three times a day before meals  insulin lispro (ADMELOG) corrective regimen sliding scale   SubCutaneous at bedtime  insulin lispro Injectable (ADMELOG) 3 Unit(s) SubCutaneous three times a day before meals  nystatin    Suspension [id number] Unit(s) Oral every 6 hours  piperacillin/tazobactam IVPB.. 3.375 Gram(s) IV Intermittent every 8 hours  polyethylene glycol 3350 17 Gram(s) Oral daily  senna 2 Tablet(s) Oral at bedtime

## 2021-07-04 NOTE — PROVIDER CONTACT NOTE (OTHER) - ASSESSMENT
vital signs stable, denies chest pain and shortness of breath   no signs and symptoms of bleeding noted  Pt. AxOx3-4. Pt. stated "I do not want blood transfusion"

## 2021-07-04 NOTE — PROGRESS NOTE ADULT - SUBJECTIVE AND OBJECTIVE BOX
Bryan Kenney MD  [employer]  Pager [phone]  Email: [email or web address]          PROGRESS NOTE:     Patient is a 56y old  Female who presents with a chief complaint of urosepsis (2021 16:54)      SUBJECTIVE / OVERNIGHT EVENTS:  Patient seen and examined this morning. States she feels better since admission. Agrees to have blood transfusion. Denies f/c/n/v.  ADDITIONAL REVIEW OF SYSTEMS:    MEDICATIONS  (STANDING):  dextrose 50% Injectable 25 Gram(s) IV Push once  heparin  Infusion.  Unit(s)/Hr (11 mL/Hr) IV Continuous <Continuous>  insulin glargine Injectable (LANTUS) 18 Unit(s) SubCutaneous at bedtime  insulin lispro (ADMELOG) corrective regimen sliding scale   SubCutaneous three times a day before meals  insulin lispro (ADMELOG) corrective regimen sliding scale   SubCutaneous at bedtime  insulin lispro Injectable (ADMELOG) 3 Unit(s) SubCutaneous three times a day before meals  nystatin    Suspension 975282 Unit(s) Oral every 6 hours  piperacillin/tazobactam IVPB.. 3.375 Gram(s) IV Intermittent every 8 hours  polyethylene glycol 3350 17 Gram(s) Oral daily  senna 2 Tablet(s) Oral at bedtime    MEDICATIONS  (PRN):  acetaminophen   Tablet .. 650 milliGRAM(s) Oral every 6 hours PRN Temp greater or equal to 38C (100.4F), Mild Pain (1 - 3), Moderate Pain (4 - 6)  heparin   Injectable 4500 Unit(s) IV Push every 6 hours PRN For aPTT less than 40  heparin   Injectable 2000 Unit(s) IV Push every 6 hours PRN For aPTT between 40 - 57  magnesium hydroxide Suspension 30 milliLiter(s) Oral daily PRN Constipation  ondansetron    Tablet 4 milliGRAM(s) Oral every 8 hours PRN Nausea and/or Vomiting  oxyCODONE    IR 5 milliGRAM(s) Oral every 4 hours PRN Severe Pain (7 - 10)      CAPILLARY BLOOD GLUCOSE      POCT Blood Glucose.: 244 mg/dL (2021 08:16)  POCT Blood Glucose.: 192 mg/dL (2021 22:10)  POCT Blood Glucose.: 194 mg/dL (2021 17:18)  POCT Blood Glucose.: 279 mg/dL (2021 12:16)    I&O's Summary      PHYSICAL EXAM:  Vital Signs Last 24 Hrs  T(C): 36.5 (2021 05:30), Max: 36.7 (2021 21:15)  T(F): 97.7 (2021 05:30), Max: 98 (2021 21:15)  HR: 95 (2021 05:30) (93 - 101)  BP: 100/60 (2021 05:30) (96/58 - 108/61)  BP(mean): --  RR: 18 (2021 05:30) (18 - 18)  SpO2: 100% (2021 05:30) (96% - 100%)    CONSTITUTIONAL: NAD, well-developed  RESPIRATORY: Normal respiratory effort; lungs are clear to auscultation bilaterally  CARDIOVASCULAR: Regular rate and rhythm, normal S1 and S2, no murmur/rub/gallop; No lower extremity edema; Peripheral pulses are 2+ bilaterally  ABDOMEN: somewhat distended, BS+, nontender  MUSCLOSKELETAL: no clubbing or cyanosis of digits; no joint swelling or tenderness to palpation  PSYCH: calm, flat affect    LABS:                        7.7    12.18 )-----------( 150      ( 2021 07:01 )             28.7     07-04    145  |  104  |  54<H>  ----------------------------<  239<H>  3.1<L>   |  28  |  1.10    Ca    8.3<L>      2021 03:52    TPro  6.6  /  Alb  2.4<L>  /  TBili  0.5  /  DBili  x   /  AST  45<H>  /  ALT  18  /  AlkPhos  150<H>  07-03    PTT - ( 2021 03:52 )  PTT:43.2 sec  CARDIAC MARKERS ( 2021 04:26 )  x     / x     / 112 U/L / x     / x          Urinalysis Basic - ( 2021 04:26 )    Color: Yellow / Appearance: Turbid / S.020 / pH: x  Gluc: x / Ketone: Trace  / Bili: Negative / Urobili: 3 mg/dL   Blood: x / Protein: 30 mg/dL / Nitrite: Negative   Leuk Esterase: Moderate / RBC: >720 /HPF /  /HPF   Sq Epi: x / Non Sq Epi: 11 /HPF / Bacteria: Few        Culture - Blood (collected 2021 08:22)  Source: .Blood Blood  Preliminary Report (2021 09:01):    No growth to date.    Culture - Blood (collected 2021 08:22)  Source: .Blood Blood  Preliminary Report (2021 09:01):    No growth to date.        RADIOLOGY & ADDITIONAL TESTS:  Results Reviewed:   Imaging Personally Reviewed:  Electrocardiogram Personally Reviewed:    COORDINATION OF CARE:  Care Discussed with Consultants/Other Providers [Y/N]:  Prior or Outpatient Records Reviewed [Y/N]:

## 2021-07-05 LAB
ALBUMIN SERPL ELPH-MCNC: 2.3 G/DL — LOW (ref 3.3–5)
ALP SERPL-CCNC: 129 U/L — HIGH (ref 40–120)
ALT FLD-CCNC: 11 U/L — SIGNIFICANT CHANGE UP (ref 4–33)
ANION GAP SERPL CALC-SCNC: 13 MMOL/L — SIGNIFICANT CHANGE UP (ref 7–14)
AST SERPL-CCNC: 27 U/L — SIGNIFICANT CHANGE UP (ref 4–32)
BASOPHILS # BLD AUTO: 0.01 K/UL — SIGNIFICANT CHANGE UP (ref 0–0.2)
BASOPHILS NFR BLD AUTO: 0.1 % — SIGNIFICANT CHANGE UP (ref 0–2)
BILIRUB SERPL-MCNC: 0.8 MG/DL — SIGNIFICANT CHANGE UP (ref 0.2–1.2)
BUN SERPL-MCNC: 40 MG/DL — HIGH (ref 7–23)
CALCIUM SERPL-MCNC: 8.6 MG/DL — SIGNIFICANT CHANGE UP (ref 8.4–10.5)
CHLORIDE SERPL-SCNC: 111 MMOL/L — HIGH (ref 98–107)
CO2 SERPL-SCNC: 27 MMOL/L — SIGNIFICANT CHANGE UP (ref 22–31)
CREAT SERPL-MCNC: 0.96 MG/DL — SIGNIFICANT CHANGE UP (ref 0.5–1.3)
EOSINOPHIL # BLD AUTO: 0 K/UL — SIGNIFICANT CHANGE UP (ref 0–0.5)
EOSINOPHIL NFR BLD AUTO: 0 % — SIGNIFICANT CHANGE UP (ref 0–6)
GLUCOSE BLDC GLUCOMTR-MCNC: 117 MG/DL — HIGH (ref 70–99)
GLUCOSE BLDC GLUCOMTR-MCNC: 117 MG/DL — HIGH (ref 70–99)
GLUCOSE BLDC GLUCOMTR-MCNC: 124 MG/DL — HIGH (ref 70–99)
GLUCOSE BLDC GLUCOMTR-MCNC: 85 MG/DL — SIGNIFICANT CHANGE UP (ref 70–99)
GLUCOSE SERPL-MCNC: 141 MG/DL — HIGH (ref 70–99)
HCT VFR BLD CALC: 31.1 % — LOW (ref 34.5–45)
HGB BLD-MCNC: 8.6 G/DL — LOW (ref 11.5–15.5)
IANC: 8.12 K/UL — SIGNIFICANT CHANGE UP (ref 1.5–8.5)
IMM GRANULOCYTES NFR BLD AUTO: 0.7 % — SIGNIFICANT CHANGE UP (ref 0–1.5)
LYMPHOCYTES # BLD AUTO: 1.31 K/UL — SIGNIFICANT CHANGE UP (ref 1–3.3)
LYMPHOCYTES # BLD AUTO: 12.6 % — LOW (ref 13–44)
MCHC RBC-ENTMCNC: 20.7 PG — LOW (ref 27–34)
MCHC RBC-ENTMCNC: 27.7 GM/DL — LOW (ref 32–36)
MCV RBC AUTO: 74.8 FL — LOW (ref 80–100)
MONOCYTES # BLD AUTO: 0.91 K/UL — HIGH (ref 0–0.9)
MONOCYTES NFR BLD AUTO: 8.7 % — SIGNIFICANT CHANGE UP (ref 2–14)
NEUTROPHILS # BLD AUTO: 8.12 K/UL — HIGH (ref 1.8–7.4)
NEUTROPHILS NFR BLD AUTO: 77.9 % — HIGH (ref 43–77)
NRBC # BLD: 0 /100 WBCS — SIGNIFICANT CHANGE UP
NRBC # FLD: 0 K/UL — SIGNIFICANT CHANGE UP
OB PNL STL: NEGATIVE — SIGNIFICANT CHANGE UP
PLATELET # BLD AUTO: 155 K/UL — SIGNIFICANT CHANGE UP (ref 150–400)
POTASSIUM SERPL-MCNC: 3.5 MMOL/L — SIGNIFICANT CHANGE UP (ref 3.5–5.3)
POTASSIUM SERPL-SCNC: 3.5 MMOL/L — SIGNIFICANT CHANGE UP (ref 3.5–5.3)
PROT SERPL-MCNC: 6.2 G/DL — SIGNIFICANT CHANGE UP (ref 6–8.3)
RBC # BLD: 4.16 M/UL — SIGNIFICANT CHANGE UP (ref 3.8–5.2)
RBC # FLD: 20 % — HIGH (ref 10.3–14.5)
SODIUM SERPL-SCNC: 151 MMOL/L — HIGH (ref 135–145)
WBC # BLD: 10.42 K/UL — SIGNIFICANT CHANGE UP (ref 3.8–10.5)
WBC # FLD AUTO: 10.42 K/UL — SIGNIFICANT CHANGE UP (ref 3.8–10.5)

## 2021-07-05 PROCEDURE — 99233 SBSQ HOSP IP/OBS HIGH 50: CPT

## 2021-07-05 RX ORDER — DIPHENHYDRAMINE HYDROCHLORIDE AND LIDOCAINE HYDROCHLORIDE AND ALUMINUM HYDROXIDE AND MAGNESIUM HYDRO
10 KIT EVERY 6 HOURS
Refills: 0 | Status: DISCONTINUED | OUTPATIENT
Start: 2021-07-05 | End: 2021-07-05

## 2021-07-05 RX ORDER — DIPHENHYDRAMINE HYDROCHLORIDE AND LIDOCAINE HYDROCHLORIDE AND ALUMINUM HYDROXIDE AND MAGNESIUM HYDRO
10 KIT EVERY 6 HOURS
Refills: 0 | Status: DISCONTINUED | OUTPATIENT
Start: 2021-07-05 | End: 2021-07-15

## 2021-07-05 RX ORDER — SODIUM CHLORIDE 9 MG/ML
1000 INJECTION, SOLUTION INTRAVENOUS
Refills: 0 | Status: DISCONTINUED | OUTPATIENT
Start: 2021-07-05 | End: 2021-07-06

## 2021-07-05 RX ADMIN — POLYETHYLENE GLYCOL 3350 17 GRAM(S): 17 POWDER, FOR SOLUTION ORAL at 12:22

## 2021-07-05 RX ADMIN — PIPERACILLIN AND TAZOBACTAM 25 GRAM(S): 4; .5 INJECTION, POWDER, LYOPHILIZED, FOR SOLUTION INTRAVENOUS at 00:05

## 2021-07-05 RX ADMIN — SODIUM CHLORIDE 100 MILLILITER(S): 9 INJECTION, SOLUTION INTRAVENOUS at 09:11

## 2021-07-05 RX ADMIN — DIPHENHYDRAMINE HYDROCHLORIDE AND LIDOCAINE HYDROCHLORIDE AND ALUMINUM HYDROXIDE AND MAGNESIUM HYDRO 10 MILLILITER(S): KIT at 22:28

## 2021-07-05 RX ADMIN — INSULIN GLARGINE 20 UNIT(S): 100 INJECTION, SOLUTION SUBCUTANEOUS at 22:28

## 2021-07-05 RX ADMIN — ENOXAPARIN SODIUM 55 MILLIGRAM(S): 100 INJECTION SUBCUTANEOUS at 18:26

## 2021-07-05 RX ADMIN — Medication 500000 UNIT(S): at 05:20

## 2021-07-05 RX ADMIN — Medication 4 UNIT(S): at 18:15

## 2021-07-05 RX ADMIN — PIPERACILLIN AND TAZOBACTAM 25 GRAM(S): 4; .5 INJECTION, POWDER, LYOPHILIZED, FOR SOLUTION INTRAVENOUS at 07:53

## 2021-07-05 RX ADMIN — Medication 4 UNIT(S): at 13:07

## 2021-07-05 RX ADMIN — ONDANSETRON 4 MILLIGRAM(S): 8 TABLET, FILM COATED ORAL at 07:08

## 2021-07-05 RX ADMIN — DIPHENHYDRAMINE HYDROCHLORIDE AND LIDOCAINE HYDROCHLORIDE AND ALUMINUM HYDROXIDE AND MAGNESIUM HYDRO 10 MILLILITER(S): KIT at 18:57

## 2021-07-05 RX ADMIN — Medication 500000 UNIT(S): at 22:29

## 2021-07-05 RX ADMIN — SENNA PLUS 2 TABLET(S): 8.6 TABLET ORAL at 21:49

## 2021-07-05 RX ADMIN — Medication 500000 UNIT(S): at 12:22

## 2021-07-05 RX ADMIN — Medication 500000 UNIT(S): at 18:16

## 2021-07-05 RX ADMIN — Medication 4 UNIT(S): at 08:31

## 2021-07-05 RX ADMIN — ENOXAPARIN SODIUM 55 MILLIGRAM(S): 100 INJECTION SUBCUTANEOUS at 05:19

## 2021-07-05 RX ADMIN — Medication 500000 UNIT(S): at 00:05

## 2021-07-05 RX ADMIN — PIPERACILLIN AND TAZOBACTAM 25 GRAM(S): 4; .5 INJECTION, POWDER, LYOPHILIZED, FOR SOLUTION INTRAVENOUS at 16:01

## 2021-07-05 NOTE — PROGRESS NOTE ADULT - PROBLEM SELECTOR PLAN 7
Hx of L. mid femoral/peroneal vein DVT  - at home on Lovenox 70mg BID  - given EZE on admission- heparin for now, if renal function improves, transition back to Lovenox

## 2021-07-05 NOTE — DIETITIAN INITIAL EVALUATION ADULT. - PROBLEM SELECTOR PLAN 7
Hx of L. mid femoral/peroneal vein DVT  - at home on Lovenox 70mg BID  - given EZE - heparin for now, transition back to Lovenox once renal function improves

## 2021-07-05 NOTE — ADVANCED PRACTICE NURSE CONSULT - REASON FOR CONSULT
Patient seen on skin care rounds after wound care referral received for assessment of skin impairment and recommendations of topical management. Chart reviewed: BMI 18.7, WBC 10.42, H/H 8.6/31.1, Serum albumin 2.3, A1C 8.2, Ike 12. Patient H/O of DM II, migraines, and stage 4 endometrial cancer, non ambulatory at bedside coming in w/ complaining of weakness, dizziness and nausea. FS at home were 350s causing family to provide lantus and call EMS. Pt was recently admitted to [facility] for metastatic uterine carcinosarcoma c/b ascites and received carbo+paclitaxel on 6/18/21. However, her general condition quickly decompensated over the last few weeks and upon outpatient discussion with Dr. Hoang, she was deemed not to get further DMT and hospice was appropriate. Urgent hospice referral was made by Dr. Hoang's team. Now presenting to ED with fever and sepsis likely 2/2 UTI. Patient seen by Hematology/Oncology for metastatic uterine carcinoma and endocrinology for DM management.

## 2021-07-05 NOTE — PROGRESS NOTE ADULT - PROBLEM SELECTOR PLAN 1
SIRS positive (febrile, tachycardic, leukocytosis)  - likely 2/2 UTI, UA positive, bcx and Ucx ntd  - administer last dose of IV zosyn today to complete 3d tx  - On admission, BPs soft SBP mid 90s (baseline 110s-120s), patient mentating. Continue to monitor.   - trend fever curve

## 2021-07-05 NOTE — DIETITIAN INITIAL EVALUATION ADULT. - PROBLEM SELECTOR PLAN 5
Na 151  - hypovolemic on exam, likely due to poor PO intake   - c/w 1/2NS, BMP [id number], goal correction 6-8 meq/24hours  - hold home ethacrynic acid for now (started last admission by nephro for hypo-osmolar hypervolemic hyponatremia)

## 2021-07-05 NOTE — DIETITIAN INITIAL EVALUATION ADULT. - OTHER INFO
RD visited with patient for requested consult re: pressure injury.  Patient noted with stage II pressure injury to sacrum and sacral spine.      Patient reported a fair appetite, noted with good PO intake of lunch today. Denies chewing/swallowing difficulties. No reported GI distress ie. nausea, vomiting, diarrhea.  Reinforced increased nutritional needs related to clinical condition and pressure injuries. Patient reported she is accepting of the Glucerna shakes as currently ordered = 660kcal, 30gm protein/day.  Encouraged fluid intake as well given UTI and hypernatremia.  Reviewed with patient High Biological Value Protein sources (i.e. chicken, turkey, beef, fish, eggs, etc.).  No reported food allergies. Reported usual body weight of 140 pounds approximately 2 weeks ago.  RD initial evaluation reviewed from a previous admission - noted admit wt of 73.1kg 6/1/21. Current weight 57.4kgs 7/3/21, indicating a weight loss of 15.7kg x 1 month; of note, patient also had paracentesis at time of last admission during which time 4L fluids were removed, therefore, some weight loss could also be fluid related, but given stage IV endometrial CA and chemo tx, likely cancer related weight loss as well as patient appears cachectic.  Patient appears weak, tired,  receiving ABT for sepsis, therefore, diet education for therapeutic diet restrictions not appropriate at this time.

## 2021-07-05 NOTE — DIETITIAN INITIAL EVALUATION ADULT. - SIGNS/SYMPTOMS
in setting of sepsis, hypernatremia, pressure injuries Severe findings of muscle and fat loss, wt loss of 21% x 1 month.

## 2021-07-05 NOTE — ADVANCED PRACTICE NURSE CONSULT - RECOMMEDATIONS
Recommend Nutrition consult    Recommend Palliative for Goals of care-  hospice referral completed from previous admission.     Topical Recommendations:     Bilateral buttocks- Cleanse with skin cleanser, pat dry. Apply TRIAD moisture barrier paste twice a day and PRN with incontinent episodes. Monitor for tissue type changes.     Continue low air loss bed therapy, continue or initiate seat cushion, heel elevation with offloading boots, turn & reposition q2h with Z-flow fluidized pillow, continue moisture management with barrier creams & single breathable pad, continue measures to decrease friction/shear.   Plan discussed with patient and primary RN.     Please contact Wound Care Service Line if we can be of further assistance (ext [phone]).

## 2021-07-05 NOTE — DIETITIAN INITIAL EVALUATION ADULT. - ADD RECOMMEND
1) Monitor weights, PO intake/diet tolerance, skin integrity, pertinent labs. 2) Honor food preferences, offer alternate meal selections as needed to aid in optimizing nutritional intake; assist with menu as needed.  3) Encourage High Biological Value Protein sources (i.e. chicken, turkey, beef, fish, eggs, etc.) & calorically dense food items.

## 2021-07-05 NOTE — DIETITIAN NUTRITION RISK NOTIFICATION - TREATMENT: THE FOLLOWING DIET HAS BEEN RECOMMENDED
Diet, Regular:   Consistent Carbohydrate {Evening Snack} (CSTCHOSN)  Pesco Vegetarian (Accepts Fish)  Supplement Feeding Modality:  Oral  Glucerna Shake Cans or Servings Per Day:  1       Frequency:  Three Times a day (07-04-21 @ 14:58) [Active]

## 2021-07-05 NOTE — PROGRESS NOTE ADULT - PROBLEM SELECTOR PLAN 5
Na 145  - hypovolemic on exam, likely due to poor PO intake   - c/w 1/2NS, BMP q2wzxur, goal correction 6-8 meq/24hours  - hold home ethacrynic acid for now (started last admission by nephro for hypo-osmolar hypervolemic hyponatremia) worsening Na  - hypovolemic on exam, likely due to poor PO intake   - c/w 1/2NS, BMP [id number], goal correction 6-8 meq/24hours  - hold home ethacrynic acid for now (started last admission by nephro for hypo-osmolar hypervolemic hyponatremia)

## 2021-07-05 NOTE — DIETITIAN INITIAL EVALUATION ADULT. - PROBLEM SELECTOR PLAN 3
SCr 1.3 (baseline 0.5-0.6)  - likely pre-renal due to hemodynamic changes from sepsis  - c/w IVF, hold home ethacrynic acid as below for now   - trend BMP, renally dose medications

## 2021-07-05 NOTE — DIETITIAN INITIAL EVALUATION ADULT. - ETIOLOGY
in the setting of catabolic illness - endometrial CA c/b malignant ascites, chemotherapy increased physiological demands

## 2021-07-05 NOTE — DIETITIAN INITIAL EVALUATION ADULT. - PROBLEM SELECTOR PLAN 1
SIRS positive (febrile, tachycardic, leukocytosis)  - likely 2/2 UTI, UA positive, f/u BCx and UCx, CXR with no focal consolidations  - empiric zosyn pending cultures  - BPs soft SBP mid 90s (baseline 110s-120s), patient mentating, s/p 1L LR bolus, will administer additional bolus now, followed by maintenance IVF, if BPs remain soft, start midodrine 5mg TID  - trend fever curve

## 2021-07-05 NOTE — ADVANCED PRACTICE NURSE CONSULT - ASSESSMENT
General: A&Ox2-3, bedbound, incontinent of urine and stool- perineal care provided for episode of pasty BM. Temporal and muscle wasting noted. Cachectic. Prominent bony prominences. Skin warm, dry, scattered areas of hyperpigmentation and hypopigmentation. Blanchable erythema on bilateral heels. Distended abdomen.     Bilateral buttock with mixed etiology severe moisture/incontinence associated dermatitis and Unstageable pressure injury:   Right inner buttock measuring 3.5cm 1.5cmx0.3cm exposing 30% tan/grey/brown firmly attached slough and 70% translucent yellow fibrinous film. Irregular borders. 1.5cm away at 3oclock  on left inner buttock additional wound noted measuring 1cmx0.5cmx0.3cm exposing 100% yellow moist translucent fibrin film. Both openings with small serous drainage. no odor. Irregular borders. Mirroring effect noted. Periwound skin with increased moisture and chronic hyperpigmentation consistent with IAD/MAD. Fissure noted to sacral fold exposing 100% fibrinous film.  Assessment completed while patient lying on right side, unable to be fully seen in natural anatomical position.

## 2021-07-05 NOTE — DIETITIAN INITIAL EVALUATION ADULT. - PROBLEM SELECTOR PLAN 6
Stage IV Endometrial Cancer c/b malignant ascites s/p chemotherapy (6/18 carboplatin/taxol)  - last admission required diagnostic/therapeutic paracentesis, abdomen mildly distended, will get sonogram, if recurrent para may need to consider pleurx  - oncology following, patient not a candidate for DMT at this time, family still interested in pursuing therapy, palliative care consult to assist with further GOC  - pain control: at home on morphine ER 15mg BID, oxycodone 5mg [id number] PRN severe pain, discussed with palliative, anticipate improvement in renal function with fluids can continue morphine, however BPs now soft in high 90s (baseline 110s-120s) - will hold morphine ER for now, resume when BP stabilizes to prevent withdrawal

## 2021-07-05 NOTE — DIETITIAN INITIAL EVALUATION ADULT. - PERTINENT LABORATORY DATA
07-05 Na151 mmol/L<H> Glu 141 mg/dL<H> K+ 3.5 mmol/L Cr  0.96 mg/dL BUN 40 mg/dL<H> 07-05 Alb 2.3 g/dL<L>    A1C with Estimated Average Glucose (07.03.21 @ 04:26)    A1C with Estimated Average Glucose Result: 8.2: High Risk (prediabetic)    5.7 - 6.4 %  Diabetic, diagnostic           > 6.5 %  ADA diabetic treatment goal    < 7.0 %  HbA1C values may not accurately reflect mean blood glucose in patients  with Hb variants.  Suggest clinical correlation. %    Estimated Average Glucose: 189    CAPILLARY BLOOD GLUCOSE  POCT Blood Glucose.: 117 mg/dL (05 Jul 2021 12:27)  POCT Blood Glucose.: 124 mg/dL (05 Jul 2021 08:21)  POCT Blood Glucose.: 172 mg/dL (04 Jul 2021 22:16)  POCT Blood Glucose.: 131 mg/dL (04 Jul 2021 17:18)

## 2021-07-05 NOTE — PROGRESS NOTE ADULT - SUBJECTIVE AND OBJECTIVE BOX
Patient is a 56y old  Female who presents with a chief complaint of urosepsis (04 Jul 2021 10:15)      SUBJECTIVE / OVERNIGHT EVENTS: no events overnight. Pt reports feeling overall well. Wound care at  bedside, has two sacral decubitus without signs of active infection.    MEDICATIONS  (STANDING):  dextrose 50% Injectable 25 Gram(s) IV Push once  enoxaparin Injectable 55 milliGRAM(s) SubCutaneous every 12 hours  insulin glargine Injectable (LANTUS) 20 Unit(s) SubCutaneous at bedtime  insulin lispro (ADMELOG) corrective regimen sliding scale   SubCutaneous three times a day before meals  insulin lispro (ADMELOG) corrective regimen sliding scale   SubCutaneous at bedtime  insulin lispro Injectable (ADMELOG) 4 Unit(s) SubCutaneous three times a day before meals  nystatin    Suspension 613160 Unit(s) Oral every 6 hours  piperacillin/tazobactam IVPB.. 3.375 Gram(s) IV Intermittent every 8 hours  polyethylene glycol 3350 17 Gram(s) Oral daily  senna 2 Tablet(s) Oral at bedtime  sodium chloride 0.45%. 1000 milliLiter(s) (100 mL/Hr) IV Continuous <Continuous>    MEDICATIONS  (PRN):  acetaminophen   Tablet .. 650 milliGRAM(s) Oral every 6 hours PRN Temp greater or equal to 38C (100.4F), Mild Pain (1 - 3), Moderate Pain (4 - 6)  magnesium hydroxide Suspension 30 milliLiter(s) Oral daily PRN Constipation  ondansetron    Tablet 4 milliGRAM(s) Oral every 8 hours PRN Nausea and/or Vomiting  oxyCODONE    IR 5 milliGRAM(s) Oral every 4 hours PRN Severe Pain (7 - 10)        CAPILLARY BLOOD GLUCOSE      POCT Blood Glucose.: 117 mg/dL (05 Jul 2021 12:27)  POCT Blood Glucose.: 124 mg/dL (05 Jul 2021 08:21)  POCT Blood Glucose.: 172 mg/dL (04 Jul 2021 22:16)  POCT Blood Glucose.: 131 mg/dL (04 Jul 2021 17:18)    I&O's Summary    05 Jul 2021 07:01  -  05 Jul 2021 13:19  --------------------------------------------------------  IN: 300 mL / OUT: 0 mL / NET: 300 mL      Vital Signs Last 24 Hrs  T(C): 36.6 (05 Jul 2021 05:45), Max: 36.9 (04 Jul 2021 13:37)  T(F): 97.8 (05 Jul 2021 05:45), Max: 98.5 (04 Jul 2021 20:38)  HR: 86 (05 Jul 2021 05:45) (86 - 98)  BP: 92/57 (05 Jul 2021 05:45) (90/48 - 105/70)  BP(mean): --  RR: 16 (05 Jul 2021 05:45) (16 - 18)  SpO2: 96% (05 Jul 2021 05:45) (96% - 100%)      PHYSICAL EXAM:    CONSTITUTIONAL: NAD, well-developed  RESPIRATORY: Normal respiratory effort; lungs are clear to auscultation bilaterally  CARDIOVASCULAR: Regular rate and rhythm, normal S1 and S2, no murmur/rub/gallop; No lower extremity edema; Peripheral pulses are 2+ bilaterally  ABDOMEN: somewhat distended, BS+, nontender  MUSCLOSKELETAL: no clubbing or cyanosis of digits; no joint swelling or tenderness to palpation  PSYCH: calm, flat affect        LABS:                        8.6    10.42 )-----------( 155      ( 05 Jul 2021 06:18 )             31.1     07-05    151<H>  |  111<H>  |  40<H>  ----------------------------<  141<H>  3.5   |  27  |  0.96    Ca    8.6      05 Jul 2021 06:18    TPro  6.2  /  Alb  2.3<L>  /  TBili  0.8  /  DBili  x   /  AST  27  /  ALT  11  /  AlkPhos  129<H>  07-05    PTT - ( 04 Jul 2021 11:33 )  PTT:36.8 sec          RADIOLOGY & ADDITIONAL TESTS:    Imaging Personally Reviewed:    Consultant(s) Notes Reviewed:      Care Discussed with Consultants/Other Providers:

## 2021-07-05 NOTE — DIETITIAN INITIAL EVALUATION ADULT. - PROBLEM SELECTOR PLAN 4
A1C 8%  - hyperglycemic, does not meet DKA criteria, elevated anion gap in setting of sepsis/EZE, pH wnl   - home regimen: Lantus 18U at bedtime, metformin 1000mg BID  - hold home metformin while admitted, resume on discharge  - appreciate endo recs: Lantus 18U qhs + Admelog 3U TID qc, FS/SSI qac and hs

## 2021-07-06 DIAGNOSIS — Z51.5 ENCOUNTER FOR PALLIATIVE CARE: ICD-10-CM

## 2021-07-06 DIAGNOSIS — I95.9 HYPOTENSION, UNSPECIFIED: ICD-10-CM

## 2021-07-06 DIAGNOSIS — G89.3 NEOPLASM RELATED PAIN (ACUTE) (CHRONIC): ICD-10-CM

## 2021-07-06 DIAGNOSIS — C56.9 MALIGNANT NEOPLASM OF UNSPECIFIED OVARY: ICD-10-CM

## 2021-07-06 DIAGNOSIS — R53.2 FUNCTIONAL QUADRIPLEGIA: ICD-10-CM

## 2021-07-06 LAB
ALBUMIN SERPL ELPH-MCNC: 2.3 G/DL — LOW (ref 3.3–5)
ALP SERPL-CCNC: 135 U/L — HIGH (ref 40–120)
ALT FLD-CCNC: 11 U/L — SIGNIFICANT CHANGE UP (ref 4–33)
ANION GAP SERPL CALC-SCNC: 15 MMOL/L — HIGH (ref 7–14)
AST SERPL-CCNC: 28 U/L — SIGNIFICANT CHANGE UP (ref 4–32)
BASOPHILS # BLD AUTO: 0.01 K/UL — SIGNIFICANT CHANGE UP (ref 0–0.2)
BASOPHILS NFR BLD AUTO: 0.1 % — SIGNIFICANT CHANGE UP (ref 0–2)
BILIRUB SERPL-MCNC: 0.9 MG/DL — SIGNIFICANT CHANGE UP (ref 0.2–1.2)
BUN SERPL-MCNC: 28 MG/DL — HIGH (ref 7–23)
CALCIUM SERPL-MCNC: 8.5 MG/DL — SIGNIFICANT CHANGE UP (ref 8.4–10.5)
CHLORIDE SERPL-SCNC: 107 MMOL/L — SIGNIFICANT CHANGE UP (ref 98–107)
CO2 SERPL-SCNC: 24 MMOL/L — SIGNIFICANT CHANGE UP (ref 22–31)
CREAT SERPL-MCNC: 0.92 MG/DL — SIGNIFICANT CHANGE UP (ref 0.5–1.3)
EOSINOPHIL # BLD AUTO: 0 K/UL — SIGNIFICANT CHANGE UP (ref 0–0.5)
EOSINOPHIL NFR BLD AUTO: 0 % — SIGNIFICANT CHANGE UP (ref 0–6)
GLUCOSE BLDC GLUCOMTR-MCNC: 114 MG/DL — HIGH (ref 70–99)
GLUCOSE BLDC GLUCOMTR-MCNC: 124 MG/DL — HIGH (ref 70–99)
GLUCOSE BLDC GLUCOMTR-MCNC: 151 MG/DL — HIGH (ref 70–99)
GLUCOSE BLDC GLUCOMTR-MCNC: 90 MG/DL — SIGNIFICANT CHANGE UP (ref 70–99)
GLUCOSE SERPL-MCNC: 87 MG/DL — SIGNIFICANT CHANGE UP (ref 70–99)
HCT VFR BLD CALC: 30.3 % — LOW (ref 34.5–45)
HGB BLD-MCNC: 8.7 G/DL — LOW (ref 11.5–15.5)
IANC: 5.75 K/UL — SIGNIFICANT CHANGE UP (ref 1.5–8.5)
IMM GRANULOCYTES NFR BLD AUTO: 0.9 % — SIGNIFICANT CHANGE UP (ref 0–1.5)
LYMPHOCYTES # BLD AUTO: 0.95 K/UL — LOW (ref 1–3.3)
LYMPHOCYTES # BLD AUTO: 12.7 % — LOW (ref 13–44)
MAGNESIUM SERPL-MCNC: 1.8 MG/DL — SIGNIFICANT CHANGE UP (ref 1.6–2.6)
MCHC RBC-ENTMCNC: 21.2 PG — LOW (ref 27–34)
MCHC RBC-ENTMCNC: 28.7 GM/DL — LOW (ref 32–36)
MCV RBC AUTO: 73.7 FL — LOW (ref 80–100)
MONOCYTES # BLD AUTO: 0.71 K/UL — SIGNIFICANT CHANGE UP (ref 0–0.9)
MONOCYTES NFR BLD AUTO: 9.5 % — SIGNIFICANT CHANGE UP (ref 2–14)
NEUTROPHILS # BLD AUTO: 5.75 K/UL — SIGNIFICANT CHANGE UP (ref 1.8–7.4)
NEUTROPHILS NFR BLD AUTO: 76.8 % — SIGNIFICANT CHANGE UP (ref 43–77)
NRBC # BLD: 0 /100 WBCS — SIGNIFICANT CHANGE UP
NRBC # FLD: 0 K/UL — SIGNIFICANT CHANGE UP
PHOSPHATE SERPL-MCNC: 2.7 MG/DL — SIGNIFICANT CHANGE UP (ref 2.5–4.5)
PLATELET # BLD AUTO: 155 K/UL — SIGNIFICANT CHANGE UP (ref 150–400)
POTASSIUM SERPL-MCNC: 3.3 MMOL/L — LOW (ref 3.5–5.3)
POTASSIUM SERPL-SCNC: 3.3 MMOL/L — LOW (ref 3.5–5.3)
PROT SERPL-MCNC: 6.1 G/DL — SIGNIFICANT CHANGE UP (ref 6–8.3)
RBC # BLD: 4.11 M/UL — SIGNIFICANT CHANGE UP (ref 3.8–5.2)
RBC # FLD: 20.2 % — HIGH (ref 10.3–14.5)
SODIUM SERPL-SCNC: 146 MMOL/L — HIGH (ref 135–145)
WBC # BLD: 7.49 K/UL — SIGNIFICANT CHANGE UP (ref 3.8–10.5)
WBC # FLD AUTO: 7.49 K/UL — SIGNIFICANT CHANGE UP (ref 3.8–10.5)

## 2021-07-06 PROCEDURE — 99232 SBSQ HOSP IP/OBS MODERATE 35: CPT

## 2021-07-06 PROCEDURE — 99223 1ST HOSP IP/OBS HIGH 75: CPT | Mod: GC

## 2021-07-06 PROCEDURE — 99233 SBSQ HOSP IP/OBS HIGH 50: CPT

## 2021-07-06 PROCEDURE — 74018 RADEX ABDOMEN 1 VIEW: CPT | Mod: 26

## 2021-07-06 RX ORDER — MIDODRINE HYDROCHLORIDE 2.5 MG/1
5 TABLET ORAL THREE TIMES A DAY
Refills: 0 | Status: DISCONTINUED | OUTPATIENT
Start: 2021-07-06 | End: 2021-07-06

## 2021-07-06 RX ORDER — SODIUM CHLORIDE 9 MG/ML
1000 INJECTION, SOLUTION INTRAVENOUS
Refills: 0 | Status: DISCONTINUED | OUTPATIENT
Start: 2021-07-06 | End: 2021-07-06

## 2021-07-06 RX ORDER — SODIUM CHLORIDE 9 MG/ML
1000 INJECTION INTRAMUSCULAR; INTRAVENOUS; SUBCUTANEOUS ONCE
Refills: 0 | Status: COMPLETED | OUTPATIENT
Start: 2021-07-06 | End: 2021-07-06

## 2021-07-06 RX ORDER — POTASSIUM CHLORIDE 20 MEQ
40 PACKET (EA) ORAL ONCE
Refills: 0 | Status: DISCONTINUED | OUTPATIENT
Start: 2021-07-06 | End: 2021-07-06

## 2021-07-06 RX ORDER — SODIUM CHLORIDE 9 MG/ML
1000 INJECTION, SOLUTION INTRAVENOUS
Refills: 0 | Status: DISCONTINUED | OUTPATIENT
Start: 2021-07-06 | End: 2021-07-07

## 2021-07-06 RX ORDER — INSULIN GLARGINE 100 [IU]/ML
16 INJECTION, SOLUTION SUBCUTANEOUS AT BEDTIME
Refills: 0 | Status: DISCONTINUED | OUTPATIENT
Start: 2021-07-06 | End: 2021-07-08

## 2021-07-06 RX ORDER — POTASSIUM CHLORIDE 20 MEQ
10 PACKET (EA) ORAL
Refills: 0 | Status: COMPLETED | OUTPATIENT
Start: 2021-07-06 | End: 2021-07-06

## 2021-07-06 RX ORDER — INSULIN GLARGINE 100 [IU]/ML
18 INJECTION, SOLUTION SUBCUTANEOUS AT BEDTIME
Refills: 0 | Status: DISCONTINUED | OUTPATIENT
Start: 2021-07-06 | End: 2021-07-06

## 2021-07-06 RX ORDER — MIDODRINE HYDROCHLORIDE 2.5 MG/1
5 TABLET ORAL ONCE
Refills: 0 | Status: COMPLETED | OUTPATIENT
Start: 2021-07-06 | End: 2021-07-06

## 2021-07-06 RX ORDER — INSULIN LISPRO 100/ML
3 VIAL (ML) SUBCUTANEOUS
Refills: 0 | Status: DISCONTINUED | OUTPATIENT
Start: 2021-07-06 | End: 2021-07-09

## 2021-07-06 RX ORDER — SODIUM CHLORIDE 9 MG/ML
1000 INJECTION INTRAMUSCULAR; INTRAVENOUS; SUBCUTANEOUS ONCE
Refills: 0 | Status: DISCONTINUED | OUTPATIENT
Start: 2021-07-06 | End: 2021-07-06

## 2021-07-06 RX ORDER — MIDODRINE HYDROCHLORIDE 2.5 MG/1
10 TABLET ORAL THREE TIMES A DAY
Refills: 0 | Status: DISCONTINUED | OUTPATIENT
Start: 2021-07-06 | End: 2021-07-12

## 2021-07-06 RX ORDER — MAGNESIUM SULFATE 500 MG/ML
2 VIAL (ML) INJECTION ONCE
Refills: 0 | Status: COMPLETED | OUTPATIENT
Start: 2021-07-06 | End: 2021-07-06

## 2021-07-06 RX ORDER — INSULIN LISPRO 100/ML
3 VIAL (ML) SUBCUTANEOUS
Refills: 0 | Status: DISCONTINUED | OUTPATIENT
Start: 2021-07-06 | End: 2021-07-06

## 2021-07-06 RX ORDER — ONDANSETRON 8 MG/1
4 TABLET, FILM COATED ORAL ONCE
Refills: 0 | Status: COMPLETED | OUTPATIENT
Start: 2021-07-06 | End: 2021-07-06

## 2021-07-06 RX ADMIN — MIDODRINE HYDROCHLORIDE 5 MILLIGRAM(S): 2.5 TABLET ORAL at 09:53

## 2021-07-06 RX ADMIN — SODIUM CHLORIDE 75 MILLILITER(S): 9 INJECTION, SOLUTION INTRAVENOUS at 09:25

## 2021-07-06 RX ADMIN — ONDANSETRON 4 MILLIGRAM(S): 8 TABLET, FILM COATED ORAL at 14:13

## 2021-07-06 RX ADMIN — Medication 500000 UNIT(S): at 05:28

## 2021-07-06 RX ADMIN — SODIUM CHLORIDE 1000 MILLILITER(S): 9 INJECTION, SOLUTION INTRAVENOUS at 10:21

## 2021-07-06 RX ADMIN — Medication 50 GRAM(S): at 13:55

## 2021-07-06 RX ADMIN — PIPERACILLIN AND TAZOBACTAM 25 GRAM(S): 4; .5 INJECTION, POWDER, LYOPHILIZED, FOR SOLUTION INTRAVENOUS at 09:12

## 2021-07-06 RX ADMIN — ENOXAPARIN SODIUM 55 MILLIGRAM(S): 100 INJECTION SUBCUTANEOUS at 05:22

## 2021-07-06 RX ADMIN — SODIUM CHLORIDE 75 MILLILITER(S): 9 INJECTION, SOLUTION INTRAVENOUS at 13:55

## 2021-07-06 RX ADMIN — INSULIN GLARGINE 16 UNIT(S): 100 INJECTION, SOLUTION SUBCUTANEOUS at 22:26

## 2021-07-06 RX ADMIN — PIPERACILLIN AND TAZOBACTAM 25 GRAM(S): 4; .5 INJECTION, POWDER, LYOPHILIZED, FOR SOLUTION INTRAVENOUS at 16:09

## 2021-07-06 RX ADMIN — Medication 1: at 12:54

## 2021-07-06 RX ADMIN — DIPHENHYDRAMINE HYDROCHLORIDE AND LIDOCAINE HYDROCHLORIDE AND ALUMINUM HYDROXIDE AND MAGNESIUM HYDRO 10 MILLILITER(S): KIT at 05:23

## 2021-07-06 RX ADMIN — PIPERACILLIN AND TAZOBACTAM 25 GRAM(S): 4; .5 INJECTION, POWDER, LYOPHILIZED, FOR SOLUTION INTRAVENOUS at 01:00

## 2021-07-06 RX ADMIN — Medication 100 MILLIEQUIVALENT(S): at 16:10

## 2021-07-06 RX ADMIN — Medication 100 MILLIEQUIVALENT(S): at 17:25

## 2021-07-06 RX ADMIN — SODIUM CHLORIDE 1000 MILLILITER(S): 9 INJECTION INTRAMUSCULAR; INTRAVENOUS; SUBCUTANEOUS at 10:47

## 2021-07-06 RX ADMIN — Medication 100 MILLIEQUIVALENT(S): at 15:07

## 2021-07-06 NOTE — PROGRESS NOTE ADULT - SUBJECTIVE AND OBJECTIVE BOX
INTERVAL HPI/OVERNIGHT EVENTS:  Patient seen at bedside.  Patient appearing weak and frail  Complaining of feeling cold  and has abdominal pain  Deferring pain meds at this time  Denies fever or chills    VITAL SIGNS:  T(F): 98.2 (07-06-21 @ 13:05)  HR: 95 (07-06-21 @ 13:05)  BP: 98/63 (07-06-21 @ 13:05)  RR: 16 (07-06-21 @ 13:05)  SpO2: 100% (07-06-21 @ 13:05)  Wt(kg): --    PHYSICAL EXAM:    Constitutional: NAD, resting in bed comfortably  Eyes: EOMI, sclera non-icteric  Neck: supple, no LAD  Respiratory: CTA b/l, good air entry b/l,   Gastrointestinal: soft, tender abdomen  Extremities: mild pedal edema  Neurological: AAOx3,       MEDICATIONS  (STANDING):  dextrose 50% Injectable 25 Gram(s) IV Push once  enoxaparin Injectable 55 milliGRAM(s) SubCutaneous every 12 hours  FIRST- Mouthwash  BLM 10 milliLiter(s) Swish and Spit every 6 hours  insulin glargine Injectable (LANTUS) 18 Unit(s) SubCutaneous at bedtime  insulin lispro (ADMELOG) corrective regimen sliding scale   SubCutaneous three times a day before meals  insulin lispro (ADMELOG) corrective regimen sliding scale   SubCutaneous at bedtime  insulin lispro Injectable (ADMELOG) 3 Unit(s) SubCutaneous three times a day before meals  midodrine. 5 milliGRAM(s) Oral once  midodrine. 10 milliGRAM(s) Oral three times a day  nystatin    Suspension 833552 Unit(s) Oral every 6 hours  piperacillin/tazobactam IVPB.. 3.375 Gram(s) IV Intermittent every 8 hours  polyethylene glycol 3350 17 Gram(s) Oral daily  potassium chloride  10 mEq/100 mL IVPB 10 milliEquivalent(s) IV Intermittent every 1 hour  senna 2 Tablet(s) Oral at bedtime  sodium chloride 0.45%. 1000 milliLiter(s) (75 mL/Hr) IV Continuous <Continuous>    MEDICATIONS  (PRN):  acetaminophen   Tablet .. 650 milliGRAM(s) Oral every 6 hours PRN Temp greater or equal to 38C (100.4F), Mild Pain (1 - 3), Moderate Pain (4 - 6)  magnesium hydroxide Suspension 30 milliLiter(s) Oral daily PRN Constipation  ondansetron    Tablet 4 milliGRAM(s) Oral every 8 hours PRN Nausea and/or Vomiting  oxyCODONE    IR 5 milliGRAM(s) Oral every 4 hours PRN Severe Pain (7 - 10)      Allergies    Sulf-10 (Rash; Short breath)    Intolerances        LABS:                        8.7    7.49  )-----------( 155      ( 06 Jul 2021 07:40 )             30.3     07-06    146<H>  |  107  |  28<H>  ----------------------------<  87  3.3<L>   |  24  |  0.92    Ca    8.5      06 Jul 2021 07:40  Phos  2.7     07-06  Mg     1.80     07-06    TPro  6.1  /  Alb  2.3<L>  /  TBili  0.9  /  DBili  x   /  AST  28  /  ALT  11  /  AlkPhos  135<H>  07-06          RADIOLOGY & ADDITIONAL TESTS:  Studies reviewed.

## 2021-07-06 NOTE — PROVIDER CONTACT NOTE (OTHER) - ACTION/TREATMENT ORDERED:
Hold premeal insulin   Re-assess patient for PO midodrine Hold premeal insulin at this time  Re-assess patient for PO midodrine

## 2021-07-06 NOTE — CONSULT NOTE ADULT - SUBJECTIVE AND OBJECTIVE BOX
HPI:  56F with hx of Stage IV Endometrial Cancer c/b malignant ascites s/p chemotherapy ( carboplatin/taxol), L. mid femoral/peroneal vein DVT on Lovenox, T2DM (A1C 8% on insulin) who presents with altered mental status and weakness. Patient was recently admitted to [facility] in  for abdominal pain/distension, attributed to malignant ascites, s/p diagnostic and therapeutic paracentesis. Also received chemotherapy during that admission. Since discharge she was in her usual state of health, until yesterday when family noted her to be more confused and weak than baseline. They checked her FS which were in the 300-400s range, despite taking insulin, prompting family to bring her to the ED. Patient denies fevers, chills, cp, sob, abdominal pain, n/v/d, dysuria, change in urinary frequency, sick contacts, recent travel.     Pt known to me from previous admission. Pt with significant decline.  min responsive to me.  denies pain, sob.  Very lethargic and hypotensive.  Pt was on ms contin 75mg bid but now off while here    PERTINENT PM/SXH:   No pertinent past medical history    History of migraine headaches    DM type 2 (diabetes mellitus, type 2)    History of irregular menstrual cycles    Ovarian cancer      No significant past surgical history    History of       FAMILY HISTORY:  FH: type 2 diabetes mellitus (Mother)  mother      ITEMS NOT CHECKED ARE NOT PRESENT    SOCIAL HISTORY:   Significant other/partner:  [ x]  Children:  [x ]  Mormon/Spirituality:  Substance hx:  [ ]   Tobacco hx:  [ ]   Alcohol hx: [ ]   Home Opioid hx:  [ ] I-Stop Reference No:  Living Situation: [x ]Home  [ ]Long term care  [ ]Rehab [ ]Other    ADVANCE DIRECTIVES:    DNR  MOLST  [ ]  Living Will  [ ]   DECISION MAKER(s):  [ ] Health Care Proxy(s)  [ ] Surrogate(s)  [ ] Guardian           Name(s): Phone Number(s): Marline    BASELINE (I)ADL(s) (prior to admission):  [place]: [ ]Total  [ x] Moderate [ ]Dependent    Allergies    Sulf-10 (Rash; Short breath)    Intolerances    MEDICATIONS  (STANDING):  dextrose 50% Injectable 25 Gram(s) IV Push once  enoxaparin Injectable 55 milliGRAM(s) SubCutaneous every 12 hours  FIRST- Mouthwash  BLM 10 milliLiter(s) Swish and Spit every 6 hours  insulin glargine Injectable (LANTUS) 18 Unit(s) SubCutaneous at bedtime  insulin lispro (ADMELOG) corrective regimen sliding scale   SubCutaneous three times a day before meals  insulin lispro (ADMELOG) corrective regimen sliding scale   SubCutaneous at bedtime  insulin lispro Injectable (ADMELOG) 3 Unit(s) SubCutaneous three times a day before meals  midodrine. 5 milliGRAM(s) Oral once  midodrine. 10 milliGRAM(s) Oral three times a day  nystatin    Suspension [id number] Unit(s) Oral every 6 hours  piperacillin/tazobactam IVPB.. 3.375 Gram(s) IV Intermittent every 8 hours  polyethylene glycol 3350 17 Gram(s) Oral daily  potassium chloride  10 mEq/100 mL IVPB 10 milliEquivalent(s) IV Intermittent every 1 hour  senna 2 Tablet(s) Oral at bedtime  sodium chloride 0.45%. 1000 milliLiter(s) (75 mL/Hr) IV Continuous <Continuous>    MEDICATIONS  (PRN):  acetaminophen   Tablet .. 650 milliGRAM(s) Oral every 6 hours PRN Temp greater or equal to 38C (100.4F), Mild Pain (1 - 3), Moderate Pain (4 - 6)  magnesium hydroxide Suspension 30 milliLiter(s) Oral daily PRN Constipation  ondansetron    Tablet 4 milliGRAM(s) Oral every 8 hours PRN Nausea and/or Vomiting  oxyCODONE    IR 5 milliGRAM(s) Oral every 4 hours PRN Severe Pain (7 - 10)    PRESENT SYMPTOMS: [x ]Unable to obtain due to poor mentation   Source if other than patient:  [ ]Family   [ ]Team     Pain (Impact on QOL):  denies  Location -         Minimal acceptable level (0-10 scale):                    Aggrevating factors -  Quality -  Radiation -  Severity (0-10 scale) -    Timing -    PAIN AD Score:     [email or web address] (press ctrl +  left click to view)    Dyspnea:                           [ ]Mild [ ]Moderate [ ]Severe  Anxiety:                             [ ]Mild [ ]Moderate [ ]Severe  Fatigue:                             [ ]Mild [ ]Moderate [ x]Severe  Nausea:                             [ ]Mild [ ]Moderate [ ]Severe  Loss of appetite:              [ ]Mild [ ]Moderate [ x]Severe  Constipation:                    [ ]Mild [ ]Moderate [ ]Severe    Other Symptoms:  [x ]All other review of systems negative     Karnofsky Performance Score/Palliative Performance Status Version 2:    30     %  PHYSICAL EXAM:  Vital Signs Last 24 Hrs  T(C): 36.8 (2021 13:05), Max: 36.9 (2021 21:44)  T(F): 98.2 (2021 13:05), Max: 98.5 (2021 21:44)  HR: 95 (2021 13:05) (82 - 99)  BP: 98/63 (2021 13:05) (82/55 - 98/63)  BP(mean): --  RR: 16 (2021 13:05) (16 - 18)  SpO2: 100% (2021 13:05) (98% - 100%) I&O's Summary    2021 07:01  -  2021 07:00  --------------------------------------------------------  IN: 300 mL / OUT: 0 mL / NET: 300 mL    GENERAL:  [ x]Alert  [x ]Oriented x 1  [x ]Lethargic  [ ]Cachexia  [ ]Unarousable  [ ]Verbal  [ ]Non-Verbal  Behavioral:   [ ] Anxiety  [ ] Delirium [ ] Agitation [ ] Other  HEENT:  [ ]Normal   [x ]Dry mouth   [ ]ET Tube/Trach  [ ]Oral lesions  PULMONARY:   [x ]Clear [ ]Tachypnea  [ ]Audible excessive secretions   [ ]Rhonchi        [ ]Right [ ]Left [ ]Bilateral  [ ]Crackles        [ ]Right [ ]Left [ ]Bilateral  [ ]Wheezing     [ ]Right [ ]Left [ ]Bilateral  CARDIOVASCULAR:    [ ]Regular [ ]Irregular [ x]Tachy  [ ]Glenn [ ]Murmur [ ]Other  GASTROINTESTINAL:  [x ]Soft  [x ]Distended   [ ]+BS  [ ]Non tender [x ]Tender  [ ]PEG [ ]OGT/ NGT  Last BM:   GENITOURINARY:  [ ]Normal [x ] Incontinent   [ ]Oliguria/Anuria   [ ][place]  MUSCULOSKELETAL:   [ ]Normal   [ x]Weakness  [ ]Bed/Wheelchair bound [ ]Edema  NEUROLOGIC:   [ ]No focal deficits  [x ] Cognitive impairment  [ ] Dysphagia [ ]Dysarthria [ ] Paresis [ ]Other   SKIN:   [ ]Normal   [ x]Pressure ulcer(s)  [ ]Rash    CRITICAL CARE:  [ ] Shock Present  [ ]Septic [ ]Cardiogenic [ ]Neurologic [ ]Hypovolemic  [ ]  Vasopressors [ ]  Inotropes   [ ] Respiratory failure present  [ ] Acute  [ ] Chronic [ ] Hypoxic  [ ] Hypercarbic [ ] Other  [ ] Other organ failure     LABS:                        8.7    7.49  )-----------( 155      ( 2021 07:40 )             30.3   07-    146<H>  |  107  |  28<H>  ----------------------------<  87  3.3<L>   |  24  |  0.92    Ca    8.5      2021 07:40  Phos  2.7     -  Mg     1.80         TPro  6.1  /  Alb  2.3<L>  /  TBili  0.9  /  DBili  x   /  AST  28  /  ALT  11  /  AlkPhos  135<H>          RADIOLOGY & ADDITIONAL STUDIES:    PROTEIN CALORIE MALNUTRITION:   [ ] PPSV2 < or = to 30% [ ] significant weight loss  [ ] poor nutritional intake [ ] catabolic state [ ] anasarca     Albumin, Serum: 2.3 g/dL (21 @ 07:40)  Artificial Nutrition [ ]     REFERRALS:   [ ]Chaplaincy  [ ] Hospice  [ ]Child Life  [ ]Social Work  [ ]Case management [ ]Holistic Therapy   Goals of Care Discussion Document:

## 2021-07-06 NOTE — CONSULT NOTE ADULT - PROBLEM SELECTOR PROBLEM 1
Neoplasm related pain
Type 2 diabetes mellitus with hyperglycemia, with long-term current use of insulin

## 2021-07-06 NOTE — CHART NOTE - NSCHARTNOTEFT_GEN_A_CORE
[facility] MEDICINE COVERAGE - Medicine Subsequent Hospital Care Note    CC:  HPI/Subjective:    ROS:  Denies fever, chills, diaphoresis, malaise, night sweats, generalized weakness, headache, changes in vision, dizziness, cough, sputum production, wheezing, hemoptysis, chest pain, palpitations, shortness of breath, dyspnea on exertion, PND, dysphagia, new abdominal pain, nausea, vomiting, diarrhea, constipation, melena, hematochezia, dysuria, increased urinary frequency/urgency, hematuria, nocturia, numbness/weakness/tingling, any other complaints.    [  ] I spoke with the attending, nurse, and family to obtain the history.  [  ] Unable to obtain history due to ___________.    Vital Signs Last 24 Hrs  T(C): 36.7 (21 @ 12:00), Max: 36.9 (21 @ 21:44)  T(F): 98 (21 @ 12:00), Max: 98.5 (21 @ 21:44)  HR: 88 (21 @ 12:00) (82 - 99)  BP: 93/50 (21 @ 12:00) (77/44 - 94/55)  RR: 16 (21 @ 12:00) (16 - 18)  SpO2: 99% (21 @ 12:00) (98% - 100%) on (O2)    PHYSICAL EXAM:  Constitutional: NAD, well-developed, well-nourished  Ears, nose, Mouth, and Throat: normal external ears and nose, normal hearing, moist oral mucosa  Eyes: normal conjunctiva, EOMI, PEERL  Neck: supple, no JVD  Respiratory: Clear to auscultation bilaterally. No wheezes, rales or rhonchi. Normal respiratory effort  Cardiovascular: regular rate and rhythm, S1 and S2, no murmurs, rubs or gallops, no edema, 2+ peripheral pulses  Gastrointestinal: soft, nontender, nondistended, +bowel sounds, no hernia  Skin: warm, dry, no rash  Neurologic: sensation grossly intact, CN grossly intact, non-focal exam: lethargic but arousable:lethargy improved post bolus   Musculoskeletal: no clubbing, no cyanosis, no joint swelling  Psychiatric: A&Ox3, appropriate mood, affect    LABS:                        8.7    7.49  )-----------( 155      ( 2021 07:40 )             30.3         146<H>  |  107  |  28<H>  ----------------------------<  87  3.3<L>   |  24  |  0.92    Ca    8.5      2021 07:40  Phos  2.7       Mg     1.80         TPro  6.1  /  Alb  2.3<L>  /  TBili  0.9  /  DBili  x   /  AST  28  /  ALT  11  /  AlkPhos  135<H>      PT/INR: PT: x | INR: x (21 @ 11:33)  PTT: 36.8 sec (21 @ 11:33)  PT/INR: PT: x | INR: x (21 @ 03:52)  PTT: 43.2 sec (21 @ 03:52)  PT/INR: PT: x | INR: x (21 @ 20:24)  PTT: 41.0 sec (21 @ 20:24)  PT/INR: PT: x | INR: x (21 @ 13:26)  PTT: 89.7 sec (21 @ 13:26)  PT/INR: PT: 15.5 sec | INR: 1.37 ratio (21 @ 04:09)  PTT: 31.1 sec (21 @ 04:09)  PT/INR: PT: 14.9 sec | INR: 1.31 ratio (21 @ 18:30)  PTT: 27.8 sec (21 @ 18:30)    CARDIAC ENZYMES    Creatine Kinase, Serum: 112 U/L (21 @ 04:26)          Serum Pro-Brain Natriuretic Peptide:   D-Dimer Assay:     Urinanalysis Basic (21 @ 12:10):  Color: Yellow / Appearance: Turbid / S.020 / pH: --  Gluc: -- / Ketone: Trace / Bili: Negative / Urobili: 3 mg/dL  Blood: -- / Protein: 30 mg/dL / Nitrite: Negative  Leuk Esterase: Moderate / RBC: >720 / WBC: 238  Sq Epi: -- / Non Sq Epi: -- / Bacteria: Few      Blood Gas Venous (21 @ 12:10):  pH: 7.43 | HCO3: 31 | pCO2: 48 | pO2: 61 | Lactate: 2.4  pH: -- | HCO3: -- | pCO2: -- | pO2: -- | Lactate: 2.2  pH: 7.36 | HCO3: 31 | pCO2: 62 | pO2: 29 | Lactate: 3.9      Blood Gas Arterial (21 @ 12:10):      CAPILLARY BLOOD GLUCOSE:  POCT Blood Glucose: 90 mg/dL (21 @ 08:20)  POCT Blood Glucose: 85 mg/dL (21 @ 22:02)  POCT Blood Glucose: 117 mg/dL (21 @ 17:56)      COVID PCR:  NotDetec (21 @ 13:00)      RADIOLOGY:    MEDICATIONS  (STANDING):  dextrose 50% Injectable 25 Gram(s) IV Push once  enoxaparin Injectable 55 milliGRAM(s) SubCutaneous every 12 hours  FIRST- Mouthwash  BLM 10 milliLiter(s) Swish and Spit every 6 hours  insulin glargine Injectable (LANTUS) 20 Unit(s) SubCutaneous at bedtime  insulin lispro (ADMELOG) corrective regimen sliding scale   SubCutaneous three times a day before meals  insulin lispro (ADMELOG) corrective regimen sliding scale   SubCutaneous at bedtime  insulin lispro Injectable (ADMELOG) 4 Unit(s) SubCutaneous three times a day before meals  magnesium sulfate  IVPB 2 Gram(s) IV Intermittent once  midodrine. 5 milliGRAM(s) Oral once  midodrine. 10 milliGRAM(s) Oral three times a day  nystatin    Suspension 501531 Unit(s) Oral every 6 hours  piperacillin/tazobactam IVPB.. 3.375 Gram(s) IV Intermittent every 8 hours  polyethylene glycol 3350 17 Gram(s) Oral daily  potassium chloride   Powder 40 milliEquivalent(s) Oral once  senna 2 Tablet(s) Oral at bedtime  sodium chloride 0.45%. 1000 milliLiter(s) (75 mL/Hr) IV Continuous <Continuous>    MEDICATIONS  (PRN):  acetaminophen   Tablet .. 650 milliGRAM(s) Oral every 6 hours PRN Temp greater or equal to 38C (100.4F), Mild Pain (1 - 3), Moderate Pain (4 - 6)  magnesium hydroxide Suspension 30 milliLiter(s) Oral daily PRN Constipation  ondansetron    Tablet 4 milliGRAM(s) Oral every 8 hours PRN Nausea and/or Vomiting  oxyCODONE    IR 5 milliGRAM(s) Oral every 4 hours PRN Severe Pain (7 - 10)    I&O's Summary    2021 07:01  -  2021 07:00  --------------------------------------------------------  IN: 300 mL / OUT: 0 mL / NET: 300 mL      I reviewed the above labs, radiology, medications, tests, telemetry, and EKG interpretation.    ASSESSMENT/PLAN:    - Clinical findings, labs, tests, telemetry, and ekg reviewed with attending. Will monitor patient closely.         medium complexity/ risk (45 min)      called by RN pt with hypotension sbp 80s   Pt complaining of dizziness and slightly lethargic but arousable ; alert and oriented x 4, follows commands.    pt received 5mg of midodrine stat   1 liter bolus over 60min   bp improved sbp low 100s then repeat low 90s   as per dr. midodrine ok to give additional midodrine 5mg x 1 and increase standing order midodrine 10mg p.o. TID   VSS q 4 hours   pt dizziness resolved [facility]ICINE COVERAGE - Medicine Subsequent Hospital Care Note    CC:  HPI/Subjective:    ROS:  Denies fever, chills, diaphoresis, malaise, night sweats, generalized weakness, headache, changes in vision, dizziness, cough, sputum production, wheezing, hemoptysis, chest pain, palpitations, shortness of breath, dyspnea on exertion, PND, dysphagia, new abdominal pain, nausea, vomiting, diarrhea, constipation, melena, hematochezia, dysuria, increased urinary frequency/urgency, hematuria, nocturia, numbness/weakness/tingling, any other complaints.    [  ] I spoke with the attending, nurse, and family to obtain the history.  [  ] Unable to obtain history due to ___________.    Vital Signs Last 24 Hrs  T(C): 36.7 (21 @ 12:00), Max: 36.9 (21 @ 21:44)  T(F): 98 (21 @ 12:00), Max: 98.5 (21 @ 21:44)  HR: 88 (21 @ 12:00) (82 - 99)  BP: 93/50 (21 @ 12:00) (77/44 - 94/55)  RR: 16 (21 @ 12:00) (16 - 18)  SpO2: 99% (21 @ 12:00) (98% - 100%) on (O2)    PHYSICAL EXAM:  Constitutional: NAD, well-developed, well-nourished  Ears, nose, Mouth, and Throat: normal external ears and nose, normal hearing, moist oral mucosa  Eyes: normal conjunctiva, EOMI, PEERL  Neck: supple, no JVD  Respiratory: Clear to auscultation bilaterally. No wheezes, rales or rhonchi. Normal respiratory effort  Cardiovascular: regular rate and rhythm, S1 and S2, no murmurs, rubs or gallops, no edema, 2+ peripheral pulses  Gastrointestinal: soft, nontender, nondistended, +bowel sounds, no hernia  Skin: warm, dry, no rash  Neurologic: sensation grossly intact, CN grossly intact, non-focal exam: lethargic but arousable:lethargy improved post bolus   Musculoskeletal: no clubbing, no cyanosis, no joint swelling  Psychiatric: A&Ox3, appropriate mood, affect    LABS:                        8.7    7.49  )-----------( 155      ( 2021 07:40 )             30.3         146<H>  |  107  |  28<H>  ----------------------------<  87  3.3<L>   |  24  |  0.92    Ca    8.5      2021 07:40  Phos  2.7       Mg     1.80         TPro  6.1  /  Alb  2.3<L>  /  TBili  0.9  /  DBili  x   /  AST  28  /  ALT  11  /  AlkPhos  135<H>      PT/INR: PT: x | INR: x (21 @ 11:33)  PTT: 36.8 sec (21 @ 11:33)  PT/INR: PT: x | INR: x (21 @ 03:52)  PTT: 43.2 sec (21 @ 03:52)  PT/INR: PT: x | INR: x (21 @ 20:24)  PTT: 41.0 sec (21 @ 20:24)  PT/INR: PT: x | INR: x (21 @ 13:26)  PTT: 89.7 sec (21 @ 13:26)  PT/INR: PT: 15.5 sec | INR: 1.37 ratio (21 @ 04:09)  PTT: 31.1 sec (21 @ 04:09)  PT/INR: PT: 14.9 sec | INR: 1.31 ratio (21 @ 18:30)  PTT: 27.8 sec (21 @ 18:30)    CARDIAC ENZYMES    Creatine Kinase, Serum: 112 U/L (21 @ 04:26)          Serum Pro-Brain Natriuretic Peptide:   D-Dimer Assay:     Urinanalysis Basic (21 @ 12:10):  Color: Yellow / Appearance: Turbid / S.020 / pH: --  Gluc: -- / Ketone: Trace / Bili: Negative / Urobili: 3 mg/dL  Blood: -- / Protein: 30 mg/dL / Nitrite: Negative  Leuk Esterase: Moderate / RBC: >720 / WBC: 238  Sq Epi: -- / Non Sq Epi: -- / Bacteria: Few      Blood Gas Venous (21 @ 12:10):  pH: 7.43 | HCO3: 31 | pCO2: 48 | pO2: 61 | Lactate: 2.4  pH: -- | HCO3: -- | pCO2: -- | pO2: -- | Lactate: 2.2  pH: 7.36 | HCO3: 31 | pCO2: 62 | pO2: 29 | Lactate: 3.9      Blood Gas Arterial (21 @ 12:10):      CAPILLARY BLOOD GLUCOSE:  POCT Blood Glucose: 90 mg/dL (21 @ 08:20)  POCT Blood Glucose: 85 mg/dL (21 @ 22:02)  POCT Blood Glucose: 117 mg/dL (21 @ 17:56)      COVID PCR:  NotDetec (21 @ 13:00)      RADIOLOGY:    MEDICATIONS  (STANDING):  dextrose 50% Injectable 25 Gram(s) IV Push once  enoxaparin Injectable 55 milliGRAM(s) SubCutaneous every 12 hours  FIRST- Mouthwash  BLM 10 milliLiter(s) Swish and Spit every 6 hours  insulin glargine Injectable (LANTUS) 20 Unit(s) SubCutaneous at bedtime  insulin lispro (ADMELOG) corrective regimen sliding scale   SubCutaneous three times a day before meals  insulin lispro (ADMELOG) corrective regimen sliding scale   SubCutaneous at bedtime  insulin lispro Injectable (ADMELOG) 4 Unit(s) SubCutaneous three times a day before meals  magnesium sulfate  IVPB 2 Gram(s) IV Intermittent once  midodrine. 5 milliGRAM(s) Oral once  midodrine. 10 milliGRAM(s) Oral three times a day  nystatin    Suspension 968792 Unit(s) Oral every 6 hours  piperacillin/tazobactam IVPB.. 3.375 Gram(s) IV Intermittent every 8 hours  polyethylene glycol 3350 17 Gram(s) Oral daily  potassium chloride   Powder 40 milliEquivalent(s) Oral once  senna 2 Tablet(s) Oral at bedtime  sodium chloride 0.45%. 1000 milliLiter(s) (75 mL/Hr) IV Continuous <Continuous>    MEDICATIONS  (PRN):  acetaminophen   Tablet .. 650 milliGRAM(s) Oral every 6 hours PRN Temp greater or equal to 38C (100.4F), Mild Pain (1 - 3), Moderate Pain (4 - 6)  magnesium hydroxide Suspension 30 milliLiter(s) Oral daily PRN Constipation  ondansetron    Tablet 4 milliGRAM(s) Oral every 8 hours PRN Nausea and/or Vomiting  oxyCODONE    IR 5 milliGRAM(s) Oral every 4 hours PRN Severe Pain (7 - 10)    I&O's Summary    2021 07:01  -  2021 07:00  --------------------------------------------------------  IN: 300 mL / OUT: 0 mL / NET: 300 mL      I reviewed the above labs, radiology, medications, tests, telemetry, and EKG interpretation.    ASSESSMENT/PLAN:    - Clinical findings, labs, tests, telemetry, and ekg reviewed with attending. Will monitor patient closely.         medium complexity/ risk (45 min)      called by RN pt with hypotension sbp 80s   Pt complaining of dizziness and slightly lethargic but arousable ; alert and oriented x 4, follows commands.    pt received 5mg of midodrine stat   1 liter bolus over 60min   bp improved sbp low 100s then repeat low 90s   as per dr. midodrine ok to give additional midodrine 5mg x 1 and increase standing order midodrine 10mg p.o. TID   VSS q 4 hours   pt dizziness resolved    pt vomited x 1 zofran added, f/u abdominal xray ---------------------pos bowel sounds x 4 quadrants, abdomen non alfonso, slightly distended most likle from ascites.    d/w Adr. Glynn-lidya in agreement with plan [facility] MEDICINE COVERAGE - Medicine Subsequent Hospital Care Note    CC:  HPI/Subjective:    ROS:  Denies fever, chills, diaphoresis, malaise, night sweats, generalized weakness, headache, changes in vision, dizziness, cough, sputum production, wheezing, hemoptysis, chest pain, palpitations, shortness of breath, dyspnea on exertion, PND, dysphagia, new abdominal pain, nausea, vomiting, diarrhea, constipation, melena, hematochezia, dysuria, increased urinary frequency/urgency, hematuria, nocturia, numbness/weakness/tingling, any other complaints.    [  ] I spoke with the attending, nurse, and family to obtain the history.  [  ] Unable to obtain history due to ___________.    Vital Signs Last 24 Hrs  T(C): 36.7 (21 @ 12:00), Max: 36.9 (21 @ 21:44)  T(F): 98 (21 @ 12:00), Max: 98.5 (21 @ 21:44)  HR: 88 (21 @ 12:00) (82 - 99)  BP: 93/50 (21 @ 12:00) (77/44 - 94/55)  RR: 16 (21 @ 12:00) (16 - 18)  SpO2: 99% (21 @ 12:00) (98% - 100%) on (O2)    PHYSICAL EXAM:  Constitutional: NAD, well-developed, well-nourished  Ears, nose, Mouth, and Throat: normal external ears and nose, normal hearing, moist oral mucosa  Eyes: normal conjunctiva, EOMI, PEERL  Neck: supple, no JVD  Respiratory: Clear to auscultation bilaterally. No wheezes, rales or rhonchi. Normal respiratory effort  Cardiovascular: regular rate and rhythm, S1 and S2, no murmurs, rubs or gallops, no edema, 2+ peripheral pulses  Gastrointestinal: soft, nontender, nondistended, +bowel sounds, no hernia  Skin: warm, dry, no rash  Neurologic: sensation grossly intact, CN grossly intact, non-focal exam: lethargic but arousable:lethargy improved post bolus   Musculoskeletal: no clubbing, no cyanosis, no joint swelling  Psychiatric: A&Ox3, appropriate mood, affect    LABS:                        8.7    7.49  )-----------( 155      ( 2021 07:40 )             30.3         146<H>  |  107  |  28<H>  ----------------------------<  87  3.3<L>   |  24  |  0.92    Ca    8.5      2021 07:40  Phos  2.7       Mg     1.80         TPro  6.1  /  Alb  2.3<L>  /  TBili  0.9  /  DBili  x   /  AST  28  /  ALT  11  /  AlkPhos  135<H>      PT/INR: PT: x | INR: x (21 @ 11:33)  PTT: 36.8 sec (21 @ 11:33)  PT/INR: PT: x | INR: x (21 @ 03:52)  PTT: 43.2 sec (21 @ 03:52)  PT/INR: PT: x | INR: x (21 @ 20:24)  PTT: 41.0 sec (21 @ 20:24)  PT/INR: PT: x | INR: x (21 @ 13:26)  PTT: 89.7 sec (21 @ 13:26)  PT/INR: PT: 15.5 sec | INR: 1.37 ratio (21 @ 04:09)  PTT: 31.1 sec (21 @ 04:09)  PT/INR: PT: 14.9 sec | INR: 1.31 ratio (21 @ 18:30)  PTT: 27.8 sec (21 @ 18:30)    CARDIAC ENZYMES    Creatine Kinase, Serum: 112 U/L (21 @ 04:26)          Serum Pro-Brain Natriuretic Peptide:   D-Dimer Assay:     Urinanalysis Basic (21 @ 12:10):  Color: Yellow / Appearance: Turbid / S.020 / pH: --  Gluc: -- / Ketone: Trace / Bili: Negative / Urobili: 3 mg/dL  Blood: -- / Protein: 30 mg/dL / Nitrite: Negative  Leuk Esterase: Moderate / RBC: >720 / WBC: 238  Sq Epi: -- / Non Sq Epi: -- / Bacteria: Few      Blood Gas Venous (21 @ 12:10):  pH: 7.43 | HCO3: 31 | pCO2: 48 | pO2: 61 | Lactate: 2.4  pH: -- | HCO3: -- | pCO2: -- | pO2: -- | Lactate: 2.2  pH: 7.36 | HCO3: 31 | pCO2: 62 | pO2: 29 | Lactate: 3.9      Blood Gas Arterial (21 @ 12:10):      CAPILLARY BLOOD GLUCOSE:  POCT Blood Glucose: 90 mg/dL (21 @ 08:20)  POCT Blood Glucose: 85 mg/dL (21 @ 22:02)  POCT Blood Glucose: 117 mg/dL (21 @ 17:56)      COVID PCR:  NotDetec (21 @ 13:00)      RADIOLOGY:    MEDICATIONS  (STANDING):  dextrose 50% Injectable 25 Gram(s) IV Push once  enoxaparin Injectable 55 milliGRAM(s) SubCutaneous every 12 hours  FIRST- Mouthwash  BLM 10 milliLiter(s) Swish and Spit every 6 hours  insulin glargine Injectable (LANTUS) 20 Unit(s) SubCutaneous at bedtime  insulin lispro (ADMELOG) corrective regimen sliding scale   SubCutaneous three times a day before meals  insulin lispro (ADMELOG) corrective regimen sliding scale   SubCutaneous at bedtime  insulin lispro Injectable (ADMELOG) 4 Unit(s) SubCutaneous three times a day before meals  magnesium sulfate  IVPB 2 Gram(s) IV Intermittent once  midodrine. 5 milliGRAM(s) Oral once  midodrine. 10 milliGRAM(s) Oral three times a day  nystatin    Suspension 411816 Unit(s) Oral every 6 hours  piperacillin/tazobactam IVPB.. 3.375 Gram(s) IV Intermittent every 8 hours  polyethylene glycol 3350 17 Gram(s) Oral daily  potassium chloride   Powder 40 milliEquivalent(s) Oral once  senna 2 Tablet(s) Oral at bedtime  sodium chloride 0.45%. 1000 milliLiter(s) (75 mL/Hr) IV Continuous <Continuous>    MEDICATIONS  (PRN):  acetaminophen   Tablet .. 650 milliGRAM(s) Oral every 6 hours PRN Temp greater or equal to 38C (100.4F), Mild Pain (1 - 3), Moderate Pain (4 - 6)  magnesium hydroxide Suspension 30 milliLiter(s) Oral daily PRN Constipation  ondansetron    Tablet 4 milliGRAM(s) Oral every 8 hours PRN Nausea and/or Vomiting  oxyCODONE    IR 5 milliGRAM(s) Oral every 4 hours PRN Severe Pain (7 - 10)    I&O's Summary    2021 07:01  -  2021 07:00  --------------------------------------------------------  IN: 300 mL / OUT: 0 mL / NET: 300 mL      I reviewed the above labs, radiology, medications, tests, telemetry, and EKG interpretation.    ASSESSMENT/PLAN:    - Clinical findings, labs, tests, telemetry, and ekg reviewed with attending. Will monitor patient closely.         medium complexity/ risk (45 min)      called by RN pt with hypotension sbp 80s   Pt complaining of dizziness and slightly lethargic but arousable ; alert and oriented x 4, follows commands.    pt received 5mg of midodrine stat   1 liter bolus over 60min   bp improved sbp low 100s then repeat low 90s   as per dr. midodrine ok to give additional midodrine 5mg x 1 and increase standing order midodrine 10mg p.o. TID   VSS q 4 hours   pt dizziness resolved    pt vomited x 1 zofran added, f/u abdominal xray ---------------------pos bowel sounds x 4 quadrants, abdomen non alfonso, slightly distended most like from ascites.    d/w dr. Glover in agreement with plan      Pt refusing all meds understands risk including death she understands that her blood pressure is low and requires midodrine.  daughter marline aware and is calling mom to advise her to take these medications .  If bp decreases will require boluses . D/w daughter Marline will come in am for family meeting in am .    d/w Dr. Glynn Contreras in agreement with plan . [facility] MEDICINE COVERAGE - Medicine Subsequent Hospital Care Note    CC:  HPI/Subjective:    ROS:  Denies fever, chills, diaphoresis, malaise, night sweats, generalized weakness, headache, changes in vision, dizziness, cough, sputum production, wheezing, hemoptysis, chest pain, palpitations, shortness of breath, dyspnea on exertion, PND, dysphagia, new abdominal pain, nausea, vomiting, diarrhea, constipation, melena, hematochezia, dysuria, increased urinary frequency/urgency, hematuria, nocturia, numbness/weakness/tingling, any other complaints.    [  ] I spoke with the attending, nurse, and family to obtain the history.  [  ] Unable to obtain history due to ___________.    Vital Signs Last 24 Hrs  T(C): 36.7 (21 @ 12:00), Max: 36.9 (21 @ 21:44)  T(F): 98 (21 @ 12:00), Max: 98.5 (21 @ 21:44)  HR: 88 (21 @ 12:00) (82 - 99)  BP: 93/50 (21 @ 12:00) (77/44 - 94/55)  RR: 16 (21 @ 12:00) (16 - 18)  SpO2: 99% (21 @ 12:00) (98% - 100%) on (O2)    PHYSICAL EXAM:  Constitutional: NAD, well-developed, well-nourished  Ears, nose, Mouth, and Throat: normal external ears and nose, normal hearing, moist oral mucosa  Eyes: normal conjunctiva, EOMI, PEERL  Neck: supple, no JVD  Respiratory: Clear to auscultation bilaterally. No wheezes, rales or rhonchi. Normal respiratory effort  Cardiovascular: regular rate and rhythm, S1 and S2, no murmurs, rubs or gallops, no edema, 2+ peripheral pulses  Gastrointestinal: soft, nontender, nondistended, +bowel sounds, no hernia  Skin: warm, dry, no rash  Neurologic: sensation grossly intact, CN grossly intact, non-focal exam: lethargic but arousable:lethargy improved post bolus   Musculoskeletal: no clubbing, no cyanosis, no joint swelling  Psychiatric: A&Ox3, appropriate mood, affect    LABS:                        8.7    7.49  )-----------( 155      ( 2021 07:40 )             30.3         146<H>  |  107  |  28<H>  ----------------------------<  87  3.3<L>   |  24  |  0.92    Ca    8.5      2021 07:40  Phos  2.7       Mg     1.80         TPro  6.1  /  Alb  2.3<L>  /  TBili  0.9  /  DBili  x   /  AST  28  /  ALT  11  /  AlkPhos  135<H>      PT/INR: PT: x | INR: x (21 @ 11:33)  PTT: 36.8 sec (21 @ 11:33)  PT/INR: PT: x | INR: x (21 @ 03:52)  PTT: 43.2 sec (21 @ 03:52)  PT/INR: PT: x | INR: x (21 @ 20:24)  PTT: 41.0 sec (21 @ 20:24)  PT/INR: PT: x | INR: x (21 @ 13:26)  PTT: 89.7 sec (21 @ 13:26)  PT/INR: PT: 15.5 sec | INR: 1.37 ratio (21 @ 04:09)  PTT: 31.1 sec (21 @ 04:09)  PT/INR: PT: 14.9 sec | INR: 1.31 ratio (21 @ 18:30)  PTT: 27.8 sec (21 @ 18:30)    CARDIAC ENZYMES    Creatine Kinase, Serum: 112 U/L (21 @ 04:26)          Serum Pro-Brain Natriuretic Peptide:   D-Dimer Assay:     Urinanalysis Basic (21 @ 12:10):  Color: Yellow / Appearance: Turbid / S.020 / pH: --  Gluc: -- / Ketone: Trace / Bili: Negative / Urobili: 3 mg/dL  Blood: -- / Protein: 30 mg/dL / Nitrite: Negative  Leuk Esterase: Moderate / RBC: >720 / WBC: 238  Sq Epi: -- / Non Sq Epi: -- / Bacteria: Few      Blood Gas Venous (21 @ 12:10):  pH: 7.43 | HCO3: 31 | pCO2: 48 | pO2: 61 | Lactate: 2.4  pH: -- | HCO3: -- | pCO2: -- | pO2: -- | Lactate: 2.2  pH: 7.36 | HCO3: 31 | pCO2: 62 | pO2: 29 | Lactate: 3.9      Blood Gas Arterial (21 @ 12:10):      CAPILLARY BLOOD GLUCOSE:  POCT Blood Glucose: 90 mg/dL (21 @ 08:20)  POCT Blood Glucose: 85 mg/dL (21 @ 22:02)  POCT Blood Glucose: 117 mg/dL (21 @ 17:56)      COVID PCR:  NotDetec (21 @ 13:00)      RADIOLOGY:    MEDICATIONS  (STANDING):  dextrose 50% Injectable 25 Gram(s) IV Push once  enoxaparin Injectable 55 milliGRAM(s) SubCutaneous every 12 hours  FIRST- Mouthwash  BLM 10 milliLiter(s) Swish and Spit every 6 hours  insulin glargine Injectable (LANTUS) 20 Unit(s) SubCutaneous at bedtime  insulin lispro (ADMELOG) corrective regimen sliding scale   SubCutaneous three times a day before meals  insulin lispro (ADMELOG) corrective regimen sliding scale   SubCutaneous at bedtime  insulin lispro Injectable (ADMELOG) 4 Unit(s) SubCutaneous three times a day before meals  magnesium sulfate  IVPB 2 Gram(s) IV Intermittent once  midodrine. 5 milliGRAM(s) Oral once  midodrine. 10 milliGRAM(s) Oral three times a day  nystatin    Suspension 407570 Unit(s) Oral every 6 hours  piperacillin/tazobactam IVPB.. 3.375 Gram(s) IV Intermittent every 8 hours  polyethylene glycol 3350 17 Gram(s) Oral daily  potassium chloride   Powder 40 milliEquivalent(s) Oral once  senna 2 Tablet(s) Oral at bedtime  sodium chloride 0.45%. 1000 milliLiter(s) (75 mL/Hr) IV Continuous <Continuous>    MEDICATIONS  (PRN):  acetaminophen   Tablet .. 650 milliGRAM(s) Oral every 6 hours PRN Temp greater or equal to 38C (100.4F), Mild Pain (1 - 3), Moderate Pain (4 - 6)  magnesium hydroxide Suspension 30 milliLiter(s) Oral daily PRN Constipation  ondansetron    Tablet 4 milliGRAM(s) Oral every 8 hours PRN Nausea and/or Vomiting  oxyCODONE    IR 5 milliGRAM(s) Oral every 4 hours PRN Severe Pain (7 - 10)    I&O's Summary    2021 07:01  -  2021 07:00  --------------------------------------------------------  IN: 300 mL / OUT: 0 mL / NET: 300 mL      I reviewed the above labs, radiology, medications, tests, telemetry, and EKG interpretation.    ASSESSMENT/PLAN:    - Clinical findings, labs, tests, telemetry, and ekg reviewed with attending. Will monitor patient closely.         medium complexity/ risk (45 min)      called by RN pt with hypotension sbp 80s   Pt complaining of dizziness and slightly lethargic but arousable ; alert and oriented x 4, follows commands.    pt received 5mg of midodrine stat   1 liter bolus over 60min   bp improved sbp low 100s then repeat low 90s   as per dr. midodrine ok to give additional midodrine 5mg x 1 and increase standing order midodrine 10mg p.o. TID   VSS q 4 hours   pt dizziness resolved    pt vomited x 1 zofran added, f/u abdominal xray ---------------------pos bowel sounds x 4 quadrants, abdomen non alfonso, slightly distended most like from ascites.    d/w dr. Glover in agreement with plan      Pt refusing all meds understands risk including death she understands that her blood pressure is low and requires midodrine. pt states she understands risk and continues to refuse.   daughter marline aware and is calling mom to advise her to take these medications .  If bp decreases will require boluses . D/w daughter Marline will come in am for family meeting in am for ongoing goc ; daughter aware prognosis guarded: palliative following.    d/w Dr. Gylnn Contreras in agreement with plan . [facility]OVER[facility] - Medicine Subsequent Hospital Care Note    CC:  HPI/Subjective:    ROS:  Denies fever, chills, diaphoresis, malaise, night sweats, generalized weakness, headache, changes in vision, dizziness, cough, sputum production, wheezing, hemoptysis, chest pain, palpitations, shortness of breath, dyspnea on exertion, PND, dysphagia, new abdominal pain, nausea, vomiting, diarrhea, constipation, melena, hematochezia, dysuria, increased urinary frequency/urgency, hematuria, nocturia, numbness/weakness/tingling, any other complaints.    [  ] I spoke with the attending, nurse, and family to obtain the history.  [  ] Unable to obtain history due to ___________.    Vital Signs Last 24 Hrs  T(C): 36.7 (21 @ 12:00), Max: 36.9 (21 @ 21:44)  T(F): 98 (21 @ 12:00), Max: 98.5 (21 @ 21:44)  HR: 88 (21 @ 12:00) (82 - 99)  BP: 93/50 (21 @ 12:00) (77/44 - 94/55)  RR: 16 (21 @ 12:00) (16 - 18)  SpO2: 99% (21 @ 12:00) (98% - 100%) on (O2)    PHYSICAL EXAM:  Constitutional: NAD, well-developed, well-nourished  Ears, nose, Mouth, and Throat: normal external ears and nose, normal hearing, moist oral mucosa  Eyes: normal conjunctiva, EOMI, PEERL  Neck: supple, no JVD  Respiratory: Clear to auscultation bilaterally. No wheezes, rales or rhonchi. Normal respiratory effort  Cardiovascular: regular rate and rhythm, S1 and S2, no murmurs, rubs or gallops, no edema, 2+ peripheral pulses  Gastrointestinal: soft, nontender, nondistended, +bowel sounds, no hernia  Skin: warm, dry, no rash  Neurologic: sensation grossly intact, CN grossly intact, non-focal exam: lethargic but arousable:lethargy improved post bolus   Musculoskeletal: no clubbing, no cyanosis, no joint swelling  Psychiatric: A&Ox3, appropriate mood, affect    LABS:                        8.7    7.49  )-----------( 155      ( 2021 07:40 )             30.3         146<H>  |  107  |  28<H>  ----------------------------<  87  3.3<L>   |  24  |  0.92    Ca    8.5      2021 07:40  Phos  2.7       Mg     1.80         TPro  6.1  /  Alb  2.3<L>  /  TBili  0.9  /  DBili  x   /  AST  28  /  ALT  11  /  AlkPhos  135<H>      PT/INR: PT: x | INR: x (21 @ 11:33)  PTT: 36.8 sec (21 @ 11:33)  PT/INR: PT: x | INR: x (21 @ 03:52)  PTT: 43.2 sec (21 @ 03:52)  PT/INR: PT: x | INR: x (21 @ 20:24)  PTT: 41.0 sec (21 @ 20:24)  PT/INR: PT: x | INR: x (21 @ 13:26)  PTT: 89.7 sec (21 @ 13:26)  PT/INR: PT: 15.5 sec | INR: 1.37 ratio (21 @ 04:09)  PTT: 31.1 sec (21 @ 04:09)  PT/INR: PT: 14.9 sec | INR: 1.31 ratio (21 @ 18:30)  PTT: 27.8 sec (21 @ 18:30)    CARDIAC ENZYMES    Creatine Kinase, Serum: 112 U/L (21 @ 04:26)          Serum Pro-Brain Natriuretic Peptide:   D-Dimer Assay:     Urinanalysis Basic (21 @ 12:10):  Color: Yellow / Appearance: Turbid / S.020 / pH: --  Gluc: -- / Ketone: Trace / Bili: Negative / Urobili: 3 mg/dL  Blood: -- / Protein: 30 mg/dL / Nitrite: Negative  Leuk Esterase: Moderate / RBC: >720 / WBC: 238  Sq Epi: -- / Non Sq Epi: -- / Bacteria: Few      Blood Gas Venous (21 @ 12:10):  pH: 7.43 | HCO3: 31 | pCO2: 48 | pO2: 61 | Lactate: 2.4  pH: -- | HCO3: -- | pCO2: -- | pO2: -- | Lactate: 2.2  pH: 7.36 | HCO3: 31 | pCO2: 62 | pO2: 29 | Lactate: 3.9      Blood Gas Arterial (21 @ 12:10):      CAPILLARY BLOOD GLUCOSE:  POCT Blood Glucose: 90 mg/dL (21 @ 08:20)  POCT Blood Glucose: 85 mg/dL (21 @ 22:02)  POCT Blood Glucose: 117 mg/dL (21 @ 17:56)      COVID PCR:  NotDetec (21 @ 13:00)      RADIOLOGY:    MEDICATIONS  (STANDING):  dextrose 50% Injectable 25 Gram(s) IV Push once  enoxaparin Injectable 55 milliGRAM(s) SubCutaneous every 12 hours  FIRST- Mouthwash  BLM 10 milliLiter(s) Swish and Spit every 6 hours  insulin glargine Injectable (LANTUS) 20 Unit(s) SubCutaneous at bedtime  insulin lispro (ADMELOG) corrective regimen sliding scale   SubCutaneous three times a day before meals  insulin lispro (ADMELOG) corrective regimen sliding scale   SubCutaneous at bedtime  insulin lispro Injectable (ADMELOG) 4 Unit(s) SubCutaneous three times a day before meals  magnesium sulfate  IVPB 2 Gram(s) IV Intermittent once  midodrine. 5 milliGRAM(s) Oral once  midodrine. 10 milliGRAM(s) Oral three times a day  nystatin    Suspension 348967 Unit(s) Oral every 6 hours  piperacillin/tazobactam IVPB.. 3.375 Gram(s) IV Intermittent every 8 hours  polyethylene glycol 3350 17 Gram(s) Oral daily  potassium chloride   Powder 40 milliEquivalent(s) Oral once  senna 2 Tablet(s) Oral at bedtime  sodium chloride 0.45%. 1000 milliLiter(s) (75 mL/Hr) IV Continuous <Continuous>    MEDICATIONS  (PRN):  acetaminophen   Tablet .. 650 milliGRAM(s) Oral every 6 hours PRN Temp greater or equal to 38C (100.4F), Mild Pain (1 - 3), Moderate Pain (4 - 6)  magnesium hydroxide Suspension 30 milliLiter(s) Oral daily PRN Constipation  ondansetron    Tablet 4 milliGRAM(s) Oral every 8 hours PRN Nausea and/or Vomiting  oxyCODONE    IR 5 milliGRAM(s) Oral every 4 hours PRN Severe Pain (7 - 10)    I&O's Summary    2021 07:01  -  2021 07:00  --------------------------------------------------------  IN: 300 mL / OUT: 0 mL / NET: 300 mL      I reviewed the above labs, radiology, medications, tests, telemetry, and EKG interpretation.    ASSESSMENT/PLAN:    - Clinical findings, labs, tests, telemetry, and ekg reviewed with attending. Will monitor patient closely.         medium complexity/ risk (45 min)      called by RN pt with hypotension sbp 80s   Pt complaining of dizziness and slightly lethargic but arousable ; alert and oriented x 4, follows commands.    pt received 5mg of midodrine stat   1 liter bolus over 60min   bp improved sbp low 100s then repeat low 90s   as per dr. midodrine ok to give additional midodrine 5mg x 1 and increase standing order midodrine 10mg p.o. TID   VSS q 4 hours   pt dizziness resolved    pt vomited x 1 zofran added, f/u abdominal xray ---------------------pos bowel sounds x 4 quadrants, abdomen non alfonso, slightly distended most like from ascites.    d/w dr. Glover in agreement with plan      Pt refusing all meds understands risk including death she understands that her blood pressure is low and requires midodrine. pt states she understands risk and continues to refuse.   daughter marline aware and is calling mom to advise her to take these medications .  If bp decreases will require boluses . D/w daughter Marline will come in am for family meeting in am for ongoing goc : daughter will confirm time with me in am ; daughter aware prognosis guarded: palliative following.    d/w Dr. Glynn Contreras in agreement with plan .    low threshold for ICU consult.  as per Dr. Glynn Contreras no role for ICU at this time [facility] COVERAGE - Medicine Subsequent Hospital Care Note    CC:  HPI/Subjective:    ROS:  Denies fever, chills, diaphoresis, malaise, night sweats, generalized weakness, headache, changes in vision, dizziness, cough, sputum production, wheezing, hemoptysis, chest pain, palpitations, shortness of breath, dyspnea on exertion, PND, dysphagia, new abdominal pain, nausea, vomiting, diarrhea, constipation, melena, hematochezia, dysuria, increased urinary frequency/urgency, hematuria, nocturia, numbness/weakness/tingling, any other complaints.    [  ] I spoke with the attending, nurse, and family to obtain the history.  [  ] Unable to obtain history due to ___________.    Vital Signs Last 24 Hrs  T(C): 36.7 (21 @ 12:00), Max: 36.9 (21 @ 21:44)  T(F): 98 (21 @ 12:00), Max: 98.5 (21 @ 21:44)  HR: 88 (21 @ 12:00) (82 - 99)  BP: 93/50 (21 @ 12:00) (77/44 - 94/55)  RR: 16 (21 @ 12:00) (16 - 18)  SpO2: 99% (21 @ 12:00) (98% - 100%) on (O2)    PHYSICAL EXAM:  Constitutional: NAD, well-developed, well-nourished  Ears, nose, Mouth, and Throat: normal external ears and nose, normal hearing, moist oral mucosa  Eyes: normal conjunctiva, EOMI, PEERL  Neck: supple, no JVD  Respiratory: Clear to auscultation bilaterally. No wheezes, rales or rhonchi. Normal respiratory effort  Cardiovascular: regular rate and rhythm, S1 and S2, no murmurs, rubs or gallops, no edema, 2+ peripheral pulses  Gastrointestinal: soft, nontender, nondistended, +bowel sounds, no hernia  Skin: warm, dry, no rash  Neurologic: sensation grossly intact, CN grossly intact, non-focal exam: lethargic but arousable:lethargy improved post bolus   Musculoskeletal: no clubbing, no cyanosis, no joint swelling  Psychiatric: A&Ox3, appropriate mood, affect    LABS:                        8.7    7.49  )-----------( 155      ( 2021 07:40 )             30.3         146<H>  |  107  |  28<H>  ----------------------------<  87  3.3<L>   |  24  |  0.92    Ca    8.5      2021 07:40  Phos  2.7       Mg     1.80         TPro  6.1  /  Alb  2.3<L>  /  TBili  0.9  /  DBili  x   /  AST  28  /  ALT  11  /  AlkPhos  135<H>      PT/INR: PT: x | INR: x (21 @ 11:33)  PTT: 36.8 sec (21 @ 11:33)  PT/INR: PT: x | INR: x (21 @ 03:52)  PTT: 43.2 sec (21 @ 03:52)  PT/INR: PT: x | INR: x (21 @ 20:24)  PTT: 41.0 sec (21 @ 20:24)  PT/INR: PT: x | INR: x (21 @ 13:26)  PTT: 89.7 sec (21 @ 13:26)  PT/INR: PT: 15.5 sec | INR: 1.37 ratio (21 @ 04:09)  PTT: 31.1 sec (21 @ 04:09)  PT/INR: PT: 14.9 sec | INR: 1.31 ratio (21 @ 18:30)  PTT: 27.8 sec (21 @ 18:30)    CARDIAC ENZYMES    Creatine Kinase, Serum: 112 U/L (21 @ 04:26)          Serum Pro-Brain Natriuretic Peptide:   D-Dimer Assay:     Urinanalysis Basic (21 @ 12:10):  Color: Yellow / Appearance: Turbid / S.020 / pH: --  Gluc: -- / Ketone: Trace / Bili: Negative / Urobili: 3 mg/dL  Blood: -- / Protein: 30 mg/dL / Nitrite: Negative  Leuk Esterase: Moderate / RBC: >720 / WBC: 238  Sq Epi: -- / Non Sq Epi: -- / Bacteria: Few      Blood Gas Venous (21 @ 12:10):  pH: 7.43 | HCO3: 31 | pCO2: 48 | pO2: 61 | Lactate: 2.4  pH: -- | HCO3: -- | pCO2: -- | pO2: -- | Lactate: 2.2  pH: 7.36 | HCO3: 31 | pCO2: 62 | pO2: 29 | Lactate: 3.9      Blood Gas Arterial (21 @ 12:10):      CAPILLARY BLOOD GLUCOSE:  POCT Blood Glucose: 90 mg/dL (21 @ 08:20)  POCT Blood Glucose: 85 mg/dL (21 @ 22:02)  POCT Blood Glucose: 117 mg/dL (21 @ 17:56)      COVID PCR:  NotDetec (21 @ 13:00)      RADIOLOGY:    MEDICATIONS  (STANDING):  dextrose 50% Injectable 25 Gram(s) IV Push once  enoxaparin Injectable 55 milliGRAM(s) SubCutaneous every 12 hours  FIRST- Mouthwash  BLM 10 milliLiter(s) Swish and Spit every 6 hours  insulin glargine Injectable (LANTUS) 20 Unit(s) SubCutaneous at bedtime  insulin lispro (ADMELOG) corrective regimen sliding scale   SubCutaneous three times a day before meals  insulin lispro (ADMELOG) corrective regimen sliding scale   SubCutaneous at bedtime  insulin lispro Injectable (ADMELOG) 4 Unit(s) SubCutaneous three times a day before meals  magnesium sulfate  IVPB 2 Gram(s) IV Intermittent once  midodrine. 5 milliGRAM(s) Oral once  midodrine. 10 milliGRAM(s) Oral three times a day  nystatin    Suspension [id number] Unit(s) Oral every 6 hours  piperacillin/tazobactam IVPB.. 3.375 Gram(s) IV Intermittent every 8 hours  polyethylene glycol 3350 17 Gram(s) Oral daily  potassium chloride   Powder 40 milliEquivalent(s) Oral once  senna 2 Tablet(s) Oral at bedtime  sodium chloride 0.45%. 1000 milliLiter(s) (75 mL/Hr) IV Continuous <Continuous>    MEDICATIONS  (PRN):  acetaminophen   Tablet .. 650 milliGRAM(s) Oral every 6 hours PRN Temp greater or equal to 38C (100.4F), Mild Pain (1 - 3), Moderate Pain (4 - 6)  magnesium hydroxide Suspension 30 milliLiter(s) Oral daily PRN Constipation  ondansetron    Tablet 4 milliGRAM(s) Oral every 8 hours PRN Nausea and/or Vomiting  oxyCODONE    IR 5 milliGRAM(s) Oral every 4 hours PRN Severe Pain (7 - 10)    I&O's Summary    2021 07:01  -  2021 07:00  --------------------------------------------------------  IN: 300 mL / OUT: 0 mL / NET: 300 mL      I reviewed the above labs, radiology, medications, tests, telemetry, and EKG interpretation.    ASSESSMENT/PLAN:    - Clinical findings, labs, tests, telemetry, and ekg reviewed with attending. Will monitor patient closely.         medium complexity/ risk (45 min)      called by RN pt with hypotension sbp 80s   Pt complaining of dizziness and slightly lethargic but arousable ; alert and oriented x 4, follows commands.    pt received 5mg of midodrine stat   1 liter bolus over 60min   bp improved sbp low 100s then repeat low 90s   as per dr. midodrine ok to give additional midodrine 5mg x 1 and increase standing order midodrine 10mg p.o. TID   VSS q 4 hours   pt dizziness resolved    pt vomited x 1 zofran added, f/u abdominal xray ---------------------pos bowel sounds x 4 quadrants, abdomen non alfonso, slightly distended most like from ascites.    d/w dr. Glover in agreement with plan      Pt refusing all meds understands risk including death she understands that her blood pressure is low and requires midodrine. pt states she understands risk and continues to refuse.   daughter marline aware and is calling mom to advise her to take these medications .  If bp decreases will require boluses . D/w daughter Marline will come in am for family meeting in am for ongoing goc : daughter will confirm time with me in am ; daughter aware prognosis guarded: palliative following.      Pt also not eating will cont IVF and encourage oral intake. D/w Endocrinologist Dr. Orozco ok to reduce premeals to 3units TID before meals only to receive if pt eating above 50% of her meals and lantus 18units.  as per medical attending ok to decrease lantus to 16units since pt has decrease oral intake.  will continue to monitor fingersticks closely   d/w Dr. Glynn Contreras in agreement with plan .    low threshold for ICU consult.  as per Dr. Glynn Contreras no role for ICU at this time

## 2021-07-06 NOTE — PROGRESS NOTE ADULT - ASSESSMENT
56 year old female admitted 2/2 sepsis consulted for inpatient DM management. FS had been running high few days prior to admission. HbA1C of 8.2% (was improved from 9.6% on prior admission)    Problem/Recommendation - 1:  Problem: Type 2 diabetes mellitus with hyperglycemia, with long-term current use of insulin.   Recommendation: nausea, vomiting with glucose borderline low  -Reduce Lantus to 18 units at bedtime   -Reduce Admelog to 3 units before meals TID, hold if not eating  -Low dose correctional scale  premeal, low bedtime scale  -BG goal of 100-180 inpatient   -Carb consistent diet   -FS qAC + HS   -Endocrine will continue to follow inpatient    Discharge recommendations   -Will finalize closer to discharge date. Patient was followed recently by our team on prior admission and discharged on Lantus 18 units qhs and metformin 1000mg BID with A1c improving.  Of note lactate is currently 2.4 elevated - will need to ensure it has normalized before considering resume metformin.    Problem/Recommendation - 2:  ·  Problem: Hypertension, unspecified type.    Now with hypotension requiring midodrine    Hemal Giang MD  Division of Endocrinology  Pager: [phone]    If after 6PM or before 9AM, or on weekends/holidays, please call endocrine answering service for assistance ([phone]).  For nonurgent matters email [email or web address] for assistance. [phone]

## 2021-07-06 NOTE — PROGRESS NOTE ADULT - PROBLEM SELECTOR PLAN 1
BP soft in the 90s (baseline 110s), patient mentating  - likely multifactorial from poor PO intake/sepsis  - s/p fluid bolus, c/w maintenance fluids, start midodrine 10mg TID  - monitor vital signs closely

## 2021-07-06 NOTE — PROGRESS NOTE ADULT - PROBLEM SELECTOR PLAN 7
Stage IV Endometrial Cancer c/b malignant ascites s/p chemotherapy (6/18 carboplatin/taxol)  - last admission required diagnostic/therapeutic paracentesis, abdomen mildly distended, sonogram with moderate ascites however patient asymptomatic/given soft BPs/recovering EZE can hold off paracentesis for now   - oncology following, patient not a candidate for DMT at this time  - pain control: holding home morphine ER given soft BPs, c/w oxycodone 5mg [id number] PRN with hold parameters  - appreciate palliative care assistance for GOC with family - FULL CODE at this time, agreeable to home hospice

## 2021-07-06 NOTE — PROGRESS NOTE ADULT - SUBJECTIVE AND OBJECTIVE BOX
Luh Grigsby MD  [facility] Division of Hospital Medicine  Pager [phone] (M-F 8AM-5PM)  Other Times: [phone]    Patient is a 56y old  Female who presents with a chief complaint of urosepsis (06 Jul 2021 18:32)    SUBJECTIVE / OVERNIGHT EVENTS: Patient seen and examined at bedside. Says pain is well control, feels tired.     MEDICATIONS  (STANDING):  dextrose 50% Injectable 25 Gram(s) IV Push once  enoxaparin Injectable 55 milliGRAM(s) SubCutaneous every 12 hours  FIRST- Mouthwash  BLM 10 milliLiter(s) Swish and Spit every 6 hours  insulin glargine Injectable (LANTUS) 16 Unit(s) SubCutaneous at bedtime  insulin lispro (ADMELOG) corrective regimen sliding scale   SubCutaneous three times a day before meals  insulin lispro (ADMELOG) corrective regimen sliding scale   SubCutaneous at bedtime  insulin lispro Injectable (ADMELOG) 3 Unit(s) SubCutaneous three times a day before meals  midodrine. 10 milliGRAM(s) Oral three times a day  nystatin    Suspension [id number] Unit(s) Oral every 6 hours  piperacillin/tazobactam IVPB.. 3.375 Gram(s) IV Intermittent every 8 hours  polyethylene glycol 3350 17 Gram(s) Oral daily  senna 2 Tablet(s) Oral at bedtime  sodium chloride 0.45%. 1000 milliLiter(s) (75 mL/Hr) IV Continuous <Continuous>    MEDICATIONS  (PRN):  acetaminophen   Tablet .. 650 milliGRAM(s) Oral every 6 hours PRN Temp greater or equal to 38C (100.4F), Mild Pain (1 - 3), Moderate Pain (4 - 6)  magnesium hydroxide Suspension 30 milliLiter(s) Oral daily PRN Constipation  ondansetron    Tablet 4 milliGRAM(s) Oral every 8 hours PRN Nausea and/or Vomiting  oxyCODONE    IR 5 milliGRAM(s) Oral every 4 hours PRN Severe Pain (7 - 10)      PHYSICAL EXAM:  Vital Signs Last 24 Hrs  T(C): 36.8 (06 Jul 2021 13:05), Max: 36.9 (05 Jul 2021 21:44)  T(F): 98.2 (06 Jul 2021 13:05), Max: 98.5 (05 Jul 2021 21:44)  HR: 87 (06 Jul 2021 18:40) (82 - 99)  BP: 94/56 (06 Jul 2021 18:40) (82/55 - 98/63)  RR: 16 (06 Jul 2021 13:05) (16 - 18)  SpO2: 100% (06 Jul 2021 15:34) (98% - 100%)    CONSTITUTIONAL: ill appearing, no acute distress   RESPIRATORY: Normal respiratory effort; lungs are clear to auscultation bilaterally  CARDIOVASCULAR: Regular rate and rhythm, normal S1 and S2, no murmur/rub/gallop; No lower extremity edema  GASTROINTESTINAL: Nontender to palpation, normoactive bowel sounds, no rebound/guarding; No hepatosplenomegaly  MUSCULOSKELETAL:  no clubbing or cyanosis of digits; no joint swelling or tenderness to palpation  NEUROLOGY: non-focal; no gross sensory deficits   PSYCH: A+O to person, place, and time; affect appropriate  SKIN: No rashes; warm     LABS:                        8.7    7.49  )-----------( 155      ( 06 Jul 2021 07:40 )             30.3     07-06    146<H>  |  107  |  28<H>  ----------------------------<  87  3.3<L>   |  24  |  0.92    Ca    8.5      06 Jul 2021 07:40  Phos  2.7     07-06  Mg     1.80     07-06    TPro  6.1  /  Alb  2.3<L>  /  TBili  0.9  /  DBili  x   /  AST  28  /  ALT  11  /  AlkPhos  135<H>  07-06                RADIOLOGY & ADDITIONAL TESTS:  Results Reviewed:   Imaging Personally Reviewed:  Electrocardiogram Personally Reviewed:    COORDINATION OF CARE:  Care Discussed with Consultants/Other Providers [Y/N]:  Prior or Outpatient Records Reviewed [Y/N]:

## 2021-07-06 NOTE — PROGRESS NOTE ADULT - SUBJECTIVE AND OBJECTIVE BOX
Chief Complaint: DM2    History: + nausea and vomiting today  no hypoglycemia events but glu has been borderline low    MEDICATIONS  (STANDING):  dextrose 50% Injectable 25 Gram(s) IV Push once  enoxaparin Injectable 55 milliGRAM(s) SubCutaneous every 12 hours  FIRST- Mouthwash  BLM 10 milliLiter(s) Swish and Spit every 6 hours  insulin glargine Injectable (LANTUS) 18 Unit(s) SubCutaneous at bedtime  insulin lispro (ADMELOG) corrective regimen sliding scale   SubCutaneous three times a day before meals  insulin lispro (ADMELOG) corrective regimen sliding scale   SubCutaneous at bedtime  insulin lispro Injectable (ADMELOG) 3 Unit(s) SubCutaneous three times a day before meals  midodrine. 10 milliGRAM(s) Oral three times a day  nystatin    Suspension [id number] Unit(s) Oral every 6 hours  piperacillin/tazobactam IVPB.. 3.375 Gram(s) IV Intermittent every 8 hours  polyethylene glycol 3350 17 Gram(s) Oral daily  senna 2 Tablet(s) Oral at bedtime  sodium chloride 0.45%. 1000 milliLiter(s) (75 mL/Hr) IV Continuous <Continuous>    MEDICATIONS  (PRN):  acetaminophen   Tablet .. 650 milliGRAM(s) Oral every 6 hours PRN Temp greater or equal to 38C (100.4F), Mild Pain (1 - 3), Moderate Pain (4 - 6)  magnesium hydroxide Suspension 30 milliLiter(s) Oral daily PRN Constipation  ondansetron    Tablet 4 milliGRAM(s) Oral every 8 hours PRN Nausea and/or Vomiting  oxyCODONE    IR 5 milliGRAM(s) Oral every 4 hours PRN Severe Pain (7 - 10)      Allergies    Sulf-10 (Rash; Short breath)    Intolerances      Review of Systems:    ALL OTHER SYSTEMS REVIEWED AND NEGATIVE    PHYSICAL EXAM:  VITALS: T(C): 36.8 (07-06-21 @ 13:05)  T(F): 98.2 (07-06-21 @ 13:05), Max: 98.5 (07-05-21 @ 21:44)  HR: 85 (07-06-21 @ 15:34) (82 - 99)  BP: 98/59 (07-06-21 @ 15:34) (82/55 - 98/63)  RR:  (16 - 18)  SpO2:  (98% - 100%)  Wt(kg): --  GENERAL: looks uncomfortable due to N/V  EYES: No proptosis, no lid lag, anicteric  HEENT:  Atraumatic, Normocephalic, moist mucous membranes  RESPIRATORY: nonlabored respirations, no wheezing  PSYCH: Alert and oriented x 3, normal affect, normal mood    CAPILLARY BLOOD GLUCOSE      POCT Blood Glucose.: 114 mg/dL (06 Jul 2021 17:54)  POCT Blood Glucose.: 151 mg/dL (06 Jul 2021 12:22)  POCT Blood Glucose.: 90 mg/dL (06 Jul 2021 08:20)  POCT Blood Glucose.: 85 mg/dL (05 Jul 2021 22:02)      07-06    146<H>  |  107  |  28<H>  ----------------------------<  87  3.3<L>   |  24  |  0.92    EGFR if : 80  EGFR if non : 69    Ca    8.5      07-06  Mg     1.80     07-06  Phos  2.7     07-06    TPro  6.1  /  Alb  2.3<L>  /  TBili  0.9  /  DBili  x   /  AST  28  /  ALT  11  /  AlkPhos  135<H>  07-06      A1C with Estimated Average Glucose Result: 8.2 % (07-03-21 @ 04:26)  A1C with Estimated Average Glucose Result: 9.6 % (06-02-21 @ 06:33)      Thyroid Function Tests:

## 2021-07-06 NOTE — CONSULT NOTE ADULT - ASSESSMENT
56F with hx of Stage IV Endometrial Cancer c/b malignant ascites s/p chemotherapy (6/18 carboplatin/taxol), L. mid femoral/peroneal vein DVT on Lovenox, T2DM (A1C 8% on insulin) who presents with altered mental status and weakness. Asked to see for goc and pain management

## 2021-07-06 NOTE — CONSULT NOTE ADULT - PROBLEM SELECTOR RECOMMENDATION 9
-Increase Lantus to 18 units at bedtime   -Humalog 3 units before meals   -Low dose correctional scale   -BG goal of 100-180 inpatient   -Carb consistent diet   -FS qAC + HS   -Endocrine will continue to follow inpatient    Discharge recommendations   -Will be decided closer to discharge date   -Likely basal plus metformin
hold ms contin  continue oxy ir 5mg po q 4 hours prn- if not taking po, can change to iv [id number] q 2 hours prn  bowel regimen

## 2021-07-06 NOTE — PROGRESS NOTE ADULT - ASSESSMENT
57yo Female pmhx of DM II, migraines, and stage 4 endometrial cancer, non ambulatory at bedside coming in w/ complaining of weakness, dizziness and nausea. FS at home were 350s causing family to provide lantus and call EMS. Pt was recently admitted to [facility] for metastatic uterine carcinosarcoma c/b ascites and received carbo+paclitaxel on 6/18/21. However, her general condition quickly decompensated over the last few weeks and upon outpatient discussion with Dr. Hoang, she was deemed not to get further DMT and hospice was appropriate. Urgent hospice referral was made by Dr. Hoang's team. Now presenting to ED with fever and sepsis likely 2/2 UTI. Admitted to medicine for further management.    # Metastatic endometrial cancer  - Recently diagnosed metastatic uterine carcinosarcoma, c/b ascites, last chemotherapy with carbo+paclitaxel on 6/18, then her condition quickly decompensated over the last few weeks.  - Pt is not a candidate for DMT at this point, rec consulting palliative care and check hospice care referral status. Per sister at the bedside, plan to meet hospice care network team on Tuesday at home.  - Will require symptom oriented care at this point with pain management and stool regimen, consider pleurex catheter insertion to the abdomen if she requires frequent paracentesis.  - Treatment for urosepsis per primary team.  - [place] [facility] [place]  -C/w Supportive care, pain control, Nutrition, PT, DVT ppx  -Oncology will continue to follow with you      Case d/w Dr. Rory HORN  Oncology Physician Assistant  [facility]  Pager [phone]    If after 5pm or weekends please page On-call Oncology Fellow

## 2021-07-06 NOTE — PROGRESS NOTE ADULT - ASSESSMENT
56F with hx of Stage IV Endometrial Cancer c/b malignant ascites s/p chemotherapy (6/18 carboplatin/taxol), L. mid femoral/peroneal vein DVT on Lovenox, T2DM (A1C 8% on insulin) who presents with altered mental status and weakness a/w sepsis and EZE likely due to UTI. Course c/b persistent hypotension and hypernatremia.

## 2021-07-06 NOTE — PROVIDER CONTACT NOTE (OTHER) - SITUATION
BP 82/55  Pt is lethargic - was sitting in chair and states she "does not feel well" and requests to go back in bed

## 2021-07-06 NOTE — PROGRESS NOTE ADULT - PROBLEM SELECTOR PLAN 5
A1C 8%  - hyperglycemic on admission however did not meet DKA criteria  - home regimen: Lantus 18U at bedtime, metformin 1000mg BID  - hold home metformin while admitted, resume on discharge  - basal/bolus regimen per endocrine, SSI/FS qac and hs

## 2021-07-06 NOTE — CONSULT NOTE ADULT - PROBLEM SELECTOR RECOMMENDATION 4
spoke with pt's daughter  agree to home with hospice  she is hcp - form in last admission  discussed AD- still remains full code at this time despite grave prognosis

## 2021-07-06 NOTE — PROVIDER CONTACT NOTE (OTHER) - SITUATION
Pt refusing ordered 1800 PO medications Pt refusing ordered 1800 PO medications  Pt states "she is not a guinea pig"

## 2021-07-07 LAB
ALBUMIN SERPL ELPH-MCNC: 2.2 G/DL — LOW (ref 3.3–5)
ALP SERPL-CCNC: 142 U/L — HIGH (ref 40–120)
ALT FLD-CCNC: 13 U/L — SIGNIFICANT CHANGE UP (ref 4–33)
ANION GAP SERPL CALC-SCNC: 16 MMOL/L — HIGH (ref 7–14)
AST SERPL-CCNC: 23 U/L — SIGNIFICANT CHANGE UP (ref 4–32)
BILIRUB SERPL-MCNC: 0.7 MG/DL — SIGNIFICANT CHANGE UP (ref 0.2–1.2)
BUN SERPL-MCNC: 22 MG/DL — SIGNIFICANT CHANGE UP (ref 7–23)
CALCIUM SERPL-MCNC: 8.1 MG/DL — LOW (ref 8.4–10.5)
CHLORIDE SERPL-SCNC: 106 MMOL/L — SIGNIFICANT CHANGE UP (ref 98–107)
CO2 SERPL-SCNC: 22 MMOL/L — SIGNIFICANT CHANGE UP (ref 22–31)
CREAT SERPL-MCNC: 0.83 MG/DL — SIGNIFICANT CHANGE UP (ref 0.5–1.3)
GLUCOSE BLDC GLUCOMTR-MCNC: 130 MG/DL — HIGH (ref 70–99)
GLUCOSE BLDC GLUCOMTR-MCNC: 145 MG/DL — HIGH (ref 70–99)
GLUCOSE BLDC GLUCOMTR-MCNC: 159 MG/DL — HIGH (ref 70–99)
GLUCOSE BLDC GLUCOMTR-MCNC: 187 MG/DL — HIGH (ref 70–99)
GLUCOSE SERPL-MCNC: 124 MG/DL — HIGH (ref 70–99)
HCT VFR BLD CALC: 29.1 % — LOW (ref 34.5–45)
HGB BLD-MCNC: 8 G/DL — LOW (ref 11.5–15.5)
MAGNESIUM SERPL-MCNC: 2.2 MG/DL — SIGNIFICANT CHANGE UP (ref 1.6–2.6)
MCHC RBC-ENTMCNC: 20.7 PG — LOW (ref 27–34)
MCHC RBC-ENTMCNC: 27.5 GM/DL — LOW (ref 32–36)
MCV RBC AUTO: 75.4 FL — LOW (ref 80–100)
NRBC # BLD: 0 /100 WBCS — SIGNIFICANT CHANGE UP
NRBC # FLD: 0 K/UL — SIGNIFICANT CHANGE UP
PHOSPHATE SERPL-MCNC: 3.1 MG/DL — SIGNIFICANT CHANGE UP (ref 2.5–4.5)
PLATELET # BLD AUTO: 161 K/UL — SIGNIFICANT CHANGE UP (ref 150–400)
POTASSIUM SERPL-MCNC: 3.4 MMOL/L — LOW (ref 3.5–5.3)
POTASSIUM SERPL-SCNC: 3.4 MMOL/L — LOW (ref 3.5–5.3)
PROT SERPL-MCNC: 6.2 G/DL — SIGNIFICANT CHANGE UP (ref 6–8.3)
RBC # BLD: 3.86 M/UL — SIGNIFICANT CHANGE UP (ref 3.8–5.2)
RBC # FLD: 20.6 % — HIGH (ref 10.3–14.5)
SODIUM SERPL-SCNC: 144 MMOL/L — SIGNIFICANT CHANGE UP (ref 135–145)
WBC # BLD: 6.41 K/UL — SIGNIFICANT CHANGE UP (ref 3.8–10.5)
WBC # FLD AUTO: 6.41 K/UL — SIGNIFICANT CHANGE UP (ref 3.8–10.5)

## 2021-07-07 PROCEDURE — 99232 SBSQ HOSP IP/OBS MODERATE 35: CPT | Mod: GC

## 2021-07-07 PROCEDURE — 99232 SBSQ HOSP IP/OBS MODERATE 35: CPT

## 2021-07-07 PROCEDURE — 99233 SBSQ HOSP IP/OBS HIGH 50: CPT

## 2021-07-07 RX ORDER — SODIUM CHLORIDE 9 MG/ML
1000 INJECTION, SOLUTION INTRAVENOUS
Refills: 0 | Status: DISCONTINUED | OUTPATIENT
Start: 2021-07-07 | End: 2021-07-08

## 2021-07-07 RX ORDER — POTASSIUM CHLORIDE 20 MEQ
10 PACKET (EA) ORAL
Refills: 0 | Status: COMPLETED | OUTPATIENT
Start: 2021-07-07 | End: 2021-07-07

## 2021-07-07 RX ADMIN — Medication 1: at 18:10

## 2021-07-07 RX ADMIN — PIPERACILLIN AND TAZOBACTAM 25 GRAM(S): 4; .5 INJECTION, POWDER, LYOPHILIZED, FOR SOLUTION INTRAVENOUS at 18:10

## 2021-07-07 RX ADMIN — ENOXAPARIN SODIUM 55 MILLIGRAM(S): 100 INJECTION SUBCUTANEOUS at 18:06

## 2021-07-07 RX ADMIN — Medication 100 MILLIEQUIVALENT(S): at 08:32

## 2021-07-07 RX ADMIN — PIPERACILLIN AND TAZOBACTAM 25 GRAM(S): 4; .5 INJECTION, POWDER, LYOPHILIZED, FOR SOLUTION INTRAVENOUS at 08:31

## 2021-07-07 RX ADMIN — PIPERACILLIN AND TAZOBACTAM 25 GRAM(S): 4; .5 INJECTION, POWDER, LYOPHILIZED, FOR SOLUTION INTRAVENOUS at 01:00

## 2021-07-07 RX ADMIN — Medication 100 MILLIEQUIVALENT(S): at 09:50

## 2021-07-07 RX ADMIN — INSULIN GLARGINE 16 UNIT(S): 100 INJECTION, SOLUTION SUBCUTANEOUS at 22:21

## 2021-07-07 RX ADMIN — Medication 100 MILLIEQUIVALENT(S): at 11:08

## 2021-07-07 RX ADMIN — ENOXAPARIN SODIUM 55 MILLIGRAM(S): 100 INJECTION SUBCUTANEOUS at 06:06

## 2021-07-07 RX ADMIN — SODIUM CHLORIDE 60 MILLILITER(S): 9 INJECTION, SOLUTION INTRAVENOUS at 13:33

## 2021-07-07 NOTE — PROGRESS NOTE ADULT - SUBJECTIVE AND OBJECTIVE BOX
SUBJECTIVE AND OBJECTIVE:  pt more awake, alert today.  Still confused.  Denies pain in abdomen.  States her backside is sore due to the wound  INTERVAL HPI/OVERNIGHT EVENTS:    DNR on chart:   Allergies    Sulf-10 (Rash; Short breath)    Intolerances    MEDICATIONS  (STANDING):  dextrose 50% Injectable 25 Gram(s) IV Push once  enoxaparin Injectable 55 milliGRAM(s) SubCutaneous every 12 hours  FIRST- Mouthwash  BLM 10 milliLiter(s) Swish and Spit every 6 hours  insulin glargine Injectable (LANTUS) 16 Unit(s) SubCutaneous at bedtime  insulin lispro (ADMELOG) corrective regimen sliding scale   SubCutaneous three times a day before meals  insulin lispro (ADMELOG) corrective regimen sliding scale   SubCutaneous at bedtime  insulin lispro Injectable (ADMELOG) 3 Unit(s) SubCutaneous three times a day before meals  midodrine. 10 milliGRAM(s) Oral three times a day  nystatin    Suspension 195697 Unit(s) Oral every 6 hours  piperacillin/tazobactam IVPB.. 3.375 Gram(s) IV Intermittent every 8 hours  polyethylene glycol 3350 17 Gram(s) Oral daily  senna 2 Tablet(s) Oral at bedtime  sodium chloride 0.45%. 1000 milliLiter(s) (60 mL/Hr) IV Continuous <Continuous>    MEDICATIONS  (PRN):  acetaminophen   Tablet .. 650 milliGRAM(s) Oral every 6 hours PRN Temp greater or equal to 38C (100.4F), Mild Pain (1 - 3), Moderate Pain (4 - 6)  magnesium hydroxide Suspension 30 milliLiter(s) Oral daily PRN Constipation  ondansetron    Tablet 4 milliGRAM(s) Oral every 8 hours PRN Nausea and/or Vomiting  oxyCODONE    IR 5 milliGRAM(s) Oral every 4 hours PRN Severe Pain (7 - 10)      ITEMS UNCHECKED ARE NOT PRESENT    PRESENT SYMPTOMS: [ ]Unable to obtain due to poor mentation   Source if other than patient:  [ ]Family   [ ]Team     Pain (Impact on QOL):  denies  Location:  Minimal acceptable level (0-10 scale):            Aggravating factors:  Quality:  Radiation:  Severity (0-10 scale):    Timing:    Dyspnea:                           [ ]Mild [ ]Moderate [ ]Severe  Anxiety:                             [ ]Mild [ ]Moderate [ ]Severe  Fatigue:                             [ ]Mild [ ]Moderate [x ]Severe  Nausea:                             [ ]Mild [ ]Moderate [ ]Severe  Loss of appetite:              [ ]Mild [ ]Moderate [x]Severe  Constipation:                    [ ]Mild [ ]Moderate [ ]Severe    PAIN AD Score:	  http://geriatrictoolkit.[place].[facility]/cog/painad.pdf (Ctrl + left click to view)    Other Symptoms:  [x ]All other review of systems negative     Karnofsky Performance Score/Palliative Performance Status Version 2:   40      %    http://palliative.info/resource_material/PPSv2.pdf  PHYSICAL EXAM:  Vital Signs Last 24 Hrs  T(C): 36.8 (07 Jul 2021 12:29), Max: 36.9 (07 Jul 2021 10:00)  T(F): 98.2 (07 Jul 2021 12:29), Max: 98.4 (07 Jul 2021 10:00)  HR: 93 (07 Jul 2021 12:29) (82 - 93)  BP: 106/69 (07 Jul 2021 12:29) (91/55 - 106/69)  BP(mean): --  RR: 18 (07 Jul 2021 12:29) (16 - 18)  SpO2: 100% (07 Jul 2021 12:29) (98% - 100%) I&O's Summary   GENERAL:  [ x]Alert  [ x]Oriented x 2  [ ]Lethargic  [ ]Cachexia  [ ]Unarousable  [ ]Verbal  [ ]Non-Verbal    Behavioral:   [ ] Anxiety  [ ] Delirium [ ] Agitation [ ] Other    HEENT:  [ x]Normal   [ ]Dry mouth   [ ]ET Tube/Trach  [ ]Oral lesions    PULMONARY:   [ x]Clear [ ]Tachypnea  [ ]Audible excessive secretions   [ ]Rhonchi        [ ]Right [ ]Left [ ]Bilateral  [ ]Crackles        [ ]Right [ ]Left [ ]Bilateral  [ ]Wheezing     [ ]Right [ ]Left [ ]Bilateral    CARDIOVASCULAR:    [x ]Regular [ ]Irregular [ ]Tachy  [ ]Glenn [ ]Murmur [ ]Other    GASTROINTESTINAL:  [ ]Soft  [x ]Distended   [ ]+BS  [ x]Non tender [ ]Tender  [ ]PEG [ ]OGT/ NGT   Last BM:    GENITOURINARY:  [ ]Normal [ x] Incontinent   [ ]Oliguria/Anuria   [ ]Ang[place]    MUSCULOSKELETAL:   [ ]Normal   [ ]Weakness  [x ]Bed/Wheelchair bound [ ]Edema    NEUROLOGIC:   [ ]No focal deficits  [ x] Cognitive impairment  [ ] Dysphagia [ ]Dysarthria [ ] Paresis [ ]Other     SKIN:   [ ]Normal   [x ]Pressure ulcer(s)  [ ]Rash    CRITICAL CARE:  [ ] Shock Present  [ ]Septic [ ]Cardiogenic [ ]Neurologic [ ]Hypovolemic  [ ]  Vasopressors [ ]  Inotropes   [ ] Respiratory failure present  [ ] Acute  [ ] Chronic [ ] Hypoxic  [ ] Hypercarbic [ ] Other  [ ] Other organ failure     LABS:                        8.0    6.41  )-----------( 161      ( 07 Jul 2021 06:32 )             29.1   07-07    144  |  106  |  22  ----------------------------<  124<H>  3.4<L>   |  22  |  0.83    Ca    8.1<L>      07 Jul 2021 06:29  Phos  3.1     07-07  Mg     2.20     07-07    TPro  6.2  /  Alb  2.2<L>  /  TBili  0.7  /  DBili  x   /  AST  23  /  ALT  13  /  AlkPhos  142<H>  07-07        RADIOLOGY & ADDITIONAL STUDIES:    Protein Calorie Malnutrition Present: [ ] yes [ ] no  [ ] PPSV2 < or = 30%  [ ] significant weight loss [ ] poor nutritional intake [ ] anasarca [ ] catabolic state Albumin, Serum: 2.2 g/dL (07-07-21 @ 06:29)  Artificial Nutrition [ ]     REFERRALS:   [ ]Chaplaincy  [ ] Hospice  [ ]Child Life  [ ]Social Work  [ ]Case management [ ]Holistic Therapy   Goals of Care Document:

## 2021-07-07 NOTE — PROGRESS NOTE ADULT - NSPROGADDITIONALINFOA_GEN_ALL_CORE
- Plan discussed with patient, sister Sonya, awaiting to meet with daughter Marline for family meeting, ACP Toshia. - Plan discussed with patient, sister Sonya, daughter Marline, ACP Toshia.

## 2021-07-07 NOTE — PROGRESS NOTE ADULT - SUBJECTIVE AND OBJECTIVE BOX
Luh Grigsby MD  [facility] Division of Hospital Medicine  Pager [phone] (M-F 8AM-5PM)  Other Times: [phone]    Patient is a 56y old  Female who presents with a chief complaint of urosepsis (07 Jul 2021 14:50)    SUBJECTIVE / OVERNIGHT EVENTS: Patient seen and examined at bedside. More alert than yesterday, does not want to eat, refusing PO meds.     MEDICATIONS  (STANDING):  dextrose 50% Injectable 25 Gram(s) IV Push once  enoxaparin Injectable 55 milliGRAM(s) SubCutaneous every 12 hours  FIRST- Mouthwash  BLM 10 milliLiter(s) Swish and Spit every 6 hours  insulin glargine Injectable (LANTUS) 16 Unit(s) SubCutaneous at bedtime  insulin lispro (ADMELOG) corrective regimen sliding scale   SubCutaneous three times a day before meals  insulin lispro (ADMELOG) corrective regimen sliding scale   SubCutaneous at bedtime  insulin lispro Injectable (ADMELOG) 3 Unit(s) SubCutaneous three times a day before meals  midodrine. 10 milliGRAM(s) Oral three times a day  nystatin    Suspension [id number] Unit(s) Oral every 6 hours  piperacillin/tazobactam IVPB.. 3.375 Gram(s) IV Intermittent every 8 hours  polyethylene glycol 3350 17 Gram(s) Oral daily  senna 2 Tablet(s) Oral at bedtime  sodium chloride 0.45%. 1000 milliLiter(s) (60 mL/Hr) IV Continuous <Continuous>    MEDICATIONS  (PRN):  acetaminophen   Tablet .. 650 milliGRAM(s) Oral every 6 hours PRN Temp greater or equal to 38C (100.4F), Mild Pain (1 - 3), Moderate Pain (4 - 6)  magnesium hydroxide Suspension 30 milliLiter(s) Oral daily PRN Constipation  ondansetron    Tablet 4 milliGRAM(s) Oral every 8 hours PRN Nausea and/or Vomiting  oxyCODONE    IR 5 milliGRAM(s) Oral every 4 hours PRN Severe Pain (7 - 10)      PHYSICAL EXAM:  Vital Signs Last 24 Hrs  T(C): 36.8 (07 Jul 2021 12:29), Max: 36.9 (07 Jul 2021 10:00)  T(F): 98.2 (07 Jul 2021 12:29), Max: 98.4 (07 Jul 2021 10:00)  HR: 93 (07 Jul 2021 12:29) (82 - 93)  BP: 106/69 (07 Jul 2021 12:29) (91/55 - 106/69)  RR: 18 (07 Jul 2021 12:29) (16 - 18)  SpO2: 100% (07 Jul 2021 12:29) (98% - 100%)    CONSTITUTIONAL: NAD, well-developed, well-groomed  RESPIRATORY: Normal respiratory effort; lungs are clear to auscultation bilaterally  CARDIOVASCULAR: Regular rate and rhythm, normal S1 and S2, no murmur/rub/gallop; No lower extremity edema  GASTROINTESTINAL: Mild diffuse TTP, normoactive bowel sounds, no rebound/guarding; No hepatosplenomegaly  MUSCULOSKELETAL:  no clubbing or cyanosis of digits; no joint swelling or tenderness to palpation  NEUROLOGY: non-focal; no gross sensory deficits   PSYCH: A+O to person, place, and time; affect appropriate  SKIN: No rashes; warm     LABS:                        8.0    6.41  )-----------( 161      ( 07 Jul 2021 06:32 )             29.1     07-07    144  |  106  |  22  ----------------------------<  124<H>  3.4<L>   |  22  |  0.83    Ca    8.1<L>      07 Jul 2021 06:29  Phos  3.1     07-07  Mg     2.20     07-07    TPro  6.2  /  Alb  2.2<L>  /  TBili  0.7  /  DBili  x   /  AST  23  /  ALT  13  /  AlkPhos  142<H>  07-07                RADIOLOGY & ADDITIONAL TESTS:  Results Reviewed:   Imaging Personally Reviewed:  Electrocardiogram Personally Reviewed:    COORDINATION OF CARE:  Care Discussed with Consultants/Other Providers [Y/N]:  Prior or Outpatient Records Reviewed [Y/N]:

## 2021-07-07 NOTE — DIETITIAN INITIAL EVALUATION ADULT. - PERTINENT MEDS FT
MEDICATIONS  (STANDING):  dextrose 50% Injectable 25 Gram(s) IV Push once  enoxaparin Injectable 55 milliGRAM(s) SubCutaneous every 12 hours  insulin glargine Injectable (LANTUS) 20 Unit(s) SubCutaneous at bedtime  insulin lispro (ADMELOG) corrective regimen sliding scale   SubCutaneous three times a day before meals  insulin lispro (ADMELOG) corrective regimen sliding scale   SubCutaneous at bedtime  insulin lispro Injectable (ADMELOG) 4 Unit(s) SubCutaneous three times a day before meals  nystatin    Suspension [id number] Unit(s) Oral every 6 hours  piperacillin/tazobactam IVPB.. 3.375 Gram(s) IV Intermittent every 8 hours  polyethylene glycol 3350 17 Gram(s) Oral daily  senna 2 Tablet(s) Oral at bedtime  sodium chloride 0.45%. 1000 milliLiter(s) (100 mL/Hr) IV Continuous <Continuous>    MEDICATIONS  (PRN):  acetaminophen   Tablet .. 650 milliGRAM(s) Oral every 6 hours PRN Temp greater or equal to 38C (100.4F), Mild Pain (1 - 3), Moderate Pain (4 - 6)  magnesium hydroxide Suspension 30 milliLiter(s) Oral daily PRN Constipation  ondansetron    Tablet 4 milliGRAM(s) Oral every 8 hours PRN Nausea and/or Vomiting  oxyCODONE    IR 5 milliGRAM(s) Oral every 4 hours PRN Severe Pain (7 - 10)
PAST SURGICAL HISTORY:  History of total right knee replacement     S/P coronary artery stent placement

## 2021-07-07 NOTE — PROGRESS NOTE ADULT - PROBLEM SELECTOR PLAN 1
BP soft in the 90s (baseline 110s), patient mentating  - likely multifactorial from poor PO intake/sepsis  - s/p fluid bolus, c/w maintenance fluids, c/w midodrine 10mg TID (patient refuses at times)  - monitor vital signs closely

## 2021-07-07 NOTE — PROGRESS NOTE ADULT - SUBJECTIVE AND OBJECTIVE BOX
INTERVAL HPI/OVERNIGHT EVENTS:  Patient seen at bedside. Appears tired. Reports occasional nausea and mild abdominal pain which is controlled    Vital Signs Last 24 Hrs  T(C): 36.8 (07 Jul 2021 12:29), Max: 36.9 (07 Jul 2021 10:00)  T(F): 98.2 (07 Jul 2021 12:29), Max: 98.4 (07 Jul 2021 10:00)  HR: 93 (07 Jul 2021 12:29) (82 - 93)  BP: 106/69 (07 Jul 2021 12:29) (91/55 - 106/69)  BP(mean): --  RR: 18 (07 Jul 2021 12:29) (16 - 18)  SpO2: 100% (07 Jul 2021 12:29) (98% - 100%)    PHYSICAL EXAM:    Constitutional: NAD, resting in bed comfortably  Eyes: EOMI, sclerae non-icteric  Neck: supple, no LAD  Respiratory: CTA b/l, good air entry b/l,   Gastrointestinal: +BS, mildly distended, mild tenderness to palpation, no rebound  Extremities: mild pedal edema  Neurological: AAOx3,       MEDICATIONS  (STANDING):  dextrose 50% Injectable 25 Gram(s) IV Push once  enoxaparin Injectable 55 milliGRAM(s) SubCutaneous every 12 hours  FIRST- Mouthwash  BLM 10 milliLiter(s) Swish and Spit every 6 hours  insulin glargine Injectable (LANTUS) 16 Unit(s) SubCutaneous at bedtime  insulin lispro (ADMELOG) corrective regimen sliding scale   SubCutaneous three times a day before meals  insulin lispro (ADMELOG) corrective regimen sliding scale   SubCutaneous at bedtime  insulin lispro Injectable (ADMELOG) 3 Unit(s) SubCutaneous three times a day before meals  midodrine. 10 milliGRAM(s) Oral three times a day  nystatin    Suspension [id number] Unit(s) Oral every 6 hours  piperacillin/tazobactam IVPB.. 3.375 Gram(s) IV Intermittent every 8 hours  polyethylene glycol 3350 17 Gram(s) Oral daily  senna 2 Tablet(s) Oral at bedtime  sodium chloride 0.45%. 1000 milliLiter(s) (60 mL/Hr) IV Continuous <Continuous>    MEDICATIONS  (PRN):  acetaminophen   Tablet .. 650 milliGRAM(s) Oral every 6 hours PRN Temp greater or equal to 38C (100.4F), Mild Pain (1 - 3), Moderate Pain (4 - 6)  magnesium hydroxide Suspension 30 milliLiter(s) Oral daily PRN Constipation  ondansetron    Tablet 4 milliGRAM(s) Oral every 8 hours PRN Nausea and/or Vomiting  oxyCODONE    IR 5 milliGRAM(s) Oral every 4 hours PRN Severe Pain (7 - 10)        Allergies    Sulf-10 (Rash; Short breath)    Intolerances        LABS:                          8.0    6.41  )-----------( 161      ( 07 Jul 2021 06:32 )             29.1       07-07    144  |  106  |  22  ----------------------------<  124<H>  3.4<L>   |  22  |  0.83    Ca    8.1<L>      07 Jul 2021 06:29  Phos  3.1     07-07  Mg     2.20     07-07    TPro  6.2  /  Alb  2.2<L>  /  TBili  0.7  /  DBili  x   /  AST  23  /  ALT  13  /  AlkPhos  142<H>  07-07      RADIOLOGY & ADDITIONAL TESTS:  Studies reviewed.

## 2021-07-07 NOTE — PROGRESS NOTE ADULT - PROBLEM SELECTOR PLAN 7
Stage IV Endometrial Cancer c/b malignant ascites s/p chemotherapy (6/18 carboplatin/taxol)  - oncology following, patient not a candidate for DMT at this time  - last admission required diagnostic/therapeutic paracentesis, abdomen mildly distended, sonogram with moderate ascites however given that patient still remains on abx, soft BPs, not in acute distress can hold off paracentesis. Discussed with oncology, may have to explore pleurX placement once abx complete pending further discussions with family as patient is to be discharged with home hospice   - pain control: holding home morphine ER given soft BPs, c/w oxycodone 5mg [id number] PRN with hold parameters  - appreciate palliative care assistance for GOC with family - FULL CODE at this time, agreeable to home hospice  - patient with poor PO intake, daughter inquiring about NGT - explained that patient is not amenable to NGT which would be a temporary measure regardless as patient not candidate for PEG given ascites Stage IV Endometrial Cancer c/b malignant ascites s/p chemotherapy (6/18 carboplatin/taxol)  - oncology following, patient not a candidate for DMT at this time  - last admission required diagnostic/therapeutic paracentesis, abdomen mildly distended, sonogram with moderate ascites will request procedure team eval in AM for para  - pain control: holding home morphine ER given soft BPs, c/w oxycodone 5mg [id number] PRN with hold parameters  - appreciate palliative care assistance for GOC with family - FULL CODE at this time, agreeable to home hospice  - patient with poor PO intake, daughter inquiring about NGT - explained that patient is not amenable to NGT which would be a temporary measure regardless as patient not candidate for PEG given ascites Stage IV Endometrial Cancer c/b malignant ascites s/p chemotherapy (6/18 carboplatin/taxol)  - oncology following, patient not a candidate for DMT at this time  - last admission required diagnostic/therapeutic paracentesis, abdomen mildly distended, sonogram with moderate ascites will request procedure team eval in AM for para  - pain control: holding home morphine ER given soft BPs, c/w oxycodone 5mg [id number] PRN with hold parameters  - appreciate palliative care assistance for GOC with family - FULL CODE at this time, agreeable to home hospice  - patient with poor PO intake, daughter inquiring about NGT - explained that patient is not amenable to NGT which would be a temporary measure regardless as patient not candidate for PEG given ascites, and that her poor PO intake is part of her disease process   - family meeting 7/7 with myself + oncology Dr. Valadez, daughter Marline, and family - questions answered about hospice, open to hospice services at home, will continue to provide support

## 2021-07-08 LAB
ANION GAP SERPL CALC-SCNC: 26 MMOL/L — HIGH (ref 7–14)
ANION GAP SERPL CALC-SCNC: 27 MMOL/L — HIGH (ref 7–14)
BLD GP AB SCN SERPL QL: NEGATIVE — SIGNIFICANT CHANGE UP
BLOOD GAS VENOUS COMPREHENSIVE RESULT: SIGNIFICANT CHANGE UP
BUN SERPL-MCNC: 20 MG/DL — SIGNIFICANT CHANGE UP (ref 7–23)
BUN SERPL-MCNC: 20 MG/DL — SIGNIFICANT CHANGE UP (ref 7–23)
CALCIUM SERPL-MCNC: 8.3 MG/DL — LOW (ref 8.4–10.5)
CALCIUM SERPL-MCNC: 8.3 MG/DL — LOW (ref 8.4–10.5)
CHLORIDE SERPL-SCNC: 102 MMOL/L — SIGNIFICANT CHANGE UP (ref 98–107)
CHLORIDE SERPL-SCNC: 102 MMOL/L — SIGNIFICANT CHANGE UP (ref 98–107)
CO2 SERPL-SCNC: 13 MMOL/L — LOW (ref 22–31)
CO2 SERPL-SCNC: 13 MMOL/L — LOW (ref 22–31)
CREAT SERPL-MCNC: 0.75 MG/DL — SIGNIFICANT CHANGE UP (ref 0.5–1.3)
CREAT SERPL-MCNC: 0.78 MG/DL — SIGNIFICANT CHANGE UP (ref 0.5–1.3)
CULTURE RESULTS: SIGNIFICANT CHANGE UP
CULTURE RESULTS: SIGNIFICANT CHANGE UP
GLUCOSE BLDC GLUCOMTR-MCNC: 174 MG/DL — HIGH (ref 70–99)
GLUCOSE BLDC GLUCOMTR-MCNC: 238 MG/DL — HIGH (ref 70–99)
GLUCOSE BLDC GLUCOMTR-MCNC: 242 MG/DL — HIGH (ref 70–99)
GLUCOSE BLDC GLUCOMTR-MCNC: 287 MG/DL — HIGH (ref 70–99)
GLUCOSE SERPL-MCNC: 210 MG/DL — HIGH (ref 70–99)
GLUCOSE SERPL-MCNC: 226 MG/DL — HIGH (ref 70–99)
HAPTOGLOB SERPL-MCNC: 335 MG/DL — HIGH (ref 34–200)
HCT VFR BLD CALC: 23.8 % — LOW (ref 34.5–45)
HCT VFR BLD CALC: 26 % — LOW (ref 34.5–45)
HGB BLD-MCNC: 7.1 G/DL — LOW (ref 11.5–15.5)
HGB BLD-MCNC: 7.3 G/DL — LOW (ref 11.5–15.5)
INR BLD: 1.29 RATIO — HIGH (ref 0.88–1.16)
LDH SERPL L TO P-CCNC: 828 U/L — HIGH (ref 135–225)
MAGNESIUM SERPL-MCNC: 2 MG/DL — SIGNIFICANT CHANGE UP (ref 1.6–2.6)
MAGNESIUM SERPL-MCNC: 2.1 MG/DL — SIGNIFICANT CHANGE UP (ref 1.6–2.6)
MCHC RBC-ENTMCNC: 20.7 PG — LOW (ref 27–34)
MCHC RBC-ENTMCNC: 20.7 PG — LOW (ref 27–34)
MCHC RBC-ENTMCNC: 28.1 GM/DL — LOW (ref 32–36)
MCHC RBC-ENTMCNC: 29 GM/DL — LOW (ref 32–36)
MCV RBC AUTO: 71.3 FL — LOW (ref 80–100)
MCV RBC AUTO: 73.7 FL — LOW (ref 80–100)
NRBC # BLD: 0 /100 WBCS — SIGNIFICANT CHANGE UP
NRBC # BLD: 0 /100 WBCS — SIGNIFICANT CHANGE UP
NRBC # FLD: 0 K/UL — SIGNIFICANT CHANGE UP
NRBC # FLD: 0 K/UL — SIGNIFICANT CHANGE UP
PHOSPHATE SERPL-MCNC: 2.7 MG/DL — SIGNIFICANT CHANGE UP (ref 2.5–4.5)
PHOSPHATE SERPL-MCNC: 3.1 MG/DL — SIGNIFICANT CHANGE UP (ref 2.5–4.5)
PLATELET # BLD AUTO: 214 K/UL — SIGNIFICANT CHANGE UP (ref 150–400)
PLATELET # BLD AUTO: 215 K/UL — SIGNIFICANT CHANGE UP (ref 150–400)
POTASSIUM SERPL-MCNC: 3.5 MMOL/L — SIGNIFICANT CHANGE UP (ref 3.5–5.3)
POTASSIUM SERPL-MCNC: 3.6 MMOL/L — SIGNIFICANT CHANGE UP (ref 3.5–5.3)
POTASSIUM SERPL-SCNC: 3.5 MMOL/L — SIGNIFICANT CHANGE UP (ref 3.5–5.3)
POTASSIUM SERPL-SCNC: 3.6 MMOL/L — SIGNIFICANT CHANGE UP (ref 3.5–5.3)
PROTHROM AB SERPL-ACNC: 14.7 SEC — HIGH (ref 10.6–13.6)
RBC # BLD: 3.34 M/UL — LOW (ref 3.8–5.2)
RBC # BLD: 3.53 M/UL — LOW (ref 3.8–5.2)
RBC # FLD: 20.4 % — HIGH (ref 10.3–14.5)
RBC # FLD: 20.6 % — HIGH (ref 10.3–14.5)
RH IG SCN BLD-IMP: POSITIVE — SIGNIFICANT CHANGE UP
SODIUM SERPL-SCNC: 141 MMOL/L — SIGNIFICANT CHANGE UP (ref 135–145)
SODIUM SERPL-SCNC: 142 MMOL/L — SIGNIFICANT CHANGE UP (ref 135–145)
SPECIMEN SOURCE: SIGNIFICANT CHANGE UP
SPECIMEN SOURCE: SIGNIFICANT CHANGE UP
WBC # BLD: 6.13 K/UL — SIGNIFICANT CHANGE UP (ref 3.8–10.5)
WBC # BLD: 6.2 K/UL — SIGNIFICANT CHANGE UP (ref 3.8–10.5)
WBC # FLD AUTO: 6.13 K/UL — SIGNIFICANT CHANGE UP (ref 3.8–10.5)
WBC # FLD AUTO: 6.2 K/UL — SIGNIFICANT CHANGE UP (ref 3.8–10.5)

## 2021-07-08 PROCEDURE — 99232 SBSQ HOSP IP/OBS MODERATE 35: CPT | Mod: GC

## 2021-07-08 PROCEDURE — 99233 SBSQ HOSP IP/OBS HIGH 50: CPT

## 2021-07-08 PROCEDURE — 99232 SBSQ HOSP IP/OBS MODERATE 35: CPT

## 2021-07-08 RX ORDER — ENOXAPARIN SODIUM 100 MG/ML
55 INJECTION SUBCUTANEOUS ONCE
Refills: 0 | Status: DISCONTINUED | OUTPATIENT
Start: 2021-07-08 | End: 2021-07-08

## 2021-07-08 RX ORDER — SODIUM CHLORIDE 9 MG/ML
1000 INJECTION, SOLUTION INTRAVENOUS
Refills: 0 | Status: COMPLETED | OUTPATIENT
Start: 2021-07-08 | End: 2021-07-09

## 2021-07-08 RX ORDER — INSULIN GLARGINE 100 [IU]/ML
18 INJECTION, SOLUTION SUBCUTANEOUS AT BEDTIME
Refills: 0 | Status: DISCONTINUED | OUTPATIENT
Start: 2021-07-08 | End: 2021-07-08

## 2021-07-08 RX ORDER — OXYCODONE HYDROCHLORIDE 5 MG/1
5 TABLET ORAL EVERY 4 HOURS
Refills: 0 | Status: DISCONTINUED | OUTPATIENT
Start: 2021-07-08 | End: 2021-07-14

## 2021-07-08 RX ORDER — INSULIN GLARGINE 100 [IU]/ML
16 INJECTION, SOLUTION SUBCUTANEOUS AT BEDTIME
Refills: 0 | Status: DISCONTINUED | OUTPATIENT
Start: 2021-07-08 | End: 2021-07-09

## 2021-07-08 RX ADMIN — Medication 1: at 22:31

## 2021-07-08 RX ADMIN — INSULIN GLARGINE 16 UNIT(S): 100 INJECTION, SOLUTION SUBCUTANEOUS at 22:31

## 2021-07-08 RX ADMIN — PIPERACILLIN AND TAZOBACTAM 25 GRAM(S): 4; .5 INJECTION, POWDER, LYOPHILIZED, FOR SOLUTION INTRAVENOUS at 08:36

## 2021-07-08 RX ADMIN — Medication 3 UNIT(S): at 18:43

## 2021-07-08 RX ADMIN — Medication 2: at 08:36

## 2021-07-08 RX ADMIN — PIPERACILLIN AND TAZOBACTAM 25 GRAM(S): 4; .5 INJECTION, POWDER, LYOPHILIZED, FOR SOLUTION INTRAVENOUS at 00:35

## 2021-07-08 RX ADMIN — Medication 1: at 12:12

## 2021-07-08 RX ADMIN — SODIUM CHLORIDE 50 MILLILITER(S): 9 INJECTION, SOLUTION INTRAVENOUS at 13:50

## 2021-07-08 RX ADMIN — Medication 2: at 18:43

## 2021-07-08 RX ADMIN — PIPERACILLIN AND TAZOBACTAM 25 GRAM(S): 4; .5 INJECTION, POWDER, LYOPHILIZED, FOR SOLUTION INTRAVENOUS at 18:12

## 2021-07-08 RX ADMIN — Medication 3 UNIT(S): at 12:12

## 2021-07-08 RX ADMIN — SODIUM CHLORIDE 50 MILLILITER(S): 9 INJECTION, SOLUTION INTRAVENOUS at 22:30

## 2021-07-08 RX ADMIN — Medication 3 UNIT(S): at 08:37

## 2021-07-08 NOTE — PROGRESS NOTE ADULT - SUBJECTIVE AND OBJECTIVE BOX
SUBJECTIVE AND OBJECTIVE:  pt states she feels fine.  Took a few bites of food for me.  Events of last evening noted  INTERVAL HPI/OVERNIGHT EVENTS:    DNR on chart:   Allergies    Sulf-10 (Rash; Short breath)    Intolerances    MEDICATIONS  (STANDING):  dextrose 50% Injectable 25 Gram(s) IV Push once  FIRST- Mouthwash  BLM 10 milliLiter(s) Swish and Spit every 6 hours  insulin glargine Injectable (LANTUS) 16 Unit(s) SubCutaneous at bedtime  insulin lispro (ADMELOG) corrective regimen sliding scale   SubCutaneous three times a day before meals  insulin lispro (ADMELOG) corrective regimen sliding scale   SubCutaneous at bedtime  insulin lispro Injectable (ADMELOG) 3 Unit(s) SubCutaneous three times a day before meals  lactated ringers. 1000 milliLiter(s) (50 mL/Hr) IV Continuous <Continuous>  midodrine. 10 milliGRAM(s) Oral three times a day  nystatin    Suspension 866312 Unit(s) Oral every 6 hours  piperacillin/tazobactam IVPB.. 3.375 Gram(s) IV Intermittent every 8 hours  polyethylene glycol 3350 17 Gram(s) Oral daily  senna 2 Tablet(s) Oral at bedtime    MEDICATIONS  (PRN):  acetaminophen   Tablet .. 650 milliGRAM(s) Oral every 6 hours PRN Temp greater or equal to 38C (100.4F), Mild Pain (1 - 3), Moderate Pain (4 - 6)  magnesium hydroxide Suspension 30 milliLiter(s) Oral daily PRN Constipation  ondansetron    Tablet 4 milliGRAM(s) Oral every 8 hours PRN Nausea and/or Vomiting  oxyCODONE    IR 5 milliGRAM(s) Oral every 4 hours PRN Severe Pain (7 - 10)      ITEMS UNCHECKED ARE NOT PRESENT    PRESENT SYMPTOMS: [ ]Unable to obtain due to poor mentation   Source if other than patient:  [ ]Family   [ ]Team     Pain (Impact on QOL):  denies  Location:  Minimal acceptable level (0-10 scale):            Aggravating factors:  Quality:  Radiation:  Severity (0-10 scale):    Timing:    Dyspnea:                           [ ]Mild [ ]Moderate [ ]Severe  Anxiety:                             [ ]Mild [ ]Moderate [ ]Severe  Fatigue:                             [ ]Mild [ ]Moderate [ ]Severe  Nausea:                             [ ]Mild [ ]Moderate [ ]Severe  Loss of appetite:              [ ]Mild [ ]Moderate [ ]Severe  Constipation:                    [ ]Mild [ ]Moderate [ ]Severe    PAIN AD Score:	  http://geriatrictoolkit.[place].[facility]/cog/painad.pdf (Ctrl + left click to view)    Other Symptoms:  [ ]All other review of systems negative     Karnofsky Performance Score/Palliative Performance Status Version 2:   40      %    http://palliative.info/resource_material/[email or web address].pdf  PHYSICAL EXAM:  Vital Signs Last 24 Hrs  T(C): 36.5 (08 Jul 2021 12:56), Max: 36.6 (07 Jul 2021 21:40)  T(F): 97.7 (08 Jul 2021 12:56), Max: 97.8 (07 Jul 2021 21:40)  HR: 98 (08 Jul 2021 12:56) (90 - 98)  BP: 111/60 (08 Jul 2021 12:56) (90/58 - 111/60)  BP(mean): --  RR: 18 (08 Jul 2021 05:30) (18 - 18)  SpO2: 100% (08 Jul 2021 12:56) (100% - 100%) I&O's Summary   GENERAL:  [ x]Alert  [x ]Oriented x2   [ ]Lethargic  [ ]Cachexia  [ ]Unarousable  [ ]Verbal  [ ]Non-Verbal    Behavioral:   [ ] Anxiety  [ ] Delirium [ ] Agitation [ ] Other    HEENT:  [ ]Normal   [ x]Dry mouth   [ ]ET Tube/Trach  [ ]Oral lesions    PULMONARY:   [x ]Clear [ ]Tachypnea  [ ]Audible excessive secretions   [ ]Rhonchi        [ ]Right [ ]Left [ ]Bilateral  [ ]Crackles        [ ]Right [ ]Left [ ]Bilateral  [ ]Wheezing     [ ]Right [ ]Left [ ]Bilateral    CARDIOVASCULAR:    [x ]Regular [ ]Irregular [ ]Tachy  [ ]Glenn [ ]Murmur [ ]Other    GASTROINTESTINAL:  [ x]Soft  [x ]Distended   [ ]+BS  [ x]Non tender [ ]Tender  [ ]PEG [ ]OGT/ NGT   Last BM:    GENITOURINARY:  [ ]Normal [x ] Incontinent   [ ]Oliguria/Anuria   [ ][place]    MUSCULOSKELETAL:   [ ]Normal   [ x]Weakness  [ ]Bed/Wheelchair bound [ ]Edema    NEUROLOGIC:   [ ]No focal deficits  [x ] Cognitive impairment  [ ] Dysphagia [ ]Dysarthria [ ] Paresis [ ]Other     SKIN:   [ ]Normal   [x ]Pressure ulcer(s)  [ ]Rash    CRITICAL CARE:  [ ] Shock Present  [ ]Septic [ ]Cardiogenic [ ]Neurologic [ ]Hypovolemic  [ ]  Vasopressors [ ]  Inotropes   [ ] Respiratory failure present  [ ] Acute  [ ] Chronic [ ] Hypoxic  [ ] Hypercarbic [ ] Other  [ ] Other organ failure     LABS:                        7.3    6.13  )-----------( 214      ( 08 Jul 2021 08:03 )             26.0   07-08    142  |  102  |  20  ----------------------------<  210<H>  3.6   |  13<L>  |  0.78    Ca    8.3<L>      08 Jul 2021 08:03  Phos  3.1     07-08  Mg     2.10     07-08    TPro  6.2  /  Alb  2.2<L>  /  TBili  0.7  /  DBili  x   /  AST  23  /  ALT  13  /  AlkPhos  142<H>  07-07  PT/INR - ( 08 Jul 2021 08:03 )   PT: 14.7 sec;   INR: 1.29 ratio               RADIOLOGY & ADDITIONAL STUDIES:    Protein Calorie Malnutrition Present: [ ] yes [ ] no  [ ] PPSV2 < or = 30%  [ ] significant weight loss [ ] poor nutritional intake [ ] anasarca [ ] catabolic state Albumin, Serum: 2.2 g/dL (07-07-21 @ 06:29)  Artificial Nutrition [ ]     REFERRALS:   [ ]Chaplaincy  [ ] Hospice  [ ]Child Life  [ ]Social Work  [ ]Case management [ ]Holistic Therapy   Goals of Care Document:

## 2021-07-08 NOTE — PROGRESS NOTE ADULT - ASSESSMENT
56 year old female admitted 2/2 sepsis,   Pt was recently admitted to [facility] for metastatic uterine carcinosarcoma c/b ascites and received carbo+paclitaxel on 6/18/21, given her current clinical condition she is  deemed not appropriate for further DMT and hospice was recommended.   Endocrine consulted for inpatient DM management. HbA1C of 8.2% (was improved from 9.6% on prior admission)      Problem/Recommendation - 1:  Problem: Type 2 diabetes mellitus with hyperglycemia, with long-term current use of insulin.   Recommendation: nausea, vomiting with glucose borderline low during admission, given her current clinical condition, and abdominial pain intermittently and ascites, I would not be aggressive with her management   She has recieved Lantus 16 last two nights and well controlled glucose yesterday am, today on the higher end.  I would not increase Lantus based on todays blood glucose, rather base the increase on a trend of fasting glucose     7/8/2021  -Reduce Lantus to 16 units at bedtime   -c/w Admelog to 3 units before meals TID, hold if not eating  -Low dose correctional scale  premeal, low bedtime scale  -BG goal of 100-180 inpatient   -Carb consistent diet   -FS qAC + HS   -Endocrine will continue to follow inpatient    Discharge recommendations   plan is for home with hospice. DC reccs based on GOC and need for control (FSBG frequency etc)  -Will finalize closer to discharge date. Patient was followed recently by our team on prior admission and discharged on Lantus 18 units qhs and metformin 1000mg BID with A1c improving.  Of note lactate was 2.4 elevated - will need to ensure it has normalized before considering resume metformin.    2. Hypertension, unspecified type.    Now with hypotension requiring midodrine        Patrica Hernández MD  Attending Physician   Department of Endocrinology, Diabetes and Metabolism     Pager [phone] ([facility])/ [phone] ([facility]) [please provide 10 digit call back number]  [facility] 9-5  [email or web address]  Nights and weekends: [phone]  Please note that this patient may be followed by a different provider tomorrow.   If no answer or after hours, please contact [phone].  For final dc reccomendations, please call [phone] or page the endocrine fellow on call.

## 2021-07-08 NOTE — CHART NOTE - NSCHARTNOTEFT_GEN_A_CORE
Given soft BPs, downtrending Hb, will obtain CTAP to r/o bleed as discussed with  medical attending. Order placed in sunrise. Consent obtained from HCP- wilfrido Horan in chart

## 2021-07-08 NOTE — PROGRESS NOTE ADULT - PROBLEM SELECTOR PLAN 1
Stage IV Endometrial Cancer c/b malignant ascites s/p chemotherapy (6/18 carboplatin/taxol)  - oncology following, patient not a candidate for DMT at this time  - last admission required diagnostic/therapeutic paracentesis, abdomen mildly distended, sonogram with moderate ascites, procedure team consulted tentative paracentesis 7/9 AM  - pain control: holding home morphine ER given soft BPs, c/w oxycodone 5mg [id number] PRN with hold parameters  - appreciate palliative care assistance for GOC with family - FULL CODE at this time, agreeable to home hospice  - patient with poor PO intake, daughter inquiring about NGT - explained that patient is not amenable to NGT which would be a temporary measure regardless as patient not candidate for PEG given ascites, and that her poor PO intake is part of her disease process   - family meeting 7/7 with myself + oncology Dr. Valadez, daughter Marline, and family - questions answered about hospice, open to hospice services at home, will continue to provide support

## 2021-07-08 NOTE — PROGRESS NOTE ADULT - ASSESSMENT
56F with hx of Stage IV Endometrial Cancer c/b malignant ascites s/p chemotherapy (6/18 carboplatin/taxol), L. mid femoral/peroneal vein DVT on Lovenox, T2DM (A1C 8% on insulin) who presents with altered mental status and weakness a/w sepsis and EZE likely due to UTI. Course c/b persistent hypotension.

## 2021-07-08 NOTE — PROGRESS NOTE ADULT - SUBJECTIVE AND OBJECTIVE BOX
Luh Grigsby MD  [facility] Division of Hospital Medicine  Pager [phone] (M-F 8AM-5PM)  Other Times: [phone]    Patient is a 56y old  Female who presents with a chief complaint of urosepsis (08 Jul 2021 09:55)    SUBJECTIVE / OVERNIGHT EVENTS: Patient seen and examined at bedside. Says she doesn't want to eat. Having some abdominal pain. BM is brown.     MEDICATIONS  (STANDING):  dextrose 50% Injectable 25 Gram(s) IV Push once  FIRST- Mouthwash  BLM 10 milliLiter(s) Swish and Spit every 6 hours  insulin glargine Injectable (LANTUS) 16 Unit(s) SubCutaneous at bedtime  insulin lispro (ADMELOG) corrective regimen sliding scale   SubCutaneous three times a day before meals  insulin lispro (ADMELOG) corrective regimen sliding scale   SubCutaneous at bedtime  insulin lispro Injectable (ADMELOG) 3 Unit(s) SubCutaneous three times a day before meals  lactated ringers. 1000 milliLiter(s) (50 mL/Hr) IV Continuous <Continuous>  midodrine. 10 milliGRAM(s) Oral three times a day  nystatin    Suspension 384871 Unit(s) Oral every 6 hours  piperacillin/tazobactam IVPB.. 3.375 Gram(s) IV Intermittent every 8 hours  polyethylene glycol 3350 17 Gram(s) Oral daily  senna 2 Tablet(s) Oral at bedtime    MEDICATIONS  (PRN):  acetaminophen   Tablet .. 650 milliGRAM(s) Oral every 6 hours PRN Temp greater or equal to 38C (100.4F), Mild Pain (1 - 3), Moderate Pain (4 - 6)  magnesium hydroxide Suspension 30 milliLiter(s) Oral daily PRN Constipation  ondansetron    Tablet 4 milliGRAM(s) Oral every 8 hours PRN Nausea and/or Vomiting  oxyCODONE    IR 5 milliGRAM(s) Oral every 4 hours PRN Severe Pain (7 - 10)      PHYSICAL EXAM:  Vital Signs Last 24 Hrs  T(C): 36.5 (08 Jul 2021 12:56), Max: 36.6 (07 Jul 2021 21:40)  T(F): 97.7 (08 Jul 2021 12:56), Max: 97.8 (07 Jul 2021 21:40)  HR: 98 (08 Jul 2021 12:56) (90 - 98)  BP: 111/60 (08 Jul 2021 12:56) (90/58 - 111/60)  RR: 18 (08 Jul 2021 05:30) (18 - 18)  SpO2: 100% (08 Jul 2021 12:56) (100% - 100%)    CONSTITUTIONAL: NAD, well-developed, well-groomed  RESPIRATORY: Normal respiratory effort; lungs are clear to auscultation bilaterally  CARDIOVASCULAR: Regular rate and rhythm, normal S1 and S2, no murmur/rub/gallop; No lower extremity edema  GASTROINTESTINAL: Mild distension, mild TTP, normoactive bowel sounds, no rebound/guarding; No hepatosplenomegaly  MUSCULOSKELETAL:  no clubbing or cyanosis of digits; no joint swelling or tenderness to palpation  NEUROLOGY: non-focal; no gross sensory deficits   PSYCH: A+O to person, place, and time; affect appropriate  SKIN: No rashes; warm     LABS:                        7.3    6.13  )-----------( 214      ( 08 Jul 2021 08:03 )             26.0     07-08    142  |  102  |  20  ----------------------------<  210<H>  3.6   |  13<L>  |  0.78    Ca    8.3<L>      08 Jul 2021 08:03  Phos  3.1     07-08  Mg     2.10     07-08    TPro  6.2  /  Alb  2.2<L>  /  TBili  0.7  /  DBili  x   /  AST  23  /  ALT  13  /  AlkPhos  142<H>  07-07    PT/INR - ( 08 Jul 2021 08:03 )   PT: 14.7 sec;   INR: 1.29 ratio                     RADIOLOGY & ADDITIONAL TESTS:  Results Reviewed:   Imaging Personally Reviewed:  Electrocardiogram Personally Reviewed:    COORDINATION OF CARE:  Care Discussed with Consultants/Other Providers [Y/N]: d/w Dr. Valadez - patient not a candidate for DMT  Prior or Outpatient Records Reviewed [Y/N]:

## 2021-07-08 NOTE — CHART NOTE - NSCHARTNOTEFT_GEN_A_CORE
reviewed labs with medical attending Dr. Glynn Contreras 1 unit PRBC ordered .  Ct abdomen and pelvis to be ordered by night shift.  no further interventions at this time.  will cont to monitor anion gap.  Full dose lovenox held 2/2 paracentesis will f/u when its safe to resume. Medical attending in agreement with plan. will continue ongoing goals of care with family

## 2021-07-08 NOTE — PROGRESS NOTE ADULT - SUBJECTIVE AND OBJECTIVE BOX
Chief Complaint/Follow-up on: DM    Subjective:  POC blood glucose and insulin use reviewed.  trending slightly high this am   yesterday did not receive any pre meal or correctional insulin     MEDICATIONS  (STANDING):  dextrose 50% Injectable 25 Gram(s) IV Push once  FIRST- Mouthwash  BLM 10 milliLiter(s) Swish and Spit every 6 hours  insulin glargine Injectable (LANTUS) 18 Unit(s) SubCutaneous at bedtime  insulin lispro (ADMELOG) corrective regimen sliding scale   SubCutaneous three times a day before meals  insulin lispro (ADMELOG) corrective regimen sliding scale   SubCutaneous at bedtime  insulin lispro Injectable (ADMELOG) 3 Unit(s) SubCutaneous three times a day before meals  lactated ringers. 1000 milliLiter(s) (50 mL/Hr) IV Continuous <Continuous>  midodrine. 10 milliGRAM(s) Oral three times a day  nystatin    Suspension 841563 Unit(s) Oral every 6 hours  piperacillin/tazobactam IVPB.. 3.375 Gram(s) IV Intermittent every 8 hours  polyethylene glycol 3350 17 Gram(s) Oral daily  senna 2 Tablet(s) Oral at bedtime    MEDICATIONS  (PRN):  acetaminophen   Tablet .. 650 milliGRAM(s) Oral every 6 hours PRN Temp greater or equal to 38C (100.4F), Mild Pain (1 - 3), Moderate Pain (4 - 6)  magnesium hydroxide Suspension 30 milliLiter(s) Oral daily PRN Constipation  ondansetron    Tablet 4 milliGRAM(s) Oral every 8 hours PRN Nausea and/or Vomiting  oxyCODONE    IR 5 milliGRAM(s) Oral every 4 hours PRN Severe Pain (7 - 10)      PHYSICAL EXAM:  VITALS: T(C): 36.5 (07-08-21 @ 05:30)  T(F): 97.7 (07-08-21 @ 05:30), Max: 98.4 (07-07-21 @ 10:00)  HR: 90 (07-08-21 @ 05:30) (90 - 93)  BP: 90/58 (07-08-21 @ 05:30) (90/58 - 106/69)  RR:  (18 - 18)  SpO2:  (100% - 100%)  Wt(kg): --  GENERAL: NAD,   EYES: No proptosis, no injection  RESPIRATORY: Clear to auscultation bilaterally; No rales, rhonchi, wheezing, or rubs  CARDIOVASCULAR: Regular rate and rhythm; No murmurs; no peripheral edema  GI: Soft, nontender, non distended, normal bowel sounds    POCT Blood Glucose.: 238 mg/dL (07-08-21 @ 08:09)  POCT Blood Glucose.: 187 mg/dL (07-07-21 @ 21:57)  POCT Blood Glucose.: 159 mg/dL (07-07-21 @ 17:54)  POCT Blood Glucose.: 145 mg/dL (07-07-21 @ 11:51)  POCT Blood Glucose.: 130 mg/dL (07-07-21 @ 08:24)  POCT Blood Glucose.: 124 mg/dL (07-06-21 @ 22:17)  POCT Blood Glucose.: 114 mg/dL (07-06-21 @ 17:54)  POCT Blood Glucose.: 151 mg/dL (07-06-21 @ 12:22)  POCT Blood Glucose.: 90 mg/dL (07-06-21 @ 08:20)  POCT Blood Glucose.: 85 mg/dL (07-05-21 @ 22:02)  POCT Blood Glucose.: 117 mg/dL (07-05-21 @ 17:56)  POCT Blood Glucose.: 117 mg/dL (07-05-21 @ 12:27)    07-08    142  |  102  |  20  ----------------------------<  210<H>  3.6   |  13<L>  |  0.78    EGFR if : 97  EGFR if non : 84    Ca    8.3<L>      07-08  Mg     2.10     07-08  Phos  3.1     07-08    TPro  6.2  /  Alb  2.2<L>  /  TBili  0.7  /  DBili  x   /  AST  23  /  ALT  13  /  AlkPhos  142<H>  07-07          Thyroid Function Tests:

## 2021-07-08 NOTE — PROGRESS NOTE ADULT - PROBLEM SELECTOR PLAN 7
Na 142  - hypovolemic on exam, likely due to poor PO intake   - c/w IVF, encourage PO intake  - hold home ethacrynic acid for now (started last admission by nephro for hypo-osmolar hypervolemic hyponatremia)

## 2021-07-08 NOTE — PROGRESS NOTE ADULT - PROBLEM SELECTOR PLAN 5
Improving, SCr 1.3 --> 0.7 (baseline 0.5-0.6)  - likely pre-renal due to hemodynamic changes from sepsis  - c/w IVF, hold home ethacrynic acid as below for now   - trend BMP, renally dose medications

## 2021-07-08 NOTE — PROGRESS NOTE ADULT - PROBLEM SELECTOR PLAN 9
Hb 7.3 (baseline ~8)  - iron studies checked on prior admission c/w ACD in setting of malignancy   - possibly dilutional, no s/s of bleeding, repeat CBC  - trend CBC, transfuse PRN

## 2021-07-09 ENCOUNTER — APPOINTMENT (OUTPATIENT)
Dept: INFUSION THERAPY | Facility: HOSPITAL | Age: 56
End: 2021-07-09

## 2021-07-09 LAB
ANION GAP SERPL CALC-SCNC: 28 MMOL/L — HIGH (ref 7–14)
BLD GP AB SCN SERPL QL: NEGATIVE — SIGNIFICANT CHANGE UP
BUN SERPL-MCNC: 18 MG/DL — SIGNIFICANT CHANGE UP (ref 7–23)
CALCIUM SERPL-MCNC: 9 MG/DL — SIGNIFICANT CHANGE UP (ref 8.4–10.5)
CHLORIDE SERPL-SCNC: 101 MMOL/L — SIGNIFICANT CHANGE UP (ref 98–107)
CO2 SERPL-SCNC: 13 MMOL/L — LOW (ref 22–31)
CREAT SERPL-MCNC: 0.78 MG/DL — SIGNIFICANT CHANGE UP (ref 0.5–1.3)
GLUCOSE BLDC GLUCOMTR-MCNC: 231 MG/DL — HIGH (ref 70–99)
GLUCOSE BLDC GLUCOMTR-MCNC: 280 MG/DL — HIGH (ref 70–99)
GLUCOSE BLDC GLUCOMTR-MCNC: 306 MG/DL — HIGH (ref 70–99)
GLUCOSE BLDC GLUCOMTR-MCNC: 342 MG/DL — HIGH (ref 70–99)
GLUCOSE SERPL-MCNC: 263 MG/DL — HIGH (ref 70–99)
HAPTOGLOB SERPL-MCNC: 378 MG/DL — HIGH (ref 34–200)
HCT VFR BLD CALC: 33.2 % — LOW (ref 34.5–45)
HGB BLD-MCNC: 9.5 G/DL — LOW (ref 11.5–15.5)
LDH SERPL L TO P-CCNC: 889 U/L — HIGH (ref 135–225)
MAGNESIUM SERPL-MCNC: 2 MG/DL — SIGNIFICANT CHANGE UP (ref 1.6–2.6)
MCHC RBC-ENTMCNC: 22.1 PG — LOW (ref 27–34)
MCHC RBC-ENTMCNC: 28.6 GM/DL — LOW (ref 32–36)
MCV RBC AUTO: 77.4 FL — LOW (ref 80–100)
NRBC # BLD: 0 /100 WBCS — SIGNIFICANT CHANGE UP
NRBC # FLD: 0 K/UL — SIGNIFICANT CHANGE UP
PHOSPHATE SERPL-MCNC: 2.8 MG/DL — SIGNIFICANT CHANGE UP (ref 2.5–4.5)
PLATELET # BLD AUTO: 259 K/UL — SIGNIFICANT CHANGE UP (ref 150–400)
POTASSIUM SERPL-MCNC: 3.6 MMOL/L — SIGNIFICANT CHANGE UP (ref 3.5–5.3)
POTASSIUM SERPL-SCNC: 3.6 MMOL/L — SIGNIFICANT CHANGE UP (ref 3.5–5.3)
RBC # BLD: 4.29 M/UL — SIGNIFICANT CHANGE UP (ref 3.8–5.2)
RBC # BLD: 4.29 M/UL — SIGNIFICANT CHANGE UP (ref 3.8–5.2)
RBC # FLD: 21.8 % — HIGH (ref 10.3–14.5)
RETICS #: 23.6 K/UL — LOW (ref 25–125)
RETICS/RBC NFR: 0.6 % — SIGNIFICANT CHANGE UP (ref 0.5–2.5)
RH IG SCN BLD-IMP: POSITIVE — SIGNIFICANT CHANGE UP
SODIUM SERPL-SCNC: 142 MMOL/L — SIGNIFICANT CHANGE UP (ref 135–145)
WBC # BLD: 5.7 K/UL — SIGNIFICANT CHANGE UP (ref 3.8–10.5)
WBC # FLD AUTO: 5.7 K/UL — SIGNIFICANT CHANGE UP (ref 3.8–10.5)

## 2021-07-09 PROCEDURE — 99232 SBSQ HOSP IP/OBS MODERATE 35: CPT | Mod: GC

## 2021-07-09 PROCEDURE — 99233 SBSQ HOSP IP/OBS HIGH 50: CPT

## 2021-07-09 PROCEDURE — 99232 SBSQ HOSP IP/OBS MODERATE 35: CPT

## 2021-07-09 RX ORDER — INSULIN LISPRO 100/ML
5 VIAL (ML) SUBCUTANEOUS
Refills: 0 | Status: DISCONTINUED | OUTPATIENT
Start: 2021-07-09 | End: 2021-07-09

## 2021-07-09 RX ORDER — INSULIN GLARGINE 100 [IU]/ML
18 INJECTION, SOLUTION SUBCUTANEOUS AT BEDTIME
Refills: 0 | Status: DISCONTINUED | OUTPATIENT
Start: 2021-07-09 | End: 2021-07-13

## 2021-07-09 RX ORDER — INSULIN LISPRO 100/ML
4 VIAL (ML) SUBCUTANEOUS
Refills: 0 | Status: DISCONTINUED | OUTPATIENT
Start: 2021-07-09 | End: 2021-07-12

## 2021-07-09 RX ADMIN — Medication 2: at 22:43

## 2021-07-09 RX ADMIN — Medication 3 UNIT(S): at 12:24

## 2021-07-09 RX ADMIN — PIPERACILLIN AND TAZOBACTAM 25 GRAM(S): 4; .5 INJECTION, POWDER, LYOPHILIZED, FOR SOLUTION INTRAVENOUS at 17:22

## 2021-07-09 RX ADMIN — Medication 2: at 18:38

## 2021-07-09 RX ADMIN — Medication 4 UNIT(S): at 18:37

## 2021-07-09 RX ADMIN — Medication 3 UNIT(S): at 09:05

## 2021-07-09 RX ADMIN — MIDODRINE HYDROCHLORIDE 10 MILLIGRAM(S): 2.5 TABLET ORAL at 12:25

## 2021-07-09 RX ADMIN — ONDANSETRON 4 MILLIGRAM(S): 8 TABLET, FILM COATED ORAL at 05:57

## 2021-07-09 RX ADMIN — PIPERACILLIN AND TAZOBACTAM 25 GRAM(S): 4; .5 INJECTION, POWDER, LYOPHILIZED, FOR SOLUTION INTRAVENOUS at 00:51

## 2021-07-09 RX ADMIN — MIDODRINE HYDROCHLORIDE 10 MILLIGRAM(S): 2.5 TABLET ORAL at 05:58

## 2021-07-09 RX ADMIN — INSULIN GLARGINE 18 UNIT(S): 100 INJECTION, SOLUTION SUBCUTANEOUS at 22:43

## 2021-07-09 RX ADMIN — Medication 4: at 12:23

## 2021-07-09 RX ADMIN — SODIUM CHLORIDE 50 MILLILITER(S): 9 INJECTION, SOLUTION INTRAVENOUS at 10:32

## 2021-07-09 RX ADMIN — PIPERACILLIN AND TAZOBACTAM 25 GRAM(S): 4; .5 INJECTION, POWDER, LYOPHILIZED, FOR SOLUTION INTRAVENOUS at 09:04

## 2021-07-09 RX ADMIN — Medication 500000 UNIT(S): at 00:52

## 2021-07-09 RX ADMIN — DIPHENHYDRAMINE HYDROCHLORIDE AND LIDOCAINE HYDROCHLORIDE AND ALUMINUM HYDROXIDE AND MAGNESIUM HYDRO 10 MILLILITER(S): KIT at 00:52

## 2021-07-09 RX ADMIN — Medication 3: at 09:05

## 2021-07-09 NOTE — PROGRESS NOTE ADULT - SUBJECTIVE AND OBJECTIVE BOX
INTERVAL HPI/OVERNIGHT EVENTS:  Patient seen at bedside. Appears tired and weak. Reports occasional nausea and mild abdominal pain which is controlled    Vital Signs Last 24 Hrs  T(C): 36.5 (09 Jul 2021 05:57), Max: 36.6 (09 Jul 2021 01:05)  T(F): 97.7 (09 Jul 2021 05:57), Max: 97.8 (09 Jul 2021 01:05)  HR: 97 (09 Jul 2021 05:57) (96 - 99)  BP: 104/50 (09 Jul 2021 05:57) (104/50 - 117/62)  BP(mean): --  RR: 18 (09 Jul 2021 05:57) (17 - 18)  SpO2: 100% (09 Jul 2021 05:57) (100% - 100%)    PHYSICAL EXAM:    HEENT: cachectic with temporal wasting  Eyes: EOMI, sclerae non-icteric  Neck: supple, no LAD  Respiratory: CTA b/l, good air entry b/l,   Gastrointestinal: +BS, mildly distended, mild tenderness to palpation, no rebound  Extremities: mild pedal edema  Neurological: AAOx3,       MEDICATIONS  (STANDING):  dextrose 50% Injectable 25 Gram(s) IV Push once  enoxaparin Injectable 55 milliGRAM(s) SubCutaneous every 12 hours  FIRST- Mouthwash  BLM 10 milliLiter(s) Swish and Spit every 6 hours  insulin glargine Injectable (LANTUS) 16 Unit(s) SubCutaneous at bedtime  insulin lispro (ADMELOG) corrective regimen sliding scale   SubCutaneous three times a day before meals  insulin lispro (ADMELOG) corrective regimen sliding scale   SubCutaneous at bedtime  insulin lispro Injectable (ADMELOG) 3 Unit(s) SubCutaneous three times a day before meals  midodrine. 10 milliGRAM(s) Oral three times a day  nystatin    Suspension 447570 Unit(s) Oral every 6 hours  piperacillin/tazobactam IVPB.. 3.375 Gram(s) IV Intermittent every 8 hours  polyethylene glycol 3350 17 Gram(s) Oral daily  senna 2 Tablet(s) Oral at bedtime  sodium chloride 0.45%. 1000 milliLiter(s) (60 mL/Hr) IV Continuous <Continuous>    MEDICATIONS  (PRN):  acetaminophen   Tablet .. 650 milliGRAM(s) Oral every 6 hours PRN Temp greater or equal to 38C (100.4F), Mild Pain (1 - 3), Moderate Pain (4 - 6)  magnesium hydroxide Suspension 30 milliLiter(s) Oral daily PRN Constipation  ondansetron    Tablet 4 milliGRAM(s) Oral every 8 hours PRN Nausea and/or Vomiting  oxyCODONE    IR 5 milliGRAM(s) Oral every 4 hours PRN Severe Pain (7 - 10)        Allergies    Sulf-10 (Rash; Short breath)    Intolerances        LABS:                        9.5    5.70  )-----------( 259      ( 09 Jul 2021 06:14 )             33.2     07-09    142  |  101  |  18  ----------------------------<  263<H>  3.6   |  13<L>  |  0.78    Ca    9.0      09 Jul 2021 06:14  Phos  2.8     07-09  Mg     2.00     07-09          RADIOLOGY & ADDITIONAL TESTS:  Studies reviewed.

## 2021-07-09 NOTE — PROGRESS NOTE ADULT - SUBJECTIVE AND OBJECTIVE BOX
Luh Grigsby MD  [facility] Division of Hospital Medicine  Pager [phone] (M-F 8AM-5PM)  Other Times: [phone]    Patient is a 56y old  Female who presents with a chief complaint of urosepsis (09 Jul 2021 14:40)    SUBJECTIVE / OVERNIGHT EVENTS: Patient seen and examined at bedside. Denies pain, vomiting, diarrhea.     MEDICATIONS  (STANDING):  dextrose 50% Injectable 25 Gram(s) IV Push once  FIRST- Mouthwash  BLM 10 milliLiter(s) Swish and Spit every 6 hours  insulin glargine Injectable (LANTUS) 16 Unit(s) SubCutaneous at bedtime  insulin lispro (ADMELOG) corrective regimen sliding scale   SubCutaneous three times a day before meals  insulin lispro (ADMELOG) corrective regimen sliding scale   SubCutaneous at bedtime  insulin lispro Injectable (ADMELOG) 3 Unit(s) SubCutaneous three times a day before meals  midodrine. 10 milliGRAM(s) Oral three times a day  nystatin    Suspension 734950 Unit(s) Oral every 6 hours  piperacillin/tazobactam IVPB.. 3.375 Gram(s) IV Intermittent every 8 hours  polyethylene glycol 3350 17 Gram(s) Oral daily  senna 2 Tablet(s) Oral at bedtime    MEDICATIONS  (PRN):  acetaminophen   Tablet .. 650 milliGRAM(s) Oral every 6 hours PRN Temp greater or equal to 38C (100.4F), Mild Pain (1 - 3), Moderate Pain (4 - 6)  magnesium hydroxide Suspension 30 milliLiter(s) Oral daily PRN Constipation  ondansetron    Tablet 4 milliGRAM(s) Oral every 8 hours PRN Nausea and/or Vomiting  oxyCODONE    IR 5 milliGRAM(s) Oral every 4 hours PRN Severe Pain (7 - 10)      PHYSICAL EXAM:  Vital Signs Last 24 Hrs  T(C): 36.3 (09 Jul 2021 13:55), Max: 36.6 (09 Jul 2021 01:05)  T(F): 97.4 (09 Jul 2021 13:55), Max: 97.8 (09 Jul 2021 01:05)  HR: 94 (09 Jul 2021 13:55) (94 - 99)  BP: 125/60 (09 Jul 2021 13:55) (104/50 - 125/60)  RR: 18 (09 Jul 2021 13:55) (17 - 18)  SpO2: 100% (09 Jul 2021 13:55) (100% - 100%)    CONSTITUTIONAL: NAD, well-developed, well-groomed  RESPIRATORY: Normal respiratory effort; lungs are clear to auscultation bilaterally  CARDIOVASCULAR: Regular rate and rhythm, normal S1 and S2, no murmur/rub/gallop; No lower extremity edema  GASTROINTESTINAL: mild TTP, mild distension, normoactive bowel sounds, no rebound/guarding; No hepatosplenomegaly  MUSCULOSKELETAL:  no clubbing or cyanosis of digits; no joint swelling or tenderness to palpation  NEUROLOGY: non-focal; no gross sensory deficits   PSYCH: A+O to person, place, and time; affect appropriate  SKIN: No rashes; warm     LABS:                        9.5    5.70  )-----------( 259      ( 09 Jul 2021 06:14 )             33.2     07-09    142  |  101  |  18  ----------------------------<  263<H>  3.6   |  13<L>  |  0.78    Ca    9.0      09 Jul 2021 06:14  Phos  2.8     07-09  Mg     2.00     07-09      PT/INR - ( 08 Jul 2021 08:03 )   PT: 14.7 sec;   INR: 1.29 ratio                     RADIOLOGY & ADDITIONAL TESTS:  Results Reviewed:   Imaging Personally Reviewed:  Electrocardiogram Personally Reviewed:    COORDINATION OF CARE:  Care Discussed with Consultants/Other Providers [Y/N]:  Prior or Outpatient Records Reviewed [Y/N]:

## 2021-07-09 NOTE — CHART NOTE - NSCHARTNOTEFT_GEN_A_CORE
56 year old female admitted 2/2 sepsis,   Pt was recently admitted to [facility] for metastatic uterine carcinosarcoma c/b ascites and received carbo+paclitaxel on 6/18/21, given her current clinical condition she is  deemed not appropriate for further DMT and hospice was recommended.   Endocrine consulted for inpatient DM management. HbA1C of 8.2% (was improved from 9.6% on prior admission)    CAPILLARY BLOOD GLUCOSE      POCT Blood Glucose.: 306 mg/dL (09 Jul 2021 12:18)  POCT Blood Glucose.: 280 mg/dL (09 Jul 2021 08:44)  POCT Blood Glucose.: 287 mg/dL (08 Jul 2021 22:15)  POCT Blood Glucose.: 242 mg/dL (08 Jul 2021 18:28)       Problem/Recommendation - 1:  Problem: Type 2 diabetes mellitus with hyperglycemia, with long-term current use of insulin.   Recommendation: nausea, vomiting with glucose borderline low during admission, given her current clinical condition, and abdominial pain intermittently and ascites, I would not be aggressive with her management       - increased Lantus to 18 units at bedtime   -increased Admelog to 4 units before meals TID, hold if not eating  -Low dose correctional scale  premeal, low bedtime scale  -BG goal of 100-180 inpatient   -Carb consistent diet   -FS qAC + HS   -Endocrine will continue to follow inpatient    Discharge recommendations   plan is for home with hospice. DC reccs based on GOC and need for control (FSBG frequency etc)  -Will finalize closer to discharge date. Patient was followed recently by our team on prior admission and discharged on Lantus 18 units qhs and metformin 1000mg BID with A1c improving.  Of note lactate was 2.4 elevated - will need to ensure it has normalized before considering resume metformin.    2. Hypertension, unspecified type.    Now with hypotension requiring midodrine

## 2021-07-09 NOTE — PROGRESS NOTE ADULT - PROBLEM SELECTOR PLAN 5
SIRS positive on admission (febrile, tachycardic, leukocytosis)  - likely 2/2 UTI, UA positive, BCx/UCx NTD  - c/w empiric zosyn for longer course x 7 days through 7/10 given soft BPs  - trend fever curve

## 2021-07-09 NOTE — PROGRESS NOTE ADULT - PROBLEM SELECTOR PLAN 1
Stage IV Endometrial Cancer c/b malignant ascites s/p chemotherapy (6/18 carboplatin/taxol)  - oncology following, patient not a candidate for DMT at this time  - last admission required diagnostic/therapeutic paracentesis, abdomen mildly distended, sonogram with moderate ascites, procedure team consulted tentative paracentesis 7/12 (if CTAP 7/9 negative for bleed)  - pain control: holding home morphine ER given soft BPs, c/w oxycodone 5mg [id number] PRN with hold parameters  - appreciate palliative care assistance for GOC with family - FULL CODE at this time, agreeable to home hospice  - patient with poor PO intake, daughter inquiring about NGT - explained that patient is not amenable to NGT which would be a temporary measure regardless as patient not candidate for PEG given ascites, and that her poor PO intake is part of her disease process   - family meeting 7/7 with myself + oncology Dr. Valadez, daughter Marline, and family - questions answered about hospice, open to hospice services at home, however still want all medical interventions done

## 2021-07-09 NOTE — PROGRESS NOTE ADULT - ASSESSMENT
55 yo F with PMH AODM, migraines, and stage 4 endometrial cancer, non ambulatory at baseline, coming in w/ complaining of weakness, dizziness and nausea. FS at home were 350s causing family to provide Lantus and call EMS. Pt was recently admitted to [facility] for metastatic uterine carcinosarcoma c/b ascites and received carbo+paclitaxel on 6/18/21. However, her general condition quickly decompensated over the last few weeks and upon outpatient discussion with Dr. Hoang, she was deemed not appropriate for further DMT and hospice was recommended. Urgent hospice referral was made by Dr. Hoang's team. Now admitted with fever and urosepsis.    # Metastatic endometrial cancer  - Recently diagnosed metastatic uterine carcinosarcoma, c/b ascites, last chemotherapy with carbo+paclitaxel on 6/18, then her condition quickly decompensated over the last few weeks.  - Pt is not a candidate for DMT at this point  - Plan for discharge home with hospice when completes IV abx, but the family still want any intervention that could help her condition and do not want DNR at this time.  - Continue symptom oriented care with pain management and stool regimen  -Transfused for decrease in HGB with CT abdomen/pelvis pending to evaluate  - She has moderate ascites on exam and on imaging. Consider paracentesis prior to discharge for comfort  - Treatment for urosepsis per primary team, will complete Zosyn 7/10  - Pt followed by Dr. Hoang at [facility].  -C/w Supportive care, pain control, Nutrition, PT, DVT ppx  -Oncology will continue to follow with you.

## 2021-07-09 NOTE — PROGRESS NOTE ADULT - PROBLEM SELECTOR PLAN 1
off ms contin   oxy ir 5mg po q 4 hours prn  pt not taking  agree with eval for paracentesis for comfort to remove ascites if tolerated  bowel regimen

## 2021-07-09 NOTE — PROGRESS NOTE ADULT - ASSESSMENT
56F with hx of Stage IV Endometrial Cancer c/b malignant ascites s/p chemotherapy (6/18 carboplatin/taxol), L. mid femoral/peroneal vein DVT on Lovenox, T2DM (A1C 8% on insulin) who presents with altered mental status and weakness a/w sepsis and EZE likely due to UTI. Course c/b persistent hypotension, downtrending Hb, pending CTAP to r/o bleed.

## 2021-07-09 NOTE — PROGRESS NOTE ADULT - SUBJECTIVE AND OBJECTIVE BOX
SUBJECTIVE AND OBJECTIVE:  pt states she is fine.  Denies pain. confused.  lethargic  INTERVAL HPI/OVERNIGHT EVENTS:    DNR on chart:   Allergies    Sulf-10 (Rash; Short breath)    Intolerances    MEDICATIONS  (STANDING):  dextrose 50% Injectable 25 Gram(s) IV Push once  FIRST- Mouthwash  BLM 10 milliLiter(s) Swish and Spit every 6 hours  insulin glargine Injectable (LANTUS) 16 Unit(s) SubCutaneous at bedtime  insulin lispro (ADMELOG) corrective regimen sliding scale   SubCutaneous three times a day before meals  insulin lispro (ADMELOG) corrective regimen sliding scale   SubCutaneous at bedtime  insulin lispro Injectable (ADMELOG) 3 Unit(s) SubCutaneous three times a day before meals  midodrine. 10 milliGRAM(s) Oral three times a day  nystatin    Suspension 128515 Unit(s) Oral every 6 hours  piperacillin/tazobactam IVPB.. 3.375 Gram(s) IV Intermittent every 8 hours  polyethylene glycol 3350 17 Gram(s) Oral daily  senna 2 Tablet(s) Oral at bedtime    MEDICATIONS  (PRN):  acetaminophen   Tablet .. 650 milliGRAM(s) Oral every 6 hours PRN Temp greater or equal to 38C (100.4F), Mild Pain (1 - 3), Moderate Pain (4 - 6)  magnesium hydroxide Suspension 30 milliLiter(s) Oral daily PRN Constipation  ondansetron    Tablet 4 milliGRAM(s) Oral every 8 hours PRN Nausea and/or Vomiting  oxyCODONE    IR 5 milliGRAM(s) Oral every 4 hours PRN Severe Pain (7 - 10)      ITEMS UNCHECKED ARE NOT PRESENT    PRESENT SYMPTOMS: [ ]Unable to obtain due to poor mentation   Source if other than patient:  [ ]Family   [ ]Team     Pain (Impact on QOL):  denies  Location:  Minimal acceptable level (0-10 scale):            Aggravating factors:  Quality:  Radiation:  Severity (0-10 scale):    Timing:    Dyspnea:                           [ ]Mild [ ]Moderate [ ]Severe  Anxiety:                             [ ]Mild [ ]Moderate [ ]Severe  Fatigue:                             [ ]Mild [ ]Moderate [x ]Severe  Nausea:                             [ ]Mild [ ]Moderate [ ]Severe  Loss of appetite:              [ ]Mild [ ]Moderate [ x]Severe  Constipation:                    [ ]Mild [ ]Moderate [ ]Severe    PAIN AD Score:	  [email or web address] (Ctrl + left click to view)    Other Symptoms:  [ x]All other review of systems negative     Karnofsky Performance Score/Palliative Performance Status Version 2:   30     %    http://palliative.info/resource_material/[email or web address].pdf  PHYSICAL EXAM:  Vital Signs Last 24 Hrs  T(C): 36.3 (09 Jul 2021 13:55), Max: 36.6 (09 Jul 2021 01:05)  T(F): 97.4 (09 Jul 2021 13:55), Max: 97.8 (09 Jul 2021 01:05)  HR: 94 (09 Jul 2021 13:55) (94 - 99)  BP: 125/60 (09 Jul 2021 13:55) (104/50 - 125/60)  BP(mean): --  RR: 18 (09 Jul 2021 13:55) (17 - 18)  SpO2: 100% (09 Jul 2021 13:55) (100% - 100%) I&O's Summary   GENERAL:  [x ]Alert  [ x]Oriented x  2 [ ]Lethargic  [ ]Cachexia  [ ]Unarousable  [ ]Verbal  [ ]Non-Verbal    Behavioral:   [ ] Anxiety  [ x] Delirium [ ] Agitation [ ] Other    HEENT:  [x ]Normal   [ ]Dry mouth   [ ]ET Tube/Trach  [ ]Oral lesions    PULMONARY:   [x ]Clear [ ]Tachypnea  [ ]Audible excessive secretions   [ ]Rhonchi        [ ]Right [ ]Left [ ]Bilateral  [ ]Crackles        [ ]Right [ ]Left [ ]Bilateral  [ ]Wheezing     [ ]Right [ ]Left [ ]Bilateral    CARDIOVASCULAR:    [ ]Regular [ ]Irregular [x ]Tachy  [ ]Glenn [ ]Murmur [ ]Other    GASTROINTESTINAL:  [ x]Soft  [x ]Distended   [ ]+BS  [x ]Non tender [ ]Tender  [ ]PEG [ ]OGT/ NGT   Last BM:    GENITOURINARY:  [ ]Normal [ x] Incontinent   [ ]Oliguria/Anuria   [ ]Angeles    MUSCULOSKELETAL:   [ ]Normal   [ ]Weakness  [ x]Bed/Wheelchair bound [ ]Edema    NEUROLOGIC:   [ ]No focal deficits  [x ] Cognitive impairment  [ ] Dysphagia [ ]Dysarthria [ ] Paresis [ ]Other     SKIN:   [ ]Normal   [x ]Pressure ulcer(s)  [ ]Rash    CRITICAL CARE:  [ ] Shock Present  [ ]Septic [ ]Cardiogenic [ ]Neurologic [ ]Hypovolemic  [ ]  Vasopressors [ ]  Inotropes   [ ] Respiratory failure present  [ ] Acute  [ ] Chronic [ ] Hypoxic  [ ] Hypercarbic [ ] Other  [ ] Other organ failure     LABS:                        9.5    5.70  )-----------( 259      ( 09 Jul 2021 06:14 )             33.2   07-09    142  |  101  |  18  ----------------------------<  263<H>  3.6   |  13<L>  |  0.78    Ca    9.0      09 Jul 2021 06:14  Phos  2.8     07-09  Mg     2.00     07-09    PT/INR - ( 08 Jul 2021 08:03 )   PT: 14.7 sec;   INR: 1.29 ratio               RADIOLOGY & ADDITIONAL STUDIES:    Protein Calorie Malnutrition Present: [ ] yes [ ] no  [ ] PPSV2 < or = 30%  [ ] significant weight loss [ ] poor nutritional intake [ ] anasarca [ ] catabolic state Albumin, Serum: 2.2 g/dL (07-07-21 @ 06:29)  Artificial Nutrition [ ]     REFERRALS:   [ ]Chaplaincy  [ ] Hospice  [ ]Child Life  [ ]Social Work  [ ]Case management [ ]Holistic Therapy   Goals of Care Document:

## 2021-07-10 LAB
ANION GAP SERPL CALC-SCNC: 20 MMOL/L — HIGH (ref 7–14)
BUN SERPL-MCNC: 17 MG/DL — SIGNIFICANT CHANGE UP (ref 7–23)
CALCIUM SERPL-MCNC: 8.8 MG/DL — SIGNIFICANT CHANGE UP (ref 8.4–10.5)
CHLORIDE SERPL-SCNC: 107 MMOL/L — SIGNIFICANT CHANGE UP (ref 98–107)
CO2 SERPL-SCNC: 19 MMOL/L — LOW (ref 22–31)
CREAT SERPL-MCNC: 0.77 MG/DL — SIGNIFICANT CHANGE UP (ref 0.5–1.3)
GLUCOSE BLDC GLUCOMTR-MCNC: 174 MG/DL — HIGH (ref 70–99)
GLUCOSE BLDC GLUCOMTR-MCNC: 178 MG/DL — HIGH (ref 70–99)
GLUCOSE BLDC GLUCOMTR-MCNC: 216 MG/DL — HIGH (ref 70–99)
GLUCOSE BLDC GLUCOMTR-MCNC: 225 MG/DL — HIGH (ref 70–99)
GLUCOSE SERPL-MCNC: 240 MG/DL — HIGH (ref 70–99)
HCT VFR BLD CALC: 29.1 % — LOW (ref 34.5–45)
HGB BLD-MCNC: 8.8 G/DL — LOW (ref 11.5–15.5)
MAGNESIUM SERPL-MCNC: 1.8 MG/DL — SIGNIFICANT CHANGE UP (ref 1.6–2.6)
MCHC RBC-ENTMCNC: 22.5 PG — LOW (ref 27–34)
MCHC RBC-ENTMCNC: 30.2 GM/DL — LOW (ref 32–36)
MCV RBC AUTO: 74.4 FL — LOW (ref 80–100)
NRBC # BLD: 0 /100 WBCS — SIGNIFICANT CHANGE UP
NRBC # FLD: 0 K/UL — SIGNIFICANT CHANGE UP
PHOSPHATE SERPL-MCNC: 2 MG/DL — LOW (ref 2.5–4.5)
PLATELET # BLD AUTO: 258 K/UL — SIGNIFICANT CHANGE UP (ref 150–400)
POTASSIUM SERPL-MCNC: 3.2 MMOL/L — LOW (ref 3.5–5.3)
POTASSIUM SERPL-SCNC: 3.2 MMOL/L — LOW (ref 3.5–5.3)
RBC # BLD: 3.91 M/UL — SIGNIFICANT CHANGE UP (ref 3.8–5.2)
RBC # FLD: 21.7 % — HIGH (ref 10.3–14.5)
SODIUM SERPL-SCNC: 146 MMOL/L — HIGH (ref 135–145)
WBC # BLD: 5.02 K/UL — SIGNIFICANT CHANGE UP (ref 3.8–10.5)
WBC # FLD AUTO: 5.02 K/UL — SIGNIFICANT CHANGE UP (ref 3.8–10.5)

## 2021-07-10 PROCEDURE — 74178 CT ABD&PLV WO CNTR FLWD CNTR: CPT | Mod: 26

## 2021-07-10 PROCEDURE — 99233 SBSQ HOSP IP/OBS HIGH 50: CPT

## 2021-07-10 RX ORDER — POTASSIUM PHOSPHATE, MONOBASIC POTASSIUM PHOSPHATE, DIBASIC 236; 224 MG/ML; MG/ML
15 INJECTION, SOLUTION INTRAVENOUS ONCE
Refills: 0 | Status: COMPLETED | OUTPATIENT
Start: 2021-07-10 | End: 2021-07-10

## 2021-07-10 RX ORDER — POTASSIUM CHLORIDE 20 MEQ
40 PACKET (EA) ORAL ONCE
Refills: 0 | Status: COMPLETED | OUTPATIENT
Start: 2021-07-10 | End: 2021-07-10

## 2021-07-10 RX ORDER — SODIUM,POTASSIUM PHOSPHATES 278-250MG
1 POWDER IN PACKET (EA) ORAL EVERY 4 HOURS
Refills: 0 | Status: DISCONTINUED | OUTPATIENT
Start: 2021-07-10 | End: 2021-07-10

## 2021-07-10 RX ORDER — ENOXAPARIN SODIUM 100 MG/ML
55 INJECTION SUBCUTANEOUS EVERY 12 HOURS
Refills: 0 | Status: DISCONTINUED | OUTPATIENT
Start: 2021-07-10 | End: 2021-07-11

## 2021-07-10 RX ORDER — POTASSIUM CHLORIDE 20 MEQ
10 PACKET (EA) ORAL ONCE
Refills: 0 | Status: COMPLETED | OUTPATIENT
Start: 2021-07-10 | End: 2021-07-10

## 2021-07-10 RX ADMIN — Medication 2: at 12:41

## 2021-07-10 RX ADMIN — Medication 500000 UNIT(S): at 06:11

## 2021-07-10 RX ADMIN — Medication 2: at 08:49

## 2021-07-10 RX ADMIN — Medication 100 MILLIEQUIVALENT(S): at 10:45

## 2021-07-10 RX ADMIN — PIPERACILLIN AND TAZOBACTAM 25 GRAM(S): 4; .5 INJECTION, POWDER, LYOPHILIZED, FOR SOLUTION INTRAVENOUS at 01:08

## 2021-07-10 RX ADMIN — Medication 650 MILLIGRAM(S): at 18:39

## 2021-07-10 RX ADMIN — POTASSIUM PHOSPHATE, MONOBASIC POTASSIUM PHOSPHATE, DIBASIC 62.5 MILLIMOLE(S): 236; 224 INJECTION, SOLUTION INTRAVENOUS at 10:45

## 2021-07-10 RX ADMIN — PIPERACILLIN AND TAZOBACTAM 25 GRAM(S): 4; .5 INJECTION, POWDER, LYOPHILIZED, FOR SOLUTION INTRAVENOUS at 08:40

## 2021-07-10 RX ADMIN — Medication 650 MILLIGRAM(S): at 19:39

## 2021-07-10 RX ADMIN — Medication 500000 UNIT(S): at 18:28

## 2021-07-10 RX ADMIN — ENOXAPARIN SODIUM 55 MILLIGRAM(S): 100 INJECTION SUBCUTANEOUS at 22:56

## 2021-07-10 RX ADMIN — Medication 1: at 18:18

## 2021-07-10 RX ADMIN — INSULIN GLARGINE 18 UNIT(S): 100 INJECTION, SOLUTION SUBCUTANEOUS at 22:55

## 2021-07-10 RX ADMIN — DIPHENHYDRAMINE HYDROCHLORIDE AND LIDOCAINE HYDROCHLORIDE AND ALUMINUM HYDROXIDE AND MAGNESIUM HYDRO 10 MILLILITER(S): KIT at 14:20

## 2021-07-10 RX ADMIN — DIPHENHYDRAMINE HYDROCHLORIDE AND LIDOCAINE HYDROCHLORIDE AND ALUMINUM HYDROXIDE AND MAGNESIUM HYDRO 10 MILLILITER(S): KIT at 06:11

## 2021-07-10 NOTE — PROGRESS NOTE ADULT - PROBLEM SELECTOR PLAN 7
Resolved  - likely pre-renal due to hemodynamic changes from sepsis  - c/w IVF, hold home ethacrynic acid as below for now   - trend BMP

## 2021-07-10 NOTE — PROVIDER CONTACT NOTE (OTHER) - SITUATION
Pt refusing to drink potassium chloride powder and Phos-NaK drink  All pt is drinking is her tea, refusing to eat breakfast

## 2021-07-10 NOTE — PROGRESS NOTE ADULT - SUBJECTIVE AND OBJECTIVE BOX
[facility] Division of Hospital Medicine  Edmond Griggs MD  Pager [phone]    Patient is a 56y old  Female who presents with a chief complaint of urosepsis (09 Jul 2021 15:20)      SUBJECTIVE / OVERNIGHT EVENTS: Weak, tolerating diet      MEDICATIONS  (STANDING):  dextrose 50% Injectable 25 Gram(s) IV Push once  FIRST- Mouthwash  BLM 10 milliLiter(s) Swish and Spit every 6 hours  insulin glargine Injectable (LANTUS) 18 Unit(s) SubCutaneous at bedtime  insulin lispro (ADMELOG) corrective regimen sliding scale   SubCutaneous three times a day before meals  insulin lispro (ADMELOG) corrective regimen sliding scale   SubCutaneous at bedtime  insulin lispro Injectable (ADMELOG) 4 Unit(s) SubCutaneous three times a day before meals  midodrine. 10 milliGRAM(s) Oral three times a day  nystatin    Suspension 579676 Unit(s) Oral every 6 hours  polyethylene glycol 3350 17 Gram(s) Oral daily  senna 2 Tablet(s) Oral at bedtime    MEDICATIONS  (PRN):  acetaminophen   Tablet .. 650 milliGRAM(s) Oral every 6 hours PRN Temp greater or equal to 38C (100.4F), Mild Pain (1 - 3), Moderate Pain (4 - 6)  magnesium hydroxide Suspension 30 milliLiter(s) Oral daily PRN Constipation  ondansetron    Tablet 4 milliGRAM(s) Oral every 8 hours PRN Nausea and/or Vomiting  oxyCODONE    IR 5 milliGRAM(s) Oral every 4 hours PRN Severe Pain (7 - 10)      CAPILLARY BLOOD GLUCOSE      POCT Blood Glucose.: 216 mg/dL (10 Jul 2021 12:16)  POCT Blood Glucose.: 225 mg/dL (10 Jul 2021 08:34)  POCT Blood Glucose.: 342 mg/dL (09 Jul 2021 22:21)  POCT Blood Glucose.: 231 mg/dL (09 Jul 2021 18:17)    I&O's Summary    09 Jul 2021 07:01  -  10 Jul 2021 07:00  --------------------------------------------------------  IN: 30 mL / OUT: 0 mL / NET: 30 mL        PHYSICAL EXAM:  Vital Signs Last 24 Hrs  T(C): 36.2 (10 Jul 2021 13:25), Max: 36.6 (10 Jul 2021 03:00)  T(F): 97.2 (10 Jul 2021 13:25), Max: 97.9 (10 Jul 2021 03:00)  HR: 88 (10 Jul 2021 13:25) (88 - 97)  BP: 101/60 (10 Jul 2021 13:25) (101/60 - 108/56)  BP(mean): --  RR: 17 (10 Jul 2021 13:25) (16 - 17)  SpO2: 100% (10 Jul 2021 13:25) (100% - 100%)  CONSTITUTIONAL: NAD, cachectic  EYES: EOMI, conjunctiva and sclera clear  ENMT: Moist oral mucosa  NECK: Supple  RESPIRATORY: Breathing unlabored, CTAB  CARDIOVASCULAR: S1S2 no MRG  ABDOMEN: Nontender to palpation, normoactive bowel sounds, no rebound/guarding  MUSCULOSKELETAL: no clubbing or cyanosis of digits  NEUROLOGY: No focal deficits   SKIN: No rashes or lesions    LABS:                        8.8    5.02  )-----------( 258      ( 10 Jul 2021 07:05 )             29.1     07-10    146<H>  |  107  |  17  ----------------------------<  240<H>  3.2<L>   |  19<L>  |  0.77    Ca    8.8      10 Jul 2021 07:05  Phos  2.0     07-10  Mg     1.80     07-10              CODE: FULL

## 2021-07-10 NOTE — PROGRESS NOTE ADULT - PROBLEM SELECTOR PLAN 1
Stage IV Endometrial Cancer c/b malignant ascites s/p chemotherapy (6/18 carboplatin/taxol)  - oncology following, patient not a candidate for DMT at this time  - last admission required diagnostic/therapeutic paracentesis, abdomen mildly distended, sonogram with moderate ascites, procedure team consulted tentative paracentesis 7/12 (if CTAP 7/9 negative for bleed)  - pain control: holding home morphine ER given soft BPs, c/w oxycodone 5mg [id number] PRN with hold parameters  - appreciate palliative care assistance for GOC with family - FULL CODE at this time, agreeable to home hospice  - patient with poor PO intake, daughter inquiring about NGT - explained that patient is not amenable to NGT which would be a temporary measure regardless as patient not candidate for PEG given ascites, and that her poor PO intake is part of her disease process   - family meeting 7/7 with hospitalists, oncology Dr. Valadez, daughter Marline, and family - questions answered about hospice, open to hospice services at home, however still want all medical interventions done

## 2021-07-11 ENCOUNTER — TRANSCRIPTION ENCOUNTER (OUTPATIENT)
Age: 56
End: 2021-07-11

## 2021-07-11 LAB
ALBUMIN SERPL ELPH-MCNC: 2.1 G/DL — LOW (ref 3.3–5)
ALP SERPL-CCNC: 147 U/L — HIGH (ref 40–120)
ALT FLD-CCNC: 10 U/L — SIGNIFICANT CHANGE UP (ref 4–33)
ANION GAP SERPL CALC-SCNC: 15 MMOL/L — HIGH (ref 7–14)
AST SERPL-CCNC: 17 U/L — SIGNIFICANT CHANGE UP (ref 4–32)
BILIRUB SERPL-MCNC: 0.5 MG/DL — SIGNIFICANT CHANGE UP (ref 0.2–1.2)
BUN SERPL-MCNC: 14 MG/DL — SIGNIFICANT CHANGE UP (ref 7–23)
CALCIUM SERPL-MCNC: 8.7 MG/DL — SIGNIFICANT CHANGE UP (ref 8.4–10.5)
CHLORIDE SERPL-SCNC: 109 MMOL/L — HIGH (ref 98–107)
CO2 SERPL-SCNC: 21 MMOL/L — LOW (ref 22–31)
CREAT SERPL-MCNC: 0.67 MG/DL — SIGNIFICANT CHANGE UP (ref 0.5–1.3)
GLUCOSE BLDC GLUCOMTR-MCNC: 136 MG/DL — HIGH (ref 70–99)
GLUCOSE BLDC GLUCOMTR-MCNC: 186 MG/DL — HIGH (ref 70–99)
GLUCOSE BLDC GLUCOMTR-MCNC: 200 MG/DL — HIGH (ref 70–99)
GLUCOSE BLDC GLUCOMTR-MCNC: 203 MG/DL — HIGH (ref 70–99)
GLUCOSE SERPL-MCNC: 198 MG/DL — HIGH (ref 70–99)
HCT VFR BLD CALC: 29.2 % — LOW (ref 34.5–45)
HGB BLD-MCNC: 8.7 G/DL — LOW (ref 11.5–15.5)
MAGNESIUM SERPL-MCNC: 1.8 MG/DL — SIGNIFICANT CHANGE UP (ref 1.6–2.6)
MCHC RBC-ENTMCNC: 22.2 PG — LOW (ref 27–34)
MCHC RBC-ENTMCNC: 29.8 GM/DL — LOW (ref 32–36)
MCV RBC AUTO: 74.5 FL — LOW (ref 80–100)
NRBC # BLD: 0 /100 WBCS — SIGNIFICANT CHANGE UP
NRBC # FLD: 0 K/UL — SIGNIFICANT CHANGE UP
PHOSPHATE SERPL-MCNC: 2.3 MG/DL — LOW (ref 2.5–4.5)
PLATELET # BLD AUTO: 245 K/UL — SIGNIFICANT CHANGE UP (ref 150–400)
POTASSIUM SERPL-MCNC: 3.3 MMOL/L — LOW (ref 3.5–5.3)
POTASSIUM SERPL-SCNC: 3.3 MMOL/L — LOW (ref 3.5–5.3)
PROT SERPL-MCNC: 5.8 G/DL — LOW (ref 6–8.3)
RBC # BLD: 3.92 M/UL — SIGNIFICANT CHANGE UP (ref 3.8–5.2)
RBC # FLD: 22.2 % — HIGH (ref 10.3–14.5)
SODIUM SERPL-SCNC: 145 MMOL/L — SIGNIFICANT CHANGE UP (ref 135–145)
WBC # BLD: 4.8 K/UL — SIGNIFICANT CHANGE UP (ref 3.8–10.5)
WBC # FLD AUTO: 4.8 K/UL — SIGNIFICANT CHANGE UP (ref 3.8–10.5)

## 2021-07-11 PROCEDURE — 99233 SBSQ HOSP IP/OBS HIGH 50: CPT

## 2021-07-11 RX ORDER — ENOXAPARIN SODIUM 100 MG/ML
55 INJECTION SUBCUTANEOUS ONCE
Refills: 0 | Status: COMPLETED | OUTPATIENT
Start: 2021-07-11 | End: 2021-07-11

## 2021-07-11 RX ORDER — POTASSIUM CHLORIDE 20 MEQ
40 PACKET (EA) ORAL ONCE
Refills: 0 | Status: COMPLETED | OUTPATIENT
Start: 2021-07-11 | End: 2021-07-11

## 2021-07-11 RX ADMIN — INSULIN GLARGINE 18 UNIT(S): 100 INJECTION, SOLUTION SUBCUTANEOUS at 23:04

## 2021-07-11 RX ADMIN — Medication 500000 UNIT(S): at 14:39

## 2021-07-11 RX ADMIN — Medication 4 UNIT(S): at 17:44

## 2021-07-11 RX ADMIN — Medication 40 MILLIEQUIVALENT(S): at 09:46

## 2021-07-11 RX ADMIN — Medication 4 UNIT(S): at 08:44

## 2021-07-11 RX ADMIN — Medication 1: at 17:44

## 2021-07-11 RX ADMIN — ENOXAPARIN SODIUM 55 MILLIGRAM(S): 100 INJECTION SUBCUTANEOUS at 10:38

## 2021-07-11 RX ADMIN — Medication 2: at 08:44

## 2021-07-11 NOTE — PROGRESS NOTE ADULT - ASSESSMENT
56F with hx of Stage IV Endometrial Cancer c/b malignant ascites s/p chemotherapy (6/18 carboplatin/taxol), L. mid femoral/peroneal vein DVT on Lovenox, T2DM (A1C 8% on insulin) who presented with altered mental status and weakness a/w sepsis and EZE likely due to UTI. Course c/b persistent hypotension, downtrending Hb, pending CTAP to r/o bleed.

## 2021-07-11 NOTE — PROGRESS NOTE ADULT - PROBLEM SELECTOR PLAN 1
Stage IV Endometrial Cancer c/b malignant ascites s/p chemotherapy (6/18 carboplatin/taxol)  - oncology following, patient not a candidate for DMT at this time  - last admission required diagnostic/therapeutic paracentesis, abdomen mildly distended, sonogram with moderate ascites, procedure team consulted tentative paracentesis 7/12 (if CTAP 7/9 negative for bleed)  - pain control: holding home morphine ER given soft BPs, c/w oxycodone 5mg [id number] PRN with hold parameters  - appreciate palliative care assistance for GOC with family - FULL CODE at this time, agreeable to home hospice  - patient with poor PO intake, daughter inquiring about NGT - explained that patient is not amenable to NGT which would be a temporary measure regardless as patient not candidate for PEG given ascites, and that her poor PO intake is part of her disease process   - family meeting 7/7 with hospitalists, oncology Dr. Valadez, daughter Marline, and family - questions answered about hospice, open to hospice services at home, however still want all medical interventions done

## 2021-07-11 NOTE — PROGRESS NOTE ADULT - SUBJECTIVE AND OBJECTIVE BOX
[facility] Division of Hospital Medicine  Edmond Griggs MD  Pager [phone]    Patient is a 56y old  Female who presents with a chief complaint of urosepsis (10 Jul 2021 15:08)      SUBJECTIVE / OVERNIGHT EVENTS: Stable      MEDICATIONS  (STANDING):  dextrose 50% Injectable 25 Gram(s) IV Push once  FIRST- Mouthwash  BLM 10 milliLiter(s) Swish and Spit every 6 hours  insulin glargine Injectable (LANTUS) 18 Unit(s) SubCutaneous at bedtime  insulin lispro (ADMELOG) corrective regimen sliding scale   SubCutaneous three times a day before meals  insulin lispro (ADMELOG) corrective regimen sliding scale   SubCutaneous at bedtime  insulin lispro Injectable (ADMELOG) 4 Unit(s) SubCutaneous three times a day before meals  midodrine. 10 milliGRAM(s) Oral three times a day  nystatin    Suspension 308447 Unit(s) Oral every 6 hours  polyethylene glycol 3350 17 Gram(s) Oral daily  senna 2 Tablet(s) Oral at bedtime    MEDICATIONS  (PRN):  acetaminophen   Tablet .. 650 milliGRAM(s) Oral every 6 hours PRN Temp greater or equal to 38C (100.4F), Mild Pain (1 - 3), Moderate Pain (4 - 6)  magnesium hydroxide Suspension 30 milliLiter(s) Oral daily PRN Constipation  ondansetron    Tablet 4 milliGRAM(s) Oral every 8 hours PRN Nausea and/or Vomiting  oxyCODONE    IR 5 milliGRAM(s) Oral every 4 hours PRN Severe Pain (7 - 10)      CAPILLARY BLOOD GLUCOSE      POCT Blood Glucose.: 136 mg/dL (11 Jul 2021 12:25)  POCT Blood Glucose.: 203 mg/dL (11 Jul 2021 08:26)  POCT Blood Glucose.: 178 mg/dL (10 Jul 2021 22:26)  POCT Blood Glucose.: 174 mg/dL (10 Jul 2021 17:52)    I&O's Summary      PHYSICAL EXAM:  Vital Signs Last 24 Hrs  T(C): 36.2 (11 Jul 2021 13:33), Max: 36.7 (10 Jul 2021 18:00)  T(F): 97.2 (11 Jul 2021 13:33), Max: 98 (10 Jul 2021 18:00)  HR: 89 (11 Jul 2021 13:33) (85 - 99)  BP: 108/62 (11 Jul 2021 13:33) (101/59 - 111/63)  BP(mean): --  RR: 16 (11 Jul 2021 13:33) (16 - 17)  SpO2: 100% (11 Jul 2021 13:33) (98% - 100%)  CONSTITUTIONAL: NAD, cachectic   EYES: EOMI, conjunctiva and sclera clear  ENMT: Moist oral mucosa  NECK: Supple  RESPIRATORY: Breathing unlabored, CTAB  CARDIOVASCULAR: S1S2 no MRG  ABDOMEN: Nontender to palpation, normoactive bowel sounds, no rebound/guarding, distended  MUSCULOSKELETAL: no clubbing or cyanosis of digits  NEUROLOGY: No focal deficits   SKIN: No rashes or lesions    LABS:                        8.7    4.80  )-----------( 245      ( 11 Jul 2021 08:24 )             29.2     07-11    145  |  109<H>  |  14  ----------------------------<  198<H>  3.3<L>   |  21<L>  |  0.67    Ca    8.7      11 Jul 2021 08:24  Phos  2.3     07-11  Mg     1.80     07-11    TPro  5.8<L>  /  Alb  2.1<L>  /  TBili  0.5  /  DBili  x   /  AST  17  /  ALT  10  /  AlkPhos  147<H>  07-11                RADIOLOGY & ADDITIONAL TESTS:  Results Reviewed:   < from: CT Abdomen and Pelvis w/wo IV Cont (07.10.21 @ 16:30) >    EXAM:  CT ABDOMEN AND PELVIS WAW IC        PROCEDURE DATE:  Jul 10 2021         INTERPRETATION:  CLINICAL INFORMATION: Endometrial cancer with downtrending hemoglobin. Assess for GI or retroperitoneal bleed.    ADDITIONAL CLINICAL INFORMATION: Other, Non-specified    COMPARISON: CT abdomen and pelvis 6/7/2021.    CONTRAST/COMPLICATIONS:  IV Contrast: Omnipaque 350  90 cc administered   10 cc discarded  Oral Contrast: NONE  Complications: None reported at time of study completion    PROCEDURE:  CT of the Abdomen and Pelvis was performed.  Precontrast, Arterial and Delayed phases were performed.  Sagittal and coronal reformats were performed.    FINDINGS:  Study is limited by streak artifact.    LOWER CHEST: A small left and a trace right pleural effusion, new/increased since prior, with adjacent compressive atelectasis.    LIVER: Redemonstrated subcapsular soft tissue implants, essentially unchanged. Scalloping of the liver surface.  BILE DUCTS: Normal caliber.  GALLBLADDER: Within normal limits.  SPLEEN: Scalloping of the splenic surface  PANCREAS: Within normal limits.  ADRENALS: Within normal limits.  KIDNEYS/URETERS: Right renal pelvic fullness versus mild hydronephrosis, new/increased since 6/7/2021. Normal left kidney without hydronephrosis.    BLADDER: Loss of the fat plane between the uterus and the bladder raises concern for bladder wall invasion, as on prior. Previously described bladder dome mass concerning for metastasis is not clearly identified on this study due to streak artifact at that location.  REPRODUCTIVE ORGANS: Enlarged heterogeneous uterus with distended endometrial cavity and underlying endometrial mass. Redemonstrated bilateral adnexal masses, consistent with metastases.    BOWEL: No bowel obstruction. Small bowel is largely underdistended. Thickening of the wall of the transverse colon is concerning for serosal implants. Bowel loops in the pelvis are difficult to delineate from the endometrial malignancy and bilateral adnexal masses. No active extravasation of contrast on arterial phase imaging or venous phase pooling to suggest active GI bleed.  PERITONEUM: Redemonstrated omental carcinomatosis and nodular peritoneal thickening, essentially unchanged from prior. Also moderate to large volume malignant ascites is unchanged.  VESSELS: No aortic aneurysm. Celiac axis, SMA, bilateral renal arteries, inferior mesenteric artery, and bilateral iliac arteries are patent.  RETROPERITONEUM/LYMPH NODES: Bilateral obturator lymphadenopathy with a reference on the left measuring 2.8 x 1.7 cm (4:06), unchanged when remeasured on prior. No retroperitoneal hematoma.  ABDOMINAL WALL: Mild anasarca.  BONES: Stable to mild interval increase in the extensive sclerotic osseous metastatic disease.    IMPRESSION:  Study limited by streak artifact.    Metastatic endometrial cancer with extensive omental and peritoneal disease and sclerotic osseous metastatic disease. Stable to mild interval increase in the osseous metastatic disease since 6/7/2021. Moderate to large volume malignant ascites is essentially unchanged.    No retroperitoneal hematoma. No CT evidence of active GI bleed.    Right renal pelvic fullness versus mild hydronephrosis, new/increased since 6/7/2021.    --- End of Report ---              BLUE PEREZ MD; Attending Radiologist  This document has been electronically signed. Jul 10 2021  8:35PM    < end of copied text >    CODE: FULL

## 2021-07-11 NOTE — PROGRESS NOTE ADULT - PROBLEM SELECTOR PLAN 5
SIRS positive on admission (febrile, tachycardic, leukocytosis)  - likely 2/2 UTI, UA positive, BCx/UCx NTD  - completed empiric zosyn for longer course x 7 days through 7/10 given soft BPs  - trend fever curve

## 2021-07-11 NOTE — DISCHARGE NOTE PROVIDER - CARE PROVIDER_API CALL
Aziza Hanna)  Internal Medicine; Medical Oncology  [address] [place] [address]  Phone: [phone]  Fax: [phone]  Follow Up Time:

## 2021-07-11 NOTE — DISCHARGE NOTE PROVIDER - HOSPITAL COURSE
56F with hx of Stage IV Endometrial Cancer c/b malignant ascites s/p chemotherapy (6/18 carboplatin/taxol), L. mid femoral/peroneal vein DVT on Lovenox, T2DM (A1C 8% on insulin) who presents with altered mental status and weakness a/w sepsis and EZE due to UTI.    56F with hx of Stage IV Endometrial Cancer c/b malignant ascites s/p chemotherapy (6/18 carboplatin/taxol), L. mid femoral/peroneal vein DVT on Lovenox, T2DM (A1C 8% on insulin) who presents with altered mental status and weakness a/w sepsis and EZE due to Continue antibiotics as directed and monitor for signs/symptoms of infection, such as, fever/chills, burning/pain with urination, urinary frequency/hesitancy, cloudy urine, or blood in urine.    Hypotension  BP soft in the 90s (baseline 110s), patient mentating  - likely multifactorial from poor PO intake/sepsis  - s/p fluid bolus, c/w maintenance fluids, c/w midodrine 10mg TID (patient refuses)  - monitor vital signs closely.  -B/p improved     Sepsis  SIRS positive on admission (febrile, tachycardic, leukocytosis)  - likely 2/2 UTI, UA positive, BCx/UCx NTD  - c/w empiric zosyn for longer course x 7 days through 7/10 given soft BPs  - trend fever curve    UTI  UA positive + febrile   - UCx NTD however sent after abx started   - c/w empiric zosyn for longer course x 7 days through 7/10 given soft BPs  - trend fever curve.     EZE  Improving, SCr 1.3 --> 0.9 (baseline 0.5-0.6)  - likely pre-renal due to hemodynamic changes from sepsis  - c/w IVF, hold home ethacrynic acid as below for now   - trend BMP, renally dose medications.     DM2   -A1C 8%  - hyperglycemic on admission however did not meet DKA criteria  - home regimen: Lantus 18U at bedtime, metformin 1000mg BID: decreased ot 8units qhs and hold metformin   - hold home metformin while admitted, resume on discharge  - basal/bolus regimen per endocrine, SSI/FS qac and hs.   -Endocrine consulted, Insulin adjusted as needed    Endometrial cancer  Stage IV Endometrial Cancer c/b malignant ascites s/p chemotherapy (6/18 carboplatin/taxol)  - oncology following, patient not a candidate for DMT at this time  - last admission required diagnostic/therapeutic paracentesis, abdomen mildly distended, sonogram with moderate ascites will request procedure team eval in AM for para  - pain control: holding home morphine ER given soft BPs, c/w oxycodone 5mg [id number] PRN with hold parameters  - appreciate palliative care assistance for GOC with family - FULL CODE at this time, agreeable to home hospice  - patient with poor PO intake, daughter inquiring about NGT - explained that patient is not amenable to NGT which would be a temporary measure regardless as patient not candidate for PEG given ascites, and that her poor PO intake is part of her disease process   - family meeting 7/7 with myself + oncology Dr. Valadez, daughter Marline, and family - questions answered about hospice, open to hospice services at home, will continue to provide support.   -7/12-s/p Therapuetic Paracentesis 2.8L fuid removal.    DVT  Hx of L. mid femoral/peroneal vein DVT  - c/w therapeutic lovenox 55mg BID.     Anemia  Hb 8.7 (baseline ~8)  - iron studies checked on prior admission c/w ACD in setting of malignancy   -s/p 1 unit PRBC 7/4, CT A/P neg RP bleed  - trend CBC, transfuse PRN.     DVT PPX  -therapeutically anticoagulated on lovenox  - DISPO: Home with hospice. Pending completion of abx, stabilization of BPs, possible pleurx placement      On 7/15/2021, discussed with   patient is medically cleared and optimized for discharge today. All medications were reviewed with attending, and sent to mutually agreed upon pharmacy. .   dispo: Home with home hospice   D/w Marline daughter she knows how to inject 56F with hx of Stage IV Endometrial Cancer c/b malignant ascites s/p chemotherapy (6/18 carboplatin/taxol), L. mid femoral/peroneal vein DVT on Lovenox, T2DM (A1C 8% on insulin) who presents with altered mental status and weakness a/w sepsis and EZE due to Continue antibiotics as directed and monitor for signs/symptoms of infection, such as, fever/chills, burning/pain with urination, urinary frequency/hesitancy, cloudy urine, or blood in urine.    Hypotension  BP soft in the 90s (baseline 110s), patient mentating  - likely multifactorial from poor PO intake/sepsis  - s/p fluid bolus, c/w maintenance fluids, c/w midodrine 10mg TID (patient refuses)  - monitor vital signs closely.  -B/p improved     Sepsis  SIRS positive on admission (febrile, tachycardic, leukocytosis)  - likely 2/2 UTI, UA positive, BCx/UCx NTD  - c/w empiric zosyn for longer course x 7 days through 7/10 given soft BPs  - trend fever curve    UTI  UA positive + febrile   - UCx NTD however sent after abx started   - c/w empiric zosyn for longer course x 7 days through 7/10 given soft BPs  - trend fever curve.     EZE  Improving, SCr 1.3 --> 0.9 (baseline 0.5-0.6)  - likely pre-renal due to hemodynamic changes from sepsis  - c/w IVF, hold home ethacrynic acid as below for now   - trend BMP, renally dose medications.     DM2   -A1C 8%  - hyperglycemic on admission however did not meet DKA criteria  - home regimen: Lantus 18U at bedtime, metformin 1000mg BID: decreased ot 8units qhs and hold metformin   - hold home metformin while admitted, resume on discharge  - basal/bolus regimen per endocrine, SSI/FS qac and hs.   -Endocrine consulted, Insulin adjusted as needed    Endometrial cancer  Stage IV Endometrial Cancer c/b malignant ascites s/p chemotherapy (6/18 carboplatin/taxol)  - oncology following, patient not a candidate for DMT at this time  - last admission required diagnostic/therapeutic paracentesis, abdomen mildly distended, sonogram with moderate ascites will request procedure team eval in AM for para  - pain control: holding home morphine ER given soft BPs, c/w oxycodone 5mg [id number] PRN with hold parameters  - appreciate palliative care assistance for GOC with family - FULL CODE at this time, agreeable to home hospice  - patient with poor PO intake, daughter inquiring about NGT - explained that patient is not amenable to NGT which would be a temporary measure regardless as patient not candidate for PEG given ascites, and that her poor PO intake is part of her disease process   - family meeting 7/7 with myself + oncology Dr. Valadez, daughter Marline, and family - questions answered about hospice, open to hospice services at home, will continue to provide support.   -7/12-s/p Therapuetic Paracentesis 2.8L fuid removal.    DVT  Hx of L. mid femoral/peroneal vein DVT  - c/w therapeutic lovenox 55mg BID.     Anemia  Hb 8.7 (baseline ~8)  - iron studies checked on prior admission c/w ACD in setting of malignancy   -s/p 1 unit PRBC 7/4, CT A/P neg RP bleed  - trend CBC, transfuse PRN.     DVT PPX  -therapeutically anticoagulated on lovenox  - DISPO: Home with hospice. Pending completion of abx, stabilization of BPs, possible pleurx placement      On 7/15/2021, discussed with   patient is medically cleared and optimized for discharge today. All medications were reviewed with attending, and sent to mutually agreed upon pharmacy. .   dispo: Home with home hospice     D/w [facility] all meds are covered ok to send to [facility]   D/w Marline daughter she knows how to inject Lovenox and Insulin . no

## 2021-07-11 NOTE — DISCHARGE NOTE PROVIDER - NSDCMRMEDTOKEN_GEN_ALL_CORE_FT
acetaminophen 325 mg oral tablet: 2 tab(s) orally every 6 hours, As needed, Mild Pain (1 - 3), Moderate Pain (4 - 6)  ethacrynic acid 25 mg oral tablet: 1 tab(s) orally once a day   Lantus Solostar Pen 100 units/mL subcutaneous solution: 18 unit(s) subcutaneous once a day (at bedtime)  Lovenox 80 mg/0.8 mL injectable solution: 70 milligram(s) subcutaneously 2 times a day     magnesium hydroxide 8% oral suspension: 30 milliliter(s) orally once a day, As Needed   metFORMIN 500 mg oral tablet: 2 tab(s) orally 2 times a day  morphine 15 mg/8 to 12 hr oral tablet, extended release: 1 tab(s) orally 2 times a day MDD:2 tabs  nystatin 100,000 units/mL oral suspension: 5 milliliter(s) orally every 6 hours  ondansetron 4 mg oral disintegrating strip: 1 each orally 3 times a day, As Needed   oxyCODONE 5 mg oral tablet: 1 tab(s) orally every 4 hours, As needed, Severe Pain (7 - 10) MDD:6 tabs  polyethylene glycol 3350 oral powder for reconstitution: 17 gram(s) orally once a day  senna oral tablet: 2 tab(s) orally once a day (at bedtime)   acetaminophen 325 mg oral tablet: 2 tab(s) orally every 6 hours, As needed, Mild Pain (1 - 3), Moderate Pain (4 - 6)  enoxaparin 60 mg/0.6 mL injectable solution: 55 milligram(s) subcutaneously 2 times a day   Lantus Solostar Pen 100 units/mL subcutaneous solution: 5 unit(s) subcutaneous once a day (at bedtime)   magnesium hydroxide 8% oral suspension: 30 milliliter(s) orally once a day, As Needed   nystatin 100,000 units/mL oral suspension: 5 milliliter(s) orally every 6 hours  ondansetron 4 mg oral disintegrating strip: 1 each orally every 8 hours   oxyCODONE 5 mg oral tablet: 1 tab(s) orally every 4 hours, As needed, Severe Pain (7 - 10) MDD:mdd: 5tabs  hold for sedation   polyethylene glycol 3350 oral powder for reconstitution: 17 gram(s) orally once a day  senna oral tablet: 2 tab(s) orally once a day (at bedtime)

## 2021-07-11 NOTE — DISCHARGE NOTE PROVIDER - NSDCCPCAREPLAN_GEN_ALL_CORE_FT
PRINCIPAL DISCHARGE DIAGNOSIS  Diagnosis: Sepsis  Assessment and Plan of Treatment: Resolved  You are being discharged home with Hospice services and it is recommended to focus on comfort measures and supportive care. Continue with medications prescribed for pain and other symptom control. If you have questions or concerns you may contact the Hospice service line by calling [phone].      SECONDARY DISCHARGE DIAGNOSES  Diagnosis: UTI (urinary tract infection)  Assessment and Plan of Treatment: you were treated asnd completed antibiotics    Diagnosis: EZE (acute kidney injury)  Assessment and Plan of Treatment: Resolved    Diagnosis: Hypernatremia  Assessment and Plan of Treatment: You recieved IV fluid in the  hospital. Stay hydrated    Diagnosis: Anemia  Assessment and Plan of Treatment: You received blood transfusion while in the hospital.    Diagnosis: Endometrial cancer  Assessment and Plan of Treatment: You are being discharged home with Hospice services and it is recommended to focus on comfort measures and supportive care. Continue with medications prescribed for pain and other symptom control. If you have questions or concerns you may contact the Hospice service line by calling [phone].    Diagnosis: DVT (deep venous thrombosis)  Assessment and Plan of Treatment: continue Lovenox as prescribed    Diagnosis: Type 2 diabetes mellitus with hyperglycemia, with long-term current use of insulin  Assessment and Plan of Treatment: continue diabetes medications as prescribed    Diagnosis: Hypotension  Assessment and Plan of Treatment: You received IV fluid.     PRINCIPAL DISCHARGE DIAGNOSIS  Diagnosis: Sepsis  Assessment and Plan of Treatment: Resolved  You are being discharged home with Hospice services and it is recommended to focus on comfort measures and supportive care. Continue with medications prescribed for pain and other symptom control. If you have questions or concerns you may contact the Hospice service line by calling [phone].      SECONDARY DISCHARGE DIAGNOSES  Diagnosis: Type 2 diabetes mellitus with hyperglycemia, with long-term current use of insulin  Assessment and Plan of Treatment: continue diabetes medications as prescribed  Your lantus was decreased to 5 units at bedtime.  Continue to monitor your  fingersticks.  Encourage oral intake. Continue to hold metformin.    Diagnosis: UTI (urinary tract infection)  Assessment and Plan of Treatment: you were treated and completed antibiotics while you were admitted to the hospital    Diagnosis: EZE (acute kidney injury)  Assessment and Plan of Treatment: Resolved    Diagnosis: Hypernatremia  Assessment and Plan of Treatment: You recieved IV fluid in the  hospital. Stay hydrated  encourage oral intake    Diagnosis: Anemia  Assessment and Plan of Treatment: You received blood transfusion while in the hospital.      Diagnosis: Endometrial cancer  Assessment and Plan of Treatment: You are being discharged home with Hospice services and it is recommended to focus on comfort measures and supportive care. Continue with medications prescribed for pain and other symptom control. If you have questions or concerns you may contact the Hospice service line by calling [phone].    Diagnosis: DVT (deep venous thrombosis)  Assessment and Plan of Treatment: continue Lovenox as prescribed    Diagnosis: Hypotension  Assessment and Plan of Treatment: You received IV fluid.  resolved

## 2021-07-12 LAB
ALBUMIN SERPL ELPH-MCNC: 2.2 G/DL — LOW (ref 3.3–5)
ALP SERPL-CCNC: 157 U/L — HIGH (ref 40–120)
ALT FLD-CCNC: 11 U/L — SIGNIFICANT CHANGE UP (ref 4–33)
ANION GAP SERPL CALC-SCNC: 18 MMOL/L — HIGH (ref 7–14)
AST SERPL-CCNC: 16 U/L — SIGNIFICANT CHANGE UP (ref 4–32)
BILIRUB SERPL-MCNC: 0.5 MG/DL — SIGNIFICANT CHANGE UP (ref 0.2–1.2)
BUN SERPL-MCNC: 15 MG/DL — SIGNIFICANT CHANGE UP (ref 7–23)
CALCIUM SERPL-MCNC: 8.4 MG/DL — SIGNIFICANT CHANGE UP (ref 8.4–10.5)
CHLORIDE SERPL-SCNC: 110 MMOL/L — HIGH (ref 98–107)
CO2 SERPL-SCNC: 20 MMOL/L — LOW (ref 22–31)
CREAT SERPL-MCNC: 0.6 MG/DL — SIGNIFICANT CHANGE UP (ref 0.5–1.3)
GLUCOSE BLDC GLUCOMTR-MCNC: 236 MG/DL — HIGH (ref 70–99)
GLUCOSE SERPL-MCNC: 220 MG/DL — HIGH (ref 70–99)
HCT VFR BLD CALC: 30.4 % — LOW (ref 34.5–45)
HGB BLD-MCNC: 9 G/DL — LOW (ref 11.5–15.5)
MAGNESIUM SERPL-MCNC: 1.9 MG/DL — SIGNIFICANT CHANGE UP (ref 1.6–2.6)
MCHC RBC-ENTMCNC: 22.3 PG — LOW (ref 27–34)
MCHC RBC-ENTMCNC: 29.6 GM/DL — LOW (ref 32–36)
MCV RBC AUTO: 75.4 FL — LOW (ref 80–100)
NRBC # BLD: 0 /100 WBCS — SIGNIFICANT CHANGE UP
NRBC # FLD: 0 K/UL — SIGNIFICANT CHANGE UP
PHOSPHATE SERPL-MCNC: 2.5 MG/DL — SIGNIFICANT CHANGE UP (ref 2.5–4.5)
PLATELET # BLD AUTO: 233 K/UL — SIGNIFICANT CHANGE UP (ref 150–400)
POTASSIUM SERPL-MCNC: 3.5 MMOL/L — SIGNIFICANT CHANGE UP (ref 3.5–5.3)
POTASSIUM SERPL-SCNC: 3.5 MMOL/L — SIGNIFICANT CHANGE UP (ref 3.5–5.3)
PROT SERPL-MCNC: 6.1 G/DL — SIGNIFICANT CHANGE UP (ref 6–8.3)
RBC # BLD: 4.03 M/UL — SIGNIFICANT CHANGE UP (ref 3.8–5.2)
RBC # FLD: 22.6 % — HIGH (ref 10.3–14.5)
SODIUM SERPL-SCNC: 148 MMOL/L — HIGH (ref 135–145)
WBC # BLD: 6.41 K/UL — SIGNIFICANT CHANGE UP (ref 3.8–10.5)
WBC # FLD AUTO: 6.41 K/UL — SIGNIFICANT CHANGE UP (ref 3.8–10.5)

## 2021-07-12 PROCEDURE — 99232 SBSQ HOSP IP/OBS MODERATE 35: CPT

## 2021-07-12 PROCEDURE — 99233 SBSQ HOSP IP/OBS HIGH 50: CPT

## 2021-07-12 PROCEDURE — 49083 ABD PARACENTESIS W/IMAGING: CPT | Mod: GC

## 2021-07-12 RX ORDER — LIDOCAINE HCL 20 MG/ML
20 VIAL (ML) INJECTION ONCE
Refills: 0 | Status: COMPLETED | OUTPATIENT
Start: 2021-07-12 | End: 2021-07-12

## 2021-07-12 RX ORDER — INSULIN LISPRO 100/ML
5 VIAL (ML) SUBCUTANEOUS
Refills: 0 | Status: DISCONTINUED | OUTPATIENT
Start: 2021-07-12 | End: 2021-07-13

## 2021-07-12 RX ADMIN — Medication 1: at 18:24

## 2021-07-12 RX ADMIN — Medication 2: at 12:52

## 2021-07-12 RX ADMIN — Medication 2: at 09:18

## 2021-07-12 RX ADMIN — INSULIN GLARGINE 18 UNIT(S): 100 INJECTION, SOLUTION SUBCUTANEOUS at 23:00

## 2021-07-12 NOTE — PROGRESS NOTE ADULT - ASSESSMENT
S.57 yo F with PMH AODM, migraines, and stage 4 endometrial cancer, non ambulatory at baseline, coming in w/ complaining of weakness, dizziness and nausea. FS at home were 350s causing family to provide Lantus and call EMS. Pt was recently admitted to [facility] for metastatic uterine carcinosarcoma c/b ascites and received carbo+paclitaxel on 6/18/21. However, her general condition quickly decompensated over the last few weeks and upon outpatient discussion with Dr. Hoang, she was deemed not appropriate for further DMT and hospice was recommended. Urgent hospice referral was made by Dr. Hoang's team. Now admitted with fever and urosepsis.    # Metastatic endometrial cancer  - Recently diagnosed metastatic uterine carcinosarcoma, c/b ascites, last chemotherapy with carbo+paclitaxel on 6/18, then her condition quickly decompensated over the last few weeks.  - Pt is not a candidate for DMT at this point  - Plan for discharge home with hospice when completes IV abx, but the family still want any intervention that could help her condition and do not want DNR at this time.  - Continue symptom oriented care with pain management and stool regimen  -S/p paracentesis today, feels more comfortable now  - Treatment for urosepsis completed  - Pt followed by Dr. Hoang at [facility].  -C/w Supportive care, pain control, Nutrition, PT, DVT ppx  -Oncology will continue to follow with you.    Will follow. Please do not hesitate to call back with questions.     Aziza Hanna MD  Medical Oncology Attending  C: [phone]

## 2021-07-12 NOTE — CHART NOTE - NSCHARTNOTEFT_GEN_A_CORE
Pt awaiting home hospice.  Symptoms controlled.  Goals remain the same.  Will sign off.  Please reconsult as needed.  Stella Mccord DO

## 2021-07-12 NOTE — PROGRESS NOTE ADULT - ASSESSMENT
56 year old female admitted 2/2 sepsis,   Pt was recently admitted to [facility] for metastatic uterine carcinosarcoma c/b ascites and received carbo+paclitaxel on 6/18/21, given her current clinical condition she is deemed not appropriate for further treatment and hospice was recommended.   Endocrine consulted for inpatient DM management. HbA1C of 8.2% (was improved from 9.6% on prior admission)      Problem/Recommendation - 1:  Problem: Type 2 diabetes mellitus with hyperglycemia, with long-term current use of insulin.   -trending above goal with FS greater than 200 mg/dL   -continue Lantus 18 units at bedtime   -will increase Admelog slightly to 5 units before meals TID, hold if not eating  -continue Ademlog low dose correctional scale premeal, low bedtime scale  -BG goal of 100-180 inpatient   -Carb consistent diet   -FS qAC + HS   -Endocrine will continue to follow inpatient    Discharge recommendations   plan is for home with hospice. DC recommendations based on GOC and need for control (FSBG frequency etc)  -Will finalize closer to discharge date. Patient was followed recently by our team on prior admission and discharged on Lantus 18 units qhs and metformin 1000mg BID with A1c improving.  Of note lactate was 2.4 elevated - will need to ensure it has normalized before considering resume metformin.    2. Hypertension, unspecified type.    Defer management to primary team     TONY Paris-BC  Nurse Practitioner   Division of Endocrinology  Pager: STACY pager [phone]    If out of hospital/unavailable when paged, please note: patient will be cared for by another provider on the endocrine service.  Please call the endocrine answering service for assistance to reach covering provider ([phone]). For non-urgent matters, please email [email or web address] for assistance.        56 year old female admitted 2/2 sepsis,   Pt was recently admitted to [facility] for metastatic uterine carcinosarcoma c/b ascites and received carbo+paclitaxel on 6/18/21, given her current clinical condition she is deemed not appropriate for further treatment and hospice was recommended.   Endocrine consulted for inpatient DM management. HbA1C of 8.2% (was improved from 9.6% on prior admission)      Problem/Recommendation - 1:  Problem: Type 2 diabetes mellitus with hyperglycemia, with long-term current use of insulin.   -trending above goal with FS greater than 200 mg/dL   -continue Lantus 18 units at bedtime   -will increase Admelog slightly to 5 units before meals TID, hold if not eating  -continue Ademlog low dose correctional scale premeal, low bedtime scale  -BG goal of 100-180 inpatient   -Carb consistent diet   -FS qAC + HS   -Endocrine will continue to follow inpatient    Discharge recommendations   plan is for home with hospice. DC recommendations based on GOC and need for control (FSBG frequency etc)  -Will finalize closer to discharge date. Patient was followed recently by our team on prior admission and discharged on Lantus 18 units qhs and metformin 1000mg BID with A1c improving.  Of note lactate was 1.7, improved    2. Hypertension, unspecified type.    Defer management to primary team     TONY Paris-BC  Nurse Practitioner   Division of Endocrinology  Pager: [facility] pager [phone]    If out of hospital/unavailable when paged, please note: patient will be cared for by another provider on the endocrine service.  Please call the endocrine answering service for assistance to reach covering provider ([phone]). For non-urgent matters, please email [email or web address] for assistance.

## 2021-07-12 NOTE — PROGRESS NOTE ADULT - SUBJECTIVE AND OBJECTIVE BOX
Chief Complaint/Follow-up on: DM    Subjective:  POC blood glucose and insulin use reviewed.  trending slightly higher today    MEDICATIONS  (STANDING):  dextrose 50% Injectable 25 Gram(s) IV Push once  FIRST- Mouthwash  BLM 10 milliLiter(s) Swish and Spit every 6 hours  insulin glargine Injectable (LANTUS) 18 Unit(s) SubCutaneous at bedtime  insulin lispro (ADMELOG) corrective regimen sliding scale   SubCutaneous three times a day before meals  insulin lispro (ADMELOG) corrective regimen sliding scale   SubCutaneous at bedtime  insulin lispro Injectable (ADMELOG) 4 Unit(s) SubCutaneous three times a day before meals  lidocaine 1% Injectable 20 milliLiter(s) Local Injection once  midodrine. 10 milliGRAM(s) Oral three times a day  nystatin    Suspension 967009 Unit(s) Oral every 6 hours  polyethylene glycol 3350 17 Gram(s) Oral daily  senna 2 Tablet(s) Oral at bedtime      PHYSICAL EXAM:  Vital Signs Last 24 Hrs  T(C): 36.4 (12 Jul 2021 12:56), Max: 37 (11 Jul 2021 22:03)  T(F): 97.6 (12 Jul 2021 12:56), Max: 98.6 (11 Jul 2021 22:03)  HR: 104 (12 Jul 2021 12:56) (88 - 104)  BP: 103/62 (12 Jul 2021 12:56) (103/59 - 110/55)  BP(mean): --  RR: 16 (12 Jul 2021 12:56) (16 - 17)  SpO2: 99% (12 Jul 2021 12:56) (99% - 100%)  GENERAL: NAD, resting in bed   RESPIRATORY: Non labored respirations   GI: Soft, nontender, non distended    CAPILLARY BLOOD GLUCOSE      POCT Blood Glucose.: 248 mg/dL (12 Jul 2021 12:18)  POCT Blood Glucose.: 236 mg/dL (12 Jul 2021 08:40)  POCT Blood Glucose.: 200 mg/dL (11 Jul 2021 22:24)  POCT Blood Glucose.: 186 mg/dL (11 Jul 2021 17:41)      07-12    148<H>  |  110<H>  |  15  ----------------------------<  220<H>  3.5   |  20<L>  |  0.60    Ca    8.4      12 Jul 2021 07:18  Phos  2.5     07-12  Mg     1.90     07-12    TPro  6.1  /  Alb  2.2<L>  /  TBili  0.5  /  DBili  x   /  AST  16  /  ALT  11  /  AlkPhos  157<H>  07-12      A1C with Estimated Average Glucose Result: 8.2 % (07-03-21 @ 04:26)  A1C with Estimated Average Glucose Result: 9.6 % (06-02-21 @ 06:33)      Diet, Regular:   Consistent Carbohydrate Evening Snack (CSTCHOSN)  Pesco Vegetarian (Accepts Fish)  Supplement Feeding Modality:  Oral  Glucerna Shake Cans or Servings Per Day:  1       Frequency:  Three Times a day (07-04-21 @ 14:58) [Active]

## 2021-07-12 NOTE — PROGRESS NOTE ADULT - PROBLEM SELECTOR PLAN 1
Stage IV Endometrial Cancer c/b malignant ascites s/p chemotherapy (6/18 carboplatin/taxol)  - oncology following, patient not a candidate for DMT at this time  - last admission required diagnostic/therapeutic paracentesis, abdomen mildly distended, sonogram with moderate ascites, procedure team consulted tentative paracentesis 7/12 (if CTAP 7/9 negative for bleed)  - pain control: holding home morphine ER given soft BPs, c/w oxycodone 5mg [id number] PRN with hold parameters  -CT 7/10 Metastatic endometrial cancer with extensive omental and peritoneal disease and sclerotic osseous metastatic disease. Stable to mild interval increase in the osseous metastatic disease since 6/7/2021. Moderate to large volume malignant ascites is essentially unchanged. No NV hematoma/bleed.  -pt with malignant ascites, plan for palliative paracentesis 7/12  - appreciate palliative care assistance for GOC with family - FULL CODE at this time, agreeable to home hospice  - patient with poor PO intake, daughter inquiring about NGT - explained that patient is not amenable to NGT which would be a temporary measure regardless as patient not candidate for PEG given ascites, and that her poor PO intake is part of her disease process   - family meeting 7/7 with hospitalists, oncology Dr. Valadez, daughter Marline, and family - questions answered about hospice, open to hospice services at home, however still want all medical interventions done  -dispo: meena planning home with home hospice

## 2021-07-12 NOTE — PROGRESS NOTE ADULT - SUBJECTIVE AND OBJECTIVE BOX
INTERVAL HPI/OVERNIGHT EVENTS:  Patient seen at bedside.      VITAL SIGNS:  T(F): 97.6 (07-12-21 @ 12:56)  HR: 104 (07-12-21 @ 12:56)  BP: 103/62 (07-12-21 @ 12:56)  RR: 16 (07-12-21 @ 12:56)  SpO2: 99% (07-12-21 @ 12:56)  Wt(kg): --    PHYSICAL EXAM:    Constitutional: NAD, resting in bed comfortably  Eyes: EOMI, sclera non-icteric  Neck: supple, no LAP  Respiratory: CTA b/l, good air entry b/l, no wheezing, rhonchi or crackels  Cardiovascular: RRR, normal S1S2, no M/R/G  Gastrointestinal: soft, NTND  Extremities: no edema  Neurological: AAOx3, non focal  Skin: Normal temperature    MEDICATIONS  (STANDING):  dextrose 50% Injectable 25 Gram(s) IV Push once  FIRST- Mouthwash  BLM 10 milliLiter(s) Swish and Spit every 6 hours  insulin glargine Injectable (LANTUS) 18 Unit(s) SubCutaneous at bedtime  insulin lispro (ADMELOG) corrective regimen sliding scale   SubCutaneous three times a day before meals  insulin lispro (ADMELOG) corrective regimen sliding scale   SubCutaneous at bedtime  insulin lispro Injectable (ADMELOG) 4 Unit(s) SubCutaneous three times a day before meals  lidocaine 1% Injectable 20 milliLiter(s) Local Injection once  midodrine. 10 milliGRAM(s) Oral three times a day  nystatin    Suspension 711093 Unit(s) Oral every 6 hours  polyethylene glycol 3350 17 Gram(s) Oral daily  senna 2 Tablet(s) Oral at bedtime    MEDICATIONS  (PRN):  acetaminophen   Tablet .. 650 milliGRAM(s) Oral every 6 hours PRN Temp greater or equal to 38C (100.4F), Mild Pain (1 - 3), Moderate Pain (4 - 6)  magnesium hydroxide Suspension 30 milliLiter(s) Oral daily PRN Constipation  ondansetron    Tablet 4 milliGRAM(s) Oral every 8 hours PRN Nausea and/or Vomiting  oxyCODONE    IR 5 milliGRAM(s) Oral every 4 hours PRN Severe Pain (7 - 10)      Allergies    Sulf-10 (Rash; Short breath)    Intolerances        LABS:                        9.0    6.41  )-----------( 233      ( 12 Jul 2021 07:18 )             30.4     07-12    148<H>  |  110<H>  |  15  ----------------------------<  220<H>  3.5   |  20<L>  |  0.60    Ca    8.4      12 Jul 2021 07:18  Phos  2.5     07-12  Mg     1.90     07-12    TPro  6.1  /  Alb  2.2<L>  /  TBili  0.5  /  DBili  x   /  AST  16  /  ALT  11  /  AlkPhos  157<H>  07-12          RADIOLOGY & ADDITIONAL TESTS:  Studies reviewed.

## 2021-07-12 NOTE — PROCEDURE NOTE - ADDITIONAL PROCEDURE DETAILS
Invasive procedure team was consulted for diagnostic/therapeutic paracentesis due to abdominal discomfort. Explained risks (including bleeding, infection, and bowel perforation) and benefits to patient and patient expressed understanding and consented. Ultrasound was utilized and visualized 10 cm Ascitic fluid pocket identified w/ no epigastric vessels visualized. No rash or vessels overlying skin site. Pts plts 233, INR 1.29, DVT PPx held over night, not on NOACs or coumadin. Sterile technique was used and 10 mL of 1% lidocaine was used for local anesthesia. Small incision with scalpel done and 18 Fr Arrow Paracentesis catheter used. 2.8 L of clear yellow ascitic fluid drained. Catheter removed and dressed. Pt tolerated procedure well and no complications.  Communicated to primary team.

## 2021-07-13 PROCEDURE — 99232 SBSQ HOSP IP/OBS MODERATE 35: CPT

## 2021-07-13 PROCEDURE — 99233 SBSQ HOSP IP/OBS HIGH 50: CPT

## 2021-07-13 RX ORDER — INSULIN GLARGINE 100 [IU]/ML
12 INJECTION, SOLUTION SUBCUTANEOUS AT BEDTIME
Refills: 0 | Status: DISCONTINUED | OUTPATIENT
Start: 2021-07-13 | End: 2021-07-14

## 2021-07-13 RX ORDER — INSULIN LISPRO 100/ML
3 VIAL (ML) SUBCUTANEOUS
Refills: 0 | Status: DISCONTINUED | OUTPATIENT
Start: 2021-07-13 | End: 2021-07-14

## 2021-07-13 RX ORDER — ENOXAPARIN SODIUM 100 MG/ML
55 INJECTION SUBCUTANEOUS EVERY 12 HOURS
Refills: 0 | Status: DISCONTINUED | OUTPATIENT
Start: 2021-07-13 | End: 2021-07-15

## 2021-07-13 RX ORDER — DEXTROSE 50 % IN WATER 50 %
15 SYRINGE (ML) INTRAVENOUS ONCE
Refills: 0 | Status: COMPLETED | OUTPATIENT
Start: 2021-07-13 | End: 2021-07-13

## 2021-07-13 RX ORDER — DEXTROSE 50 % IN WATER 50 %
50 SYRINGE (ML) INTRAVENOUS ONCE
Refills: 0 | Status: COMPLETED | OUTPATIENT
Start: 2021-07-13 | End: 2021-07-13

## 2021-07-13 RX ADMIN — Medication 1: at 18:06

## 2021-07-13 RX ADMIN — Medication 1: at 12:44

## 2021-07-13 RX ADMIN — Medication 15 GRAM(S): at 08:56

## 2021-07-13 RX ADMIN — Medication 50 MILLILITER(S): at 09:57

## 2021-07-13 RX ADMIN — INSULIN GLARGINE 12 UNIT(S): 100 INJECTION, SOLUTION SUBCUTANEOUS at 22:26

## 2021-07-13 RX ADMIN — DIPHENHYDRAMINE HYDROCHLORIDE AND LIDOCAINE HYDROCHLORIDE AND ALUMINUM HYDROXIDE AND MAGNESIUM HYDRO 10 MILLILITER(S): KIT at 06:14

## 2021-07-13 NOTE — CHART NOTE - NSCHARTNOTEFT_GEN_A_CORE
Pt with asymptomatic hypoglycemia 50-->repeat 42, pt had no IV access, difficulty obtaining by RN. Pt is awake and alert, pt was given oral agent, had 120ml Glucerna, 120ml apple juice. Received 15gm dextrose 40% oral gel. Glucose improved to 75. IV access obtained with the use of Ultrasound, will give D50, 25gm IVP x 1. Continue to monitor. Pt pending Home hospice. Pt with asymptomatic hypoglycemia 50-->repeat 42, pt had no IV access, difficulty obtaining by RN. Pt is awake and alert, pt was given oral agent, had 120ml Glucerna, 120ml apple juice. Received 15gm dextrose 40% oral gel. Glucose improved to 75. IV access obtained with the use of Ultrasound, will give D50, 25gm IVP x 1. Continue to monitor. Pt pending Home hospice.      Insulin doses adjusted by Endocrine. Blood glucose improved.

## 2021-07-13 NOTE — PROGRESS NOTE ADULT - SUBJECTIVE AND OBJECTIVE BOX
Dr. Ysabel Flores  Pager [phone]    PROGRESS NOTE:     Patient is a 56y old  Female who presents with a chief complaint of urosepsis (13 Jul 2021 10:08)      SUBJECTIVE / OVERNIGHT EVENTS: pt denies chest pain or sob   ADDITIONAL REVIEW OF SYSTEMS: afebrile, hypoglycemic this morning to 42, given d50, repeat 190    MEDICATIONS  (STANDING):  dextrose 50% Injectable 25 Gram(s) IV Push once  FIRST- Mouthwash  BLM 10 milliLiter(s) Swish and Spit every 6 hours  insulin glargine Injectable (LANTUS) 12 Unit(s) SubCutaneous at bedtime  insulin lispro (ADMELOG) corrective regimen sliding scale   SubCutaneous three times a day before meals  insulin lispro Injectable (ADMELOG) 3 Unit(s) SubCutaneous three times a day before meals  lidocaine 1% Injectable 20 milliLiter(s) Local Injection once  nystatin    Suspension 394669 Unit(s) Oral every 6 hours  polyethylene glycol 3350 17 Gram(s) Oral daily  senna 2 Tablet(s) Oral at bedtime    MEDICATIONS  (PRN):  acetaminophen   Tablet .. 650 milliGRAM(s) Oral every 6 hours PRN Temp greater or equal to 38C (100.4F), Mild Pain (1 - 3), Moderate Pain (4 - 6)  magnesium hydroxide Suspension 30 milliLiter(s) Oral daily PRN Constipation  ondansetron    Tablet 4 milliGRAM(s) Oral every 8 hours PRN Nausea and/or Vomiting  oxyCODONE    IR 5 milliGRAM(s) Oral every 4 hours PRN Severe Pain (7 - 10)      CAPILLARY BLOOD GLUCOSE      POCT Blood Glucose.: 199 mg/dL (13 Jul 2021 10:34)  POCT Blood Glucose.: 192 mg/dL (13 Jul 2021 10:32)  POCT Blood Glucose.: 98 mg/dL (13 Jul 2021 09:55)  POCT Blood Glucose.: 75 mg/dL (13 Jul 2021 09:34)  POCT Blood Glucose.: 68 mg/dL (13 Jul 2021 09:12)  POCT Blood Glucose.: 55 mg/dL (13 Jul 2021 08:55)  POCT Blood Glucose.: 42 mg/dL (13 Jul 2021 08:39)  POCT Blood Glucose.: 50 mg/dL (13 Jul 2021 08:37)  POCT Blood Glucose.: 192 mg/dL (12 Jul 2021 22:34)  POCT Blood Glucose.: 153 mg/dL (12 Jul 2021 17:49)  POCT Blood Glucose.: 248 mg/dL (12 Jul 2021 12:18)    I&O's Summary      PHYSICAL EXAM:  Vital Signs Last 24 Hrs  T(C): 36.7 (13 Jul 2021 06:11), Max: 37.1 (12 Jul 2021 20:24)  T(F): 98 (13 Jul 2021 06:11), Max: 98.7 (12 Jul 2021 20:24)  HR: 99 (13 Jul 2021 06:11) (94 - 104)  BP: 106/65 (13 Jul 2021 06:11) (103/62 - 119/65)  BP(mean): --  RR: 18 (13 Jul 2021 06:11) (16 - 18)  SpO2: 99% (13 Jul 2021 06:11) (99% - 100%)  CONSTITUTIONAL: NAD, cachectic , thin woman  EYES: EOMI, conjunctiva and sclera clear  ENMT: Moist oral mucosa  NECK: Supple  RESPIRATORY: Breathing unlabored, CTAB  CARDIOVASCULAR: S1S2 no MRG, 2-3 + pitting leg edema  ABDOMEN: soft and less distended after tap   : primafit   MUSCULOSKELETAL: no clubbing or cyanosis of digits  NEUROLOGY: No focal deficits   SKIN: No rashes or lesions    LABS:                        9.0    6.41  )-----------( 233      ( 12 Jul 2021 07:18 )             30.4     07-12    148<H>  |  110<H>  |  15  ----------------------------<  220<H>  3.5   |  20<L>  |  0.60    Ca    8.4      12 Jul 2021 07:18  Phos  2.5     07-12  Mg     1.90     07-12    TPro  6.1  /  Alb  2.2<L>  /  TBili  0.5  /  DBili  x   /  AST  16  /  ALT  11  /  AlkPhos  157<H>  07-12        RADIOLOGY & ADDITIONAL TESTS:  Results Reviewed:   Imaging Personally Reviewed:  Electrocardiogram Personally Reviewed:    COORDINATION OF CARE:  Care Discussed with Consultants/Other Providers [Y/N]: sanjay Castellano, reduce insulin, d50, dc plan home with home hospice  Prior or Outpatient Records Reviewed [Y/N]:

## 2021-07-13 NOTE — PROGRESS NOTE ADULT - SUBJECTIVE AND OBJECTIVE BOX
Chief Complaint/Follow-up on: DM    Subjective:  marked hypoglycemia this am - difficult IV access   glucose now improving with intervention         MEDICATIONS  (STANDING):  dextrose 50% Injectable 25 Gram(s) IV Push once  FIRST- Mouthwash  BLM 10 milliLiter(s) Swish and Spit every 6 hours  insulin glargine Injectable (LANTUS) 18 Unit(s) SubCutaneous at bedtime  insulin lispro (ADMELOG) corrective regimen sliding scale   SubCutaneous three times a day before meals  insulin lispro (ADMELOG) corrective regimen sliding scale   SubCutaneous at bedtime  insulin lispro Injectable (ADMELOG) 5 Unit(s) SubCutaneous three times a day before meals  lidocaine 1% Injectable 20 milliLiter(s) Local Injection once  nystatin    Suspension 804959 Unit(s) Oral every 6 hours  polyethylene glycol 3350 17 Gram(s) Oral daily  senna 2 Tablet(s) Oral at bedtime    MEDICATIONS  (PRN):  acetaminophen   Tablet .. 650 milliGRAM(s) Oral every 6 hours PRN Temp greater or equal to 38C (100.4F), Mild Pain (1 - 3), Moderate Pain (4 - 6)  magnesium hydroxide Suspension 30 milliLiter(s) Oral daily PRN Constipation  ondansetron    Tablet 4 milliGRAM(s) Oral every 8 hours PRN Nausea and/or Vomiting  oxyCODONE    IR 5 milliGRAM(s) Oral every 4 hours PRN Severe Pain (7 - 10)      PHYSICAL EXAM:  VITALS: T(C): 36.7 (07-13-21 @ 06:11)  T(F): 98 (07-13-21 @ 06:11), Max: 98.7 (07-12-21 @ 20:24)  HR: 99 (07-13-21 @ 06:11) (94 - 104)  BP: 106/65 (07-13-21 @ 06:11) (103/62 - 119/65)  RR:  (16 - 18)  SpO2:  (99% - 100%)  Wt(kg): --  GENERAL: NAD, frail appearing female   RESPIRATORY: Clear to auscultation bilaterally; No rales, rhonchi, wheezing, or rubs  CARDIOVASCULAR: Regular rate and rhythm; No murmurs; no peripheral edema  GI: Soft, nontender,       POCT Blood Glucose.: 98 mg/dL (07-13-21 @ 09:55)  POCT Blood Glucose.: 75 mg/dL (07-13-21 @ 09:34)  POCT Blood Glucose.: 68 mg/dL (07-13-21 @ 09:12)  POCT Blood Glucose.: 55 mg/dL (07-13-21 @ 08:55)  POCT Blood Glucose.: 42 mg/dL (07-13-21 @ 08:39)  POCT Blood Glucose.: 50 mg/dL (07-13-21 @ 08:37)  POCT Blood Glucose.: 192 mg/dL (07-12-21 @ 22:34)  POCT Blood Glucose.: 153 mg/dL (07-12-21 @ 17:49)  POCT Blood Glucose.: 248 mg/dL (07-12-21 @ 12:18)  POCT Blood Glucose.: 236 mg/dL (07-12-21 @ 08:40)  POCT Blood Glucose.: 200 mg/dL (07-11-21 @ 22:24)  POCT Blood Glucose.: 186 mg/dL (07-11-21 @ 17:41)  POCT Blood Glucose.: 136 mg/dL (07-11-21 @ 12:25)  POCT Blood Glucose.: 203 mg/dL (07-11-21 @ 08:26)  POCT Blood Glucose.: 178 mg/dL (07-10-21 @ 22:26)  POCT Blood Glucose.: 174 mg/dL (07-10-21 @ 17:52)  POCT Blood Glucose.: 216 mg/dL (07-10-21 @ 12:16)    07-12    148<H>  |  110<H>  |  15  ----------------------------<  220<H>  3.5   |  20<L>  |  0.60    EGFR if : 116  EGFR if non : 100    Ca    8.4      07-12  Mg     1.90     07-12  Phos  2.5     07-12    TPro  6.1  /  Alb  2.2<L>  /  TBili  0.5  /  DBili  x   /  AST  16  /  ALT  11  /  AlkPhos  157<H>  07-12          Thyroid Function Tests:

## 2021-07-13 NOTE — PROGRESS NOTE ADULT - PROBLEM SELECTOR PLAN 1
Stage IV Endometrial Cancer c/b malignant ascites s/p chemotherapy (6/18 carboplatin/taxol)  - oncology following, patient not a candidate for DMT at this time  - last admission required diagnostic/therapeutic paracentesis, abdomen mildly distended, sonogram with moderate ascites, s/p palliative paracentesis 7/12: 2.8L removed  - pain control: holding home morphine ER given soft BPs, c/w oxycodone 5mg [id number] PRN with hold parameters  -CT 7/10 Metastatic endometrial cancer with extensive omental and peritoneal disease and sclerotic osseous metastatic disease. Stable to mild interval increase in the osseous metastatic disease since 6/7/2021. Moderate to large volume malignant ascites is essentially unchanged. No OR hematoma/bleed.  - appreciate palliative care assistance for GOC with family - FULL CODE at this time, agreeable to home hospice  - patient with poor PO intake, daughter inquiring about NGT - explained that patient is not amenable to NGT which would be a temporary measure regardless as patient not candidate for PEG given ascites, and that her poor PO intake is part of her disease process   - family meeting 7/7 with hospitalists, oncology Dr. Valadez, daughter Marline, and family - questions answered about hospice, open to hospice services at home, however still want all medical interventions done  -dispo: dc planning home with home hospice pending equipment delivery and stabilization of sugars Stage IV Endometrial Cancer c/b malignant ascites s/p chemotherapy (6/18 carboplatin/taxol)  - oncology following, patient not a candidate for DMT at this time  - last admission required diagnostic/therapeutic paracentesis, abdomen mildly distended, sonogram with moderate ascites  -s/p palliative paracentesis 7/12: 2.8L serous fluid removed  - pain control: holding home morphine ER given soft BPs, c/w oxycodone 5mg [id number] PRN with hold parameters  -CT 7/10 Metastatic endometrial cancer with extensive omental and peritoneal disease and sclerotic osseous metastatic disease. Stable to mild interval increase in the osseous metastatic disease since 6/7/2021. Moderate to large volume malignant ascites is essentially unchanged. No CO hematoma/bleed.  - appreciate palliative care assistance for GOC with family - FULL CODE at this time, agreeable to home hospice  - patient with poor PO intake, daughter inquiring about NGT - explained that patient is not amenable to NGT which would be a temporary measure regardless as patient not candidate for PEG given ascites, and that her poor PO intake is part of her disease process   - family meeting 7/7 with hospitalists, oncology Dr. Valadez, daughter Marline, and family - questions answered about hospice, open to hospice services at home, however still want all medical interventions done  -dispo: dc planning home with home hospice pending equipment delivery and stabilization of sugars

## 2021-07-13 NOTE — PROGRESS NOTE ADULT - ASSESSMENT
56 year old female admitted 2/2 sepsis,   Pt was recently admitted to [facility] for metastatic uterine carcinosarcoma c/b ascites and received carbo+paclitaxel on 6/18/21, given her current clinical condition she is deemed not appropriate for further treatment and hospice is recommended.   Endocrine consulted for inpatient DM management. HbA1C of 8.2% (was improved from 9.6% on prior admission)      Problem/Recommendation - 1:  Problem: Type 2 diabetes mellitus with hyperglycemia, with long-term current use of insulin.   eventhough pt has intermittent hyperglycemia given GOC and plan for hospice would decrease insulin given significant hypoglycemia this am and also than difficulty with IV acccess for dextrose   pt did not recieve premeal insulin yesterday and has low PO intake - further increasing risk of hypoglycemia      7/13  - okay to let the pt run in range  BG goal  100-250 given her fraility, hypoglycemia, low PO intake and GOC for hospice in the setting of significant hypoglycemia this am   -DECREASE lantus to 12 at night  - decrease ademelog to 3 units premeals. HOLD IF NOT eating   -continue Ademlog low dose correctional scale premeal,   -dc low bedtime scale  -Carb consistent diet   -FS qAC + HS   -Endocrine will continue to follow inpatient    Discharge recommendations   plan is for home with hospice. DC recommendations based on GOC and need for control (FSBG frequency etc)  -Will need to  finalize closer to discharge date. Patient was followed recently by our team on prior admission and discharged on Lantus 18 units qhs and metformin 1000mg BID with A1c improving though now pt with low PO intake and plan for hosptice   Of note lactate was 1.7, improved    2. Hypertension, unspecified type.    Defer management to primary team     d/w primary team     Patrica Hernández MD  Attending Physician   Department of Endocrinology, Diabetes and Metabolism     Pager [phone] ([facility])/ [phone] ([facility]) [please provide 10 digit call back number]  [facility] 9-5  [email or web address]  Nights and weekends: [phone]  Please note that this patient may be followed by a different provider tomorrow.   If no answer or after hours, please contact [phone].  For final dc reccomendations, please call [phone] or page the endocrine fellow on call.

## 2021-07-13 NOTE — PROGRESS NOTE ADULT - PROBLEM SELECTOR PLAN 9
A1C 8%  - home regimen: Lantus 18U at bedtime, metformin 1000mg BID  - hypoglycemic today, given d50  -insulin dose adjusted per endo, lantus reduced to 12u hs and premeal 3u actid, hold premeal insulin if NPO or minimal intake  -clarify insulin regimen with endo  -SSI/FS qac and hs

## 2021-07-13 NOTE — PROGRESS NOTE ADULT - ASSESSMENT
56F with hx of Stage IV Endometrial Cancer c/b malignant ascites s/p chemotherapy (6/18 carboplatin/taxol), L. mid femoral/peroneal vein DVT on Lovenox, T2DM (A1C 8% on insulin) who presented with altered mental status and weakness a/w sepsis and EZE likely due to UTI. Course c/b persistent hypotension, downtrending Hb, CT no RP bleed, s/p paracentesis 7/12

## 2021-07-14 LAB
ANION GAP SERPL CALC-SCNC: 13 MMOL/L — SIGNIFICANT CHANGE UP (ref 7–14)
BUN SERPL-MCNC: 16 MG/DL — SIGNIFICANT CHANGE UP (ref 7–23)
CALCIUM SERPL-MCNC: 8.3 MG/DL — LOW (ref 8.4–10.5)
CHLORIDE SERPL-SCNC: 113 MMOL/L — HIGH (ref 98–107)
CO2 SERPL-SCNC: 24 MMOL/L — SIGNIFICANT CHANGE UP (ref 22–31)
CREAT SERPL-MCNC: 0.54 MG/DL — SIGNIFICANT CHANGE UP (ref 0.5–1.3)
GLUCOSE SERPL-MCNC: 74 MG/DL — SIGNIFICANT CHANGE UP (ref 70–99)
HCT VFR BLD CALC: 31.4 % — LOW (ref 34.5–45)
HGB BLD-MCNC: 9.1 G/DL — LOW (ref 11.5–15.5)
MCHC RBC-ENTMCNC: 21.7 PG — LOW (ref 27–34)
MCHC RBC-ENTMCNC: 29 GM/DL — LOW (ref 32–36)
MCV RBC AUTO: 74.8 FL — LOW (ref 80–100)
NRBC # BLD: 0 /100 WBCS — SIGNIFICANT CHANGE UP
NRBC # FLD: 0 K/UL — SIGNIFICANT CHANGE UP
PLATELET # BLD AUTO: 180 K/UL — SIGNIFICANT CHANGE UP (ref 150–400)
POTASSIUM SERPL-MCNC: 3.4 MMOL/L — LOW (ref 3.5–5.3)
POTASSIUM SERPL-SCNC: 3.4 MMOL/L — LOW (ref 3.5–5.3)
RBC # BLD: 4.2 M/UL — SIGNIFICANT CHANGE UP (ref 3.8–5.2)
RBC # FLD: 22.9 % — HIGH (ref 10.3–14.5)
SODIUM SERPL-SCNC: 150 MMOL/L — HIGH (ref 135–145)
WBC # BLD: 6.59 K/UL — SIGNIFICANT CHANGE UP (ref 3.8–10.5)
WBC # FLD AUTO: 6.59 K/UL — SIGNIFICANT CHANGE UP (ref 3.8–10.5)

## 2021-07-14 PROCEDURE — 99232 SBSQ HOSP IP/OBS MODERATE 35: CPT | Mod: GC

## 2021-07-14 PROCEDURE — 99233 SBSQ HOSP IP/OBS HIGH 50: CPT

## 2021-07-14 PROCEDURE — 93010 ELECTROCARDIOGRAM REPORT: CPT

## 2021-07-14 RX ORDER — POTASSIUM CHLORIDE 20 MEQ
20 PACKET (EA) ORAL ONCE
Refills: 0 | Status: COMPLETED | OUTPATIENT
Start: 2021-07-14 | End: 2021-07-14

## 2021-07-14 RX ORDER — OXYCODONE HYDROCHLORIDE 5 MG/1
5 TABLET ORAL EVERY 4 HOURS
Refills: 0 | Status: DISCONTINUED | OUTPATIENT
Start: 2021-07-14 | End: 2021-07-15

## 2021-07-14 RX ORDER — ONDANSETRON 8 MG/1
4 TABLET, FILM COATED ORAL EVERY 6 HOURS
Refills: 0 | Status: DISCONTINUED | OUTPATIENT
Start: 2021-07-14 | End: 2021-07-15

## 2021-07-14 RX ORDER — SODIUM CHLORIDE 9 MG/ML
1000 INJECTION, SOLUTION INTRAVENOUS
Refills: 0 | Status: DISCONTINUED | OUTPATIENT
Start: 2021-07-14 | End: 2021-07-15

## 2021-07-14 RX ORDER — INSULIN GLARGINE 100 [IU]/ML
10 INJECTION, SOLUTION SUBCUTANEOUS AT BEDTIME
Refills: 0 | Status: DISCONTINUED | OUTPATIENT
Start: 2021-07-14 | End: 2021-07-15

## 2021-07-14 RX ADMIN — INSULIN GLARGINE 10 UNIT(S): 100 INJECTION, SOLUTION SUBCUTANEOUS at 23:10

## 2021-07-14 RX ADMIN — Medication 650 MILLIGRAM(S): at 04:12

## 2021-07-14 RX ADMIN — Medication 20 MILLIEQUIVALENT(S): at 09:53

## 2021-07-14 RX ADMIN — ENOXAPARIN SODIUM 55 MILLIGRAM(S): 100 INJECTION SUBCUTANEOUS at 18:14

## 2021-07-14 RX ADMIN — SODIUM CHLORIDE 75 MILLILITER(S): 9 INJECTION, SOLUTION INTRAVENOUS at 09:53

## 2021-07-14 RX ADMIN — Medication 650 MILLIGRAM(S): at 03:12

## 2021-07-14 RX ADMIN — DIPHENHYDRAMINE HYDROCHLORIDE AND LIDOCAINE HYDROCHLORIDE AND ALUMINUM HYDROXIDE AND MAGNESIUM HYDRO 10 MILLILITER(S): KIT at 05:50

## 2021-07-14 RX ADMIN — ONDANSETRON 4 MILLIGRAM(S): 8 TABLET, FILM COATED ORAL at 14:38

## 2021-07-14 RX ADMIN — ENOXAPARIN SODIUM 55 MILLIGRAM(S): 100 INJECTION SUBCUTANEOUS at 05:50

## 2021-07-14 NOTE — PROGRESS NOTE ADULT - PROBLEM SELECTOR PLAN 9
A1C 8%  - home regimen: Lantus 18U at bedtime, metformin 1000mg BID  -had hypoglycemia, insulin dose reduced  -basal bolus insulin per endo, hold premeal insulin if NPO or minimal intake  -SSI/FS qac and hs

## 2021-07-14 NOTE — CHART NOTE - NSCHARTNOTEFT_GEN_A_CORE
Notified by RN that patient c/o chest pain. Patient seen and assessed at bedside. Patient reports midsternal chest pain which started when she woke up from sleep. Reports pain as stabbing 10/10. Denies radiation to left arm and jaw. Reports pain is worse is when taking deep breath and moving. Patient reports  that she had episodes intermittent episodes of chest pain in the past. Patient denies abdominal pain, nausea , palpitation and dizziness. Chest pain is reproducible on palpation. On exam lungs clear on auscultation, S1 S2 regular. Lower extremity pitting edema noted.   ICU Vital Signs Last 24 Hrs  T(C): 36.4 (14 Jul 2021 02:47), Max: 36.7 (13 Jul 2021 06:11)  T(F): 97.5 (14 Jul 2021 02:47), Max: 98 (13 Jul 2021 06:11)  HR: 97 (14 Jul 2021 02:47) (97 - 99)  BP: 107/56 (14 Jul 2021 02:47) (102/55 - 107/62)  RR: 20 (14 Jul 2021 02:47) (18 - 20)  SpO2: 100% (14 Jul 2021 02:47) (99% - 100%)    56F with hx of Stage IV Endometrial Cancer c/b malignant ascites s/p chemotherapy (6/18 carboplatin/taxol), L. mid femoral/peroneal vein DVT on Lovenox, T2DM (A1C 8% on insulin) who presents with altered mental status and weakness a/w sepsis and EEZ due to UTI now with Chest pain.     Chest pain: Chest pain atypical in nature. Stat EKG done which showed sinus rhythm heart rate @90 with no ischemic changes from previous EKG.    Tylenol PRN for pain  Will continue to monitor.

## 2021-07-14 NOTE — PROGRESS NOTE ADULT - PROBLEM SELECTOR PLAN 1
Type 2 diabetes mellitus with hyperglycemia, with long-term current use of insulin.   Patient's insulin regimen was decreased yesterday in the setting of hypoglycemic episode and difficult IV access (making dextrose administration challenging) with GOC/plan for hospice. Patient reports her appetite comes and goes, but is able to tolerate some ensure and some snacks, though with decreased PO intake overall, making hypoglycemia a concern.      7/14  - okay to let the pt run in range  BG goal  100-250 given her fraility, hypoglycemia, low PO intake and GOC for hospice in the setting of significant hypoglycemia on 7/13  -DECREASE lantus to 10 at night, given BG<100 this AM, below goal (following 12 units lantus last night)  - continue with ademelog to 3 units premeals. HOLD IF NOT eating   -continue Ademlog low dose correctional scale premeal,   -Carb consistent diet   -FS qAC + HS   -Endocrine will continue to follow inpatient    Discharge recommendations   plan is for home with hospice. DC recommendations based on GOC and need for control (FSBG frequency etc)  -Will need to  finalize closer to discharge date. Patient was followed recently by our team on prior admission and discharged on Lantus 18 units qhs and metformin 1000mg BID with A1c improving though now pt with low PO intake and plan for hospice Type 2 diabetes mellitus with hyperglycemia, with long-term current use of insulin.   Patient's insulin regimen was decreased yesterday in the setting of hypoglycemic episode and difficult IV access (making dextrose administration challenging) with GOC/plan for hospice. Patient reports her appetite comes and goes, but is able to tolerate some ensure and some snacks, though with decreased PO intake overall, making hypoglycemia a concern.      7/14  - okay to let the pt run in range  BG goal  100-250 given her frailty, hypoglycemia, low PO intake and GOC for hospice in the setting of significant hypoglycemia on 7/13  - noted that patient is on D5W @ 75cc/hr x12h for hypernatremia  - DECREASE lantus to 10 units at night, given BG<100 this AM, below goal  - DISCONTINUE ademelog 3 units premeals.  -continue ademlog low dose correctional scale premeal,   -Carb consistent diet   -FS qAC + HS   -Endocrine will continue to follow inpatient    Discharge recommendations   plan is for home with hospice. DC recommendations based on GOC and need for control (FSBG frequency etc)  -Will need to  finalize closer to discharge date. Patient was followed recently by our team on prior admission and discharged on Lantus 18 units qhs and metformin 1000mg BID with A1c improving though now pt with low PO intake and plan for hospice

## 2021-07-14 NOTE — PROGRESS NOTE ADULT - PROBLEM SELECTOR PLAN 1
Stage IV Endometrial Cancer c/b malignant ascites s/p chemotherapy (6/18 carboplatin/taxol)  - oncology following, patient not a candidate for DMT at this time  - last admission required diagnostic/therapeutic paracentesis, abdomen mildly distended, sonogram with moderate ascites  -s/p palliative paracentesis 7/12: 2.8L serous fluid removed  - pain control: holding home morphine ER given soft BPs, c/w oxycodone 5mg [id number] PRN with hold parameters  -CT 7/10 Metastatic endometrial cancer with extensive omental and peritoneal disease and sclerotic osseous metastatic disease. Stable to mild interval increase in the osseous metastatic disease since 6/7/2021. Moderate to large volume malignant ascites is essentially unchanged. No MN hematoma/bleed.  - appreciate palliative care assistance for GOC with family - FULL CODE at this time, agreeable to home hospice  - patient with poor PO intake, daughter inquiring about NGT - explained that patient is not amenable to NGT which would be a temporary measure regardless as patient not candidate for PEG given ascites, and that her poor PO intake is part of her disease process   - family meeting 7/7 with hospitalists, oncology Dr. Valadez, daughter Marline, and family - questions answered about hospice, open to hospice services at home, however still want all medical interventions done  -dispo: dc plan home with home hospice pending equipment delivery, f/u [facility]

## 2021-07-14 NOTE — PROGRESS NOTE ADULT - SUBJECTIVE AND OBJECTIVE BOX
Dr. Ysabel Flores  Pager [phone]    PROGRESS NOTE:     Patient is a 56y old  Female who presents with a chief complaint of urosepsis (14 Jul 2021 10:59)      SUBJECTIVE / OVERNIGHT EVENTS: pt had atypical chest pain last night, denies active chest pain /sob this am, EKG no change  ADDITIONAL REVIEW OF SYSTEMS: afebrile     MEDICATIONS  (STANDING):  dextrose 5%. 1000 milliLiter(s) (75 mL/Hr) IV Continuous <Continuous>  dextrose 50% Injectable 25 Gram(s) IV Push once  enoxaparin Injectable 55 milliGRAM(s) SubCutaneous every 12 hours  FIRST- Mouthwash  BLM 10 milliLiter(s) Swish and Spit every 6 hours  insulin glargine Injectable (LANTUS) 12 Unit(s) SubCutaneous at bedtime  insulin lispro (ADMELOG) corrective regimen sliding scale   SubCutaneous three times a day before meals  insulin lispro Injectable (ADMELOG) 3 Unit(s) SubCutaneous three times a day before meals  nystatin    Suspension 830162 Unit(s) Oral every 6 hours  polyethylene glycol 3350 17 Gram(s) Oral daily  senna 2 Tablet(s) Oral at bedtime    MEDICATIONS  (PRN):  acetaminophen   Tablet .. 650 milliGRAM(s) Oral every 6 hours PRN Temp greater or equal to 38C (100.4F), Mild Pain (1 - 3), Moderate Pain (4 - 6)  magnesium hydroxide Suspension 30 milliLiter(s) Oral daily PRN Constipation  ondansetron    Tablet 4 milliGRAM(s) Oral every 8 hours PRN Nausea and/or Vomiting  oxyCODONE    IR 5 milliGRAM(s) Oral every 4 hours PRN Severe Pain (7 - 10)      CAPILLARY BLOOD GLUCOSE      POCT Blood Glucose.: 110 mg/dL (14 Jul 2021 12:04)  POCT Blood Glucose.: 87 mg/dL (14 Jul 2021 08:15)  POCT Blood Glucose.: 97 mg/dL (14 Jul 2021 02:45)  POCT Blood Glucose.: 159 mg/dL (13 Jul 2021 21:38)  POCT Blood Glucose.: 192 mg/dL (13 Jul 2021 17:24)  POCT Blood Glucose.: 174 mg/dL (13 Jul 2021 12:37)    I&O's Summary    13 Jul 2021 07:01  -  14 Jul 2021 07:00  --------------------------------------------------------  IN: 1170 mL / OUT: 550 mL / NET: 620 mL    14 Jul 2021 07:01  -  14 Jul 2021 12:26  --------------------------------------------------------  IN: 120 mL / OUT: 0 mL / NET: 120 mL        PHYSICAL EXAM:  Vital Signs Last 24 Hrs  T(C): 36.6 (14 Jul 2021 05:51), Max: 36.7 (13 Jul 2021 13:36)  T(F): 97.8 (14 Jul 2021 05:51), Max: 98 (13 Jul 2021 13:36)  HR: 89 (14 Jul 2021 05:51) (89 - 99)  BP: 121/63 (14 Jul 2021 05:51) (102/55 - 121/63)  BP(mean): --  RR: 19 (14 Jul 2021 05:51) (18 - 20)  SpO2: 100% (14 Jul 2021 05:51) (99% - 100%)  CONSTITUTIONAL: NAD, cachectic , thin woman  EYES: EOMI, conjunctiva and sclera clear  ENMT: Moist oral mucosa  NECK: Supple  RESPIRATORY: Breathing unlabored, CTAB  CARDIOVASCULAR: S1S2 no MRG, 2-3 + pitting leg edema  ABDOMEN: soft and less distended after tap   : primafit   MUSCULOSKELETAL: no clubbing or cyanosis of digits  NEUROLOGY: No focal deficits   SKIN: No rashes or lesions    LABS:                        9.1    6.59  )-----------( 180      ( 14 Jul 2021 06:16 )             31.4     07-14    150<H>  |  113<H>  |  16  ----------------------------<  74  3.4<L>   |  24  |  0.54    Ca    8.3<L>      14 Jul 2021 06:16        CARDIAC MARKERS ( 14 Jul 2021 06:18 )  x     / x     / 31 U/L / x     / x            RADIOLOGY & ADDITIONAL TESTS:  Results Reviewed:   Imaging Personally Reviewed:  Electrocardiogram Personally Reviewed:    COORDINATION OF CARE:  Care Discussed with Consultants/Other Providers [Y/N]: sanjay virgen cardiac enzymes, d/c plan home hospice  Prior or Outpatient Records Reviewed [Y/N]:

## 2021-07-14 NOTE — PROVIDER CONTACT NOTE (OTHER) - ACTION/TREATMENT ORDERED:
ROS Angulo NP notified. Provider to come assess pt at bedside. EKG ordered and performed. Tylenol given for pain as requested by patient.

## 2021-07-14 NOTE — PROVIDER CONTACT NOTE (OTHER) - BACKGROUND
Pt with stage IV endometrial CA.
h/o stage IV. endometrial CA  Uncontrolled DM, UTI
Pt has h/o poor PO intake

## 2021-07-14 NOTE — PROGRESS NOTE ADULT - ASSESSMENT
Patient is a 56yoF admitted due to sepsis, recently admitted to [facility] for metastatic uterine carcinosarcoma c/b ascites and received carbo+paclitaxel on 6/18/21, given her current clinical condition she is deemed not appropriate for further treatment and hospice is recommended.   Endocrine consulted for inpatient DM management. HbA1C of 8.2% (was improved from 9.6% on prior admission).    *NOTE this document is in progress and will be discussed with the attending* Patient is a 56yoF admitted due to sepsis, recently admitted to [facility] for metastatic uterine carcinosarcoma c/b ascites and received carbo+paclitaxel on 6/18/21, given her current clinical condition she is deemed not appropriate for further treatment and hospice is recommended.   Endocrine consulted for inpatient DM management. HbA1C of 8.2% (was improved from 9.6% on prior admission).

## 2021-07-14 NOTE — HOSPICE CARE NOTE - CONVESATION DETAILS
Call to daughter Rosalba, message left, awaiting call back to review DME needs and status of private hire. Call also to patient's sister Beena, she states she will send message to Marline to contact [facility] RN.  TRI Hebert made aware, will also try to contact daughter. Pending confirmation with medical team if patient is cleared for discharge.    [facility] RN will continue to follow    KANDY Klein RN [phone]
Phone call to daughter Marline, hospice care explained, in agreement with plan to discharge with hospice care. Patient approved for home hospice. Daughter concerned with 24/hr care for patient, states spouse awaiting approval for [place], family unable to private hire [facility] at this time.   Call to TRI Hebert, message left awaiting call back.     HCN RN will continue to follow     KANDY Klein RN 
RN was contacted by TRI Hebert, pt is cleared for discharge today. RN explained that dtr and sister were emailed [facility] consents and they have not been returned.   RN attempted to contact dtr Marline-- no answer. RN contacted sister Beena- explained the above and she conferenced in sister Sonya. Family is not aware of pts discharge and they haven't spoke to Marline today either. Both sisters attempted to contact Marline -no answer. RN instructed pts sisters to call the CM too discuss dispo plan.  Per Tri Hebert, pt is s/p paracentesis today. Pt dtr Marline is in agreement with home hospice care and is working on private pay HA. [facility] liaison to remain available to set home hospice when family and pt are ready.   
[facility] consents signed and received, forwarded to [facility] referral center. Date of election will need to be updated.  Per Anup, pt is not cleared for discharge by medical team. RN left a message for Marline requesting a call back. DME needs to be discussed. Pt has a rolling walker at home. Family to arrange private hire.   Pt s/p paracentesis on 7/12/2021 for 2800ml.  Pt with hypoglycemia this am.  HURT POC submitted, no call back from dtr Marline.

## 2021-07-14 NOTE — PROVIDER CONTACT NOTE (OTHER) - DATE AND TIME:
10-Jul-2021 10:20
06-Jul-2021 12:55
06-Jul-2021 18:15
14-Jul-2021 02:47
04-Jul-2021 04:45
06-Jul-2021 10:00

## 2021-07-14 NOTE — CHART NOTE - NSCHARTNOTEFT_GEN_A_CORE
Pt with atypical chest pain, now resolved, troponin this am 144, no baseline, discussed with attending, will trend for now.

## 2021-07-14 NOTE — PROVIDER CONTACT NOTE (CRITICAL VALUE NOTIFICATION) - ACTION/TREATMENT ORDERED:
Awaiting new orders
Provider aware, continue to monitor
will follow up post rounds, awaiting MD orders

## 2021-07-14 NOTE — PROGRESS NOTE ADULT - SUBJECTIVE AND OBJECTIVE BOX
Chief Complaint: DM    History: Overnight, patient reports she feels OK overall, and is able to tolerate her ensure and snacks. No hypoglycemic episodes overnight.    MEDICATIONS  (STANDING):  dextrose 5%. 1000 milliLiter(s) (75 mL/Hr) IV Continuous <Continuous>  dextrose 50% Injectable 25 Gram(s) IV Push once  enoxaparin Injectable 55 milliGRAM(s) SubCutaneous every 12 hours  FIRST- Mouthwash  BLM 10 milliLiter(s) Swish and Spit every 6 hours  insulin glargine Injectable (LANTUS) 12 Unit(s) SubCutaneous at bedtime  insulin lispro (ADMELOG) corrective regimen sliding scale   SubCutaneous three times a day before meals  insulin lispro Injectable (ADMELOG) 3 Unit(s) SubCutaneous three times a day before meals  nystatin    Suspension 371322 Unit(s) Oral every 6 hours  polyethylene glycol 3350 17 Gram(s) Oral daily  senna 2 Tablet(s) Oral at bedtime    MEDICATIONS  (PRN):  acetaminophen   Tablet .. 650 milliGRAM(s) Oral every 6 hours PRN Temp greater or equal to 38C (100.4F), Mild Pain (1 - 3), Moderate Pain (4 - 6)  magnesium hydroxide Suspension 30 milliLiter(s) Oral daily PRN Constipation  ondansetron    Tablet 4 milliGRAM(s) Oral every 8 hours PRN Nausea and/or Vomiting  oxyCODONE    IR 5 milliGRAM(s) Oral every 4 hours PRN Severe Pain (7 - 10)      Allergies    Sulf-10 (Rash; Short breath)    Intolerances      Review of Systems:  Constitutional: No fever  Eyes: No blurry vision  Neuro: No tremors  HEENT: No active pain  Cardiovascular: Reports intermittent chest pain. No active chest pain, palpitations  Respiratory: No active SOB, no cough  GI: No active nausea, vomiting, abdominal pain  : No dysuria  Skin: no rash  Endocrine: no polyuria, polydipsia  Hem/lymph: Positive leg swelling  Osteoporosis: no fractures    ALL OTHER SYSTEMS REVIEWED AND NEGATIVE    UNABLE TO OBTAIN    PHYSICAL EXAM:  VITALS: T(C): 36.6 (07-14-21 @ 05:51)  T(F): 97.8 (07-14-21 @ 05:51), Max: 98 (07-13-21 @ 13:36)  HR: 89 (07-14-21 @ 05:51) (89 - 99)  BP: 121/63 (07-14-21 @ 05:51) (102/55 - 121/63)  RR:  (18 - 20)  SpO2:  (99% - 100%)  Wt(kg): --  GENERAL: NAD, cachectic. ill appearing  EYES: No proptosis, no lid lag, anicteric  HEENT:  Atraumatic, Normocephalic, Dry mucous membranes  THYROID: Normal size, no palpable nodules  RESPIRATORY: Clear to auscultation bilaterally; No rales, rhonchi, wheezing  CARDIOVASCULAR: Regular rate and rhythm; No murmurs; 2+ pitting lower extremity edema bilaterally  GI: Soft, nontender, non distended  SKIN: Dry, intact, No rashes or lesions  NEURO: extraocular movements intact, no tremor  PSYCH: Alert and oriented x 3, normal affect    CAPILLARY BLOOD GLUCOSE      POCT Blood Glucose.: 87 mg/dL (14 Jul 2021 08:15)  POCT Blood Glucose.: 97 mg/dL (14 Jul 2021 02:45)  POCT Blood Glucose.: 159 mg/dL (13 Jul 2021 21:38)  POCT Blood Glucose.: 192 mg/dL (13 Jul 2021 17:24)  POCT Blood Glucose.: 174 mg/dL (13 Jul 2021 12:37)      07-14    150<H>  |  113<H>  |  16  ----------------------------<  74  3.4<L>   |  24  |  0.54    EGFR if : 121  EGFR if non : 104    Ca    8.3<L>      07-14  Mg     1.90     07-12  Phos  2.5     07-12    TPro  6.1  /  Alb  2.2<L>  /  TBili  0.5  /  DBili  x   /  AST  16  /  ALT  11  /  AlkPhos  157<H>  07-12      A1C with Estimated Average Glucose Result: 8.2 % (07-03-21 @ 04:26)  A1C with Estimated Average Glucose Result: 9.6 % (06-02-21 @ 06:33)      Thyroid Function Tests:                           Chief Complaint: DM    History: Overnight, patient reports she feels OK overall, and is able to tolerate her ensure and snacks. No hypoglycemic episodes overnight.    MEDICATIONS  (STANDING):  dextrose 5%. 1000 milliLiter(s) (75 mL/Hr) IV Continuous <Continuous>  dextrose 50% Injectable 25 Gram(s) IV Push once  enoxaparin Injectable 55 milliGRAM(s) SubCutaneous every 12 hours  FIRST- Mouthwash  BLM 10 milliLiter(s) Swish and Spit every 6 hours  insulin glargine Injectable (LANTUS) 12 Unit(s) SubCutaneous at bedtime  insulin lispro (ADMELOG) corrective regimen sliding scale   SubCutaneous three times a day before meals  insulin lispro Injectable (ADMELOG) 3 Unit(s) SubCutaneous three times a day before meals  nystatin    Suspension 716566 Unit(s) Oral every 6 hours  polyethylene glycol 3350 17 Gram(s) Oral daily  senna 2 Tablet(s) Oral at bedtime    MEDICATIONS  (PRN):  acetaminophen   Tablet .. 650 milliGRAM(s) Oral every 6 hours PRN Temp greater or equal to 38C (100.4F), Mild Pain (1 - 3), Moderate Pain (4 - 6)  magnesium hydroxide Suspension 30 milliLiter(s) Oral daily PRN Constipation  ondansetron    Tablet 4 milliGRAM(s) Oral every 8 hours PRN Nausea and/or Vomiting  oxyCODONE    IR 5 milliGRAM(s) Oral every 4 hours PRN Severe Pain (7 - 10)      Allergies    Sulf-10 (Rash; Short breath)    Intolerances      Review of Systems:  Constitutional: No fever  Eyes: No blurry vision  Neuro: No tremors  HEENT: No active pain  Cardiovascular: Reports intermittent chest pain. No active chest pain, palpitations  Respiratory: No active SOB, no cough  GI: No active nausea, vomiting, abdominal pain  : No dysuria  Skin: no rash  Endocrine: no polyuria, polydipsia  Hem/lymph: Positive leg swelling  Osteoporosis: no fractures    ALL OTHER SYSTEMS REVIEWED AND NEGATIVE      PHYSICAL EXAM:  VITALS: T(C): 36.6 (07-14-21 @ 05:51)  T(F): 97.8 (07-14-21 @ 05:51), Max: 98 (07-13-21 @ 13:36)  HR: 89 (07-14-21 @ 05:51) (89 - 99)  BP: 121/63 (07-14-21 @ 05:51) (102/55 - 121/63)  RR:  (18 - 20)  SpO2:  (99% - 100%)  Wt(kg): --  GENERAL: NAD, cachectic. ill appearing  EYES: No proptosis, no lid lag, anicteric  HEENT:  Atraumatic, Normocephalic, Dry mucous membranes  THYROID: Normal size, no palpable nodules  RESPIRATORY: Clear to auscultation bilaterally; No rales, rhonchi, wheezing  CARDIOVASCULAR: Regular rate and rhythm; No murmurs; 2+ pitting lower extremity edema bilaterally  GI: Soft, nontender, non distended  SKIN: Dry, intact, No rashes or lesions  NEURO: extraocular movements intact, no tremor  PSYCH: Alert and oriented x 3, normal affect    CAPILLARY BLOOD GLUCOSE      POCT Blood Glucose.: 87 mg/dL (14 Jul 2021 08:15)  POCT Blood Glucose.: 97 mg/dL (14 Jul 2021 02:45)  POCT Blood Glucose.: 159 mg/dL (13 Jul 2021 21:38)  POCT Blood Glucose.: 192 mg/dL (13 Jul 2021 17:24)  POCT Blood Glucose.: 174 mg/dL (13 Jul 2021 12:37)      07-14    150<H>  |  113<H>  |  16  ----------------------------<  74  3.4<L>   |  24  |  0.54    EGFR if : 121  EGFR if non : 104    Ca    8.3<L>      07-14  Mg     1.90     07-12  Phos  2.5     07-12    TPro  6.1  /  Alb  2.2<L>  /  TBili  0.5  /  DBili  x   /  AST  16  /  ALT  11  /  AlkPhos  157<H>  07-12      A1C with Estimated Average Glucose Result: 8.2 % (07-03-21 @ 04:26)  A1C with Estimated Average Glucose Result: 9.6 % (06-02-21 @ 06:33)      Thyroid Function Tests:

## 2021-07-15 ENCOUNTER — TRANSCRIPTION ENCOUNTER (OUTPATIENT)
Age: 56
End: 2021-07-15

## 2021-07-15 VITALS
DIASTOLIC BLOOD PRESSURE: 65 MMHG | HEART RATE: 98 BPM | SYSTOLIC BLOOD PRESSURE: 105 MMHG | OXYGEN SATURATION: 100 % | RESPIRATION RATE: 17 BRPM | TEMPERATURE: 98 F

## 2021-07-15 LAB
ANION GAP SERPL CALC-SCNC: 17 MMOL/L — HIGH (ref 7–14)
BUN SERPL-MCNC: 17 MG/DL — SIGNIFICANT CHANGE UP (ref 7–23)
CALCIUM SERPL-MCNC: 8.1 MG/DL — LOW (ref 8.4–10.5)
CHLORIDE SERPL-SCNC: 109 MMOL/L — HIGH (ref 98–107)
CO2 SERPL-SCNC: 21 MMOL/L — LOW (ref 22–31)
CREAT SERPL-MCNC: 0.6 MG/DL — SIGNIFICANT CHANGE UP (ref 0.5–1.3)
GLUCOSE SERPL-MCNC: 130 MG/DL — HIGH (ref 70–99)
HCT VFR BLD CALC: 31.5 % — LOW (ref 34.5–45)
HGB BLD-MCNC: 8.8 G/DL — LOW (ref 11.5–15.5)
MCHC RBC-ENTMCNC: 21.6 PG — LOW (ref 27–34)
MCHC RBC-ENTMCNC: 27.9 GM/DL — LOW (ref 32–36)
MCV RBC AUTO: 77.2 FL — LOW (ref 80–100)
NRBC # BLD: 0 /100 WBCS — SIGNIFICANT CHANGE UP
NRBC # FLD: 0 K/UL — SIGNIFICANT CHANGE UP
PLATELET # BLD AUTO: 146 K/UL — LOW (ref 150–400)
POTASSIUM SERPL-MCNC: 3.7 MMOL/L — SIGNIFICANT CHANGE UP (ref 3.5–5.3)
POTASSIUM SERPL-SCNC: 3.7 MMOL/L — SIGNIFICANT CHANGE UP (ref 3.5–5.3)
RBC # BLD: 4.08 M/UL — SIGNIFICANT CHANGE UP (ref 3.8–5.2)
RBC # FLD: 23.2 % — HIGH (ref 10.3–14.5)
SODIUM SERPL-SCNC: 147 MMOL/L — HIGH (ref 135–145)
TROPONIN T, HIGH SENSITIVITY RESULT: 147 NG/L — CRITICAL HIGH
WBC # BLD: 6.33 K/UL — SIGNIFICANT CHANGE UP (ref 3.8–10.5)
WBC # FLD AUTO: 6.33 K/UL — SIGNIFICANT CHANGE UP (ref 3.8–10.5)

## 2021-07-15 PROCEDURE — 99232 SBSQ HOSP IP/OBS MODERATE 35: CPT | Mod: GC

## 2021-07-15 PROCEDURE — 99222 1ST HOSP IP/OBS MODERATE 55: CPT

## 2021-07-15 PROCEDURE — 99239 HOSP IP/OBS DSCHRG MGMT >30: CPT

## 2021-07-15 RX ORDER — SODIUM CHLORIDE 9 MG/ML
1000 INJECTION, SOLUTION INTRAVENOUS
Refills: 0 | Status: DISCONTINUED | OUTPATIENT
Start: 2021-07-15 | End: 2021-07-15

## 2021-07-15 RX ORDER — OXYCODONE HYDROCHLORIDE 5 MG/1
1 TABLET ORAL
Qty: 42 | Refills: 0
Start: 2021-07-15 | End: 2021-07-21

## 2021-07-15 RX ORDER — ONDANSETRON 8 MG/1
1 TABLET, FILM COATED ORAL
Qty: 21 | Refills: 0
Start: 2021-07-15 | End: 2021-07-21

## 2021-07-15 RX ORDER — NYSTATIN 500MM UNIT
5 POWDER (EA) MISCELLANEOUS
Qty: 280 | Refills: 0
Start: 2021-07-15 | End: 2021-07-28

## 2021-07-15 RX ORDER — MAGNESIUM HYDROXIDE 400 MG/1
30 TABLET, CHEWABLE ORAL
Qty: 900 | Refills: 0
Start: 2021-07-15 | End: 2021-08-13

## 2021-07-15 RX ORDER — POLYETHYLENE GLYCOL 3350 17 G/17G
17 POWDER, FOR SOLUTION ORAL
Qty: 30 | Refills: 0
Start: 2021-07-15 | End: 2021-08-13

## 2021-07-15 RX ORDER — OXYCODONE HYDROCHLORIDE 5 MG/1
5 TABLET ORAL EVERY 4 HOURS
Refills: 0 | Status: DISCONTINUED | OUTPATIENT
Start: 2021-07-15 | End: 2021-07-15

## 2021-07-15 RX ORDER — SENNA PLUS 8.6 MG/1
2 TABLET ORAL
Qty: 60 | Refills: 0
Start: 2021-07-15 | End: 2021-08-13

## 2021-07-15 RX ORDER — ACETAMINOPHEN 500 MG
2 TABLET ORAL
Qty: 120 | Refills: 0
Start: 2021-07-15 | End: 2021-07-29

## 2021-07-15 RX ORDER — ENOXAPARIN SODIUM 100 MG/ML
5 INJECTION SUBCUTANEOUS
Qty: 5 | Refills: 0
Start: 2021-07-15 | End: 2021-08-13

## 2021-07-15 RX ORDER — ENOXAPARIN SODIUM 100 MG/ML
55 INJECTION SUBCUTANEOUS
Qty: 30 | Refills: 0
Start: 2021-07-15 | End: 2021-08-13

## 2021-07-15 RX ADMIN — SODIUM CHLORIDE 75 MILLILITER(S): 9 INJECTION, SOLUTION INTRAVENOUS at 13:22

## 2021-07-15 RX ADMIN — ENOXAPARIN SODIUM 55 MILLIGRAM(S): 100 INJECTION SUBCUTANEOUS at 05:16

## 2021-07-15 NOTE — DISCHARGE NOTE NURSING/CASE MANAGEMENT/SOCIAL WORK - PATIENT PORTAL LINK FT
You can access the FollowMyHealth Patient Portal offered by [facility] by registering at the following website: [email or web address]. By joining [employer]’s FollowMyHealth portal, you will also be able to view your health information using other applications (apps) compatible with our system.

## 2021-07-15 NOTE — PROGRESS NOTE ADULT - PROBLEM SELECTOR PROBLEM 8
Hypernatremia
Anemia
Hypernatremia
Anemia
Hypernatremia
DVT (deep venous thrombosis)
Hypernatremia
DVT (deep venous thrombosis)
DVT (deep venous thrombosis)

## 2021-07-15 NOTE — PROGRESS NOTE ADULT - PROBLEM SELECTOR PLAN 1
Patient's insulin regimen was decreased in the setting of hypoglycemic episode and difficult IV access (making dextrose administration challenging) with GOC/plan for hospice. Patient reports her appetite comes and goes, but is able to tolerate some glucerna and some snacks, though with decreased PO intake overall, making hypoglycemia a concern.      7/15  - okay to let the pt run in range  BG goal  100-250 given her frailty, hypoglycemia, low PO intake and GOC for hospice in the setting of significant hypoglycemia on 7/13  - BG improved to 100s in the setting of D5W @ 75cc/hr x12h for hypernatremia. Now off of D5W.  - continue lantus 10 units at night  - continue ademlog low dose correctional scale premeal  - Carb consistent diet, encourage PO intake  - FS qAC + HS   - Endocrine will continue to follow inpatient    Discharge recommendations   plan is for home with hospice. DC recommendations based on GOC and need for control (FSBG frequency etc)  -Will need to  finalize closer to discharge date. Patient was followed recently by our team on prior admission and discharged on Lantus 18 units qhs and metformin 1000mg BID with A1c improving though now pt with low PO intake and plan for hospice. Patient's insulin regimen was decreased in the setting of hypoglycemic episode and difficult IV access (making dextrose administration challenging) with GOC/plan for hospice. Patient reports her appetite comes and goes, but is able to tolerate some glucerna and some snacks, though with decreased PO intake overall, making hypoglycemia a concern.      7/15  - okay to let the pt run in range  BG goal  100-250 given her frailty, hypoglycemia, low PO intake and GOC for hospice in the setting of significant hypoglycemia on 7/13  - BG improved to 100s in the setting of D5W @ 75cc/hr for hypernatremia.   - continue lantus 10 units at night  - continue ademlog low dose correctional scale premeal  - Carb consistent diet, encourage PO intake  - FS qAC + HS   - Endocrine will continue to follow inpatient    Discharge recommendations   plan is for home with hospice. DC recommendations based on GOC and need for control (FSBG frequency etc)  -Will need to  finalize closer to discharge date. Patient was followed recently by our team on prior admission and discharged on Lantus 18 units qhs and metformin 1000mg BID with A1c improving though now pt with low PO intake and plan for hospice. Patient's insulin regimen was decreased in the setting of hypoglycemic episode and difficult IV access (making dextrose administration challenging) with GOC/plan for hospice. Patient reports her appetite comes and goes, but is able to tolerate some glucerna and some snacks, though with decreased PO intake overall, making hypoglycemia a concern.      7/15  - okay to let the pt run in range  BG goal  100-250 given her frailty, hypoglycemia, low PO intake and GOC for hospice in the setting of significant hypoglycemia on 7/13  - BG improved to 100s in the setting of D5W @ 75cc/hr for hypernatremia.   - continue lantus 10 units at night (while on D5W)  - continue ademlog low dose correctional scale premeal  - Carb consistent diet, encourage PO intake  - FS qAC + HS   - Endocrine will continue to follow inpatient    Discharge recommendations   plan is for home with hospice. DC recommendations based on GOC and need for control (FSBG frequency etc)  -Will need to  finalize closer to discharge date. Patient was followed recently by our team on prior admission and discharged on Lantus 18 units qhs and metformin 1000mg BID with A1c improving though now pt with low PO intake and plan for hospice. Patient's insulin regimen was decreased in the setting of hypoglycemic episode and difficult IV access (making dextrose administration challenging) with GOC/plan for hospice. Patient reports her appetite comes and goes, but is able to tolerate some glucerna and some snacks, though with decreased PO intake overall, making hypoglycemia a concern.      7/15  - okay to let the pt run in range  BG goal  100-250 given her frailty, hypoglycemia, low PO intake and GOC for hospice in the setting of significant hypoglycemia on 7/13  - BG improved to 100s in the setting of D5W @ 75cc/hr for hypernatremia.   - continue lantus 10 units at night (while on D5W)  - continue ademlog low dose correctional scale premeal  - Carb consistent diet, encourage PO intake  - FS qAC + HS   - Endocrine will continue to follow inpatient    Discharge recommendations   plan is for home with hospice. DC recommendations based on GOC and need for control (FSBG frequency etc)  -Will need to  finalize closer to discharge date. Patient was followed recently by our team on prior admission and discharged on Lantus 18 units qhs and metformin 1000mg BID with A1c improving though now pt with low PO intake and plan for hospice.    Addendum: Patient is being discharged to hospice (off D5W). Plan to reduce Lantus to 5 units at night.

## 2021-07-15 NOTE — CONSULT NOTE ADULT - ATTENDING COMMENTS
Patient improving, notes feeling well. Plan for stabilization and transition to hospice per prior outpatient plan.
Low suspicion for ACS. ECG mildly abnormal with biphasic anterior T waves. Please check repeat ECG. If not change, no plans for further inpatient cardiac testing.

## 2021-07-15 NOTE — CONSULT NOTE ADULT - ASSESSMENT
56F with hx of Stage IV Endometrial Cancer c/b malignant ascites s/p chemotherapy (6/18 carboplatin/taxol), L. mid femoral/peroneal vein DVT on Lovenox, T2DM (A1C 8% on insulin) who presents for management of urosepsis. Cardiology was consulted for evaluation of elevated troponins.     #Elevated Troponins, Non-cardiac chest pain   Based on the patients history of non-cardiac chest pain that presented after ingesting a large volume of milk relieved with belching, this is unlikely to be cardiac in origin. Although her troponins are elevated (147, 150), she does not have active chest pain and her EKG is unremarkable for ischemic changes, making ACS extremely unlikely. She also has chronically elevated troponin levels (~130s) most likely secondary to systemic inflammation and metastatic cancer. DNR/DNI status, absence of active chest pain/cardiac history, and unremarkable EKG warrants no further cardiac-related workup.  56F with hx of Stage IV Endometrial Cancer c/b malignant ascites s/p chemotherapy (6/18 carboplatin/taxol), L. mid femoral/peroneal vein DVT on Lovenox, T2DM who presents for management of urosepsis. Cardiology was consulted for evaluation of elevated troponins.     #Elevated Troponins, Non-Cardiac Chest Pain   Based on the patients history of experiencing sharp chest pain after ingestion of milk while lying down, we believe this to be non-cardiac in origin. History of relief after belching and sitting up is also indicative of non-cardiac chest pain. She is currently not having chest pain, has had no chest pain/cardiac history in the past, and has an unremarkable EKG making ACS extremely unlikely. EKG was unremarkable for ischemic changes. Although her troponins are elevated (147, 150),  she has chronically elevated troponin levels (~130s) most likely secondary to systemic inflammation/metastatic cancer. Her DNR/DNI status, absence of active chest pain/cardiac history, and unremarkable EKG warrants no further cardiac-related workup.   PLAN  - Non-cardiac chest pain warrants no further ischemic workup   - Defer to palliative medicine and primary team for management  56F with hx of Stage IV Endometrial Cancer c/b malignant ascites s/p chemotherapy (6/18 carboplatin/taxol), L. mid femoral/peroneal vein DVT on Lovenox, T2DM who presents for management of urosepsis. Cardiology was consulted for evaluation of elevated troponins.     #Elevated Troponins, Non-Cardiac Chest Pain   Based on the patients history of experiencing sharp chest pain after ingestion of milk while lying down, we believe this to be non-cardiac in origin. History of relief after belching and sitting up is also indicative of non-cardiac chest pain. She is currently not having chest pain, has had no chest pain/cardiac history in the past, and has non-specific ischemic changes on EKG creating a low suspicion for ACS. Although her troponins are elevated (147, 150), she has chronically elevated troponin levels (~130s) most likely secondary to demand ischemia 2/2 to urosepsis vs non-cardiac etiology. Her DNR/DNI status and absence of active chest pain/cardiac history lowers likelihood of cardiac intervention if indicated.   PLAN  - F/u EKG in evening to assess evolution of ischemic changes  - Given poor prognosis, further cardiac intervention may not be warranted   - Defer to primary team/palliatve care for direction of care pending EKG findings

## 2021-07-15 NOTE — PROGRESS NOTE ADULT - PROBLEM SELECTOR PROBLEM 7
DVT (deep venous thrombosis)
DVT (deep venous thrombosis)
EZE (acute kidney injury)
Endometrial cancer
EZE (acute kidney injury)
EZE (acute kidney injury)
Endometrial cancer
EZE (acute kidney injury)
EZE (acute kidney injury)
Hypernatremia
EZE (acute kidney injury)
EZE (acute kidney injury)

## 2021-07-15 NOTE — PROGRESS NOTE ADULT - PROBLEM SELECTOR PLAN 3
Hb 9.5 (baseline ~8)  - s/p 1U PRBC on admission, 1U 7/8 for Hb 7.1 --> improved to 9.5 (may also have concentration affect)  - given soft BPs, downtrending Hb, will obtain CTAP to r/o bleed (pending)  - iron studies checked on prior admission c/w ACD in setting of malignancy   - trend CBC, transfuse PRN
- s/p 1U PRBC on admission, 1U 7/8 for Hb 7.1 --> improved to 9.5 (may also have concentration affect)  - given soft BPs, downtrending Hb, no RP bleed/hematoma on CT  - iron studies checked on prior admission c/w ACD in setting of malignancy   - trend CBC, transfuse PRN
no further dmt   daughter aware   hospice referral made
- s/p 1U PRBC on admission, 1U 7/8 for Hb 7.1 --> improved to 9.5 (may also have concentration affect)  - given soft BPs, downtrending Hb, no RP bleed/hematoma on CT  - iron studies checked on prior admission c/w ACD in setting of malignancy   - trend CBC, transfuse PRN
- s/p 1U PRBC on admission, 1U 7/8 for Hb 7.1 --> improved to 9.5 (may also have concentration affect)  - given soft BPs, downtrending Hb, no RP bleed/hematoma on CT  - iron studies checked on prior admission c/w ACD in setting of malignancy   - trend CBC, transfuse PRN
no further dmt   daughter aware   hospice referral made
UA positive + febrile   - UCx NTD however sent after abx started   - c/w empiric zosyn for longer course x 7 days through 7/10 given soft BPs  - trend fever curve
UA positive + febrile   - UCx NTD however sent after abx started   - c/w empiric zosyn for longer course x 7 days through 7/10 given soft BPs  - trend fever curve
- s/p 1U PRBC on admission, 1U 7/8 for Hb 7.1 --> improved to 9.5 (may also have concentration affect)  - given soft BPs, downtrending Hb, no RP bleed/hematoma on CT  - iron studies checked on prior admission c/w ACD in setting of malignancy   - trend CBC, transfuse PRN
no further dmt   daughter aware   hospice referral made
- s/p 1U PRBC on admission, 1U 7/8 for Hb 7.1 --> improved to 9.5 (may also have concentration affect)  - given soft BPs, downtrending Hb, obtain CTAP to r/o bleed (pending)  - iron studies checked on prior admission c/w ACD in setting of malignancy   - trend CBC, transfuse PRN
SIRS positive on admission (febrile, tachycardic, leukocytosis)  - likely 2/2 UTI, UA positive, BCx/UCx NTD  - c/w empiric zosyn for longer course x 7 days through 7/10 given soft BPs  - trend fever curve
SCr 1.3 (baseline 0.5-0.6)  - likely pre-renal due to hemodynamic changes from sepsis  - c/w IVF, hold home ethacrynic acid as below for now   - trend BMP, renally dose medications
- s/p 1U PRBC on admission, 1U 7/8 for Hb 7.1 --> improved to 9.5 (may also have concentration affect)  - given soft BPs, downtrending Hb, obtain CTAP to r/o bleed (pending)  - iron studies checked on prior admission c/w ACD in setting of malignancy   - trend CBC, transfuse PRN
SCr 1.3 (baseline 0.5-0.6)  - likely pre-renal due to hemodynamic changes from sepsis  - c/w IVF, hold home ethacrynic acid as below for now   - trend BMP, renally dose medications

## 2021-07-15 NOTE — PROGRESS NOTE ADULT - ATTENDING COMMENTS
BG too tightly controlled, reduced po intake.  Stop premeal Admelog.  Reduce Lantus to 10 units qhs.  Stop premeal Admelog.  Of note on D5w for hypernatremia.  Will follow.    Hemal Giang MD  Division of Endocrinology  Pager: [phone]    If after 6PM or before 9AM, or on weekends/holidays, please call endocrine answering service for assistance ([phone]).  For nonurgent matters email [email or web address] for assistance.
BG improved now in goal range.  Continue off premeal Admelog.  Continue Lantus 10 units qhs plus on D5. If D5 stopped Lantus would likely need to be lowered.  Will follow.    Hemal Giang MD  Division of Endocrinology  Pager: [phone]    If after 6PM or before 9AM, or on weekends/holidays, please call endocrine answering service for assistance ([phone]).  For nonurgent matters email [email or web address] for assistance.

## 2021-07-15 NOTE — CONSULT NOTE ADULT - SUBJECTIVE AND OBJECTIVE BOX
Patient is a 56y old  Female who presents with a chief complaint of urosepsis (15 Jul 2021 11:09)      HPI:  56F with hx of Stage IV Endometrial Cancer c/b malignant ascites s/p chemotherapy ( carboplatin/taxol), L. mid femoral/peroneal vein DVT on Lovenox, T2DM (A1C 8% on insulin) who presents to the ED with altered mental status and weakness and was admitted for management of urosepsis on . Cardiology was consulted for elevated cardiac enzymes today (7/15). Yesterday the patient reports having one episode of noncardiac chest pain that she states started after drinking milk while lying down. She states that the pain was sharp, non-radiating, and lasted for 2 minutes in duration. The pain resolved after she sat up and belched. There are no aggravating factors. She has never experienced chest pain before and has no prior history of cardiac related problems. She has great functional status at baseline. She currently denies chest pain, SOB, palpitations and orthopnea. She is currently DNR/DNI and is being managed by palliative.       PAST MEDICAL & SURGICAL HISTORY:  History of migraine headaches    DM type 2 (diabetes mellitus, type 2)    History of irregular menstrual cycles    Ovarian cancer  Stage 4    History of   ,       ECHO  FINDINGS:      MEDICATIONS  (STANDING):  dextrose 5%. 1000 milliLiter(s) (75 mL/Hr) IV Continuous <Continuous>  dextrose 5%. 1000 milliLiter(s) (75 mL/Hr) IV Continuous <Continuous>  dextrose 50% Injectable 25 Gram(s) IV Push once  enoxaparin Injectable 55 milliGRAM(s) SubCutaneous every 12 hours  FIRST- Mouthwash  BLM 10 milliLiter(s) Swish and Spit every 6 hours  insulin glargine Injectable (LANTUS) 10 Unit(s) SubCutaneous at bedtime  insulin lispro (ADMELOG) corrective regimen sliding scale   SubCutaneous three times a day before meals  nystatin    Suspension 034382 Unit(s) Oral every 6 hours  polyethylene glycol 3350 17 Gram(s) Oral daily  senna 2 Tablet(s) Oral at bedtime    MEDICATIONS  (PRN):  acetaminophen   Tablet .. 650 milliGRAM(s) Oral every 6 hours PRN Temp greater or equal to 38C (100.4F), Mild Pain (1 - 3), Moderate Pain (4 - 6)  magnesium hydroxide Suspension 30 milliLiter(s) Oral daily PRN Constipation  ondansetron Injectable 4 milliGRAM(s) IV Push every 6 hours PRN Nausea and/or Vomiting  oxyCODONE    IR 5 milliGRAM(s) Oral every 4 hours PRN Severe Pain (7 - 10)      FAMILY HISTORY:  FH: type 2 diabetes mellitus (Mother)  mother      Allergic/Immunologic:	  SOCIAL HISTORY:    CIGARETTES:    ALCOHOL:  Vital Signs Last 24 Hrs  T(C): 36.6 (15 Jul 2021 06:00), Max: 36.6 (15 Jul 2021 06:00)  T(F): 97.8 (15 Jul 2021 06:00), Max: 97.8 (15 Jul 2021 06:00)  HR: 85 (15 Jul 2021 06:00) (85 - 98)  BP: 108/60 (15 Jul 2021 06:00) (103/50 - 108/60)  BP(mean): --  RR: 17 (15 Jul 2021 06:00) (16 - 17)  SpO2: 98% (15 Jul 2021 06:00) (96% - 99%)    PHYSICAL EXAM:  General: Awake, alert, in no acute distress  HEENT: Normocephalic, atraumatic. EOMI. Moist mucus membranes.  Pulmonary: Symmetric chest rise. No extra work of breathing. Lungs clear to auscultation bilaterally.  Cardiovascular: Normal rate and regular rhythm. No murmurs/rubs/gallops  Abdominal: Soft, non-distended, non-tender  Skin: No evident rashes or lesions  Neurologic: No focal defects  Psychiatric: Mood appropriate        ECG:    I&O's Detail    2021 07:01  -  15 Jul 2021 07:00  --------------------------------------------------------  IN:    dextrose 5%: 750 mL    Oral Fluid: 340 mL  Total IN: 1090 mL    OUT:  Total OUT: 0 mL    Total NET: 1090 mL      15 Jul 2021 07:01  -  15 Jul 2021 11:49  --------------------------------------------------------  IN:    Oral Fluid: 400 mL  Total IN: 400 mL    OUT:  Total OUT: 0 mL    Total NET: 400 mL          LABS:                        8.8    6.33  )-----------( 146      ( 15 Jul 2021 07:12 )             31.5     07-15    147<H>  |  109<H>  |  17  ----------------------------<  130<H>  3.7   |  21<L>  |  0.60    Ca    8.1<L>      15 Jul 2021 07:12      CARDIAC MARKERS ( 2021 06:18 )  x     / x     / 31 U/L / x     / x              I&O's Summary    2021 07:01  -  15 Jul 2021 07:00  --------------------------------------------------------  IN: 1090 mL / OUT: 0 mL / NET: 1090 mL    15 Jul 2021 07:01  -  15 Jul 2021 11:49  --------------------------------------------------------  IN: 400 mL / OUT: 0 mL / NET: 400 mL      BNP  RADIOLOGY & ADDITIONAL STUDIES: Patient is a 56y old  Female who presents with a chief complaint of urosepsis (15 Jul 2021 11:09)      HPI:  56F with hx of Stage IV Endometrial Cancer c/b malignant ascites s/p chemotherapy ( carboplatin/taxol), L. mid femoral/peroneal vein DVT on Lovenox, T2DM (A1C 8% on insulin) who presents to the ED () with altered mental status and weakness . She was admitted for management of urosepsis. Cardiology was consulted today for elevated cardiac enzymes today. Yesterday, the patient reports having one episode of noncardiac chest pain that she states started after drinking milk while lying down. She describes the pain as sharp, non-radiating, and lasting for 2 minutes in duration. The pain resolved after she sat up and belched. There are no aggravating factors. She has never experienced chest pain before and has no prior history of cardiac problems. She has great functional status at baseline. She currently denies chest pain, SOB, palpitations and orthopnea. She is currently DNR/DNI and is being managed by palliative medicine.       PAST MEDICAL & SURGICAL HISTORY:  History of migraine headaches    DM type 2 (diabetes mellitus, type 2)    History of irregular menstrual cycles    Ovarian cancer  Stage 4    History of   ,       ECHO  FINDINGS:      MEDICATIONS  (STANDING):  dextrose 5%. 1000 milliLiter(s) (75 mL/Hr) IV Continuous <Continuous>  dextrose 5%. 1000 milliLiter(s) (75 mL/Hr) IV Continuous <Continuous>  dextrose 50% Injectable 25 Gram(s) IV Push once  enoxaparin Injectable 55 milliGRAM(s) SubCutaneous every 12 hours  FIRST- Mouthwash  BLM 10 milliLiter(s) Swish and Spit every 6 hours  insulin glargine Injectable (LANTUS) 10 Unit(s) SubCutaneous at bedtime  insulin lispro (ADMELOG) corrective regimen sliding scale   SubCutaneous three times a day before meals  nystatin    Suspension 738162 Unit(s) Oral every 6 hours  polyethylene glycol 3350 17 Gram(s) Oral daily  senna 2 Tablet(s) Oral at bedtime    MEDICATIONS  (PRN):  acetaminophen   Tablet .. 650 milliGRAM(s) Oral every 6 hours PRN Temp greater or equal to 38C (100.4F), Mild Pain (1 - 3), Moderate Pain (4 - 6)  magnesium hydroxide Suspension 30 milliLiter(s) Oral daily PRN Constipation  ondansetron Injectable 4 milliGRAM(s) IV Push every 6 hours PRN Nausea and/or Vomiting  oxyCODONE    IR 5 milliGRAM(s) Oral every 4 hours PRN Severe Pain (7 - 10)      FAMILY HISTORY:  FH: type 2 diabetes mellitus (Mother)  mother      Allergic/Immunologic:	  SOCIAL HISTORY:    CIGARETTES:    ALCOHOL:  Vital Signs Last 24 Hrs  T(C): 36.6 (15 Jul 2021 06:00), Max: 36.6 (15 Jul 2021 06:00)  T(F): 97.8 (15 Jul 2021 06:00), Max: 97.8 (15 Jul 2021 06:00)  HR: 85 (15 Jul 2021 06:00) (85 - 98)  BP: 108/60 (15 Jul 2021 06:00) (103/50 - 108/60)  BP(mean): --  RR: 17 (15 Jul 2021 06:00) (16 - 17)  SpO2: 98% (15 Jul 2021 06:00) (96% - 99%)    PHYSICAL EXAM:  General: Awake, alert, in no acute distress  HEENT: Normocephalic, atraumatic. EOMI. Moist mucus membranes.  Pulmonary: Symmetric chest rise. No extra work of breathing. Lungs clear to auscultation bilaterally.  Cardiovascular: Normal rate and regular rhythm. No murmurs/rubs/gallops  Abdominal: Soft, non-distended, non-tender  Skin: No evident rashes or lesions  Neurologic: No focal defects  Psychiatric: Mood appropriate        ECG:  * Sinus rhythm, no ischemic changes (7/15)     I&O's Detail    2021 07:01  -  15 Jul 2021 07:00  --------------------------------------------------------  IN:    dextrose 5%: 750 mL    Oral Fluid: 340 mL  Total IN: 1090 mL    OUT:  Total OUT: 0 mL    Total NET: 1090 mL      15 Jul 2021 07:01  -  15 Jul 2021 11:49  --------------------------------------------------------  IN:    Oral Fluid: 400 mL  Total IN: 400 mL    OUT:  Total OUT: 0 mL    Total NET: 400 mL          LABS:                        8.8    6.33  )-----------( 146      ( 15 Jul 2021 07:12 )             31.5     07-15    147<H>  |  109<H>  |  17  ----------------------------<  130<H>  3.7   |  21<L>  |  0.60    Ca    8.1<L>      15 Jul 2021 07:12      CARDIAC MARKERS ( 2021 06:18 )  x     / x     / 31 U/L / x     / x              I&O's Summary    2021 07:01  -  15 Jul 2021 07:00  --------------------------------------------------------  IN: 1090 mL / OUT: 0 mL / NET: 1090 mL    15 Jul 2021 07:01  -  15 Jul 2021 11:49  --------------------------------------------------------  IN: 400 mL / OUT: 0 mL / NET: 400 mL      BNP  RADIOLOGY & ADDITIONAL STUDIES: Patient is a 56y old  Female who presents with a chief complaint of urosepsis (15 Jul 2021 11:09)      HPI:  56F with hx of Stage IV Endometrial Cancer c/b malignant ascites s/p chemotherapy ( carboplatin/taxol), L. mid femoral/peroneal vein DVT on Lovenox, T2DM (A1C 8% on insulin) who presents to the ED () with altered mental status and weakness . She was admitted for management of urosepsis. Cardiology was consulted today for elevated cardiac enzymes today. Yesterday, the patient reports having one episode of noncardiac chest pain that she states started after drinking milk while lying down. She describes the pain as sharp, non-radiating, and lasting for 2 minutes in duration. The pain resolved after she sat up and belched. There are no aggravating factors. She has never experienced chest pain before and has no prior history of cardiac problems. She has great functional status at baseline and can walk around her house. She currently denies chest pain, SOB, palpitations and orthopnea. She is currently DNR/DNI and is being managed by palliative medicine.       PAST MEDICAL & SURGICAL HISTORY:  History of migraine headaches    DM type 2 (diabetes mellitus, type 2)    History of irregular menstrual cycles    Ovarian cancer  Stage 4    History of   2003        MEDICATIONS  (STANDING):  dextrose 5%. 1000 milliLiter(s) (75 mL/Hr) IV Continuous <Continuous>  dextrose 5%. 1000 milliLiter(s) (75 mL/Hr) IV Continuous <Continuous>  dextrose 50% Injectable 25 Gram(s) IV Push once  enoxaparin Injectable 55 milliGRAM(s) SubCutaneous every 12 hours  FIRST- Mouthwash  BLM 10 milliLiter(s) Swish and Spit every 6 hours  insulin glargine Injectable (LANTUS) 10 Unit(s) SubCutaneous at bedtime  insulin lispro (ADMELOG) corrective regimen sliding scale   SubCutaneous three times a day before meals  nystatin    Suspension 276068 Unit(s) Oral every 6 hours  polyethylene glycol 3350 17 Gram(s) Oral daily  senna 2 Tablet(s) Oral at bedtime    MEDICATIONS  (PRN):  acetaminophen   Tablet .. 650 milliGRAM(s) Oral every 6 hours PRN Temp greater or equal to 38C (100.4F), Mild Pain (1 - 3), Moderate Pain (4 - 6)  magnesium hydroxide Suspension 30 milliLiter(s) Oral daily PRN Constipation  ondansetron Injectable 4 milliGRAM(s) IV Push every 6 hours PRN Nausea and/or Vomiting  oxyCODONE    IR 5 milliGRAM(s) Oral every 4 hours PRN Severe Pain (7 - 10)      FAMILY HISTORY:  FH: type 2 diabetes mellitus (Mother)  mother      Allergic/Immunologic:	  SOCIAL HISTORY:    CIGARETTES:    ALCOHOL:  Vital Signs Last 24 Hrs  T(C): 36.6 (15 Jul 2021 06:00), Max: 36.6 (15 Jul 2021 06:00)  T(F): 97.8 (15 Jul 2021 06:00), Max: 97.8 (15 Jul 2021 06:00)  HR: 85 (15 Jul 2021 06:00) (85 - 98)  BP: 108/60 (15 Jul 2021 06:00) (103/50 - 108/60)  BP(mean): --  RR: 17 (15 Jul 2021 06:00) (16 - 17)  SpO2: 98% (15 Jul 2021 06:00) (96% - 99%)    PHYSICAL EXAM:  General: Awake, alert, in no acute distress  HEENT: Normocephalic, atraumatic. EOMI. Moist mucus membranes.  Pulmonary: Symmetric chest rise. No extra work of breathing. Lungs clear to auscultation bilaterally.  Cardiovascular: Normal rate and regular rhythm. No murmurs/rubs/gallops  Abdominal: Soft, non-distended, non-tender  Skin: No evident rashes or lesions  Neurologic: No focal defects  Psychiatric: Mood appropriate        ECG:  * Sinus rhythm, non-specific T wave changes concerning for possible ischemic etiology (7/15)     I&O's Detail    2021 07:01  -  15 Jul 2021 07:00  --------------------------------------------------------  IN:    dextrose 5%: 750 mL    Oral Fluid: 340 mL  Total IN: 1090 mL    OUT:  Total OUT: 0 mL    Total NET: 1090 mL      15 Jul 2021 07:01  -  15 Jul 2021 11:49  --------------------------------------------------------  IN:    Oral Fluid: 400 mL  Total IN: 400 mL    OUT:  Total OUT: 0 mL    Total NET: 400 mL          LABS:                        8.8    6.33  )-----------( 146      ( 15 Jul 2021 07:12 )             31.5     07-15    147<H>  |  109<H>  |  17  ----------------------------<  130<H>  3.7   |  21<L>  |  0.60    Ca    8.1<L>      15 Jul 2021 07:12      CARDIAC MARKERS ( 2021 06:18 )  x     / x     / 31 U/L / x     / x              I&O's Summary    2021 07:  -  15 Jul 2021 07:00  --------------------------------------------------------  IN: 1090 mL / OUT: 0 mL / NET: 1090 mL    15 Jul 2021 07:01  -  15 Jul 2021 11:49  --------------------------------------------------------  IN: 400 mL / OUT: 0 mL / NET: 400 mL      BNP  RADIOLOGY & ADDITIONAL STUDIES:

## 2021-07-15 NOTE — PROGRESS NOTE ADULT - PROBLEM SELECTOR PLAN 2
- Management as per primary team
- Management as per primary team.
BP soft in the 90s (baseline 110s), patient mentating  - likely multifactorial from poor PO intake  - c/w maintenance fluids, c/w midodrine 10mg TID (patient refuses at times)  - CTAP no bleed   - monitor vital signs closely  -pt had atypical chest pain, EKG sinus no ischemic change, troponin elevated (144) but normal cpk 31, doubt ACS, trend CE
significant decline - now bed bound with sacral wound  daily wound care and prn  kps 40%
BP soft in the 90s (baseline 110s), patient mentating  - likely multifactorial from poor PO intake/sepsis  - c/w maintenance fluids, c/w midodrine 10mg TID (patient refuses at times)  - CTAP no bleed   - monitor vital signs closely
significant decline - now bed bound with sacral wound  daily wound care and prn  kps 30%
significant decline - now bed bound with sacral wound  daily wound care and prn  kps 40%
BP soft in the 90s (baseline 110s), patient mentating  - likely multifactorial from poor PO intake  - c/w maintenance fluids, c/w midodrine 10mg TID (patient refuses at times)  - CTAP no bleed   - monitor vital signs closely  -appreciate cardiology consult for elevated tropoin 144--147, EKG no ischemic change, low likelihood of ischemic heart disease, cpk normal, likely d/t metastatic disease and systemic inflammation, pt denies active chest pain/sob, no further workup
UA positive + febrile   - c/w empiric zosyn pending cultures  - trend fever curve  Follow up urine cultures and sensitivities
SIRS positive on admission (febrile, tachycardic, leukocytosis)  - likely 2/2 UTI, UA positive, BCx/UCx NTD  - c/w empiric zosyn for longer course x 7 days through 7/10 given soft BPs  - trend fever curve
BP soft in the 90s (baseline 110s), patient mentating  - likely multifactorial from poor PO intake/sepsis  - c/w maintenance fluids, c/w midodrine 10mg TID (patient refuses at times)  - given downtrending Hb, will check CTAP to r/o bleed   - monitor vital signs closely
SIRS positive on admission (febrile, tachycardic, leukocytosis)  - likely 2/2 UTI, UA positive, BCx/UCx NTD  - c/w empiric zosyn for longer course x 7 days through 7/10 given soft BPs  - trend fever curve
UA positive + febrile   - c/w empiric zosyn, last day today  - trend fever curve  ucx no growth
BP soft in the 90s (baseline 110s), patient mentating  - likely multifactorial from poor PO intake/sepsis  - c/w maintenance fluids, c/w midodrine 10mg TID (patient refuses at times)  - CTAP no bleed   - monitor vital signs closely
BP soft in the 90s (baseline 110s), patient mentating  - likely multifactorial from poor PO intake/sepsis  - c/w maintenance fluids, c/w midodrine 10mg TID (patient refuses at times)  - given downtrending Hb, checking CTAP to r/o bleed   - monitor vital signs closely
BP soft in the 90s (baseline 110s), patient mentating  - likely multifactorial from poor PO intake/sepsis  - c/w maintenance fluids, c/w midodrine 10mg TID (patient refuses at times)  - CTAP no bleed   - monitor vital signs closely
BP soft in the 90s (baseline 110s), patient mentating  - likely multifactorial from poor PO intake/sepsis  - c/w maintenance fluids, c/w midodrine 10mg TID (patient refuses at times)  - monitor vital signs closely

## 2021-07-15 NOTE — PROGRESS NOTE ADULT - SUBJECTIVE AND OBJECTIVE BOX
Dr. Ysabel Flores  Pager [phone]    PROGRESS NOTE:     Patient is a 56y old  Female who presents with a chief complaint of urosepsis (15 Jul 2021 11:48)      SUBJECTIVE / OVERNIGHT EVENTS: pt denies active chest pain/sob  ADDITIONAL REVIEW OF SYSTEMS: afebrile    MEDICATIONS  (STANDING):  dextrose 5%. 1000 milliLiter(s) (75 mL/Hr) IV Continuous <Continuous>  dextrose 5%. 1000 milliLiter(s) (75 mL/Hr) IV Continuous <Continuous>  dextrose 50% Injectable 25 Gram(s) IV Push once  enoxaparin Injectable 55 milliGRAM(s) SubCutaneous every 12 hours  FIRST- Mouthwash  BLM 10 milliLiter(s) Swish and Spit every 6 hours  insulin glargine Injectable (LANTUS) 10 Unit(s) SubCutaneous at bedtime  insulin lispro (ADMELOG) corrective regimen sliding scale   SubCutaneous three times a day before meals  nystatin    Suspension 921521 Unit(s) Oral every 6 hours  polyethylene glycol 3350 17 Gram(s) Oral daily  senna 2 Tablet(s) Oral at bedtime    MEDICATIONS  (PRN):  acetaminophen   Tablet .. 650 milliGRAM(s) Oral every 6 hours PRN Temp greater or equal to 38C (100.4F), Mild Pain (1 - 3), Moderate Pain (4 - 6)  magnesium hydroxide Suspension 30 milliLiter(s) Oral daily PRN Constipation  ondansetron Injectable 4 milliGRAM(s) IV Push every 6 hours PRN Nausea and/or Vomiting  oxyCODONE    IR 5 milliGRAM(s) Oral every 4 hours PRN Severe Pain (7 - 10)      CAPILLARY BLOOD GLUCOSE      POCT Blood Glucose.: 134 mg/dL (15 Jul 2021 12:26)  POCT Blood Glucose.: 128 mg/dL (15 Jul 2021 08:08)  POCT Blood Glucose.: 107 mg/dL (14 Jul 2021 23:07)  POCT Blood Glucose.: 99 mg/dL (14 Jul 2021 17:31)    I&O's Summary    14 Jul 2021 07:01  -  15 Jul 2021 07:00  --------------------------------------------------------  IN: 1090 mL / OUT: 0 mL / NET: 1090 mL    15 Jul 2021 07:01  -  15 Jul 2021 12:31  --------------------------------------------------------  IN: 400 mL / OUT: 0 mL / NET: 400 mL        PHYSICAL EXAM:  Vital Signs Last 24 Hrs  T(C): 36.6 (15 Jul 2021 06:00), Max: 36.6 (15 Jul 2021 06:00)  T(F): 97.8 (15 Jul 2021 06:00), Max: 97.8 (15 Jul 2021 06:00)  HR: 85 (15 Jul 2021 06:00) (85 - 98)  BP: 108/60 (15 Jul 2021 06:00) (103/50 - 108/60)  BP(mean): --  RR: 17 (15 Jul 2021 06:00) (16 - 17)  SpO2: 98% (15 Jul 2021 06:00) (96% - 99%)  CONSTITUTIONAL: NAD, cachectic , thin woman  EYES: EOMI, conjunctiva and sclera clear  ENMT: Moist oral mucosa  NECK: Supple  RESPIRATORY: Breathing unlabored, CTAB  CARDIOVASCULAR: S1S2 no MRG, 2-3 + pitting leg edema  ABDOMEN: soft and less distended after tap   : primafit   MUSCULOSKELETAL: no clubbing or cyanosis of digits  NEUROLOGY: No focal deficits   SKIN: No rashes or lesions    LABS:                        8.8    6.33  )-----------( 146      ( 15 Jul 2021 07:12 )             31.5     07-15    147<H>  |  109<H>  |  17  ----------------------------<  130<H>  3.7   |  21<L>  |  0.60    Ca    8.1<L>      15 Jul 2021 07:12        CARDIAC MARKERS ( 14 Jul 2021 06:18 )  x     / x     / 31 U/L / x     / x          RADIOLOGY & ADDITIONAL TESTS:  Results Reviewed:   Imaging Personally Reviewed:  Electrocardiogram Personally Reviewed:    COORDINATION OF CARE:  Care Discussed with Consultants/Other Providers [Y/N]: cardiology fellow elevated troponin likely d/t cancer and systemic inflammation, doubt ACS  Prior or Outpatient Records Reviewed [Y/N]:

## 2021-07-15 NOTE — PROGRESS NOTE ADULT - PROBLEM SELECTOR PROBLEM 3
Anemia
Malignant neoplasm of ovary, unspecified laterality
Malignant neoplasm of ovary, unspecified laterality
Anemia
Anemia
Sepsis
Malignant neoplasm of ovary, unspecified laterality
Anemia
EZE (acute kidney injury)
Anemia
EZE (acute kidney injury)
UTI (urinary tract infection)
UTI (urinary tract infection)

## 2021-07-15 NOTE — PROGRESS NOTE ADULT - PROBLEM SELECTOR PLAN 1
Stage IV Endometrial Cancer c/b malignant ascites s/p chemotherapy (6/18 carboplatin/taxol)  - oncology following, patient not a candidate for DMT at this time  - last admission required diagnostic/therapeutic paracentesis, abdomen mildly distended, sonogram with moderate ascites  -s/p palliative paracentesis 7/12: 2.8L serous fluid removed  - pain control: holding home morphine ER given soft BPs, c/w oxycodone 5mg [id number] PRN with hold parameters  -CT 7/10 Metastatic endometrial cancer with extensive omental and peritoneal disease and sclerotic osseous metastatic disease. Stable to mild interval increase in the osseous metastatic disease since 6/7/2021. Moderate to large volume malignant ascites is essentially unchanged. No OR hematoma/bleed.  - appreciate palliative care assistance for GOC with family - FULL CODE at this time, agreeable to home hospice  - patient with poor PO intake, daughter inquiring about NGT - explained that patient is not amenable to NGT which would be a temporary measure regardless as patient not candidate for PEG given ascites, and that her poor PO intake is part of her disease process   - family meeting 7/7 with hospitalists, oncology Dr. Valadez, daughter Marline, and family - questions answered about hospice, open to hospice services at home, however still want all medical interventions done  -dispo: discharge pt home with home hospice today pending Equipment delivery, f/u [facility]  Time spent on discharge 35 minutes coordinating discharge plan and discussing with patient and family.

## 2021-07-15 NOTE — PROGRESS NOTE ADULT - PROBLEM SELECTOR PLAN 8
- hypovolemic on exam, likely due to poor PO intake   - c/w IVF, encourage PO intake  - hold home ethacrynic acid for now (started last admission by nephro for hypo-osmolar hypervolemic hyponatremia)
Hb 8.7 (baseline ~8)  - iron studies checked on prior admission c/w ACD in setting of malignancy   - trend CBC, transfuse PRN
Hb 8.7 (baseline ~8)  - iron studies checked on prior admission c/w ACD in setting of malignancy   - trend CBC, transfuse PRN
- hypovolemic on exam, likely due to poor PO intake   - start d5w, encourage PO intake  - hold home ethacrynic acid for now (started last admission by nephro for hypo-osmolar hypervolemic hyponatremia)  improving
Na 142  - hypovolemic on exam, likely due to poor PO intake   - c/w IVF, encourage PO intake  - hold home ethacrynic acid for now (started last admission by nephro for hypo-osmolar hypervolemic hyponatremia)
- hypovolemic on exam, likely due to poor PO intake   - c/w IVF, encourage PO intake  - hold home ethacrynic acid for now (started last admission by nephro for hypo-osmolar hypervolemic hyponatremia)  improving
Hx of L. mid femoral/peroneal vein DVT  - c/w therapeutic lovenox 55mg BID
- hypovolemic on exam, likely due to poor PO intake   - c/w IVF, encourage PO intake  - hold home ethacrynic acid for now (started last admission by nephro for hypo-osmolar hypervolemic hyponatremia)  improving
Hx of L. mid femoral/peroneal vein DVT  - holding therapeutic lovenox 55mg BID in anticipation of paracentesis 7/9, resume post procedure
- hypovolemic on exam, likely due to poor PO intake   - start d5w, liberalize free water intake  - hold home ethacrynic acid for now (started last admission by nephro for hypo-osmolar hypervolemic hyponatremia)  improving
Hx of L. mid femoral/peroneal vein DVT  - c/w therapeutic lovenox 55mg BID
- hypovolemic on exam, likely due to poor PO intake   - c/w IVF, encourage PO intake  - hold home ethacrynic acid for now (started last admission by nephro for hypo-osmolar hypervolemic hyponatremia)  improving

## 2021-07-15 NOTE — PROGRESS NOTE ADULT - NSPROGADDITIONALINFOA_GEN_ALL_CORE
Addendum: Patient is being discharged to hospice (off D5W). Plan to reduce Lantus to 5 units at night.

## 2021-07-15 NOTE — PROGRESS NOTE ADULT - PROBLEM SELECTOR PLAN 10
- DVT: therapeutically anticoagulated on lovenox (holding today for para tomorrow)  - DIET: CC  - DISPO: Home with hospice tomorrow, pending equipment delivery, home private hire, stabilization of sugars.
- DVT: therapeutically anticoagulated on lovenox (holding today for para tomorrow)  - DIET: CC  - DISPO: Home with hospice. Pending CTAP, paracentesis.
- DVT: lovenox  - DIET: CC  - DISPO: Home with hospice pending equipment delivery
- DVT: therapeutically anticoagulated on lovenox (holding today for para tomorrow)  - DIET: CC  - DISPO: Home with hospice. Pending completion of abx, stabilization of BPs, paracentesis.
- DVT: therapeutically anticoagulated on lovenox (holding today for para tomorrow)  - DIET: CC  - DISPO: Home with hospice. Pending CTAP, paracentesis.
- DVT: therapeutically anticoagulated on lovenox (holding today for para tomorrow)  - DIET: CC  - DISPO: Home with hospice. Pending CTAP, paracentesis.
- DVT: therapeutically anticoagulated on lovenox  - DIET: CC  - DISPO: Home with hospice. Pending completion of abx, stabilization of BPs, possible pleurx placement.
- DVT: therapeutically anticoagulated on lovenox (holding today for para tomorrow)  - DIET: CC  - DISPO: Home with hospice. Pending CTAP, paracentesis.
- DVT: therapeutically anticoagulated on lovenox  - DIET: CC  - DISPO: Home with hospice
- DVT: lovenox  - DIET: CC  - DISPO: Home with hospice pending equipment delivery

## 2021-07-15 NOTE — PROGRESS NOTE ADULT - SUBJECTIVE AND OBJECTIVE BOX
Chief Complaint: DM    History/overnight events: Patient reports she feels "excellent" this morning. Reports she has decreased appetite but is able to tolerate some glucerna and snacks. No hypoglycemic episodes overnight.    MEDICATIONS  (STANDING):  dextrose 5%. 1000 milliLiter(s) (75 mL/Hr) IV Continuous <Continuous>  dextrose 50% Injectable 25 Gram(s) IV Push once  enoxaparin Injectable 55 milliGRAM(s) SubCutaneous every 12 hours  FIRST- Mouthwash  BLM 10 milliLiter(s) Swish and Spit every 6 hours  insulin glargine Injectable (LANTUS) 10 Unit(s) SubCutaneous at bedtime  insulin lispro (ADMELOG) corrective regimen sliding scale   SubCutaneous three times a day before meals  nystatin    Suspension 375786 Unit(s) Oral every 6 hours  polyethylene glycol 3350 17 Gram(s) Oral daily  senna 2 Tablet(s) Oral at bedtime    MEDICATIONS  (PRN):  acetaminophen   Tablet .. 650 milliGRAM(s) Oral every 6 hours PRN Temp greater or equal to 38C (100.4F), Mild Pain (1 - 3), Moderate Pain (4 - 6)  magnesium hydroxide Suspension 30 milliLiter(s) Oral daily PRN Constipation  ondansetron Injectable 4 milliGRAM(s) IV Push every 6 hours PRN Nausea and/or Vomiting  oxyCODONE    IR 5 milliGRAM(s) Oral every 4 hours PRN Severe Pain (7 - 10)      Allergies    Sulf-10 (Rash; Short breath)    Intolerances      Review of Systems:  Constitutional: No fever  Eyes: No blurry vision  Neuro: No tremors  HEENT: No active pain  Cardiovascular: Reports intermittent chest pain. No active chest pain, palpitations  Respiratory: No active SOB, no cough  GI: No active nausea, vomiting, abdominal pain  : No dysuria  Skin: no rash  Endocrine: no polyuria, polydipsia  Hem/lymph: Positive leg swelling  Osteoporosis: no fractures    ALL OTHER SYSTEMS REVIEWED AND NEGATIVE    UNABLE TO OBTAIN    PHYSICAL EXAM:  VITALS: T(C): 36.6 (07-15-21 @ 06:00)  T(F): 97.8 (07-15-21 @ 06:00), Max: 97.8 (07-15-21 @ 06:00)  HR: 85 (07-15-21 @ 06:00) (85 - 98)  BP: 108/60 (07-15-21 @ 06:00) (103/50 - 108/60)  RR:  (16 - 17)  SpO2:  (96% - 99%)  Wt(kg): --  GENERAL: NAD, cachectic. ill appearing  EYES: No proptosis, no lid lag, anicteric  HEENT:  Atraumatic, Normocephalic, Dry mucous membranes  THYROID: Normal size, no palpable nodules  RESPIRATORY: Clear to auscultation bilaterally; No rales, rhonchi, wheezing  CARDIOVASCULAR: Regular rate and rhythm; No murmurs; 2+ pitting lower extremity edema bilaterally  GI: Soft, +mild tenderness to palpation diffusely, non distended  SKIN: Dry, intact, No rashes or lesions  NEURO: extraocular movements intact, no tremor  PSYCH: Alert and oriented x 3, normal affect    CAPILLARY BLOOD GLUCOSE      POCT Blood Glucose.: 128 mg/dL (15 Jul 2021 08:08)  POCT Blood Glucose.: 107 mg/dL (14 Jul 2021 23:07)  POCT Blood Glucose.: 99 mg/dL (14 Jul 2021 17:31)  POCT Blood Glucose.: 110 mg/dL (14 Jul 2021 12:04)      07-15    147<H>  |  109<H>  |  17  ----------------------------<  130<H>  3.7   |  21<L>  |  0.60    EGFR if : 116  EGFR if non : 100    Ca    8.1<L>      07-15        A1C with Estimated Average Glucose Result: 8.2 % (07-03-21 @ 04:26)  A1C with Estimated Average Glucose Result: 9.6 % (06-02-21 @ 06:33)      Thyroid Function Tests:                           Chief Complaint: DM2    History/overnight events: Patient reports she feels "excellent" this morning. Reports she has decreased appetite but is able to tolerate some glucerna and snacks. No hypoglycemic episodes overnight.    MEDICATIONS  (STANDING):  dextrose 5%. 1000 milliLiter(s) (75 mL/Hr) IV Continuous <Continuous>  dextrose 50% Injectable 25 Gram(s) IV Push once  enoxaparin Injectable 55 milliGRAM(s) SubCutaneous every 12 hours  FIRST- Mouthwash  BLM 10 milliLiter(s) Swish and Spit every 6 hours  insulin glargine Injectable (LANTUS) 10 Unit(s) SubCutaneous at bedtime  insulin lispro (ADMELOG) corrective regimen sliding scale   SubCutaneous three times a day before meals  nystatin    Suspension 475750 Unit(s) Oral every 6 hours  polyethylene glycol 3350 17 Gram(s) Oral daily  senna 2 Tablet(s) Oral at bedtime    MEDICATIONS  (PRN):  acetaminophen   Tablet .. 650 milliGRAM(s) Oral every 6 hours PRN Temp greater or equal to 38C (100.4F), Mild Pain (1 - 3), Moderate Pain (4 - 6)  magnesium hydroxide Suspension 30 milliLiter(s) Oral daily PRN Constipation  ondansetron Injectable 4 milliGRAM(s) IV Push every 6 hours PRN Nausea and/or Vomiting  oxyCODONE    IR 5 milliGRAM(s) Oral every 4 hours PRN Severe Pain (7 - 10)      Allergies    Sulf-10 (Rash; Short breath)    Intolerances      Review of Systems:  Constitutional: No fever  Eyes: No blurry vision  Neuro: No tremors  HEENT: No active pain  Cardiovascular: Reports intermittent chest pain. No active chest pain, palpitations  Respiratory: No active SOB, no cough  GI: No active nausea, vomiting, abdominal pain  : No dysuria  Skin: no rash  Endocrine: no polyuria, polydipsia  Hem/lymph: Positive leg swelling  Osteoporosis: no fractures    ALL OTHER SYSTEMS REVIEWED AND NEGATIVE      PHYSICAL EXAM:  VITALS: T(C): 36.6 (07-15-21 @ 06:00)  T(F): 97.8 (07-15-21 @ 06:00), Max: 97.8 (07-15-21 @ 06:00)  HR: 85 (07-15-21 @ 06:00) (85 - 98)  BP: 108/60 (07-15-21 @ 06:00) (103/50 - 108/60)  RR:  (16 - 17)  SpO2:  (96% - 99%)  Wt(kg): --  GENERAL: NAD, cachectic. ill appearing  EYES: No proptosis, no lid lag, anicteric  HEENT:  Atraumatic, Normocephalic, Dry mucous membranes  THYROID: Normal size, no palpable nodules  RESPIRATORY: Clear to auscultation bilaterally; No rales, rhonchi, wheezing  CARDIOVASCULAR: Regular rate and rhythm; No murmurs; 2+ pitting lower extremity edema bilaterally  GI: Soft, +mild tenderness to palpation diffusely, non distended  SKIN: Dry, intact, No rashes or lesions  NEURO: extraocular movements intact, no tremor  PSYCH: Alert and oriented x 3, normal affect    CAPILLARY BLOOD GLUCOSE      POCT Blood Glucose.: 128 mg/dL (15 Jul 2021 08:08)  POCT Blood Glucose.: 107 mg/dL (14 Jul 2021 23:07)  POCT Blood Glucose.: 99 mg/dL (14 Jul 2021 17:31)  POCT Blood Glucose.: 110 mg/dL (14 Jul 2021 12:04)      07-15    147<H>  |  109<H>  |  17  ----------------------------<  130<H>  3.7   |  21<L>  |  0.60    EGFR if : 116  EGFR if non : 100    Ca    8.1<L>      07-15        A1C with Estimated Average Glucose Result: 8.2 % (07-03-21 @ 04:26)  A1C with Estimated Average Glucose Result: 9.6 % (06-02-21 @ 06:33)      Thyroid Function Tests:

## 2021-07-15 NOTE — PROGRESS NOTE ADULT - PROBLEM SELECTOR PLAN 6
Stage IV Endometrial Cancer c/b malignant ascites s/p chemotherapy (6/18 carboplatin/taxol)  - last admission required diagnostic/therapeutic paracentesis, abdomen mildly distended, will get sonogram, if recurrent para may need to consider pleurx  - oncology following, patient not a candidate for DMT at this time, family still interested in pursuing therapy, palliative care consult to assist with further GOC  - pain control: at home on morphine ER 15mg BID, oxycodone 5mg [id number] PRN severe pain, on admissions -discussed with palliative, anticipate improvement in renal function with fluids can continue morphine, however BPs now soft in high 90s (baseline 110s-120s) - will hold morphine ER for now, resume when BP stabilizes to prevent withdrawal
Na 144  - hypovolemic on exam, likely due to poor PO intake   - c/w 1/2NS, encourage PO intake  - hold home ethacrynic acid for now (started last admission by nephro for hypo-osmolar hypervolemic hyponatremia)
UA positive + febrile   - UCx NTD however sent after abx started   - completed empiric zosyn for longer course x 7 days through 7/10 given soft BPs  - trend fever curve
UA positive + febrile   - UCx NTD however sent after abx started   - c/w empiric zosyn for longer course x 7 days through 7/10 given soft BPs  - trend fever curve
Na 146  - hypovolemic on exam, likely due to poor PO intake   - c/w 1/2NS, encourage PO intake  - hold home ethacrynic acid for now (started last admission by nephro for hypo-osmolar hypervolemic hyponatremia)
Stage IV Endometrial Cancer c/b malignant ascites s/p chemotherapy (6/18 carboplatin/taxol)  - last admission required diagnostic/therapeutic paracentesis, abdomen mildly distended, will get sonogram, if recurrent para may need to consider pleurx  - oncology following, patient not a candidate for DMT at this time, family still interested in pursuing therapy, palliative care consult to assist with further GOC  - pain control: at home on morphine ER 15mg BID, oxycodone 5mg [id number] PRN severe pain, on admissions -discussed with palliative, anticipate improvement in renal function with fluids can continue morphine, however BPs now soft in high 90s (baseline 110s-120s) - will hold morphine ER for now, resume when BP stabilizes to prevent withdrawal
A1C 8%  - hyperglycemic on admission however did not meet DKA criteria  - home regimen: Lantus 18U at bedtime, metformin 1000mg BID  - hold home metformin while admitted, resume on discharge  - basal/bolus regimen per endocrine, SSI/FS qac and hs
UA positive + febrile   - UCx NTD however sent after abx started   - completed empiric zosyn for longer course x 7 days through 7/10 given soft BPs  - trend fever curve
UA positive + febrile   - UCx NTD however sent after abx started   - completed empiric zosyn for longer course x 7 days through 7/10 given soft BPs  - trend fever curve
UA positive + febrile   - UCx NTD however sent after abx started   - c/w empiric zosyn for longer course x 7 days through 7/10 given soft BPs  - trend fever curve

## 2021-07-15 NOTE — CHART NOTE - NSCHARTNOTEFT_GEN_A_CORE
Reviewed all labs with medical attending Dr. Flores pt cleared to be discharge home with hospice. no need for further intervention.  d/w Dr. ly regarding insulin regimen ok to decrease lantus to 5units qhs. Reviewed all labs with medical attending Dr. Flores pt cleared to be discharge home with hospice. no need for further intervention.  d/w Dr. ly regarding insulin regimen ok to decrease lantus to 5units qhs. as per cards not repeat ekg  if no ischemic changes dc planning   d/w medical attending dr. flores no need for further work up pt cleared for dc Reviewed all labs with medical attending Dr. Flores pt cleared to be discharge home with hospice. no need for further intervention.  d/w Dr. ly regarding insulin regimen ok to decrease lantus to 5units qhs. as per cards not repeat ekg  if no ischemic changes dc planning   d/w medical attending dr. flores no need for further work up pt cleared for dc.  MD attending Dr. flores notified

## 2021-07-15 NOTE — PROGRESS NOTE ADULT - PROBLEM SELECTOR PROBLEM 1
Endometrial cancer
Endometrial cancer
Type 2 diabetes mellitus with hyperglycemia, with long-term current use of insulin
Type 2 diabetes mellitus with hyperglycemia, with long-term current use of insulin
Endometrial cancer
Type 2 diabetes mellitus with hyperglycemia, with long-term current use of insulin
Hypotension
Neoplasm related pain
Type 2 diabetes mellitus with hyperglycemia, with long-term current use of insulin
Neoplasm related pain
Sepsis
Neoplasm related pain
Hypotension
Endometrial cancer
Endometrial cancer
Sepsis
Endometrial cancer
PROVIDER:[TOKEN:[[id number]],FOLLOWUP:[Routine]]

## 2021-07-15 NOTE — PROGRESS NOTE ADULT - NSICDXPILOT_GEN_ALL_CORE
[place]
PLEASE SCHEDULE ENT APPOINTMENT WITH DR.JAY SPARKS  ,PATIENT HAS SEVERE R SINUS DISEASE ( SEE CT AND MRI REPORTS ATTACHED )
[place]

## 2021-07-15 NOTE — PROGRESS NOTE ADULT - PROBLEM SELECTOR PLAN 4
Hx of L. mid femoral/peroneal vein DVT  - resume therapeutic lovenox 55mg BID
goal is for home with hospice  address AD with daughter yesterday and pt remains full code  pt lacks capacity at this time to make decision  family meeting to take place with primary team and onc today to discuss further AD and nutrition  will follow up in am
A1C 8%  - hyperglycemic, does not meet DKA criteria, elevated anion gap in setting of sepsis/EZE, pH wnl   - home regimen: Lantus 18U at bedtime, metformin 1000mg BID  - hold home metformin while admitted, resume on discharge  - appreciate endo recs: Lantus 18U qhs + Admelog 3U TID qc, FS/SSI qac and hs
goal is for home with hospice  address AD with daughter again today and pt remains full code  pt lacks capacity at this time to make decision  address issue of caring for pt at home.  family goal is for home with hospice but looking for hha to help  they do not want her in a facility at this time  will have hospice and cm follow up in am
Improving, SCr 1.3 --> 0.9 (baseline 0.5-0.6)  - likely pre-renal due to hemodynamic changes from sepsis  - c/w IVF, hold home ethacrynic acid as below for now   - trend BMP, renally dose medications
A1C 8%  - hyperglycemic, does not meet DKA criteria, elevated anion gap in setting of sepsis/EZE, pH wnl   - home regimen: Lantus 18U at bedtime, metformin 1000mg BID  - hold home metformin while admitted, resume on discharge  - appreciate endo recs: Lantus 18U qhs + Admelog 3U TID qc, FS/SSI qac and hs
Improving, SCr 1.3 --> 0.9 (baseline 0.5-0.6)  - likely pre-renal due to hemodynamic changes from sepsis  - c/w IVF, hold home ethacrynic acid as below for now   - trend BMP, renally dose medications
Hx of L. mid femoral/peroneal vein DVT  - continue therapeutic lovenox 55mg BID  - (hold after 7/11 AM dose - 24hours prior to tentative paracentesis 7/12)
goal is for home with hospice  address AD with daughter - pt remains full code  pt lacks capacity at this time to make decision  family goal is for home with hospice but looking for hha to help  they do not want her in a facility at this time  hospice and cm  working with daughter  please call [phone] over weekend with questions
Hx of L. mid femoral/peroneal vein DVT  - Lovenox on hold for procedure, resume therapeutic lovenox 55mg BID after paracentesis when cleared by procedure team  - (hold after 7/11 AM dose - 24hours prior to tentative paracentesis 7/12)
Hx of L. mid femoral/peroneal vein DVT  - holding therapeutic lovenox 55mg BID pending CTAP to r/o bleed  - if CTAP negative can resume lovenox 55mg BID (hold after 7/11 AM dose - 24hours prior to tentative paracentesis 7/12)
UA positive + febrile   - UCx NTD however sent after abx started   - c/w empiric zosyn for longer course x 7 days through 7/10 given soft BPs  - trend fever curve
Hx of L. mid femoral/peroneal vein DVT  - resume therapeutic lovenox 55mg BID
Hx of L. mid femoral/peroneal vein DVT  - holding therapeutic lovenox 55mg BID pending CTAP to r/o bleed  - if CTAP negative can resume lovenox 55mg BID (hold after 7/11 AM dose - 24hours prior to tentative paracentesis 7/12)
Hx of L. mid femoral/peroneal vein DVT  - resume therapeutic lovenox 55mg BID

## 2021-07-15 NOTE — PROGRESS NOTE ADULT - ASSESSMENT
Patient is a 56yoF admitted due to sepsis, recently admitted to [facility] for metastatic uterine carcinosarcoma c/b ascites and received carbo+paclitaxel on 6/18/21, given her current clinical condition she is deemed not appropriate for further treatment and hospice is recommended.   Endocrine consulted for inpatient DM management. HbA1C of 8.2% (was improved from 9.6% on prior admission).    *NOTE: This document is in progress and is to be discussed with the attending* Patient is a 56yoF admitted due to sepsis, recently admitted to [facility] for metastatic uterine carcinosarcoma c/b ascites and received carbo+paclitaxel on 6/18/21, given her current clinical condition she is deemed not appropriate for further treatment and hospice is recommended.   Endocrine consulted for inpatient DM management. HbA1C of 8.2% (was improved from 9.6% on prior admission). Patient is a 56yoF admitted due to sepsis, recently admitted to [facility] for metastatic uterine carcinosarcoma c/b ascites and received carbo+paclitaxel on 6/18/21, given her current clinical condition she is deemed not appropriate for further treatment and hospice is recommended.   Endocrine consulted for inpatient DM management. HbA1C of 8.2% (was improved from 9.6% on prior admission).

## 2021-07-15 NOTE — PROGRESS NOTE ADULT - PROBLEM SELECTOR PROBLEM 4
EZE (acute kidney injury)
DVT (deep venous thrombosis)
DVT (deep venous thrombosis)
Type 2 diabetes mellitus with hyperglycemia, with long-term current use of insulin
DVT (deep venous thrombosis)
Palliative care encounter
DVT (deep venous thrombosis)
Palliative care encounter
Type 2 diabetes mellitus with hyperglycemia, with long-term current use of insulin
Palliative care encounter
DVT (deep venous thrombosis)
UTI (urinary tract infection)
DVT (deep venous thrombosis)
EZE (acute kidney injury)
DVT (deep venous thrombosis)

## 2021-07-15 NOTE — PROGRESS NOTE ADULT - PROBLEM SELECTOR PROBLEM 2
Hypertension, unspecified type
Sepsis
Hypotension
Hypertension, unspecified type
Hypotension
Hypertension, unspecified type
Hypertension, unspecified type
UTI (urinary tract infection)
Functional quadriplegia
Functional quadriplegia
Hypertension, unspecified type
Hypertension, unspecified type
Functional quadriplegia
Hypotension
UTI (urinary tract infection)
Hypotension
Hypotension
Sepsis
Hypotension

## 2021-07-15 NOTE — PROGRESS NOTE ADULT - PROBLEM SELECTOR PLAN 9
A1C 8%  - home regimen: Lantus 18U at bedtime, metformin 1000mg BID  -had hypoglycemia, insulin dose reduced  -basal bolus insulin per endo, hold premeal insulin if NPO or minimal intake  -SSI/FS qac and hs A1C 8%  - home regimen: Lantus 18U at bedtime, metformin 1000mg BID  -had hypoglycemia, insulin dose reduced  -lantus 10u hs + low dose ISS per endo, F/U final endo recs  -SSI/FS qac and hs

## 2021-07-15 NOTE — PROGRESS NOTE ADULT - REASON FOR ADMISSION
urosepsis

## 2021-07-15 NOTE — PROGRESS NOTE ADULT - NUTRITIONAL ASSESSMENT
This patient has been assessed with a concern for Malnutrition and has been determined to have a diagnosis/diagnoses of Severe protein-calorie malnutrition and Underweight (BMI < 19).    This patient is being managed with:   Diet Regular-  Consistent Carbohydrate {Evening Snack} (CSTCHOSN)  Pesco Vegetarian (Accepts Fish)  Supplement Feeding Modality:  Oral  Glucerna Shake Cans or Servings Per Day:  1       Frequency:  Three Times a day  Entered: Jul 4 2021  2:58PM    

## 2021-07-15 NOTE — PROGRESS NOTE ADULT - PROBLEM SELECTOR PROBLEM 5
Sepsis
Sepsis
Hypernatremia
Hypernatremia
Sepsis
Sepsis
Type 2 diabetes mellitus with hyperglycemia, with long-term current use of insulin
EZE (acute kidney injury)
Sepsis
Type 2 diabetes mellitus with hyperglycemia, with long-term current use of insulin
Sepsis
Sepsis

## 2021-07-15 NOTE — PROGRESS NOTE ADULT - PROBLEM SELECTOR PROBLEM 6
UTI (urinary tract infection)
Endometrial cancer
UTI (urinary tract infection)
UTI (urinary tract infection)
Endometrial cancer
Hypernatremia
UTI (urinary tract infection)
Type 2 diabetes mellitus with hyperglycemia, with long-term current use of insulin
UTI (urinary tract infection)
Hypernatremia
UTI (urinary tract infection)
UTI (urinary tract infection)

## 2021-07-15 NOTE — PROGRESS NOTE ADULT - PROBLEM SELECTOR PROBLEM 9
Type 2 diabetes mellitus with hyperglycemia, with long-term current use of insulin
Need for prophylactic measure
Type 2 diabetes mellitus with hyperglycemia, with long-term current use of insulin
Need for prophylactic measure
Anemia
Anemia
Type 2 diabetes mellitus with hyperglycemia, with long-term current use of insulin
Type 2 diabetes mellitus with hyperglycemia, with long-term current use of insulin
Anemia

## 2021-07-16 ENCOUNTER — APPOINTMENT (OUTPATIENT)
Dept: HEMATOLOGY ONCOLOGY | Facility: CLINIC | Age: 56
End: 2021-07-16

## 2021-07-16 ENCOUNTER — APPOINTMENT (OUTPATIENT)
Dept: INFUSION THERAPY | Facility: HOSPITAL | Age: 56
End: 2021-07-16

## 2021-07-23 ENCOUNTER — APPOINTMENT (OUTPATIENT)
Dept: HEMATOLOGY ONCOLOGY | Facility: CLINIC | Age: 56
End: 2021-07-23

## 2021-07-30 ENCOUNTER — APPOINTMENT (OUTPATIENT)
Dept: INFUSION THERAPY | Facility: HOSPITAL | Age: 56
End: 2021-07-30

## 2021-08-13 ENCOUNTER — APPOINTMENT (OUTPATIENT)
Dept: HEMATOLOGY ONCOLOGY | Facility: CLINIC | Age: 56
End: 2021-08-13

## 2021-08-20 ENCOUNTER — APPOINTMENT (OUTPATIENT)
Dept: INFUSION THERAPY | Facility: HOSPITAL | Age: 56
End: 2021-08-20

## 2021-09-03 ENCOUNTER — APPOINTMENT (OUTPATIENT)
Dept: HEMATOLOGY ONCOLOGY | Facility: CLINIC | Age: 56
End: 2021-09-03

## 2021-10-06 PROBLEM — I10 ESSENTIAL HYPERTENSION: Status: ACTIVE | Noted: 2020-07-20

## 2022-06-14 NOTE — PROGRESS NOTE ADULT - PROVIDER SPECIALTY LIST ADULT
Endocrinology
PAST MEDICAL HISTORY:  No pertinent past medical history     No pertinent past medical history       
Endocrinology
Endocrinology
Heme/Onc
Heme/Onc
Hospitalist
Heme/Onc
Heme/Onc
Endocrinology
Endocrinology
Hospitalist
Endocrinology
Hospitalist
Hospitalist
Palliative Care
Hospitalist
Palliative Care
Palliative Care
Hospitalist

## 2022-10-28 NOTE — ED PROVIDER NOTE - PR
28-Oct-2022 01:33
59 year old female sent by Urgent Care for weakness, dehydration, nausea, and decreased PO intake x4 days. Pt reports feelings of sadness since "my sister said something I didn't like" x4 days ago. Pt denies SI/HI, or self-harm. Pt presents well-appearing, and denies dizziness, chest pain, SOB, vomiting, abdominal pain, fevers or chills.
100

## 2022-11-14 NOTE — ED ADULT NURSE NOTE - DRUG PRE-SCREENING (DAST -1)
Pain not relieved by Medications/Fever greater than 101/Unable to Urinate/Bleeding that does not stop/Numbness, color, or temperature change to extremity/Persistent Nausea and Vomiting/Inability to Tolerate Liquids or Foods
Statement Selected
4 = No assist / stand by assistance

## 2022-12-16 NOTE — ED PROVIDER NOTE - CPE EDP GASTRO NORM
Pharmacist states that patient's insurance doesn't cover amoxicillin-clavulanate (Augmentin XR) 1000-62.5 MG per tablet and want to know if provider would like to switch to a different medication?   - - -

## 2022-12-25 NOTE — PROGRESS NOTE ADULT - PROBLEM SELECTOR PLAN 2
[facility]  236/02  HOSPITAL MEDICINE PROGRESS NOTE   Patient: Arlen Clay  Today's Date: 2022    YOB: 1959  Admission Date: 2022    MRN: [id number]  Inpatient LOS: 2    Attending: Freida Santos MD  Hospital Day: Hospital Day: 3    Subjective   HISTORY AND SUBJECTIVE COMPLAINTS     Chief Complaint:   C.diff colitis     Interval History / Subjective:   No overnight events.    Hospital Course:  Arlen Clay is a 63 year old female who presented on 2022 with complaints of Weakness  .    ROS:  Pertinent systems negative except as above.    Objective   PHYSICAL EXAMINATION     Vital 24 Hour Range Most Recent Value   Temperature Temp  Min: 97 °F (36.1 °C)  Max: 97.9 °F (36.6 °C) 97.9 °F (36.6 °C)   Pulse Pulse  Min: 99  Max: 102 100   Respiratory Resp  Min: 18  Max: 20 18   Blood Pressure BP  Min: 93/52  Max: 99/55 99/55   Pulse Oximetry SpO2  Min: 94 %  Max: 99 % 94 %   Arterial BP No data recorded     O2 O2 Flow Rate (L/min)  Av L/min  Min: 4 L/min   Min taken time: 22 0900  Max: 4 L/min   Max taken time: 22 0900       Recorded Intake and Output:    Intake/Output Summary (Last 24 hours) at 2022 1040  Last data filed at 2022 1032  Gross per 24 hour   Intake 1020 ml   Output 301 ml   Net 719 ml      Recorded Last Stool Occurrence: 1 (22 1032)     Vital Most Recent Value First Value   Weight 91.6 kg (201 lb 15.1 oz) Weight: 79.4 kg (175 lb)   Height 5' 7\" (170.2 cm) Height: 5' 7\" (170.2 cm)   BMI 31.63 N/A     General: Looks acutely ill no apparent distress  CV: regular rate and rhythm  Resp: clear to auscultation bilaterally  Abd: soft and nontender  Ext: no rashes, lesions, or ulcers noted  Skin: no rashes, lesions, or ulcers noted  Neuro: CN 2-12 grossly intact      TEST RESULTS     Labs: The Laboratory values listed below have been reviewed and pertinent findings discussed in the Assessment and  Plan.    Laboratory values:   Recent Labs   Lab 12/24/22  0549 12/23/22  1135 12/20/22  0616   WBC 8.8 8.5 16.0*   HGB 8.0* 8.9* 7.9*   HCT 27.5* 29.7* 25.5*   * 156 178       Recent Labs   Lab 12/24/22  0549 12/23/22  1135 12/20/22  0616   SODIUM 131* 132* 127*   POTASSIUM 4.1 4.1 3.4   CHLORIDE 103 101 97   CO2 14* 18* 16*   CALCIUM 7.8* 8.3* 7.5*   GLUCOSE 71 96 89   BUN 63* 62* 64*   CREATININE 2.07* 2.25* 1.71*   MG  --  1.9  --           Radiology: Imaging studies have been reviewed and pertinent findings discussed in the Assessment and Plan.  Results for orders placed or performed during the hospital encounter of 12/23/22 (from the past 48 hour(s))   XR Chest AP or PA    Impression    IMPRESSION:      Stable cardiomegaly.    Suspect mild central pulmonary venous congestion.    Bibasilar linear opacities favor atelectasis. Correlate for edema or  pneumonia.       CT ABDOMEN PELVIS WO CONTRAST    Impression    IMPRESSION:    1. Irregular bladder wall thickening and nondependent gas within the  bladder is a possibility for cystitis. Correlate with urinalysis.  2. Mesenteric edema, pelvic ascites and diffuse subcutaneous edema  suggesting volume overload.  3. Bibasilar groundglass opacities and and intralobular septal thickening  are suspicious for pulmonary edema. Correlate for infection.          ANCILLARY ORDERS     Diet:  Cardiac Diet  Telemetry: Off  Consults:    IP CONSULT TO CARDIOLOGY  IP CONSULT TO NEPHROLOGY  Therapy Orders:   No orders of the defined types were placed in this encounter.      ADVANCED DIRECTIVES     Code Status: Full Resuscitation             ASSESSMENT AND PLAN     Generalized weakness    blood cultures urine cultures negative  Patient was recently diagnosed with C. difficile colitis   continue with vancomycin      COPD   On supplemental oxygen continue with the same on admission patient was initially placed on non-rebreather then switched to supplemental oxygen through nasal  cannula     C. difficile colitis   Continue with oral vancomycin 250 mg p.o. q.i.d.  Patient rehab in 4-6 loose bowel movements every day  Will add Questran     Acute on chronic renal failure   baseline is around 1.5   probably secondary to dehydration/decreased oral intake  Cr 2.25 - > 2.07   Will follow-up with BMP  Appreciate nephrology recommendations    Smoking status: former smoker    Nutrition status: appropriate  Body mass index is 31.63 kg/m². - Obese (BMI 30-39 without a comorbid condition or 30-34 with a comorbid condition)  DVT Prophylaxis: Lovenox therapeutic dosing          DISCHARGE PLANNING     The patient's treatment plans were discussed with patient.    Discharge Planning       Barriers to discharge: Patient is not medically ready and needs to remain in the hospital today due to weakness   Anticipated discharge destination: Home  Expected Discharge Date: TBD                Freida Santos MD  Hospitalist  12/25/2022  10:40 AM     Pt with worsening hyponatremia, appears to be hypervolemic given ascites but with intravascular depletion.   -  nephrology consulted and follg  - per nephro cont ethacrynic acid at low dose per renal recs  - cont to monitor Na, f/u renal recs  level stable  asymptomatic

## 2023-03-11 NOTE — PROGRESS NOTE ADULT - ASSESSMENT
57 yo F with PMH AODM, migraines, and stage 4 endometrial cancer, non ambulatory at baseline, coming in w/ complaining of weakness, dizziness and nausea. FS at home were 350s causing family to provide Lantus and call EMS. Pt was recently admitted to [facility] for metastatic uterine carcinosarcoma c/b ascites and received carbo+paclitaxel on 6/18/21. However, her general condition quickly decompensated over the last few weeks and upon outpatient discussion with Dr. Hoang, she was deemed not appropriate for further DMT and hospice was recommended. Urgent hospice referral was made by Dr. Hoang's team. Now presenting to ED with fever and urosepsis.    # Metastatic endometrial cancer  - Recently diagnosed metastatic uterine carcinosarcoma, c/b ascites, last chemotherapy with carbo+paclitaxel on 6/18, then her condition quickly decompensated over the last few weeks.  - Pt is not a candidate for DMT at this point  - Plan for discharge home with hospice  - Will require symptom oriented care at this point with pain management and stool regimen  - She has moderate ascites on exam and on imaging. Would monitor but if becomes symptomatic would schedule paracentesis prior to discharge  - Treatment for urosepsis per primary team, will complete Zosyn 7/10  - Pt followed by Dr. Hoang at [facility].  -C/w Supportive care, pain control, Nutrition, PT, DVT ppx  -Oncology will continue to follow with you.       Heart Failure/Influenza Vaccination

## 2023-04-21 NOTE — CONSULT NOTE ADULT - PROBLEM SELECTOR RECOMMENDATION 4
30-year-old female with no known past medical history brought to the ER s/p cardiac arrest.  She was flying from [place] to [place] when she c/o chest pain and became unresponsive.  CPR was started in the flight until she was brought to the ER with a down time of more than 75 minutes, more than 6 rounds of epi.  She was intubated in the ER and started on Levophed.  PH of 6.7, troponin 20.  Urine pregnancy test negative.  ER provider discussed case with on-call cardiologist since this could be a case of PE but she was deemed a poor thrombolysis candidate.  Was started on a heparin drip for presumed PE.  CT head showed severe anoxic brain injury.  Admitted to the ICU.  
newly diagnosed cancer  HCP form filled out provided to patient and placed in chart- daughter vinnie primary,  Lb- secondary  goals remain to continue all current medical management
- noted to have metastatic cancer with bony mets  - may benefit from tx with bisphosphonate as outpatient given her bony mets    Darryl Valladares MD   Pager # [phone]  On evenings and weekends, please call the office at [phone] or page endocrine fellow on call. Please note that this patient may be followed by different provider tomorrow. If no answer, contact the office.

## 2024-04-18 NOTE — PATIENT PROFILE ADULT - BRADEN ACTIVITY
- Ameena GARCIA with [facility] calling concerning the orders only encounter from 4/3/2024    - States that she needs the medication list refaxed to [phone] with the PCP signature.     - Additionally, requesting that the Cranberry supplement have instructions that patient can self administer. Placed on medication copy and faxed to the facility 04/18/2024 @ 1:45 PM     Randy Kraus RN  Patient Advocate Liaison (PAL)  [facility]  [phone]    04/18/2024 at 1:45 PM    (4) walks frequently

## 2025-03-07 NOTE — H&P ADULT - PROBLEM/PLAN-5
[facility]  [facility] [id number] [phone]    Dacia Velasquez :1984 MRN:[id number]    RE-ADMIT PROGRESS NOTE    Re-admit date:  3/7/2025    Last date of service:  2025- transferred from [facility] due to her leaving the clinic     Reason for re-admit:  Symptoms the same- stated that it's mostly been work stress and there have been some work changes and different stressors     Provider reviewed current assessment with patient  Yes  [x]    No  []      [] No changes to current status      Changes in: [x] BH symptoms  [] Safety  [] Physical Health     [x] Work/School  [] Family  [] Trauma  [] Loss  [] Substance use    (Details to be included in D[facility] Note)    Treatment plan [x] reviewed      [] updated and signed by provider     [] review deferred to next session    3/7/2025 Time Session Began: 7:28 AM   Time Session Ended: 8:28 AM     Session Type:Therapy 53+ minutes (38623)    Others Present: Pt only     Intervention: Assessment, Insight    Suicide/Homicide/Violence Ideation: No    If Yes, explain: N/a    Current Outpatient Medications   Medication Sig    phenazopyridine (Pyridium) 200 MG tablet Take 1 tablet by mouth 3 times daily as needed for Pain.    buPROPion XL (Wellbutrin XL) 300 MG 24 hr tablet Take 1 tablet by mouth daily.    rimegepant sulfate (Nurtec) 75 MG disintegrating tablet 1 tablet by mouth at onset of migraine and may repeat in 24 hours not to exceed 3 in 7 days.    nitrofurantoin, macrocrystal-monohydrate, (MACROBID) 100 MG capsule Take 1 capsule by mouth 2 times daily for 5 days (Patient not taking: Reported on 2025)    Atogepant (Qulipta) 60 MG Tab Take 1 tablet by mouth at bedtime    ondansetron (ZOFRAN ODT) 4 MG disintegrating tablet Place 1 tablet onto the tongue every 8 hours as needed for Nausea.    albuterol 108 (90 Base) MCG/ACT inhaler Inhale 2 puffs into the lungs every 4 hours as  needed for Shortness of Breath or Wheezing.    amphetamine-dextroamphetamine (Adderall) 20 MG tablet Take 1 tablet by mouth daily. Begin taking on January 9, 2025.    amphetamine-dextroamphetamine (Adderall) 20 MG tablet Take 1 tablet by mouth daily.    amphetamine-dextroamphetamine (Adderall XR) 20 MG 24 hr capsule Take 1 capsule by mouth daily. Begin taking on January 9, 2025.    amphetamine-dextroamphetamine (Adderall XR) 20 MG 24 hr capsule Take 1 capsule by mouth daily.    semaglutide-Weight Management (WEGOVY) 1.7 MG/0.75ML injection Inject 1.7 mg into the skin every 7 days. Indications: OBESITY    ADAlimumab (Humira, 2 Pen,) 80 MG/0.8ML citrate free Inject 1 pen into the skin every other week    rivaroxaban (Xarelto) 20 MG Tab Take 1 tablet by mouth daily (with dinner).    Viloxazine HCl ER (Qelbree) 200 MG CAPSULE SR 24 HR Take 2 capsules by mouth daily.    cariprazine (Vraylar) 1.5 MG capsule Take 1 capsule by mouth daily.    spironolactone (ALDACTONE) 50 MG tablet Take 1 tablet by mouth 2 times daily.    dapsone (Aczone) 7.5 % gel Apply to affected areas daily    fluticasone-salmeterol (Advair HFA) 230-21 MCG/ACT inhaler Inhale 2 puffs into the lungs in the morning and 2 puffs in the evening.    amphetamine-dextroamphetamine (Adderall XR) 20 MG 24 hr capsule Take 1 capsule by mouth daily. Begin taking on September 9, 2024.    amphetamine-dextroamphetamine (Adderall XR) 20 MG 24 hr capsule Take 1 capsule by mouth daily.    amphetamine-dextroamphetamine (Adderall XR) 20 MG 24 hr capsule Take 1 capsule by mouth daily. Begin taking on August 12, 2024. (Patient not taking: Reported on 10/21/2024)    amphetamine-dextroamphetamine (Adderall XR) 20 MG 24 hr capsule Take 1 capsule by mouth daily. Do not start before March 11, 2024.    amphetamine-dextroamphetamine (Adderall XR) 20 MG 24 hr capsule Take 1 capsule by mouth daily. Do not start before January 5, 2024.    buPROPion XL (Wellbutrin XL) 300 MG 24 hr tablet  Take 1 tablet by mouth daily.    tretinoin (RETIN-A) 0.025 % cream Apply pea sized amount to face nightly    Sharps Container (GUARDIAN Sharps ) Misc use as directed    Alcohol Swabs (B-D SINGLE USE SWABS REGULAR) Pads Use as directed with Humira    fluticasone (VERAMYST) 27.5 MCG/SPRAY nasal spray Spray 2 sprays in each nostril daily.     No current facility-administered medications for this visit.       Change in Medication(s) Reported: No  If Yes, explain: N/a    Patient/Family Education Provided: Yes  Patient/Family Displays Understanding: Yes    If No, explain: N/a    Chief complaint in patient's own words: Work stress     Progress Note containing chief complaint and symptoms/problems related to the complaint:    D: Patient stated that there was a bullying situation at work for the past year. Stated that this worker is her main person she works with and is now nicer than she had been, but has been worrying about losing her job. 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 Stated that the new manager has very high standards and she is messing up daily. Stated that she had zero support and training and is struggling with the high standards. Patient stated that she has failed out of school due to the overwhelmed feelings with all the work and shutting down. Patient discussed communication barriers at work and stated that she is Autistic and has ADHD. Autism was diagnosed five years ago and ADHD was diagnosed in 2022 along with the Autism again. Patient stated that the thought of doing different tasks feels like torture and often leads to feeling overwhelmed and avoiding. Discussed struggles with frustration tolerance. Patient stated that her family life was not good growing up (drugs, alcohol, violence). Stated that father passed 8 years ago and mom and brother are living  in [place]. Patient stated that she is not good at making friends and has never dated. Discussed struggles with connecting with other's and not finding the same things funny or having the same interests. Stated that people at work will talk about things that are inappropriate and she finds it very upsetting (being  mental towards others). Discussed struggles with getting upset over these things and not being able to just let them go. Patient stated that she really values truth and accuracy as well as people just doing the right thing. Stated that she describes things accurately and doesn't change her mindset. Discussed being open minded until other people are very judgmental. Discussed some struggles with loneliness and social rejection. Patient has blood clots and aneurisms.      A: Patient was provided with support during today's appointment. Consent was signed on [employer] noe prior to today's appt and verbal consent was given. Completed assessment/re-admit.     R: Patient was alert and oriented X4. Denied thoughts of self harm and suicide. Patient expressed challenges with work and work related stress as well as communication and social problems.      P: Patient is scheduled for follow up in early July due to provider schedule and patient work schedule. Will reach out with sooner appointments that do open up.     Need for Community Resources Assessed: Yes    Resources Needed: No    If Yes, what resources: N/a    Primary Diagnosis: Major Depression Recurrent  : 2 Moderate  ADHD inattentive type   Generalized Anxiety Disorder     Treatment Plan: Unchanged    Discharge Plan: Strategies Discussed to Maintain Gains    Next Appointment: 7/1/2025  Jesse Low LCSW     DISPLAY PLAN FREE TEXT